# Patient Record
Sex: FEMALE | Race: WHITE | NOT HISPANIC OR LATINO | Employment: OTHER | URBAN - METROPOLITAN AREA
[De-identification: names, ages, dates, MRNs, and addresses within clinical notes are randomized per-mention and may not be internally consistent; named-entity substitution may affect disease eponyms.]

---

## 2019-09-23 ENCOUNTER — APPOINTMENT (OUTPATIENT)
Dept: RADIOLOGY | Facility: IMAGING CENTER | Age: 69
End: 2019-09-23
Attending: FAMILY MEDICINE
Payer: MEDICARE

## 2019-09-23 ENCOUNTER — HOSPITAL ENCOUNTER (OUTPATIENT)
Dept: LAB | Facility: MEDICAL CENTER | Age: 69
End: 2019-09-23
Attending: FAMILY MEDICINE
Payer: MEDICARE

## 2019-09-23 ENCOUNTER — OFFICE VISIT (OUTPATIENT)
Dept: MEDICAL GROUP | Facility: PHYSICIAN GROUP | Age: 69
End: 2019-09-23
Payer: MEDICARE

## 2019-09-23 VITALS
HEIGHT: 66 IN | RESPIRATION RATE: 18 BRPM | DIASTOLIC BLOOD PRESSURE: 82 MMHG | BODY MASS INDEX: 22.5 KG/M2 | TEMPERATURE: 98.7 F | HEART RATE: 110 BPM | WEIGHT: 140 LBS | OXYGEN SATURATION: 97 % | SYSTOLIC BLOOD PRESSURE: 122 MMHG

## 2019-09-23 DIAGNOSIS — M54.42 CHRONIC BILATERAL LOW BACK PAIN WITH LEFT-SIDED SCIATICA: ICD-10-CM

## 2019-09-23 DIAGNOSIS — F41.9 ANXIETY: ICD-10-CM

## 2019-09-23 DIAGNOSIS — I15.2 HYPERTENSION ASSOCIATED WITH DIABETES (HCC): ICD-10-CM

## 2019-09-23 DIAGNOSIS — E11.59 HYPERTENSION ASSOCIATED WITH DIABETES (HCC): ICD-10-CM

## 2019-09-23 DIAGNOSIS — F31.9 BIPOLAR 1 DISORDER (HCC): ICD-10-CM

## 2019-09-23 DIAGNOSIS — E78.5 DYSLIPIDEMIA: ICD-10-CM

## 2019-09-23 DIAGNOSIS — Z85.3 HISTORY OF BREAST CANCER: ICD-10-CM

## 2019-09-23 DIAGNOSIS — E10.9 TYPE 1 DIABETES MELLITUS WITHOUT COMPLICATION (HCC): ICD-10-CM

## 2019-09-23 DIAGNOSIS — Z23 NEED FOR VACCINATION: ICD-10-CM

## 2019-09-23 DIAGNOSIS — G89.29 CHRONIC BILATERAL LOW BACK PAIN WITH LEFT-SIDED SCIATICA: ICD-10-CM

## 2019-09-23 LAB
ALBUMIN SERPL BCP-MCNC: 4.3 G/DL (ref 3.2–4.9)
ALBUMIN/GLOB SERPL: 1.7 G/DL
ALP SERPL-CCNC: 123 U/L (ref 30–99)
ALT SERPL-CCNC: 12 U/L (ref 2–50)
ANION GAP SERPL CALC-SCNC: 9 MMOL/L (ref 0–11.9)
AST SERPL-CCNC: 20 U/L (ref 12–45)
BASOPHILS # BLD AUTO: 0.5 % (ref 0–1.8)
BASOPHILS # BLD: 0.04 K/UL (ref 0–0.12)
BILIRUB SERPL-MCNC: 0.4 MG/DL (ref 0.1–1.5)
BUN SERPL-MCNC: 14 MG/DL (ref 8–22)
CALCIUM SERPL-MCNC: 10.8 MG/DL (ref 8.5–10.5)
CHLORIDE SERPL-SCNC: 103 MMOL/L (ref 96–112)
CHOLEST SERPL-MCNC: 139 MG/DL (ref 100–199)
CO2 SERPL-SCNC: 27 MMOL/L (ref 20–33)
CREAT SERPL-MCNC: 1 MG/DL (ref 0.5–1.4)
EOSINOPHIL # BLD AUTO: 0.35 K/UL (ref 0–0.51)
EOSINOPHIL NFR BLD: 4.8 % (ref 0–6.9)
ERYTHROCYTE [DISTWIDTH] IN BLOOD BY AUTOMATED COUNT: 44 FL (ref 35.9–50)
EST. AVERAGE GLUCOSE BLD GHB EST-MCNC: 157 MG/DL
FASTING STATUS PATIENT QL REPORTED: NORMAL
GLOBULIN SER CALC-MCNC: 2.5 G/DL (ref 1.9–3.5)
GLUCOSE SERPL-MCNC: 126 MG/DL (ref 65–99)
HBA1C MFR BLD: 7.1 % (ref 0–5.6)
HCT VFR BLD AUTO: 44 % (ref 37–47)
HDLC SERPL-MCNC: 61 MG/DL
HGB BLD-MCNC: 13.6 G/DL (ref 12–16)
IMM GRANULOCYTES # BLD AUTO: 0.02 K/UL (ref 0–0.11)
IMM GRANULOCYTES NFR BLD AUTO: 0.3 % (ref 0–0.9)
LDLC SERPL CALC-MCNC: 51 MG/DL
LYMPHOCYTES # BLD AUTO: 1.79 K/UL (ref 1–4.8)
LYMPHOCYTES NFR BLD: 24.6 % (ref 22–41)
MCH RBC QN AUTO: 28.3 PG (ref 27–33)
MCHC RBC AUTO-ENTMCNC: 30.9 G/DL (ref 33.6–35)
MCV RBC AUTO: 91.5 FL (ref 81.4–97.8)
MONOCYTES # BLD AUTO: 0.62 K/UL (ref 0–0.85)
MONOCYTES NFR BLD AUTO: 8.5 % (ref 0–13.4)
NEUTROPHILS # BLD AUTO: 4.47 K/UL (ref 2–7.15)
NEUTROPHILS NFR BLD: 61.3 % (ref 44–72)
NRBC # BLD AUTO: 0 K/UL
NRBC BLD-RTO: 0 /100 WBC
PLATELET # BLD AUTO: 335 K/UL (ref 164–446)
PMV BLD AUTO: 10.4 FL (ref 9–12.9)
POTASSIUM SERPL-SCNC: 3.7 MMOL/L (ref 3.6–5.5)
PROT SERPL-MCNC: 6.8 G/DL (ref 6–8.2)
RBC # BLD AUTO: 4.81 M/UL (ref 4.2–5.4)
SODIUM SERPL-SCNC: 139 MMOL/L (ref 135–145)
T4 FREE SERPL-MCNC: 1.09 NG/DL (ref 0.53–1.43)
TRIGL SERPL-MCNC: 133 MG/DL (ref 0–149)
TSH SERPL DL<=0.005 MIU/L-ACNC: 4.82 UIU/ML (ref 0.38–5.33)
WBC # BLD AUTO: 7.3 K/UL (ref 4.8–10.8)

## 2019-09-23 PROCEDURE — 73521 X-RAY EXAM HIPS BI 2 VIEWS: CPT | Mod: TC | Performed by: FAMILY MEDICINE

## 2019-09-23 PROCEDURE — 80053 COMPREHEN METABOLIC PANEL: CPT

## 2019-09-23 PROCEDURE — 84443 ASSAY THYROID STIM HORMONE: CPT

## 2019-09-23 PROCEDURE — 83036 HEMOGLOBIN GLYCOSYLATED A1C: CPT | Mod: GA

## 2019-09-23 PROCEDURE — 90662 IIV NO PRSV INCREASED AG IM: CPT | Performed by: FAMILY MEDICINE

## 2019-09-23 PROCEDURE — 84439 ASSAY OF FREE THYROXINE: CPT

## 2019-09-23 PROCEDURE — 36415 COLL VENOUS BLD VENIPUNCTURE: CPT

## 2019-09-23 PROCEDURE — G0008 ADMIN INFLUENZA VIRUS VAC: HCPCS | Performed by: FAMILY MEDICINE

## 2019-09-23 PROCEDURE — 72100 X-RAY EXAM L-S SPINE 2/3 VWS: CPT | Mod: TC | Performed by: FAMILY MEDICINE

## 2019-09-23 PROCEDURE — 85025 COMPLETE CBC W/AUTO DIFF WBC: CPT

## 2019-09-23 PROCEDURE — 80061 LIPID PANEL: CPT

## 2019-09-23 PROCEDURE — 99203 OFFICE O/P NEW LOW 30 MIN: CPT | Mod: 25 | Performed by: FAMILY MEDICINE

## 2019-09-23 RX ORDER — LOSARTAN POTASSIUM 50 MG/1
TABLET ORAL
Qty: 135 TAB | Refills: 3 | Status: SHIPPED | OUTPATIENT
Start: 2019-09-23 | End: 2020-08-26 | Stop reason: SDUPTHER

## 2019-09-23 RX ORDER — GABAPENTIN 100 MG/1
100 CAPSULE ORAL 3 TIMES DAILY
Qty: 90 CAP | Refills: 5 | Status: SHIPPED | OUTPATIENT
Start: 2019-09-23 | End: 2020-02-27

## 2019-09-23 RX ORDER — LITHIUM CARBONATE 300 MG
300 TABLET ORAL 2 TIMES DAILY
Qty: 180 TAB | Refills: 0 | Status: SHIPPED | OUTPATIENT
Start: 2019-09-23 | End: 2020-01-27

## 2019-09-23 RX ORDER — ALPRAZOLAM 0.5 MG/1
TABLET ORAL
Qty: 60 TAB | Refills: 2 | Status: SHIPPED | OUTPATIENT
Start: 2019-09-23 | End: 2020-02-27 | Stop reason: SDUPTHER

## 2019-09-23 RX ORDER — LAMOTRIGINE 100 MG/1
100 TABLET ORAL DAILY
COMMUNITY
End: 2019-09-23 | Stop reason: SDUPTHER

## 2019-09-23 RX ORDER — OLANZAPINE 5 MG/1
5 TABLET ORAL EVERY EVENING
Qty: 90 TAB | Refills: 0 | Status: SHIPPED | OUTPATIENT
Start: 2019-09-23 | End: 2019-11-26 | Stop reason: SDUPTHER

## 2019-09-23 RX ORDER — AMLODIPINE BESYLATE 5 MG/1
5 TABLET ORAL DAILY
COMMUNITY
End: 2020-08-26 | Stop reason: SDUPTHER

## 2019-09-23 RX ORDER — OLANZAPINE 5 MG/1
5 TABLET ORAL NIGHTLY
COMMUNITY
End: 2019-09-23 | Stop reason: SDUPTHER

## 2019-09-23 RX ORDER — INSULIN ASPART 100 [IU]/ML
12-15 INJECTION, SOLUTION INTRAVENOUS; SUBCUTANEOUS
Qty: 15 PEN | Refills: 3 | Status: SHIPPED | OUTPATIENT
Start: 2019-09-23 | End: 2020-06-02

## 2019-09-23 RX ORDER — LITHIUM CARBONATE 300 MG
300 TABLET ORAL 3 TIMES DAILY
COMMUNITY
End: 2019-09-23 | Stop reason: SDUPTHER

## 2019-09-23 RX ORDER — ATORVASTATIN CALCIUM 80 MG/1
80 TABLET, FILM COATED ORAL NIGHTLY
COMMUNITY
End: 2020-03-02

## 2019-09-23 RX ORDER — INSULIN ASPART 100 [IU]/ML
INJECTION, SOLUTION INTRAVENOUS; SUBCUTANEOUS
Refills: 3 | COMMUNITY
Start: 2019-07-03 | End: 2019-09-23 | Stop reason: SDUPTHER

## 2019-09-23 RX ORDER — INSULIN GLARGINE 100 [IU]/ML
30 INJECTION, SOLUTION SUBCUTANEOUS EVERY EVENING
Qty: 15 PEN | Refills: 3 | Status: SHIPPED | OUTPATIENT
Start: 2019-09-23 | End: 2020-02-21

## 2019-09-23 RX ORDER — BUPROPION HYDROCHLORIDE 150 MG/1
150 TABLET, EXTENDED RELEASE ORAL 2 TIMES DAILY
Qty: 180 TAB | Refills: 0 | Status: SHIPPED | OUTPATIENT
Start: 2019-09-23 | End: 2020-04-24 | Stop reason: SDUPTHER

## 2019-09-23 RX ORDER — LOSARTAN POTASSIUM 50 MG/1
TABLET ORAL
Refills: 1 | COMMUNITY
Start: 2019-07-08 | End: 2019-09-23 | Stop reason: SDUPTHER

## 2019-09-23 RX ORDER — LAMOTRIGINE 100 MG/1
100 TABLET ORAL 2 TIMES DAILY
Qty: 180 TAB | Refills: 0 | Status: SHIPPED | OUTPATIENT
Start: 2019-09-23 | End: 2019-11-26 | Stop reason: SDUPTHER

## 2019-09-23 RX ORDER — ALPRAZOLAM 0.5 MG/1
TABLET ORAL
Refills: 2 | COMMUNITY
Start: 2019-08-14 | End: 2019-09-23 | Stop reason: SDUPTHER

## 2019-09-23 RX ORDER — PRIMIDONE 50 MG/1
100 TABLET ORAL DAILY
Qty: 180 TAB | Refills: 1 | Status: SHIPPED | OUTPATIENT
Start: 2019-09-23 | End: 2020-08-26 | Stop reason: SDUPTHER

## 2019-09-23 RX ORDER — ANASTROZOLE 1 MG/1
1 TABLET ORAL DAILY
COMMUNITY
End: 2020-08-26 | Stop reason: SDUPTHER

## 2019-09-23 RX ORDER — PRIMIDONE 50 MG/1
50 TABLET ORAL 4 TIMES DAILY
COMMUNITY
End: 2019-09-23 | Stop reason: SDUPTHER

## 2019-09-23 RX ORDER — BUPROPION HYDROCHLORIDE 150 MG/1
TABLET, EXTENDED RELEASE ORAL
COMMUNITY
Start: 2019-09-23 | End: 2019-09-23 | Stop reason: SDUPTHER

## 2019-09-23 RX ORDER — INSULIN GLARGINE 100 [IU]/ML
INJECTION, SOLUTION SUBCUTANEOUS
COMMUNITY
Start: 2019-09-21 | End: 2019-09-23 | Stop reason: SDUPTHER

## 2019-09-23 SDOH — HEALTH STABILITY: MENTAL HEALTH: HOW OFTEN DO YOU HAVE A DRINK CONTAINING ALCOHOL?: NEVER

## 2019-09-23 ASSESSMENT — PATIENT HEALTH QUESTIONNAIRE - PHQ9
CLINICAL INTERPRETATION OF PHQ2 SCORE: 2
SUM OF ALL RESPONSES TO PHQ QUESTIONS 1-9: 8
5. POOR APPETITE OR OVEREATING: 1 - SEVERAL DAYS

## 2019-09-23 NOTE — LETTER
[facility]  Ema Miller M.D.  [address]  [facility] [address]  Fax: [phone]   Authorization for Release/Disclosure of   Protected Health Information   Name: MOLLY STARK : 1950 SSN: [id number]   Address: [address] [place] [id number] Phone:    [phone] (home)    I authorize the entity listed below to release/disclose the PHI below to:   formerly [facility]/Ema Miller M.D. and Ema Miller M.D.   Provider or Entity Name:     Address   City, State, [facility]   Phone:      Fax:     Reason for request: continuity of care   Information to be released:    [  ] LAST COLONOSCOPY,  including any PATH REPORT and follow-up  [  ] LAST FIT/COLOGUARD RESULT [  ] LAST DEXA  [  ] LAST MAMMOGRAM  [  ] LAST PAP  [  ] LAST LABS [  ] RETINA EXAM REPORT  [  ] IMMUNIZATION RECORDS  [  ] Release all info      [  ] Check here and initial the line next to each item to release ALL health information INCLUDING  _____ Care and treatment for drug and / or alcohol abuse  _____ HIV testing, infection status, or AIDS  _____ Genetic Testing    DATES OF SERVICE OR TIME PERIOD TO BE DISCLOSED: _____________  I understand and acknowledge that:  * This Authorization may be revoked at any time by you in writing, except if your health information has already been used or disclosed.  * Your health information that will be used or disclosed as a result of you signing this authorization could be re-disclosed by the recipient. If this occurs, your re-disclosed health information may no longer be protected by State or Federal laws.  * You may refuse to sign this Authorization. Your refusal will not affect your ability to obtain treatment.  * This Authorization becomes effective upon signing and will  on (date) __________.      If no date is indicated, this Authorization will  one (1) year from the signature date.    Name: Molly Stark    Signature:   Date:     2019       PLEASE FAX REQUESTED RECORDS BACK TO: [phone]  [phone]

## 2019-09-23 NOTE — PROGRESS NOTES
cc: Diabetes      Subjective:     Molly Loera is a 69 y.o. female presenting for the following:     Type 1 diabetes: Patient was diagnosed with type 1 diabetes as an older adult.  She has been controlled on insulin and per her daughter's report has been well controlled.  She has not had any hypoglycemia episodes recently.Patient denies any polyuria/polydipsia, rashes, recurrent infections, changes in vision, changes in sensation.    Bipolar disorder: Patient has had this for many years and has been difficult to control her mood.  She was seeing psychiatry regularly before.  She is currently doing well on her medications.  No suicidal or homicidal ideation.    Review of systems:  All others reviewed and are negative.       Current Outpatient Medications:   •  atorvastatin (LIPITOR) 80 MG tablet, Take 80 mg by mouth every evening., Disp: , Rfl:   •  aspirin EC (ECOTRIN) 81 MG Tablet Delayed Response, Take 81 mg by mouth every day., Disp: , Rfl:   •  anastrozole (ARIMIDEX) 1 MG Tab, Take 1 mg by mouth every day., Disp: , Rfl:   •  amLODIPine (NORVASC) 5 MG Tab, Take 5 mg by mouth every day., Disp: , Rfl:   •  gabapentin (NEURONTIN) 100 MG Cap, Take 1 Cap by mouth 3 times a day., Disp: 90 Cap, Rfl: 5  •  ALPRAZolam (XANAX) 0.5 MG Tab, TAKE 1 TABLET BY MOUTH TWICE A DAY AS NEEDED FOR ANXIETY. F41.1, Disp: 60 Tab, Rfl: 2  •  lamoTRIgine (LAMICTAL) 100 MG Tab, Take 1 Tab by mouth 2 times a day., Disp: 180 Tab, Rfl: 0  •  buPROPion SR (WELLBUTRIN-SR) 150 MG TABLET SR 12 HR sustained-release tablet, Take 1 Tab by mouth 2 times a day., Disp: 180 Tab, Rfl: 0  •  Cariprazine HCl (VRAYLAR) 3 MG Cap, Take 1 Cap by mouth every day., Disp: 30 Cap, Rfl: 2  •  lithium (ESKALITH) 300 MG Tab, Take 1 Tab by mouth 2 Times a Day., Disp: 180 Tab, Rfl: 0  •  LANTUS SOLOSTAR 100 UNIT/ML Solution Pen-injector injection, Inject 30 Units as instructed every evening., Disp: 15 PEN, Rfl: 3  •  losartan (COZAAR) 50 MG Tab, TAKE 1.5 TABLETS (75 MG  "TOTAL) BY MOUTH DAILY., Disp: 135 Tab, Rfl: 3  •  NOVOLOG FLEXPEN 100 UNIT/ML solution for injection, Inject 12-15 Units as instructed 4 Times a Day,Before Meals and at Bedtime., Disp: 15 PEN, Rfl: 3  •  OLANZapine (ZYPREXA) 5 MG Tab, Take 1 Tab by mouth every evening., Disp: 90 Tab, Rfl: 0  •  primidone (MYSOLINE) 50 MG Tab, Take 2 Tabs by mouth every day., Disp: 180 Tab, Rfl: 1    Allergies, past medical history, past surgical history, family history, social history reviewed and updated    Objective:     Vitals: /82 (BP Location: Left arm, Patient Position: Sitting, BP Cuff Size: Adult)   Pulse (!) 110   Temp 37.1 °C (98.7 °F) (Temporal)   Resp 18   Ht 1.676 m (5' 6\")   Wt 63.5 kg (140 lb)   SpO2 97%   BMI 22.60 kg/m²   General: Alert, pleasant, NAD, slightly hard of hearing  HEENT: Normocephalic.   EOMI, no icterus or pallor.  Conjunctivae and lids normal. External ears normal. Oropharynx non-erythematous, mucous membranes moist.    Neck supple.  No thyromegaly or masses palpated. No cervical or supraclavicular lymphadenopathy.  Heart: Regular rate and rhythm.  S1 and S2 normal.  No murmurs appreciated.  Respiratory: Normal respiratory effort.  Clear to auscultation bilaterally.  Abdomen: Non-distended, soft  Skin: Warm, dry, no rashes.  Musculoskeletal: Gait is normal.  Moves all extremities well.  Extremities: No leg edema.    Neurological:  CN2-12 grossly intact  Psych:  Affect is slightly blunted but I am not sure if that is due to hearing difficulty, judgement is good, memory is intact, grooming is appropriate.    Assessment/Plan:     Molly was seen today for establish care.    Diagnoses and all orders for this visit:    Bipolar 1 disorder (HCC): Patient is on many medications to control her mood.  She was seeing psychiatry monthly in the past so does need to reestablish with a psychiatrist in [place].  She is currently stable and feels well on her medications.  -     INFLUENZA VACCINE, HIGH DOSE " (65+ ONLY)  -     REFERRAL TO PSYCHIATRY  -     CBC WITH DIFFERENTIAL; Future  -     TSH; Future  -     FREE THYROXINE; Future  -     lamoTRIgine (LAMICTAL) 100 MG Tab; Take 1 Tab by mouth 2 times a day.  -     buPROPion SR (WELLBUTRIN-SR) 150 MG TABLET SR 12 HR sustained-release tablet; Take 1 Tab by mouth 2 times a day.  -     Cariprazine HCl (VRAYLAR) 3 MG Cap; Take 1 Cap by mouth every day.  -     lithium (ESKALITH) 300 MG Tab; Take 1 Tab by mouth 2 Times a Day.  -     OLANZapine (ZYPREXA) 5 MG Tab; Take 1 Tab by mouth every evening.    History of breast cancer: Patient with breast cancer diagnosis diagnosed in 2017 and treated with mastectomy, chemo, and radiation.  She was seeing her last oncologist every 6 months.  Will refer to oncology.  -     REFERRAL TO HEMATOLOGY ONCOLOGY Referral to? [facility]    Type 1 diabetes mellitus without complication (HCC): Well-controlled in the past without hypoglycemia episodes recently.  Will refill medications and check labs.  -     HEMOGLOBIN A1C; Future  -     Comp Metabolic Panel; Future  -     LANTUS SOLOSTAR 100 UNIT/ML Solution Pen-injector injection; Inject 30 Units as instructed every evening.  -     NOVOLOG FLEXPEN 100 UNIT/ML solution for injection; Inject 12-15 Units as instructed 4 Times a Day,Before Meals and at Bedtime.    Chronic bilateral low back pain with left-sided sciatica: Chronic problem that is limiting patient's function.  Will obtain x-rays and refer to pain clinic.  -     DX-LUMBAR SPINE-2 OR 3 VIEWS; Future  -     DX-HIP-BILATERAL-WITH PELVIS-2 VIEWS; Future  -     REFERRAL TO PAIN CLINIC  -     gabapentin (NEURONTIN) 100 MG Cap; Take 1 Cap by mouth 3 times a day.    Anxiety: Explained to patient that Xanax is generally not recommended regularly in general and much less in someone who is 69 years old.  However, patient does have difficult to control bipolar and I would want any change in medication or withdrawal to be done with the  supervision of a psychiatrist so I will refill this now with the hope of psychiatry managing this in the future.  -     CBC WITH DIFFERENTIAL; Future  -     TSH; Future  -     FREE THYROXINE; Future  -     ALPRAZolam (XANAX) 0.5 MG Tab; TAKE 1 TABLET BY MOUTH TWICE A DAY AS NEEDED FOR ANXIETY. F41.1    Hypertension associated with diabetes (HCC): Well-controlled chronic problem.  -     losartan (COZAAR) 50 MG Tab; TAKE 1.5 TABLETS (75 MG TOTAL) BY MOUTH DAILY.    Dyslipidemia  -     Lipid Profile; Future    Need for vaccination: Daughter and patient think that she is up-to-date on her vaccinations but will check records.    Other orders  -     primidone (MYSOLINE) 50 MG Tab; Take 2 Tabs by mouth every day.    Return in about 6 months (around 3/23/2020), or if symptoms worsen or fail to improve.

## 2019-10-04 ENCOUNTER — TELEPHONE (OUTPATIENT)
Dept: MEDICAL GROUP | Facility: PHYSICIAN GROUP | Age: 69
End: 2019-10-04

## 2019-10-04 DIAGNOSIS — M54.42 CHRONIC BILATERAL LOW BACK PAIN WITH LEFT-SIDED SCIATICA: ICD-10-CM

## 2019-10-04 DIAGNOSIS — G89.29 CHRONIC BILATERAL LOW BACK PAIN WITH LEFT-SIDED SCIATICA: ICD-10-CM

## 2019-10-04 NOTE — TELEPHONE ENCOUNTER
Patient has decided that she would like to see [facility] for her chronic pain. Referral again placed.

## 2019-10-04 NOTE — TELEPHONE ENCOUNTER
Pt decided to stay with [facility].   2. SPECIFIC Action To Be Taken: Referral pending, please sign.    3. Diagnosis/Clinical Reason for Request: Pain Clinic    4. Specialty & Provider Name/Lab/Imaging Location: [facility]  Pain [facility]    5. Is appointment scheduled for requested order/referral: no    Patient was informed they will receive a return phone call from the office ONLY if there are any questions before processing their request. Advised to call back if they haven't received a call from the referral department in 5 days.

## 2019-11-04 ENCOUNTER — TELEPHONE (OUTPATIENT)
Dept: MEDICAL GROUP | Facility: PHYSICIAN GROUP | Age: 69
End: 2019-11-04

## 2019-11-04 NOTE — TELEPHONE ENCOUNTER
ALPRAZolam (XANAX) 0.5 MG Tab [[id number]]     Order Details   Dose, Route, Frequency: As Directed    Dispense Quantity: 60 Tab Refills: 2 Fills remaining: --           Sig: TAKE 1 TABLET BY MOUTH TWICE A DAY AS NEEDED FOR ANXIETY. F41.1        Pharmacy called and needs to clarify days supply? (Pt barely turned in script on Sunday)

## 2019-11-26 DIAGNOSIS — F31.9 BIPOLAR 1 DISORDER (HCC): ICD-10-CM

## 2019-11-26 RX ORDER — LAMOTRIGINE 100 MG/1
TABLET ORAL
Qty: 180 TAB | Refills: 0 | Status: SHIPPED | OUTPATIENT
Start: 2019-11-26 | End: 2020-04-24 | Stop reason: SDUPTHER

## 2019-11-26 NOTE — TELEPHONE ENCOUNTER
Will send 3 months to pharmacy as pt will be due for labs at that time. Will send 3 months to pharmacy.

## 2019-11-27 RX ORDER — OLANZAPINE 5 MG/1
TABLET ORAL
Qty: 90 TAB | Refills: 1 | Status: SHIPPED | OUTPATIENT
Start: 2019-11-27 | End: 2020-04-24

## 2019-11-27 NOTE — TELEPHONE ENCOUNTER
Was the patient seen in the last year in this department? Yes    Does patient have an active prescription for medications requested? No     Received Request Via: Pharmacy    Pt met protocol?: Yes     Last OV 09/23/19

## 2019-12-24 RX ORDER — ALPRAZOLAM 0.5 MG/1
TABLET ORAL
Qty: 60 TAB | Refills: 0 | OUTPATIENT
Start: 2019-12-24

## 2019-12-24 NOTE — TELEPHONE ENCOUNTER
Patient was referred to psychiatry to manage this medication.  We will have MA call patient to have her make appointment with psychiatry for further refills.  This referral has already been processed.    [facility]  [address] [place] [address]  Phone: [phone]    -Once patient has appointment I am willing to refill this medication up until that appointment date only.

## 2020-01-25 DIAGNOSIS — F31.9 BIPOLAR 1 DISORDER (HCC): ICD-10-CM

## 2020-01-27 RX ORDER — LITHIUM CARBONATE 300 MG
TABLET ORAL
Qty: 180 TAB | Refills: 0 | Status: SHIPPED | OUTPATIENT
Start: 2020-01-27 | End: 2020-04-09

## 2020-01-27 NOTE — TELEPHONE ENCOUNTER
Was the patient seen in the last year in this department? Yes    Does patient have an active prescription for medications requested? No     Received Request Via: Pharmacy      Pt met protocol?: Yes    LAST OV 09/23/2019

## 2020-02-18 DIAGNOSIS — E10.9 TYPE 1 DIABETES MELLITUS WITHOUT COMPLICATION (HCC): ICD-10-CM

## 2020-02-18 RX ORDER — INSULIN GLARGINE 100 [IU]/ML
30 INJECTION, SOLUTION SUBCUTANEOUS EVERY EVENING
Qty: 15 PEN | Refills: 2 | Status: SHIPPED | OUTPATIENT
Start: 2020-02-18 | End: 2020-02-21 | Stop reason: SDUPTHER

## 2020-02-18 RX ORDER — INSULIN GLARGINE 100 [IU]/ML
30 INJECTION, SOLUTION SUBCUTANEOUS EVERY EVENING
Qty: 15 PEN | Refills: 3 | OUTPATIENT
Start: 2020-02-18

## 2020-02-18 NOTE — TELEPHONE ENCOUNTER
*PHARMACY REQUESTING BASAGLAR*  Was the patient seen in the last year in this department? Yes    Does patient have an active prescription for medications requested? No     Received Request Via: Pharmacy      Pt met protocol?: Yes    LAST OV 09/23/2019    Lab Results   Component Value Date/Time    HBA1C 7.1 (H) 09/23/2019 0951     Lab Results   Component Value Date/Time    AVGLUC 157 09/23/2019 0951     Lab Results   Component Value Date/Time    CHOLSTRLTOT 139 09/23/2019 0951     Lab Results   Component Value Date/Time    TRIGLYCERIDE 133 09/23/2019 0951     No components found for: HLD  Lab Results   Component Value Date/Time    LDL 51 09/23/2019 0951

## 2020-02-21 RX ORDER — INSULIN GLARGINE 100 [IU]/ML
30 INJECTION, SOLUTION SUBCUTANEOUS EVERY EVENING
Qty: 15 PEN | Refills: 2 | Status: SHIPPED | OUTPATIENT
Start: 2020-02-21 | End: 2020-06-30 | Stop reason: SDUPTHER

## 2020-02-27 ENCOUNTER — OFFICE VISIT (OUTPATIENT)
Dept: MEDICAL GROUP | Facility: PHYSICIAN GROUP | Age: 70
End: 2020-02-27
Payer: MEDICARE

## 2020-02-27 ENCOUNTER — HOSPITAL ENCOUNTER (OUTPATIENT)
Dept: LAB | Facility: MEDICAL CENTER | Age: 70
End: 2020-02-27
Attending: FAMILY MEDICINE
Payer: MEDICARE

## 2020-02-27 VITALS
BODY MASS INDEX: 23.32 KG/M2 | HEIGHT: 65 IN | HEART RATE: 124 BPM | DIASTOLIC BLOOD PRESSURE: 90 MMHG | TEMPERATURE: 99.2 F | WEIGHT: 140 LBS | SYSTOLIC BLOOD PRESSURE: 172 MMHG | OXYGEN SATURATION: 92 %

## 2020-02-27 DIAGNOSIS — C50.912 MALIGNANT NEOPLASM OF LEFT FEMALE BREAST, UNSPECIFIED ESTROGEN RECEPTOR STATUS, UNSPECIFIED SITE OF BREAST (HCC): ICD-10-CM

## 2020-02-27 DIAGNOSIS — E83.52 HYPERCALCEMIA: ICD-10-CM

## 2020-02-27 DIAGNOSIS — E10.9 TYPE 1 DIABETES MELLITUS WITHOUT COMPLICATION (HCC): ICD-10-CM

## 2020-02-27 DIAGNOSIS — F31.9 BIPOLAR 1 DISORDER (HCC): ICD-10-CM

## 2020-02-27 DIAGNOSIS — Z11.59 NEED FOR HEPATITIS C SCREENING TEST: ICD-10-CM

## 2020-02-27 DIAGNOSIS — N18.30 STAGE 3 CHRONIC KIDNEY DISEASE: ICD-10-CM

## 2020-02-27 DIAGNOSIS — F41.9 ANXIETY: ICD-10-CM

## 2020-02-27 LAB
ALBUMIN SERPL BCP-MCNC: 4.6 G/DL (ref 3.2–4.9)
ALBUMIN/GLOB SERPL: 1.5 G/DL
ALP SERPL-CCNC: 128 U/L (ref 30–99)
ALT SERPL-CCNC: 18 U/L (ref 2–50)
ANION GAP SERPL CALC-SCNC: 12 MMOL/L (ref 0–11.9)
AST SERPL-CCNC: 24 U/L (ref 12–45)
BILIRUB SERPL-MCNC: 0.4 MG/DL (ref 0.1–1.5)
BUN SERPL-MCNC: 19 MG/DL (ref 8–22)
CALCIUM SERPL-MCNC: 11.2 MG/DL (ref 8.5–10.5)
CHLORIDE SERPL-SCNC: 100 MMOL/L (ref 96–112)
CO2 SERPL-SCNC: 22 MMOL/L (ref 20–33)
CREAT SERPL-MCNC: 0.92 MG/DL (ref 0.5–1.4)
FASTING STATUS PATIENT QL REPORTED: NORMAL
GLOBULIN SER CALC-MCNC: 3.1 G/DL (ref 1.9–3.5)
GLUCOSE SERPL-MCNC: 164 MG/DL (ref 65–99)
HCV AB S/CO SERPL IA: NEGATIVE
POTASSIUM SERPL-SCNC: 3.9 MMOL/L (ref 3.6–5.5)
PROT SERPL-MCNC: 7.7 G/DL (ref 6–8.2)
PTH-INTACT SERPL-MCNC: 65.3 PG/ML (ref 14–72)
SODIUM SERPL-SCNC: 134 MMOL/L (ref 135–145)

## 2020-02-27 PROCEDURE — 80053 COMPREHEN METABOLIC PANEL: CPT

## 2020-02-27 PROCEDURE — G0472 HEP C SCREEN HIGH RISK/OTHER: HCPCS

## 2020-02-27 PROCEDURE — 36415 COLL VENOUS BLD VENIPUNCTURE: CPT

## 2020-02-27 PROCEDURE — 83036 HEMOGLOBIN GLYCOSYLATED A1C: CPT | Mod: GA

## 2020-02-27 PROCEDURE — 99214 OFFICE O/P EST MOD 30 MIN: CPT | Performed by: FAMILY MEDICINE

## 2020-02-27 PROCEDURE — 83970 ASSAY OF PARATHORMONE: CPT

## 2020-02-27 RX ORDER — ALPRAZOLAM 0.5 MG/1
TABLET ORAL
Qty: 60 TAB | Refills: 2 | Status: SHIPPED | OUTPATIENT
Start: 2020-02-27 | End: 2020-04-24 | Stop reason: SDUPTHER

## 2020-02-27 NOTE — PROGRESS NOTES
cc: Breast cancer      Subjective:     Molyl Loera is a 69 y.o. female presenting for the following:     Breast cancer: Patient diagnosed with breast cancer in about 2017. Treated with masectomy, chemo, and radiation. Was previously seeing oncology regularly every 6 months prior to moving to [place] and has not yet established in [place]. Did not go to prior referral because she was too nervous.   Patient does feel well overall.  She is feeling some soft spots on her right breast and chest.  She feels as though it could be her anxiety making her more nervous about these areas.  She otherwise is not having any weight loss, new bone or joint pain, night sweats.    Anxiety-patient does have severe anxiety and a history of bipolar disorder.    she does feel that these problems are at their baseline.  She does have daily worry and tends to overreact to things but she always has.  She denies any suicidal or homicidal ideation.    Type 1 diabetes-patient is using NovoLog as needed after meals and daily glargine.  She has had 1 hypoglycemia to the 70s in the last few weeks.  She otherwise feels well. Patient denies any polyuria/polydipsia, rashes, recurrent infections, changes in vision, changes in sensation.      Review of systems:  All others reviewed and are negative.       Current Outpatient Medications:   •  ALPRAZolam (XANAX) 0.5 MG Tab, TAKE 1 TABLET BY MOUTH TWICE A DAY AS NEEDED FOR ANXIETY. F41.1, Disp: 60 Tab, Rfl: 2  •  insulin glargine (INSULIN GLARGINE) 100 UNIT/ML Solution Pen-injector injection, Inject 30 Units as instructed every evening., Disp: 15 PEN, Rfl: 2  •  lithium (ESKALITH) 300 MG Tab, TAKE 1 TABLET BY MOUTH TWICE A DAY, Disp: 180 Tab, Rfl: 0  •  OLANZapine (ZYPREXA) 5 MG Tab, TAKE 1 TABLET BY MOUTH EVERY DAY IN THE EVENING, Disp: 90 Tab, Rfl: 1  •  lamoTRIgine (LAMICTAL) 100 MG Tab, TAKE 1 TABLET BY MOUTH TWICE A DAY, Disp: 180 Tab, Rfl: 0  •  aspirin EC (ECOTRIN) 81 MG Tablet Delayed Response,  "Take 81 mg by mouth every day., Disp: , Rfl:   •  anastrozole (ARIMIDEX) 1 MG Tab, Take 1 mg by mouth every day., Disp: , Rfl:   •  amLODIPine (NORVASC) 5 MG Tab, Take 5 mg by mouth every day., Disp: , Rfl:   •  buPROPion SR (WELLBUTRIN-SR) 150 MG TABLET SR 12 HR sustained-release tablet, Take 1 Tab by mouth 2 times a day., Disp: 180 Tab, Rfl: 0  •  Cariprazine HCl (VRAYLAR) 3 MG Cap, Take 1 Cap by mouth every day., Disp: 30 Cap, Rfl: 2  •  losartan (COZAAR) 50 MG Tab, TAKE 1.5 TABLETS (75 MG TOTAL) BY MOUTH DAILY., Disp: 135 Tab, Rfl: 3  •  NOVOLOG FLEXPEN 100 UNIT/ML solution for injection, Inject 12-15 Units as instructed 4 Times a Day,Before Meals and at Bedtime., Disp: 15 PEN, Rfl: 3  •  primidone (MYSOLINE) 50 MG Tab, Take 2 Tabs by mouth every day., Disp: 180 Tab, Rfl: 1  •  atorvastatin (LIPITOR) 80 MG tablet, TAKE 1 TABLET BY MOUTH EVERY DAY, Disp: 90 Tab, Rfl: 3    Allergies, past medical history, past surgical history, family history, social history reviewed and updated    Objective:     Vitals: BP (!) 172/90 (BP Location: Left arm, Patient Position: Sitting, BP Cuff Size: Adult)   Pulse (!) 124   Temp 37.3 °C (99.2 °F) (Temporal)   Ht 1.651 m (5' 5\")   Wt 63.5 kg (140 lb)   SpO2 92%   BMI 23.30 kg/m²   General: Alert, pleasant, NAD  HEENT: Normocephalic.   EOMI, no icterus or pallor.  Conjunctivae and lids normal. External ears and nose normal. Oropharynx non-erythematous, mucous membranes moist.    Neck supple.  No thyromegaly or masses palpated. No cervical or supraclavicular lymphadenopathy.  Heart: Regular rate and rhythm.  S1 and S2 normal.  No murmurs appreciated.  Respiratory: Normal respiratory effort.  Clear to auscultation bilaterally.  Chest: Area of concern for patient on right anterior chest wall with normal exam.  No masses felt.  No axillary lymphadenopathy appreciated.  Skin: Warm, dry, no rashes in exposed areas.  Extremities: No leg edema.    Neurological:  CN2-12 grossly " intact  Psych:  Affect is normal, judgement is good, grooming is appropriate.    Assessment/Plan:     Molly was seen today for follow-up.    Diagnoses and all orders for this visit:    Malignant neoplasm of left female breast, unspecified estrogen receptor status, unspecified site of breast (HCC): Patient diagnosed with breast cancer in about 2017. Treated with masectomy, chemo, and radiation. Was previously seeing oncology regularly every 6 months prior to moving to [place] and has not yet established in [place]. Did not go to prior referral because she was too nervous.   -Patient does agree to obtain imaging and be seen by oncology.  -     REFERRAL TO HEMATOLOGY ONCOLOGY Referral to? [facility]  -     MA-DIAGNOSTIC MAMMO BILAT W/TOMOSYNTHESIS W/CAD; Future    Type 1 diabetes mellitus without complication (HCC): Well-controlled chronic problem but explained to patient that she should decrease her insulin at mealtimes as she has had a hypoglycemic episode.  Patient declines any referral to endocrinology currently.  She did verbalize understanding that the danger of hypoglycemia is much more immediate and being slightly elevated.  -     HEMOGLOBIN A1C; Future  -     Comp Metabolic Panel; Future    Stage 3 chronic kidney disease (HCC): We will monitor to ensure stability.  -     Comp Metabolic Panel; Future    Bipolar 1 disorder (HCC): Patient has had difficulty with mood since she was young and she does have good insight.  She does understand the importance of establishing with psychiatry but she had difficulty before.  With the last referral she had requested somewhere near her home but that office does not do medication management and she does need medication management.  So I will replace the referral to any office who does medication management and patient is okay with this.  -     REFERRAL TO PSYCHIATRY  Anxiety: Patient has been on Xanax for many years.  She does not take more than is prescribed.  She does  understand to not take this with alcohol.  She does not drive.  -     ALPRAZolam (XANAX) 0.5 MG Tab; TAKE 1 TABLET BY MOUTH TWICE A DAY AS NEEDED FOR ANXIETY. F41.1    Hypercalcemia: Patient has a long history of hypercalcemia.  Her daughter who presents with her does remember her getting scans on her parathyroid.  -     Comp Metabolic Panel; Future  -     PTH INTACT (PTH ONLY); Future    Need for hepatitis C screening test  -     HCV Scrn ( [id number] [employer]); Future    -pt declines colonoscopy currently.     Return in about 3 months (around 2020), or if symptoms worsen or fail to improve.

## 2020-02-28 LAB
EST. AVERAGE GLUCOSE BLD GHB EST-MCNC: 157 MG/DL
HBA1C MFR BLD: 7.1 % (ref 0–5.6)

## 2020-03-03 ENCOUNTER — TELEPHONE (OUTPATIENT)
Dept: SCHEDULING | Facility: IMAGING CENTER | Age: 70
End: 2020-03-03

## 2020-03-03 NOTE — TELEPHONE ENCOUNTER
Good afternoon Dr. Miller,    This pt called in and wanted to schedule for a CT and PET scan that you had talked about during your apt with her on 2/27 but I do not see an order for it.  She is worried she has cancer.     Thank you.   Jazzy Gauthier

## 2020-03-04 ENCOUNTER — HOSPITAL ENCOUNTER (OUTPATIENT)
Dept: RADIOLOGY | Facility: MEDICAL CENTER | Age: 70
End: 2020-03-04
Payer: MEDICARE

## 2020-03-04 NOTE — TELEPHONE ENCOUNTER
During that appointment I did explain to the patient that if oncology would like a PET scan, they will order it.  She does need to establish with oncology for this.

## 2020-03-06 ENCOUNTER — HOSPITAL ENCOUNTER (OUTPATIENT)
Dept: RADIOLOGY | Facility: MEDICAL CENTER | Age: 70
End: 2020-03-06
Attending: FAMILY MEDICINE
Payer: MEDICARE

## 2020-03-06 DIAGNOSIS — C50.912 MALIGNANT NEOPLASM OF LEFT FEMALE BREAST, UNSPECIFIED ESTROGEN RECEPTOR STATUS, UNSPECIFIED SITE OF BREAST (HCC): ICD-10-CM

## 2020-03-06 DIAGNOSIS — R92.8 ABNORMAL MAMMOGRAM: ICD-10-CM

## 2020-03-06 PROCEDURE — 76642 ULTRASOUND BREAST LIMITED: CPT | Mod: RT

## 2020-03-06 PROCEDURE — G0279 TOMOSYNTHESIS, MAMMO: HCPCS | Mod: RT

## 2020-03-16 ENCOUNTER — APPOINTMENT (OUTPATIENT)
Dept: BEHAVIORAL HEALTH | Facility: CLINIC | Age: 70
End: 2020-03-16
Payer: MEDICARE

## 2020-03-16 NOTE — PROGRESS NOTES
[facility] INITIAL PSYCHIATRIC EVALUATION    This provider informed the patient their medical records are totally confidential except for the use by other providers involved in their care, or if the patient signs a release, or to report instances of child or elder abuse, or if it is determined they are an immediate risk to harm themselves or others.    TOTAL FACE-TO-FACE TIME 60 minutes    CHIEF COMPLAINT       Mood swings and generalized anxiety over breast cancer diagnosis and treatment in 2017.    IDENTIFYING INFORMATION       ***    HISTORY OF PRESENT ILLNESS       ***    PSYCHIATRIC REVIEW OF SYSTEMS  {Psych ROS:72872}    MEDICAL REVIEW OF SYSTEMS:   Constitutional {POSITIVE/NEGATIVE:14}   Eyes {POSITIVE/NEGATIVE:14}   Ears/Nose/Mouth/Throat {POSITIVE/NEGATIVE:14}   Cardiovascular {POSITIVE/NEGATIVE:14}   Respiratory {POSITIVE/NEGATIVE:14}   Gastrointestinal {POSITIVE/NEGATIVE:14}   Genitourinary {POSITIVE/NEGATIVE:14}   Muscular {POSITIVE/NEGATIVE:14}   Integumentary {POSITIVE/NEGATIVE:14}   Neurological {POSITIVE/NEGATIVE:14}   Endocrine {POSITIVE/NEGATIVE:14}   Hematologic/Lymphatic {POSITIVE/NEGATIVE:14}       PAST PSYCHIATRIC HISTORY       ***  PAST PSYCHIATRIC MEDICATIONS       ***  SOCIAL HISTORY       ***  DRUGS ***  ALCOHOL ***  TOBACCO ***    MEDICAL HISTORY       ***  CURRENT MEDICATIONS       ***  ALLERGIES       ***  MENTAL STATUS EXAMINATION    There were no vitals taken for this visit.  Participation: {[facility] PARTICIPATION MEASURES:26120367}  Grooming:{[facility]}  Orientation: {[facility] ORIENTATION:62700590}  Eye contact: {[facility] EYE CONTACT:71174196}  Behavior:{[facility] BEHAVIOR:81779316}   Mood: {[facility] MOOD:87139276}  Affect: {[facility] AFFECT:43614501}  Thought process: {[facility] THOUGHT PROCESS:78943478}  Thought content:  {[facility] THOUGHT CONTENT:01840168}  Speech: {[facility] SPEECH:62012520}  Perception:  {[facility] PERCEPTION:08930944}  Memory:  {[facility]  MEMORY:00146426}  Insight: {GOOD/ADEQUATE/LIMITED/POOR:80176490}  Judgment: {GOOD/ADEQUATE/LIMITED/POOR:75312177}  Family/couple interaction observations:   Other:    CURRENT RISK       Suicidal:{[facility] RATINGS:71348886}       Homicidal:{[facility] RATINGS:05874204}       Self-Harm:{[facility] RATINGS:71932248}       Relapse:{[facility] RATINGS:16008860}       Crisis Safety Plan Reviewed {YES/NO/NOT INDICATED:87392}    ASSESSMENT       ***  DIFFERENTIAL DIAGNOSES       ***  PLAN       ***    Patient was seen for *** minutes face to face of which > 50% of appointment time was spent on counseling and coordination of care regarding the above.

## 2020-04-08 DIAGNOSIS — F31.9 BIPOLAR 1 DISORDER (HCC): ICD-10-CM

## 2020-04-09 RX ORDER — LITHIUM CARBONATE 300 MG
TABLET ORAL
Qty: 180 TAB | Refills: 0 | Status: SHIPPED | OUTPATIENT
Start: 2020-04-09 | End: 2020-04-24 | Stop reason: SDUPTHER

## 2020-04-09 NOTE — TELEPHONE ENCOUNTER
Was the patient seen in the last year in this department? Yes    Does patient have an active prescription for medications requested? No     Received Request Via: Pharmacy    Pt met protocol?: Yes     Last OV 02/27/2020

## 2020-04-23 PROBLEM — F41.1 GENERALIZED ANXIETY DISORDER: Status: ACTIVE | Noted: 2019-09-23

## 2020-04-24 ENCOUNTER — TELEMEDICINE (OUTPATIENT)
Dept: BEHAVIORAL HEALTH | Facility: CLINIC | Age: 70
End: 2020-04-24
Payer: MEDICARE

## 2020-04-24 VITALS — BODY MASS INDEX: 23.32 KG/M2 | HEIGHT: 65 IN | WEIGHT: 140 LBS

## 2020-04-24 DIAGNOSIS — F41.1 GENERALIZED ANXIETY DISORDER: ICD-10-CM

## 2020-04-24 DIAGNOSIS — F41.9 ANXIETY: ICD-10-CM

## 2020-04-24 DIAGNOSIS — F31.9 BIPOLAR 1 DISORDER (HCC): ICD-10-CM

## 2020-04-24 PROCEDURE — 90792 PSYCH DIAG EVAL W/MED SRVCS: CPT | Performed by: PSYCHIATRY & NEUROLOGY

## 2020-04-24 RX ORDER — LITHIUM CARBONATE 300 MG
TABLET ORAL
Qty: 60 TAB | Refills: 2 | Status: SHIPPED | OUTPATIENT
Start: 2020-04-24 | End: 2020-06-26 | Stop reason: SDUPTHER

## 2020-04-24 RX ORDER — BUPROPION HYDROCHLORIDE 150 MG/1
150 TABLET, EXTENDED RELEASE ORAL 2 TIMES DAILY
Qty: 60 TAB | Refills: 2 | Status: SHIPPED | OUTPATIENT
Start: 2020-04-24 | End: 2020-08-10

## 2020-04-24 RX ORDER — OLANZAPINE 10 MG/1
10 TABLET ORAL
Qty: 30 TAB | Refills: 2 | Status: SHIPPED | OUTPATIENT
Start: 2020-04-24 | End: 2020-07-30

## 2020-04-24 RX ORDER — ALPRAZOLAM 0.5 MG/1
TABLET ORAL
Qty: 60 TAB | Refills: 2 | Status: SHIPPED | OUTPATIENT
Start: 2020-04-24 | End: 2020-06-26

## 2020-04-24 RX ORDER — LAMOTRIGINE 100 MG/1
TABLET ORAL
Qty: 60 TAB | Refills: 2 | Status: SHIPPED | OUTPATIENT
Start: 2020-04-24 | End: 2020-06-26 | Stop reason: SDUPTHER

## 2020-04-24 ASSESSMENT — FIBROSIS 4 INDEX: FIB4 SCORE: 1.2

## 2020-04-24 NOTE — PROGRESS NOTES
" [facility] INITIAL PSYCHIATRIC EVALUATION    This provider informed the patient their medical records are totally confidential except for the use by other providers involved in their care, or if the patient signs a release, or to report instances of child or elder abuse, or if it is determined they are an immediate risk to harm themselves or others.  To avoid spread of covid-19 this appointment is being conducted by telehealth.  The patient gave consent to have this evaluation by telephone.    Time at beginning of call:  09:30 AM    TOTAL FACE-TO-FACE TIME 60 minutes    CHIEF COMPLAINT       Having mood swings and generalized anxiety.    IDENTIFYING INFORMATION       The patient is a 68yo W disabled female who lives with her 41yo daughter in [place], [place].    HISTORY OF PRESENT ILLNESS       The patient has a history of Bipolar 1 Disorder and has had manic episodes lasting one week or more and characterized by intense increased energy, racing thoughts, loss of need for sleep, pressure of speech, starting a lot of projects and not finishing them, extreme distractibility, euphoria, bad judgment, increased impulsivity such as spending sprees or binge eating, intense anger, spending sprees on too much clothing, and disruption of her interpersonal relations.  She also has had episodes of atypical Major Depression characterized by anhedonia, hypersomnia (10 hours/day), decreased appetite, and she would stay in bed all day., markedly decreased energy, concentration, interest, and motivation, feeling hopeless and helpless, and having passive suicidal ideation \"that life isn't worth it\".  She had a suicide attempt by trying to cut her wrist with a knife in 2000 and she had to be stopped by her daughters, she also took an overdose of insulin.  She had  admissions 4 to 5 times in [place], 2 times in [place].  She had 38 ECT treatments at [facility].  Four weeks ago she " had a 2 week hospitalization at [facility] for depression.  She denies having overt suicidal plan, intent, or impulse.  She has also become irrational and will have visual hallucinations and she will have ideas of reference from the TV.  At one time she thought her her daughter ann had had her arms and legs.  She had a father who had Bipolar 1 Disorder.       She also has had Generalized Anxiety Disorder characterized by excessive worry and concern that she can't get oput of her mind, feelikng tense and on edge, being unable to control her thoughts in order to go to sleep, irritability, and muscle tension in her neck, shoulders, and upper back.    PSYCHIATRIC REVIEW OF SYSTEMS  denies psychotic symptoms including AH / VH, denies OCD symptoms, denies restrictive eating or purging, denies trauma related symptoms, see HPI for depressive symptoms, see HPI for anxeity symptoms and see HPI for manic symptoms    MEDICAL REVIEW OF SYSTEMS:   Constitutional negative   Eyes negative   Ears/Nose/Mouth/Throat negative   Cardiovascular positive - hypertension   Respiratory positive - (L) breast cancer 2017, mastectomy and chemoradiation   Gastrointestinal negative   Genitourinary positive - chronic kidney disease stage 3   Muscular negative   Integumentary positive - chronic back pain   Neurological negative   Endocrine positive - dyslipidemia, Type 1 diabetes melitus   Hematologic/Lymphatic negative       PAST PSYCHIATRIC HISTORY       Bipolar 1 Disorder and Generalized Anxiety Disorder.  She denies ever having panic attacks or OCD.  PAST PSYCHIATRIC MEDICATIONS       Alprazolam, olanzapine, depakote, lithium, lamotrigine, bupropion SR, gabapentin, fluoxetine, sertraline, paroxetine, and venlafaxine.  SOCIAL HISTORY       The patient was born and raised in [place], [place] and had a brother and sister.  She graduated high school and went to [employer].  She  and had 2 children.  Her    in .  DRUGS none  ALCOHOL none  TOBACCO none    MEDICAL HISTORY       Hypertension, (L) breast cancer with mastectomy, chemo, and radiation, DM Type 1, CKD Stage 3, chroinic back pain, dyslipidemia  CURRENT MEDICATIONS       Lithium 300mg po BID.       Lamotrigine 100mg po BID.       Bupropion SR 150mg po BID.       Alprazolam 0.5mg po BID as needed.       Olanzapine 10mg po QHS.       Vraylar 4.5mg po daily.       For other current medications see the Rooming section of this medical record.         ALLERGIES      depakote  MENTAL STATUS EXAMINATION    There were no vitals taken for this visit.  Participation: Limited verbal participation  Grooming:Could not be observed  Orientation: Fully Oriented  Eye contact: Could not be observed.  Behavior:Could not be observed   Mood: Depressed  Affect: Constricted  Thought process: Logical  Thought content:  Within normal limits  Speech: Rate within normal limits and Volume within normal limits  Perception:  Within normal limits  Memory:  No gross evidence of memory deficits  Insight: Poor  Judgment: Poor  Family/couple interaction observations:   Other:    CURRENT RISK       Suicidal:Low       Homicidal:Not applicable       Self-Harm:Low       Relapse:Moderate       Crisis Safety Plan Reviewed Not Indicated    ASSESSMENT       The patient has a stable mood on lithium 300mg po BID augmented by olanzapine 10mg po QHS.  She has alleviation of depression by lamotrigine 100mg po BID combined with bupropion SR 150mg po BID and cariprazine 4.5mg po daily.  She has alleviation of anxiety by alprazolam 0.5mg po BID as needed.    DIFFERENTIAL DIAGNOSES       (1) Bipoplar 1 Disorder, Most Recent Episode Depressed (F31.32)       (2) Generalized Anxiety Disorder (F41.1)  PLAN       Continue lithium 300mg po BID.       Continue olanzapine 5mg po QHS.       Continue lamotrigine 100mg po BID.       Continue bupropion SR 150mg po BID.       Continue cariprazine 4.5mg po daily        Continue alprazolam 0.5mg po BID as needed.       Gabapentin 100mg po TID.    Time at end of call:  10:30 AM           Patient was seen for 60 minutes face to face of which > 50% of appointment time was spent on counseling and coordination of care regarding the above.

## 2020-05-30 DIAGNOSIS — E10.9 TYPE 1 DIABETES MELLITUS WITHOUT COMPLICATION (HCC): ICD-10-CM

## 2020-06-02 ENCOUNTER — OFFICE VISIT (OUTPATIENT)
Dept: MEDICAL GROUP | Facility: PHYSICIAN GROUP | Age: 70
End: 2020-06-02
Payer: MEDICARE

## 2020-06-02 ENCOUNTER — PATIENT MESSAGE (OUTPATIENT)
Dept: MEDICAL GROUP | Facility: PHYSICIAN GROUP | Age: 70
End: 2020-06-02

## 2020-06-02 VITALS
DIASTOLIC BLOOD PRESSURE: 80 MMHG | HEIGHT: 65 IN | SYSTOLIC BLOOD PRESSURE: 130 MMHG | TEMPERATURE: 97.8 F | BODY MASS INDEX: 23.82 KG/M2 | WEIGHT: 143 LBS | OXYGEN SATURATION: 96 % | HEART RATE: 103 BPM

## 2020-06-02 DIAGNOSIS — Z12.11 SCREENING FOR COLORECTAL CANCER: ICD-10-CM

## 2020-06-02 DIAGNOSIS — G89.29 CHRONIC BILATERAL LOW BACK PAIN WITH LEFT-SIDED SCIATICA: ICD-10-CM

## 2020-06-02 DIAGNOSIS — M54.2 CHRONIC NECK PAIN: ICD-10-CM

## 2020-06-02 DIAGNOSIS — G89.29 CHRONIC NECK PAIN: ICD-10-CM

## 2020-06-02 DIAGNOSIS — Z12.12 SCREENING FOR COLORECTAL CANCER: ICD-10-CM

## 2020-06-02 DIAGNOSIS — M54.42 CHRONIC BILATERAL LOW BACK PAIN WITH LEFT-SIDED SCIATICA: ICD-10-CM

## 2020-06-02 DIAGNOSIS — E10.9 TYPE 1 DIABETES MELLITUS WITHOUT COMPLICATION (HCC): ICD-10-CM

## 2020-06-02 DIAGNOSIS — Z79.899 LITHIUM USE: ICD-10-CM

## 2020-06-02 DIAGNOSIS — R22.1 LOCALIZED SWELLING, MASS OR LUMP OF NECK: ICD-10-CM

## 2020-06-02 DIAGNOSIS — F31.9 BIPOLAR 1 DISORDER (HCC): ICD-10-CM

## 2020-06-02 DIAGNOSIS — E83.52 HYPERCALCEMIA: ICD-10-CM

## 2020-06-02 PROCEDURE — 99214 OFFICE O/P EST MOD 30 MIN: CPT | Performed by: FAMILY MEDICINE

## 2020-06-02 RX ORDER — INSULIN ASPART 100 [IU]/ML
INJECTION, SOLUTION INTRAVENOUS; SUBCUTANEOUS
Qty: 5 PEN | Refills: 2 | Status: SHIPPED | OUTPATIENT
Start: 2020-06-02 | End: 2020-08-26 | Stop reason: SDUPTHER

## 2020-06-02 ASSESSMENT — FIBROSIS 4 INDEX: FIB4 SCORE: 1.22

## 2020-06-02 NOTE — PATIENT INSTRUCTIONS
[facility]  [address]  [place] [address]  Phone: [phone]

## 2020-06-02 NOTE — PROGRESS NOTES
cc: Chronic pain      Subjective:     Molly Loera is a 70 y.o. female presenting for the following:     Patient does have difficulty with pain in many joints.  She does have chronic low back pain and also some neck pain.  She also does have bilateral knee arthritis, right worse than left.  This is been flaring recently.  She does end up walking with a limp after just a short amount of walking.    Patient did notice a mild swelling in her right neck some months ago.  She does admit that this is very similar to the other side but she does not remember feeling this before.  It has not gotten larger or smaller.  No overlying skin changes.  Nontender.    Type 1 diabetes: Patient has been stable on her current insulin regimen for many years.  Her daughter who also does have type 1 diabetes has the Dexcom meter and she would be interested in getting this.  She does check her blood sugars a few times a day and is getting calluses on her fingers.  Most of her fasting blood sugars are near 100.  She denies any severe hypoglycemia but her daughter reports that she is getting down to the 60s.     Review of systems:  All others reviewed and are negative.       Current Outpatient Medications:   •  Blood Glucose Meter Kit, Test blood sugar as recommended by provider. 1 blood glucose monitoring kit-- DEXCOM, Disp: 1 Kit, Rfl: 0  •  Blood Glucose Test Strips, Use one testing unit every 10 days to test blood sugar. DEXCOM meter, Disp: 3 Units, Rfl: 5  •  lithium (ESKALITH) 300 MG Tab, TAKE 1 TABLET BY MOUTH TWICE A DAY, Disp: 60 Tab, Rfl: 2  •  lamoTRIgine (LAMICTAL) 100 MG Tab, TAKE 1 TABLET BY MOUTH TWICE A DAY, Disp: 60 Tab, Rfl: 2  •  ALPRAZolam (XANAX) 0.5 MG Tab, TAKE 1 TABLET BY MOUTH TWICE A DAY AS NEEDED FOR ANXIETY. F41.1, Disp: 60 Tab, Rfl: 2  •  atorvastatin (LIPITOR) 80 MG tablet, TAKE 1 TABLET BY MOUTH EVERY DAY, Disp: 90 Tab, Rfl: 3  •  insulin glargine (INSULIN GLARGINE) 100 UNIT/ML Solution Pen-injector  "injection, Inject 30 Units as instructed every evening., Disp: 15 PEN, Rfl: 2  •  aspirin EC (ECOTRIN) 81 MG Tablet Delayed Response, Take 81 mg by mouth every day., Disp: , Rfl:   •  anastrozole (ARIMIDEX) 1 MG Tab, Take 1 mg by mouth every day., Disp: , Rfl:   •  amLODIPine (NORVASC) 5 MG Tab, Take 5 mg by mouth every day., Disp: , Rfl:   •  losartan (COZAAR) 50 MG Tab, TAKE 1.5 TABLETS (75 MG TOTAL) BY MOUTH DAILY., Disp: 135 Tab, Rfl: 3  •  primidone (MYSOLINE) 50 MG Tab, Take 2 Tabs by mouth every day., Disp: 180 Tab, Rfl: 1  •  NOVOLOG FLEXPEN 100 UNIT/ML solution for injection, INJECT 12 UNITS TOTAL UNDER THE SKIN THREE (3) TIMES DAILY BEFORE MEALS., Disp: 5 PEN, Rfl: 2    Allergies, past medical history, past surgical history, family history, social history reviewed and updated    Objective:     Vitals: /80 (BP Location: Left arm, Patient Position: Sitting, BP Cuff Size: Adult)   Pulse (!) 103   Temp 36.6 °C (97.8 °F) (Temporal)   Ht 1.651 m (5' 5\")   Wt 64.9 kg (143 lb)   SpO2 96%   BMI 23.80 kg/m²   General: Alert, pleasant, NAD  HEENT: Normocephalic.   EOMI, no icterus or pallor.  Conjunctivae and lids normal. External ears and nose normal. Oropharynx non-erythematous, mucous membranes moist.    Neck supple.  No thyromegaly or masses palpated. No cervical or supraclavicular lymphadenopathy.  Heart: Regular rate and rhythm.  S1 and S2 normal.  No murmurs appreciated.  Respiratory: Normal respiratory effort.  Clear to auscultation bilaterally.  Psych:  Affect is normal, judgement is good, grooming is appropriate.    Assessment/Plan:     Molly was seen today for follow-up.    Diagnoses and all orders for this visit:    Chronic neck pain/Chronic bilateral low back pain with left-sided sciatica: Patient is established with [facility].  I do recommend that she follow-up with them, and explained that they are generally able to do knee injections as well.    Hypercalcemia: patient with history " of breast cancer diagnosed in 2017 but currently told that she is likely in remission. Normal PTH. Mostly likely caused by lithium. Will screen for MM and refer for colonoscopy, as patient believes she is due for this regardless, to ensure no signs of carcinoma.  -     Comp Metabolic Panel; Future  -     SPEP W/REFLEX TO [facility] BADILLO G [facility]; Future    Type 1 diabetes mellitus without complication (HCC): Explained to patient that I would want her to have a goal fasting blood sugar of 120-140.  Explained that her hemoglobin A1c can be between 7 and 8 at this point and that she should focus on avoiding any hypoglycemia.  Patient declines referral to endocrinology currently.  -     HEMOGLOBIN A1C; Future  -     Comp Metabolic Panel; Future  -     MICROALBUMIN CREAT RATIO URINE (LAB COLLECT); Future  -     Blood Glucose Meter Kit; Test blood sugar as recommended by provider. 1 blood glucose monitoring kit-- DEXCOM  -     Blood Glucose Test Strips; Use one testing unit every 10 days to test blood sugar. DEXCOM meter    Localized swelling, mass or lump of neck: Normal exam today but patient does feel the difference from the past.  So will obtain ultrasound to ensure no lymphadenopathy.  -     US-SOFT TISSUES OF HEAD - NECK; Future    Bipolar I/Lithium use: Patient currently denies any severe worsening of her mood.  She did have a hospitalization earlier in the year and her medications were adjusted.  She currently denies any manic or depressive symptoms and is doing well.  -     LITHIUM; Future    Screening for colorectal cancer Patient agrees to screening for colon cancer.  Currently without symptoms- no abdominal pain, changes in bowel habit, weight loss, blood in stool, or melena.   -     REFERRAL TO GI FOR COLONOSCOPY      Return in about 3 months (around 9/2/2020), or if symptoms worsen or fail to improve.

## 2020-06-05 ENCOUNTER — HOSPITAL ENCOUNTER (OUTPATIENT)
Dept: RADIOLOGY | Facility: MEDICAL CENTER | Age: 70
End: 2020-06-05
Attending: FAMILY MEDICINE
Payer: MEDICARE

## 2020-06-05 DIAGNOSIS — R22.1 LOCALIZED SWELLING, MASS OR LUMP OF NECK: ICD-10-CM

## 2020-06-05 PROCEDURE — 76536 US EXAM OF HEAD AND NECK: CPT

## 2020-06-05 RX ORDER — INSULIN ASPART 100 [IU]/ML
12-15 INJECTION, SOLUTION INTRAVENOUS; SUBCUTANEOUS
Qty: 15 PEN | Refills: 3
Start: 2020-06-05

## 2020-06-16 ENCOUNTER — PATIENT OUTREACH (OUTPATIENT)
Dept: SCHEDULING | Facility: IMAGING CENTER | Age: 70
End: 2020-06-16

## 2020-06-26 ENCOUNTER — TELEMEDICINE (OUTPATIENT)
Dept: BEHAVIORAL HEALTH | Facility: CLINIC | Age: 70
End: 2020-06-26
Payer: MEDICARE

## 2020-06-26 VITALS — HEIGHT: 65 IN | BODY MASS INDEX: 23.32 KG/M2 | WEIGHT: 140 LBS

## 2020-06-26 DIAGNOSIS — F41.1 GENERALIZED ANXIETY DISORDER: ICD-10-CM

## 2020-06-26 DIAGNOSIS — F31.9 BIPOLAR 1 DISORDER (HCC): ICD-10-CM

## 2020-06-26 PROCEDURE — 99213 OFFICE O/P EST LOW 20 MIN: CPT | Mod: 95,CR | Performed by: PSYCHIATRY & NEUROLOGY

## 2020-06-26 RX ORDER — LORAZEPAM 0.5 MG/1
0.5 TABLET ORAL 2 TIMES DAILY PRN
Qty: 60 TAB | Refills: 2 | Status: SHIPPED | OUTPATIENT
Start: 2020-06-26 | End: 2020-09-24

## 2020-06-26 RX ORDER — OLANZAPINE 5 MG/1
5 TABLET ORAL
Qty: 30 TAB | Refills: 2 | Status: SHIPPED | OUTPATIENT
Start: 2020-06-26 | End: 2020-07-26

## 2020-06-26 RX ORDER — LITHIUM CARBONATE 300 MG
TABLET ORAL
Qty: 60 TAB | Refills: 2 | Status: SHIPPED | OUTPATIENT
Start: 2020-06-26 | End: 2020-06-30

## 2020-06-26 RX ORDER — BUPROPION HYDROCHLORIDE 150 MG/1
150 TABLET, EXTENDED RELEASE ORAL 2 TIMES DAILY
Qty: 60 TAB | Refills: 2 | Status: SHIPPED | OUTPATIENT
Start: 2020-06-26 | End: 2020-07-26

## 2020-06-26 RX ORDER — LAMOTRIGINE 100 MG/1
TABLET ORAL
Qty: 60 TAB | Refills: 2 | Status: SHIPPED
Start: 2020-06-26 | End: 2020-07-28 | Stop reason: SDUPTHER

## 2020-06-26 RX ORDER — LAMOTRIGINE 100 MG/1
TABLET ORAL
Qty: 60 TAB | Refills: 2 | Status: SHIPPED | OUTPATIENT
Start: 2020-06-26 | End: 2020-06-30

## 2020-06-26 RX ORDER — LITHIUM CARBONATE 300 MG
TABLET ORAL
Qty: 60 TAB | Refills: 2 | Status: SHIPPED | OUTPATIENT
Start: 2020-06-26 | End: 2020-09-22 | Stop reason: SINTOL

## 2020-06-26 ASSESSMENT — FIBROSIS 4 INDEX: FIB4 SCORE: 1.22

## 2020-06-26 NOTE — PROGRESS NOTES
[facility] PSYCHIATRIC FOLLOW-UP NOTE    This provider informed the patient their medical records are totally confidential except for the use by other providers involved in their care, or if the patient signs a release, or to report instances of child or elder abuse, or if it is determined they are an immediate risk to harm themselves or others.  To avoid spresd of covid-19 virus this appointment was conducted by telehealth.  The patient gave consent to have this follow up session by video telehealth.    Time at beginning of session:  09:30 AM    TOTAL FACE-TO-FACE TIME  30 minutes    CHIEF COMPLAINT       Having mood swings and generalized anxiety.  HISTORY OF PRESENT ILLNESS       The patient is a 68yo disabled female who is followed by this provider for Bipolar 1 Disorder and Generalized Anxiety Disorder.  She has had manic episodes lasting one week or more characterized by intense increased energy, racing thoughts, loss of need for sleep, hyper-talkativeness, distractibility, impulsive spending and intense anger.  During manic episodes she will become irrational and will have visual hallucinations.  She also has had recurrent episodes of atypical major depression characterized by anhedonia, hypersomnia and decreased appetite.  She has had 2 suicide attempts by cutting her wrist with a knife and by overdosing on insulin.  She has had 4-5 MH admissions in [place] and 2 MH admissions in [place] where she had 38 ECT treatments.  She had a recent  admission to [facility] for depression for 2 weeks in late Mar 2020.  She depressed and suicidal because she had loaned her son some money and he didn't pay her back.       She also has Generalized Anxiety Disorder with excessive worry and concern and difficulty controlling her thoughts.  She has remained stable on her current mood stabilization with olanzapine 5mg po QHS, lithium 300mg po BID, and  lamotrigine 100mg po BID.  She has good alleviation of depression on bupro[pion SR 150mg po BID, She has better anger modulation on cariprazine 4.5mg p[o daily, and she takes alprazolam 0,5mg po BID and gabapentin 100 po TID for anxiety.        She has had decreased recent memory because of having many ECT treatments and probable damage to her hippocampus by having many manic manic episodes.    PSYCHOSOCIAL CHANGES SINCE PREVIOUS CONTACT       The patient is living with her daughter and very worried about the corona virus pandemic.  RESPONSE TO TREATMENT       Having a stable mood on lithium 300mg po BID and lamotrigine 100mg po BID.  PAST PSYCHIATRIC MEDICATIONS       Alprazolam, olanzapine, depakote, lithium, lamotrigine, bupropion SR, gabapentin, fluoxetine, sertraline, paroxetine, and venlafaxine  MEDICATION SIDE EFFECTS       none    MEDICAL REVIEW OF SYSTEMS:   Constitutional negative   Eyes negative   Ears/Nose/Mouth/Throat negative   Cardiovascular positive - hypertension   Respiratory positive - (L) breast cancer 2017, mastectomy and chemoradiation   Gastrointestinal negative   Genitourinary positive - chronic kidney disease stage 3   Muscular negative   Integumentary positive - chronic back pain   Neurological negative   Endocrine positive - dyslipidemia, Type 1 diabetes melitus   Hematologic/Lymphatic negative        MENTAL STATUS EVALUATION  There were no vitals taken for this visit.  Participation: Limited verbal participation  Grooming:Casual  Orientation: Fully Oriented  Eye contact: Poor  Behavior:Tense   Mood: Euthymic  Affect: Blunted  Thought process: Logical  Thought content:  Within normal limits  Speech: Rate within normal limits and Volume within normal limits  Perception:  Within normal limits  Memory:  Poor memory for chronology of events  Insight: Limited  Judgment: Poor  Family/couple interaction observations:   Other:    Current risk:    Suicide: Low   Homicide: Not  applicable   Self-harm: Low  Relapse: Moderate  Other:   Crisis Safety Plan reviewed?No  If evidence of imminent risk is present, intervention/plan:    Medical Records/Labs/Diagnostic Tests Reviewed: yes    Medical Records/Labs/Diagnostic Tests Ordered: no    DIAGNOSTIC IMPRESSIONS       (1) Bipolar 1 Disorder, Depressed (F31.32)       (2) Generalized Anxiety Disorder (F41.1)    ASSESSMENT AND PLAN       Having mood stabilization on a combination of lithium, lamotrigine, and olanzapine.  Having alleviation of depression by bupropion SR 150mg po BID combined with cariprazine 4.5mg po daily.  Using alprazolam 0.5mg po BID for symptomatic anxiety.       Continue lithium 300mg po BID.       Continue olanzapine 5mg po QHS,         Continue lamotrigine 100mg po BID.       Continue bupropion SR 150mg po BID.       Continue cariprazine 4.5mg po daily.       Discontinue alprazolam 0.5mg po BID as needed.       Start lorazepam 0.5mg po BID as needed for anxiety.       RTC to  Clinic in 2 months for 30 min follow up with this provider.           Time at end of session:  10:00 AM    30 minutes spent in psychotherapy  Topics addressed in psychotherapy include:  How to recognize the prodromal symptoms of a manic episode.  How un-mood-stabilized manic episodes can result in memory loss.  The importance of not getting sleep deprived to not precipitate marybeth.

## 2020-06-30 RX ORDER — INSULIN GLARGINE 100 [IU]/ML
30 INJECTION, SOLUTION SUBCUTANEOUS EVERY EVENING
Qty: 27 ML | Refills: 0 | Status: SHIPPED | OUTPATIENT
Start: 2020-06-30 | End: 2020-08-26 | Stop reason: SDUPTHER

## 2020-06-30 RX ORDER — INSULIN DETEMIR 100 [IU]/ML
INJECTION, SOLUTION SUBCUTANEOUS
COMMUNITY
Start: 2020-04-08 | End: 2020-06-30

## 2020-07-28 DIAGNOSIS — F31.9 BIPOLAR 1 DISORDER (HCC): ICD-10-CM

## 2020-07-29 RX ORDER — LAMOTRIGINE 100 MG/1
TABLET ORAL
Qty: 60 TAB | Refills: 2 | Status: SHIPPED | OUTPATIENT
Start: 2020-07-29 | End: 2020-09-22 | Stop reason: SDUPTHER

## 2020-07-30 RX ORDER — OLANZAPINE 10 MG/1
10 TABLET ORAL
Qty: 90 TAB | Refills: 0 | Status: SHIPPED | OUTPATIENT
Start: 2020-07-30 | End: 2020-08-29

## 2020-08-10 DIAGNOSIS — F31.9 BIPOLAR 1 DISORDER (HCC): ICD-10-CM

## 2020-08-10 RX ORDER — BUPROPION HYDROCHLORIDE 150 MG/1
150 TABLET, EXTENDED RELEASE ORAL 2 TIMES DAILY
Qty: 180 TAB | Refills: 1 | Status: SHIPPED | OUTPATIENT
Start: 2020-08-10 | End: 2020-09-09

## 2020-08-26 ENCOUNTER — OFFICE VISIT (OUTPATIENT)
Dept: MEDICAL GROUP | Facility: PHYSICIAN GROUP | Age: 70
End: 2020-08-26
Payer: MEDICARE

## 2020-08-26 ENCOUNTER — PATIENT MESSAGE (OUTPATIENT)
Dept: MEDICAL GROUP | Facility: PHYSICIAN GROUP | Age: 70
End: 2020-08-26

## 2020-08-26 VITALS
TEMPERATURE: 98.1 F | HEIGHT: 65 IN | WEIGHT: 148 LBS | RESPIRATION RATE: 16 BRPM | DIASTOLIC BLOOD PRESSURE: 82 MMHG | OXYGEN SATURATION: 97 % | HEART RATE: 106 BPM | BODY MASS INDEX: 24.66 KG/M2 | SYSTOLIC BLOOD PRESSURE: 140 MMHG

## 2020-08-26 DIAGNOSIS — I15.2 HYPERTENSION ASSOCIATED WITH DIABETES (HCC): ICD-10-CM

## 2020-08-26 DIAGNOSIS — F31.9 BIPOLAR 1 DISORDER (HCC): ICD-10-CM

## 2020-08-26 DIAGNOSIS — R25.1 SHAKING: ICD-10-CM

## 2020-08-26 DIAGNOSIS — Z76.0 ENCOUNTER FOR MEDICATION REFILL: ICD-10-CM

## 2020-08-26 DIAGNOSIS — C50.912 MALIGNANT NEOPLASM OF LEFT FEMALE BREAST, UNSPECIFIED ESTROGEN RECEPTOR STATUS, UNSPECIFIED SITE OF BREAST (HCC): ICD-10-CM

## 2020-08-26 DIAGNOSIS — E10.9 TYPE 1 DIABETES MELLITUS WITHOUT COMPLICATION (HCC): ICD-10-CM

## 2020-08-26 DIAGNOSIS — E11.59 HYPERTENSION ASSOCIATED WITH DIABETES (HCC): ICD-10-CM

## 2020-08-26 DIAGNOSIS — E78.5 DYSLIPIDEMIA: ICD-10-CM

## 2020-08-26 PROCEDURE — 99214 OFFICE O/P EST MOD 30 MIN: CPT | Performed by: NURSE PRACTITIONER

## 2020-08-26 RX ORDER — ANASTROZOLE 1 MG/1
1 TABLET ORAL DAILY
Qty: 90 TAB | Refills: 1 | Status: SHIPPED | OUTPATIENT
Start: 2020-08-26 | End: 2021-04-21

## 2020-08-26 RX ORDER — LOSARTAN POTASSIUM 50 MG/1
50 TABLET ORAL DAILY
Qty: 90 TAB | Refills: 3 | Status: SHIPPED | OUTPATIENT
Start: 2020-08-26 | End: 2020-11-02 | Stop reason: SDUPTHER

## 2020-08-26 RX ORDER — AMLODIPINE BESYLATE 5 MG/1
5 TABLET ORAL DAILY
Qty: 90 TAB | Refills: 0 | Status: SHIPPED | OUTPATIENT
Start: 2020-08-26 | End: 2020-12-17

## 2020-08-26 RX ORDER — ATORVASTATIN CALCIUM 80 MG/1
80 TABLET, FILM COATED ORAL
Qty: 90 TAB | Refills: 0 | Status: SHIPPED | OUTPATIENT
Start: 2020-08-26 | End: 2021-10-21

## 2020-08-26 RX ORDER — PRIMIDONE 50 MG/1
100 TABLET ORAL DAILY
Qty: 180 TAB | Refills: 1 | Status: SHIPPED | OUTPATIENT
Start: 2020-08-26 | End: 2021-02-01

## 2020-08-26 RX ORDER — INSULIN ASPART 100 [IU]/ML
INJECTION, SOLUTION INTRAVENOUS; SUBCUTANEOUS
Qty: 45 ML | Refills: 0 | Status: SHIPPED | OUTPATIENT
Start: 2020-08-26 | End: 2020-08-26 | Stop reason: SDUPTHER

## 2020-08-26 RX ORDER — INSULIN ASPART 100 [IU]/ML
INJECTION, SOLUTION INTRAVENOUS; SUBCUTANEOUS
Qty: 45 ML | Refills: 0 | Status: SHIPPED
Start: 2020-08-26 | End: 2020-11-02 | Stop reason: SDUPTHER

## 2020-08-26 RX ORDER — INSULIN GLARGINE 100 [IU]/ML
30 INJECTION, SOLUTION SUBCUTANEOUS EVERY EVENING
Qty: 27 ML | Refills: 0 | Status: SHIPPED | OUTPATIENT
Start: 2020-08-26 | End: 2020-09-24

## 2020-08-26 ASSESSMENT — FIBROSIS 4 INDEX: FIB4 SCORE: 1.22

## 2020-08-26 NOTE — ASSESSMENT & PLAN NOTE
Chronic medical problem.  She is taking anastrozole 1 mg daily.  She is in process of finding a new oncologist.  Her last oncologist was in [place].

## 2020-08-26 NOTE — ASSESSMENT & PLAN NOTE
Chronic medical problem.  She is taking atorvastatin 80 mg every day.  She is interested in decreasing her dose.  She is due for repeat labs.  Last lab results:  Results for JAMIA STARK (MRN [id number]) as of 8/26/2020 09:46   Ref. Range 9/23/2019 09:51   Cholesterol,Tot Latest Ref Range: 100 - 199 mg/dL 139   Triglycerides Latest Ref Range: 0 - 149 mg/dL 133   HDL Latest Ref Range: >=40 mg/dL 61   LDL Latest Ref Range: <100 mg/dL 51

## 2020-08-26 NOTE — PROGRESS NOTES
Subjective:     CC: medication refill    HPI:   Molly presents today with her daughter for the following.  Her prior PCP is Ema Miller.  She has establishing appointment with me on 11/2/2020.    Type 1 diabetes mellitus without complication (HCC)  Chronic medical problem. She is taking basaglar 10 units in AM and 14 units in evening, novolog 12 units with breakfast, 12 with lunch and 14 units with evening meal. She is checking her blood sugars 4-5 times. She is interested in starting the Dexcom sensor. She reports that the insurance will be sending paperwork.     Hypertension associated with diabetes (HCC)  Chronic medical problem.  She is taking amlodipine 5 mg and losartan 50 mg daily.  Her initial BP today is 148/88.  She has taken her medications this morning.  She reports that she does get anxious coming to doctor's office and does have frequently elevated blood pressure while in office.     Bipolar 1 disorder (HCC)  Chronic medical problem.  She is followed by psychiatrist Dr. Don who manages her medications. She is taking lamotrigine, lithium, and olanzapine.     Shaking  Chronic medical problem.  She is taking primidone 100 mg daily.  She is tolerating medication.    Malignant neoplasm of left female breast (HCC)  Chronic medical problem.  She is taking anastrozole 1 mg daily.  She is in process of finding a new oncologist.  Her last oncologist was in [place].    Dyslipidemia  Chronic medical problem.  She is taking atorvastatin 80 mg every day.  She is interested in decreasing her dose.  She is due for repeat labs.  Last lab results:  Results for MOLLY STARK (MRN [id number]) as of 8/26/2020 09:46   Ref. Range 9/23/2019 09:51   Cholesterol,Tot Latest Ref Range: 100 - 199 mg/dL 139   Triglycerides Latest Ref Range: 0 - 149 mg/dL 133   HDL Latest Ref Range: >=40 mg/dL 61   LDL Latest Ref Range: <100 mg/dL 51       Past Medical History:   Diagnosis Date   • Diabetes (HCC)        Social History      Tobacco Use   • Smoking status: Never Smoker   • Smokeless tobacco: Never Used   Substance Use Topics   • Alcohol use: Never     Frequency: Never   • Drug use: Never       Current Outpatient Medications Ordered in Epic   Medication Sig Dispense Refill   • insulin glargine (INSULIN GLARGINE) 100 UNIT/ML Solution Pen-injector injection Inject 30 Units as instructed every evening. 27 mL 0   • NOVOLOG FLEXPEN 100 UNIT/ML solution for injection INJECT 12 UNITS TOTAL UNDER THE SKIN THREE (3) TIMES DAILY BEFORE MEALS. 45 mL 0   • amLODIPine (NORVASC) 5 MG Tab Take 1 Tab by mouth every day. 90 Tab 0   • losartan (COZAAR) 50 MG Tab Take 1 Tab by mouth every day. 90 Tab 3   • atorvastatin (LIPITOR) 80 MG tablet Take 1 Tab by mouth every day. 90 Tab 0   • primidone (MYSOLINE) 50 MG Tab Take 2 Tabs by mouth every day. 180 Tab 1   • anastrozole (ARIMIDEX) 1 MG Tab Take 1 Tab by mouth every day. 90 Tab 1   • aspirin EC (ECOTRIN) 81 MG Tablet Delayed Response Take 1 Tab by mouth every day. 90 Tab 1   • buPROPion SR (WELLBUTRIN-SR) 150 MG TABLET SR 12 HR sustained-release tablet TAKE 1 TAB BY MOUTH 2 TIMES A DAY FOR 30 DAYS. 180 Tab 1   • olanzapine (ZYPREXA) 10 MG tablet TAKE 1 TAB BY MOUTH EVERY BEDTIME FOR 30 DAYS. 90 Tab 0   • lamoTRIgine (LAMICTAL) 100 MG Tab TAKE 1 TABLET BY MOUTH TWICE A DAY 60 Tab 2   • lithium (ESKALITH) 300 MG Tab TAKE 1 TABLET BY MOUTH TWICE A DAY 60 Tab 2   • Blood Glucose Meter Kit Test blood sugar as recommended by provider. 1 blood glucose monitoring kit-- DEXCOM 1 Kit 0   • Blood Glucose Test Strips Use one testing unit every 10 days to test blood sugar. DEXCOM meter 3 Units 5     No current Epic-ordered facility-administered medications on file.        Allergies:  Depakote [valproic acid]    Health Maintenance: Due for AWV, monofilament exam, retinal screen, hep B vaccine, tdap, pap, zoster vaccine, bone density and pneumovax. She declines pneumovax today.     ROS:  Gen: no fevers/chills, no  "changes in weight  Eyes: no changes in vision  ENT: no sore throat  Pulm: no sob, no cough  CV: no chest pain, no palpitations  GI: no nausea/vomiting, no diarrhea  : no dysuria  MSk: no myalgias, no falls   Skin: no rash  Neuro: no headaches, no dizziness    Objective:     Vital signs reviewed   Exam:  /82   Pulse (!) 106   Temp 36.7 °C (98.1 °F) (Temporal)   Resp 16   Ht 1.651 m (5' 5\")   Wt 67.1 kg (148 lb)   SpO2 97%   BMI 24.63 kg/m²  Body mass index is 24.63 kg/m².    Gen: Alert and oriented, No apparent distress. Daughter present in exam room.   Neck: Neck is supple without lymphadenopathy.  Lungs: Normal effort, CTA bilaterally, no wheezes, rhonchi, or rales  CV: Regular rate and rhythm. No murmurs, rubs, or gallops. Bilateral radial pulses 2+.   Ext: No clubbing, cyanosis, edema.      Assessment & Plan:     70 y.o. female with the following -     1. Encounter for medication refill  New problem to examiner.  Patient is here for medication refill.  She has establishing appointment on 11/2/2020.    2. Type 1 diabetes mellitus without complication (HCC)  New problem to examiner.  Continue insulin, medication refill.  Referral placed endocrinology.  We will await request from her pharmacy to start Dex com monitoring.  Monitor and follow.  - insulin glargine (INSULIN GLARGINE) 100 UNIT/ML Solution Pen-injector injection; Inject 30 Units as instructed every evening.  Dispense: 27 mL; Refill: 0  - NOVOLOG FLEXPEN 100 UNIT/ML solution for injection; INJECT 12 UNITS TOTAL UNDER THE SKIN THREE (3) TIMES DAILY BEFORE MEALS.  Dispense: 45 mL; Refill: 0  - aspirin EC (ECOTRIN) 81 MG Tablet Delayed Response; Take 1 Tab by mouth every day.  Dispense: 90 Tab; Refill: 1  - REFERRAL TO ENDOCRINOLOGY    3. Hypertension associated with diabetes (HCC)  New problem to examiner.  Continue amlodipine and losartan.  On repeat her BP by me was 140/82.  Red flags discussed.  Monitor and follow.  - amLODIPine (NORVASC) 5 " MG Tab; Take 1 Tab by mouth every day.  Dispense: 90 Tab; Refill: 0  - losartan (COZAAR) 50 MG Tab; Take 1 Tab by mouth every day.  Dispense: 90 Tab; Refill: 3    4. Dyslipidemia  New problem to examiner.  Continue atorvastatin.  Due for labs.  Orders placed.  We did discuss that we will recheck her labs and if her cholesterol is stable at her upcoming establishing appointment we will discuss decreasing her atorvastatin dose and then repeating labs.  She reports understanding.  Monitor and follow-up via [facility].  - Lipid Profile; Future  - atorvastatin (LIPITOR) 80 MG tablet; Take 1 Tab by mouth every day.  Dispense: 90 Tab; Refill: 0    5. Bipolar 1 disorder (HCC)  New problem to examiner.  Continue lamotrigine, lithium, and olanzapine per psychiatrist.  Monitor.    6. Shaking  New problem to examiner.  Continue primidone, medication refill.  No acute symptoms today.  Monitor and follow.  - primidone (MYSOLINE) 50 MG Tab; Take 2 Tabs by mouth every day.  Dispense: 180 Tab; Refill: 1    7. Malignant neoplasm of left female breast, unspecified estrogen receptor status, unspecified site of breast (HCC)  New problem to examiner.  Continue anastrozole.  Referral ordered so that she may establish with oncology.  Monitor and follow.  - anastrozole (ARIMIDEX) 1 MG Tab; Take 1 Tab by mouth every day.  Dispense: 90 Tab; Refill: 1  - REFERRAL TO HEMATOLOGY ONCOLOGY Referral to? Other      Return for Keep 11/2/2020 establishing appointment.    Please note that this dictation was created using voice recognition software. I have made every reasonable attempt to correct obvious errors, but I expect that there are errors of grammar and possibly content that I did not discover before finalizing the note.

## 2020-08-26 NOTE — ASSESSMENT & PLAN NOTE
Chronic medical problem.  She is taking amlodipine 5 mg and losartan 50 mg daily.  Her initial BP today is 148/88.  She has taken her medications this morning.  She reports that she does get anxious coming to doctor's office and does have frequently elevated blood pressure while in office.

## 2020-08-26 NOTE — PROGRESS NOTES
Kerwin request from patient to resend Novolog refill to [facility] and fax new prescription. [phone].    Requested Prescriptions     Signed Prescriptions Disp Refills   • insulin aspart (NOVOLOG FLEXPEN) 100 UNIT/ML injection PEN 45 mL 0     Sig: INJECT 12 UNITS TOTAL UNDER THE SKIN THREE (3) TIMES DAILY BEFORE MEALS.     Authorizing Provider: RJ LAM A.P.R.N.

## 2020-08-26 NOTE — ASSESSMENT & PLAN NOTE
Chronic medical problem. She is taking basaglar 10 units in AM and 14 units in evening, novolog 12 units with breakfast, 12 with lunch and 14 units with evening meal. She is checking her blood sugars 4-5 times. She is interested in starting the Dexcom sensor. She reports that the insurance will be sending paperwork.

## 2020-08-26 NOTE — ASSESSMENT & PLAN NOTE
Chronic medical problem.  She is followed by psychiatrist Dr. Don who manages her medications. She is taking lamotrigine, lithium, and olanzapine.

## 2020-09-03 ENCOUNTER — TELEPHONE (OUTPATIENT)
Dept: MEDICAL GROUP | Facility: PHYSICIAN GROUP | Age: 70
End: 2020-09-03

## 2020-09-03 NOTE — TELEPHONE ENCOUNTER
VOICEMAIL  1. Caller Name: Adamaris with cancer care specialist                      Call Back Number: [phone]    2. Message: Pt is not sure why she is getting referred to 's office. Pt states her Mammo has been clear and that she hasn't had cancer in a while.    3. Patient approves office to leave a detailed voicemail/MyChart message: yes

## 2020-09-04 NOTE — TELEPHONE ENCOUNTER
Phone Number Called: [phone]    Call outcome: Left detailed message for patient. Informed to call back with any additional questions.    Message: left a detailed vm to Adamaris regarding the note.

## 2020-09-10 ENCOUNTER — TELEPHONE (OUTPATIENT)
Dept: MEDICAL GROUP | Facility: PHYSICIAN GROUP | Age: 70
End: 2020-09-10

## 2020-09-10 NOTE — TELEPHONE ENCOUNTER
8/26/2020 progress note signed and dated per [facility] request.    STEVEN Paz.

## 2020-09-10 NOTE — TELEPHONE ENCOUNTER
1. Caller Name: / [facility]                        Call Back Number: [phone] Fax:[phone]      How would the patient prefer to be contacted with a response: Phone call OK to leave a detailed message    [facility] is requesting that we fax over chart notes that is from the 8/26/2020 visit. They state that you could sign and date anywhere on the note as long as it is visible.

## 2020-09-22 ENCOUNTER — TELEMEDICINE (OUTPATIENT)
Dept: BEHAVIORAL HEALTH | Facility: CLINIC | Age: 70
End: 2020-09-22
Payer: MEDICARE

## 2020-09-22 VITALS — HEIGHT: 65 IN | WEIGHT: 145 LBS | BODY MASS INDEX: 24.16 KG/M2

## 2020-09-22 DIAGNOSIS — F41.1 GENERALIZED ANXIETY DISORDER: ICD-10-CM

## 2020-09-22 DIAGNOSIS — F31.9 BIPOLAR 1 DISORDER (HCC): ICD-10-CM

## 2020-09-22 PROCEDURE — 99213 OFFICE O/P EST LOW 20 MIN: CPT | Mod: 95,CR | Performed by: PSYCHIATRY & NEUROLOGY

## 2020-09-22 PROCEDURE — 90833 PSYTX W PT W E/M 30 MIN: CPT | Mod: 95,CR | Performed by: PSYCHIATRY & NEUROLOGY

## 2020-09-22 RX ORDER — OXCARBAZEPINE 600 MG/1
TABLET, FILM COATED ORAL
Qty: 60 TAB | Refills: 2 | Status: SHIPPED | OUTPATIENT
Start: 2020-09-22 | End: 2020-10-19

## 2020-09-22 RX ORDER — LAMOTRIGINE 100 MG/1
100 TABLET ORAL 2 TIMES DAILY
Qty: 180 TAB | Refills: 1 | Status: SHIPPED | OUTPATIENT
Start: 2020-09-22 | End: 2020-10-28 | Stop reason: SDUPTHER

## 2020-09-22 RX ORDER — BUPROPION HYDROCHLORIDE 150 MG/1
150 TABLET, EXTENDED RELEASE ORAL 2 TIMES DAILY
Qty: 90 TAB | Refills: 1 | Status: SHIPPED | OUTPATIENT
Start: 2020-09-22 | End: 2020-12-21

## 2020-09-22 RX ORDER — OLANZAPINE 10 MG/1
10 TABLET ORAL
Qty: 90 TAB | Refills: 1 | Status: SHIPPED | OUTPATIENT
Start: 2020-09-22 | End: 2020-12-21

## 2020-09-22 RX ORDER — ALPRAZOLAM 0.5 MG/1
0.5 TABLET ORAL 2 TIMES DAILY PRN
Qty: 60 TAB | Refills: 2 | Status: SHIPPED | OUTPATIENT
Start: 2020-09-22 | End: 2020-09-24

## 2020-09-22 ASSESSMENT — FIBROSIS 4 INDEX: FIB4 SCORE: 1.22

## 2020-09-22 NOTE — PROGRESS NOTES
[facility] PSY[facility]RIC FOLLOW-UP NOTE    This provider informed the patient their medical records are totally confidential except for the use by other providers involved in their care, or if the patient signs a release, or to report instances of child or elder abuse, or if it is determined they are an immediate risk to harm themselves or others.  To avoid spread of covid-19 virus this appointment was conducted by telehealth.  The patient gave consent to have this follow up session by video telehealth.    Time at beginning of call:  08:30 AM    TOTAL FACE-TO-FACE TIME  30 minutes    CHIEF COMPLAINT       Having mood swings and generalized anxiety.    HISTORY OF PRESENT ILLNESS       The patient is a disabled 69yo female who lives with her daughter and is followed by this provider for Bipolar 1 Disorder and Generalized Anxiety Disorder.  She has recurrent manic episodes lasting one week or more with intense increased energy, racing thoughts, loss of need for sleep, impulsive spending, extreme distractibility, starting a lot of projects, and euphoria.  She also has recurrent episodes of atypical major depression with anhedonia, hypersomnia, decrease appetite, staying in bed all day, feeling hopeless and helpless, and having passive suicidal ideation.  She had 2 suicide attempts by trying to cut her wrist with a knife in 2000, and an insulin overdose.  She had  admissions 4 to 5 times in [place], 2 times in [place].  She had 38 ECT treatments at [facility].  Four weeks ago she had a 2 week hospitalization at [facility] for depression.  She denies having a current suicidal plan, intent, or impulse.  She has also become irrational and will have visual hallucinations and she will have ideas of reference from the TV.  At one time she thought her her daughter ann had had her arms and legs.  She had a father who had Bipolar 1 Disorder.        She also has had Generalized Anxiety Disorder characterized by excessive worry and concern, restlessness, irritability, difficulty relaxing, muscle tension in her neck, shoulders, and upper back, and difficulty turning off her worries to get to sleep.       This provider discussed with the patient how she has had kidney damage, probably from her type 1 diabetes and she has CKD stage 3.  Since lithium can damage the kidneys in a patient over the age of 64yo, it will be tapered and discontinued and she will be started on oxcabazepine 600 mg po BID.  She cannot take depakote because it caused a rash in the past.  PSYCHOSOCIAL CHANGES SINCE PREVIOUS CONTACT       The patient has been cooking and reading  RESPONSE TO TREATMENT       Having a stable mood but lithium can be toxic to her kidneys and will be discontinued.  PAST PSYCHIATRIC MEDICATIONS       Alprazolam, olanzapine, depakote, lithium, lamotrigine, bupropion SR, gabapentin, fluoxetine, sertraloine, paroxetine, and venlafaxine.  MEDICATION SIDE EFFECTS       Having kidney damage (CKD stage 3)    MEDICAL REVIEW OF SYSTEMS:   Constitutional negative   Eyes negative   Ears/Nose/Mouth/Throat negative   Cardiovascular positive - hypertension   Respiratory positive - (L) breast cancer 2017, mastectomy and chemoradiation   Gastrointestinal negative   Genitourinary positive - chronic kidney disease stage 3   Muscular negative   Integumentary positive - chronic back pain   Neurological negative   Endocrine positive - dyslipidemia, Type 1 diabetes melitus   Hematologic/Lymphatic negative       MENTAL STATUS EVALUATION  There were no vitals taken for this visit.  Participation: Active verbal participation  Grooming:Neat  Orientation: Fully Oriented  Eye contact: Good  Behavior:Calm   Mood: Euthymic  Affect: Full range  Thought process: Logical  Thought content:  Within normal limits  Speech: Rate within normal limits and Volume within normal limits  Perception:  Within  normal limits  Memory:  No gross evidence of memory deficits  Insight: Adequate  Judgment: Adequate  Family/couple interaction observations:   Other:  Depression Screen (PHQ-2/PHQ-9) 9/23/2019 11/2/2020   PHQ-2 Total Score 2 5   PHQ-9 Total Score 8 12       Interpretation of PHQ-9 Total Score   Score Severity   1-4 No Depression   5-9 Mild Depression   10-14 Moderate Depression   15-19 Moderately Severe Depression   20-27 Severe Depression  Current risk:    Suicide: Low   Homicide: Not applicable   Self-harm: Low  Relapse: Moderate  Other:   Crisis Safety Plan reviewed?No  If evidence of imminent risk is present, intervention/plan:    Medical Records/Labs/Diagnostic Tests Reviewed: yes    Medical Records/Labs/Diagnostic Tests Ordered: no    DIAGNOSTIC IMPRESSIONS       (1) Bipolar 1 Disorder, Depressed (F31.31)       (2) Generalized Anxiety Disorder     ASSESSMENT AND PLAN       Stable on current medications and dosages.       Taper lithium to 300mg po QHS X 1 week, then discontinue.       Start oxcarbazepine 600mg po QAM X 1 week, then increase to 600mg po BID thereafter.       Continue olanzapine 5mg po QHS.       Continue lamotrigine 100mg po BID.       Continue bupropion SR 150mg po BID.       Continue cariprazine 4.5mg po daily.       Continue alprazolam 0.5mg po BID as needed.       RTC to  Clinic in 3 months for 30 min follow up with this provider.    Time at end of call:  09:00 AM    30 minutes spent in psychotherapy  Topics addressed in psychotherapy include:  Discussed how generalized anxiety and bipolar disorder frequently are comorbid.  Explored ways the patient could add more meaning to her life.  Recommended using therapeutic breathing to reduce anxiety.

## 2020-09-24 ENCOUNTER — PATIENT MESSAGE (OUTPATIENT)
Dept: BEHAVIORAL HEALTH | Facility: CLINIC | Age: 70
End: 2020-09-24

## 2020-09-24 DIAGNOSIS — E10.9 TYPE 1 DIABETES MELLITUS WITHOUT COMPLICATION (HCC): ICD-10-CM

## 2020-09-24 DIAGNOSIS — F41.1 GENERALIZED ANXIETY DISORDER: ICD-10-CM

## 2020-09-24 RX ORDER — LORAZEPAM 0.5 MG/1
0.5 TABLET ORAL 2 TIMES DAILY PRN
Qty: 60 TAB | Refills: 2 | Status: SHIPPED | OUTPATIENT
Start: 2020-09-24 | End: 2020-09-24 | Stop reason: SDUPTHER

## 2020-09-24 RX ORDER — LORAZEPAM 0.5 MG/1
0.5 TABLET ORAL 2 TIMES DAILY PRN
Qty: 60 TAB | Refills: 2 | Status: SHIPPED | OUTPATIENT
Start: 2020-09-24 | End: 2020-10-24

## 2020-09-25 RX ORDER — INSULIN GLARGINE 100 [IU]/ML
30 INJECTION, SOLUTION SUBCUTANEOUS EVERY EVENING
Qty: 45 EACH | Refills: 1 | Status: SHIPPED | OUTPATIENT
Start: 2020-09-25 | End: 2020-11-02 | Stop reason: SDUPTHER

## 2020-09-25 NOTE — TELEPHONE ENCOUNTER
Received request via: Pharmacy    Was the patient seen in the last year in this department? Yes lov 8/26/2020    Does the patient have an active prescription (recently filled or refills available) for medication(s) requested? No

## 2020-09-28 ENCOUNTER — PATIENT MESSAGE (OUTPATIENT)
Dept: BEHAVIORAL HEALTH | Facility: CLINIC | Age: 70
End: 2020-09-28

## 2020-10-19 RX ORDER — OXCARBAZEPINE 600 MG/1
TABLET, FILM COATED ORAL
Qty: 60 TAB | Refills: 0 | Status: SHIPPED | OUTPATIENT
Start: 2020-10-19 | End: 2020-12-01

## 2020-10-28 ENCOUNTER — TELEPHONE (OUTPATIENT)
Dept: BEHAVIORAL HEALTH | Facility: CLINIC | Age: 70
End: 2020-10-28

## 2020-10-28 DIAGNOSIS — F31.9 BIPOLAR 1 DISORDER (HCC): ICD-10-CM

## 2020-10-28 RX ORDER — LAMOTRIGINE 100 MG/1
TABLET ORAL
Qty: 90 TAB | Refills: 2 | Status: SHIPPED | OUTPATIENT
Start: 2020-10-28 | End: 2020-12-14

## 2020-10-28 NOTE — TELEPHONE ENCOUNTER
1. Caller Name: Molly Loera                          Call Back Number: [phone]        How would the patient prefer to be contacted with a response: Phone call OK to leave a detailed message    Patient called to make a follow up appointment becasue she is having issues with her medication and would like to discuss them. She was able to make an appointment for Dec. 18, 2020. i added her to the cancelation list but she would like to recieve a call back if possible before her appointment. Please advise.

## 2020-10-29 ENCOUNTER — PATIENT MESSAGE (OUTPATIENT)
Dept: BEHAVIORAL HEALTH | Facility: CLINIC | Age: 70
End: 2020-10-29

## 2020-10-29 ENCOUNTER — HOSPITAL ENCOUNTER (OUTPATIENT)
Dept: LAB | Facility: MEDICAL CENTER | Age: 70
End: 2020-10-29
Attending: NURSE PRACTITIONER
Payer: MEDICARE

## 2020-10-29 DIAGNOSIS — E78.5 DYSLIPIDEMIA: ICD-10-CM

## 2020-10-29 LAB
CHOLEST SERPL-MCNC: 154 MG/DL (ref 100–199)
FASTING STATUS PATIENT QL REPORTED: NORMAL
HDLC SERPL-MCNC: 69 MG/DL
LDLC SERPL CALC-MCNC: 59 MG/DL
TRIGL SERPL-MCNC: 129 MG/DL (ref 0–149)

## 2020-10-29 PROCEDURE — 36415 COLL VENOUS BLD VENIPUNCTURE: CPT

## 2020-10-29 PROCEDURE — 80061 LIPID PANEL: CPT

## 2020-11-02 ENCOUNTER — OFFICE VISIT (OUTPATIENT)
Dept: MEDICAL GROUP | Facility: PHYSICIAN GROUP | Age: 70
End: 2020-11-02
Payer: MEDICARE

## 2020-11-02 VITALS
BODY MASS INDEX: 23.66 KG/M2 | SYSTOLIC BLOOD PRESSURE: 116 MMHG | TEMPERATURE: 97.6 F | HEIGHT: 65 IN | WEIGHT: 142 LBS | HEART RATE: 124 BPM | OXYGEN SATURATION: 96 % | DIASTOLIC BLOOD PRESSURE: 72 MMHG

## 2020-11-02 DIAGNOSIS — H53.8 BLURRY VISION, BILATERAL: ICD-10-CM

## 2020-11-02 DIAGNOSIS — R25.1 SHAKING: ICD-10-CM

## 2020-11-02 DIAGNOSIS — F41.1 GENERALIZED ANXIETY DISORDER: ICD-10-CM

## 2020-11-02 DIAGNOSIS — Z76.89 ENCOUNTER TO ESTABLISH CARE: ICD-10-CM

## 2020-11-02 DIAGNOSIS — E11.59 HYPERTENSION ASSOCIATED WITH DIABETES (HCC): ICD-10-CM

## 2020-11-02 DIAGNOSIS — I15.2 HYPERTENSION ASSOCIATED WITH DIABETES (HCC): ICD-10-CM

## 2020-11-02 DIAGNOSIS — E78.5 DYSLIPIDEMIA: ICD-10-CM

## 2020-11-02 DIAGNOSIS — F31.9 BIPOLAR 1 DISORDER (HCC): ICD-10-CM

## 2020-11-02 DIAGNOSIS — C50.912 MALIGNANT NEOPLASM OF LEFT FEMALE BREAST, UNSPECIFIED ESTROGEN RECEPTOR STATUS, UNSPECIFIED SITE OF BREAST (HCC): ICD-10-CM

## 2020-11-02 DIAGNOSIS — N18.31 STAGE 3A CHRONIC KIDNEY DISEASE: ICD-10-CM

## 2020-11-02 DIAGNOSIS — E10.9 TYPE 1 DIABETES MELLITUS WITHOUT COMPLICATION (HCC): ICD-10-CM

## 2020-11-02 PROCEDURE — 99214 OFFICE O/P EST MOD 30 MIN: CPT | Performed by: NURSE PRACTITIONER

## 2020-11-02 RX ORDER — INSULIN ASPART 100 [IU]/ML
INJECTION, SOLUTION INTRAVENOUS; SUBCUTANEOUS
Qty: 45 ML | Refills: 1 | Status: SHIPPED | OUTPATIENT
Start: 2020-11-02 | End: 2021-09-28

## 2020-11-02 RX ORDER — INSULIN GLARGINE 100 [IU]/ML
30 INJECTION, SOLUTION SUBCUTANEOUS EVERY EVENING
Qty: 45 EACH | Refills: 1 | Status: SHIPPED | OUTPATIENT
Start: 2020-11-02 | End: 2021-10-18

## 2020-11-02 RX ORDER — LOSARTAN POTASSIUM 50 MG/1
50 TABLET ORAL DAILY
Qty: 90 TAB | Refills: 3 | Status: SHIPPED | OUTPATIENT
Start: 2020-11-02 | End: 2021-10-18

## 2020-11-02 ASSESSMENT — ANXIETY QUESTIONNAIRES
1. FEELING NERVOUS, ANXIOUS, OR ON EDGE: NEARLY EVERY DAY
5. BEING SO RESTLESS THAT IT IS HARD TO SIT STILL: NEARLY EVERY DAY
GAD7 TOTAL SCORE: 19
7. FEELING AFRAID AS IF SOMETHING AWFUL MIGHT HAPPEN: NEARLY EVERY DAY
3. WORRYING TOO MUCH ABOUT DIFFERENT THINGS: NEARLY EVERY DAY
2. NOT BEING ABLE TO STOP OR CONTROL WORRYING: NEARLY EVERY DAY
6. BECOMING EASILY ANNOYED OR IRRITABLE: SEVERAL DAYS
4. TROUBLE RELAXING: NEARLY EVERY DAY

## 2020-11-02 ASSESSMENT — FIBROSIS 4 INDEX: FIB4 SCORE: 1.22

## 2020-11-02 ASSESSMENT — PATIENT HEALTH QUESTIONNAIRE - PHQ9
CLINICAL INTERPRETATION OF PHQ2 SCORE: 5
SUM OF ALL RESPONSES TO PHQ QUESTIONS 1-9: 12
5. POOR APPETITE OR OVEREATING: 0 - NOT AT ALL

## 2020-11-02 NOTE — ASSESSMENT & PLAN NOTE
Chronic medical problem.  She is currently taking atorvastatin 80 mg daily.  She is tolerating the medication.  Denies myalgias. Last lab results:  Results for JAMIA STARK (MRN [id number]) as of 11/2/2020 08:17   Ref. Range 10/29/2020 07:30   Cholesterol,Tot Latest Ref Range: 100 - 199 mg/dL 154   Triglycerides Latest Ref Range: 0 - 149 mg/dL 129   HDL Latest Ref Range: >=40 mg/dL 69   LDL Latest Ref Range: <100 mg/dL 59

## 2020-11-02 NOTE — ASSESSMENT & PLAN NOTE
Chronic medical problem.  She is followed by her psychiatrist Dr. Don.  She had recent telephone appointment last week.  Her psychiatrist discontinued her lithium due to her CKD 1 month ago.  She has upcoming appointment on 12/18/2020.  She is also taking olanzapine, lamotrigine and cariprazine.

## 2020-11-02 NOTE — ASSESSMENT & PLAN NOTE
Chronic medical problem.  She is followed by her psychiatrist.  She has upcoming appointment on 12/18/2020.  Her flower score today is 19.  She denies any self-harm or SI today.  She is taking Wellbutrin  mg twice a day.

## 2020-11-02 NOTE — PROGRESS NOTES
"Subjective:     CC:  Diagnoses of Encounter to establish care, Type 1 diabetes mellitus without complication (HCC), Bipolar 1 disorder (HCC), Generalized anxiety disorder, Hypertension associated with diabetes (HCC), Blurry vision, bilateral, Dyslipidemia, Malignant neoplasm of left female breast, unspecified estrogen receptor status, unspecified site of breast (HCC), Stage 3a chronic kidney disease, and Shaking were pertinent to this visit.    HISTORY OF THE PRESENT ILLNESS: Patient is a 70 y.o. female. This pleasant patient is here today to establish care and discuss the following. Her prior PCP was Ema Miller.    Bipolar 1 disorder (HCC)  Chronic medical problem.  She is followed by her psychiatrist Dr. Don.  She had recent telephone appointment last week.  Her psychiatrist discontinued her lithium due to her CKD 1 month ago.  She has upcoming appointment on 12/18/2020.  She is also taking olanzapine, lamotrigine and cariprazine.     Type 1 diabetes mellitus without complication (HCC)  Chronic medical problem.  She is taking basaglar 10 units in a.m. and 40 units in the evening, NovoLog 12 units with breakfast, 12 units with lunch, and 14 units with evening meal.  She has Dexcom sensor in place.  She has not followed up with endocrinology as she and her daughter states they have not received phone call from endocrinology office.  She would like a medication refill on her insulin today.    Blurry vision, bilateral  New problem to examiner. The bilateral blurry vision and spots started 3 weeks ago. She see's \"spider webs\". Her last eye exam was 2-3 years ago. She is a poor historian and requires frequent prompting for history. History of right eye retinal detachment 7 years ago and developed cataract in right eye post retinal detachment. She has history of dry eye and does not take drops. Denies fever, chills, itchy eyes, ear pain or sore throat.    Generalized anxiety disorder  Chronic medical problem.  She " "is followed by her psychiatrist.  She has upcoming appointment on 12/18/2020.  Her flower score today is 19.  She denies any self-harm or SI today.  She is taking Wellbutrin  mg twice a day.    Hypertension associated with diabetes (HCC)  Chronic medical problem.  She is taking losartan 50 mg daily and amlodipine 5 mg daily.  Her BP is at goal today.  She would like a medication refill today.  She denies any chest pain, shortness of breath, dizziness, palpitations, or headaches.    Dyslipidemia  Chronic medical problem.  She is currently taking atorvastatin 80 mg daily.  She is tolerating the medication.  Denies myalgias. Last lab results:  Results for JAMIA STARK (MRN [id number]) as of 11/2/2020 08:17   Ref. Range 10/29/2020 07:30   Cholesterol,Tot Latest Ref Range: 100 - 199 mg/dL 154   Triglycerides Latest Ref Range: 0 - 149 mg/dL 129   HDL Latest Ref Range: >=40 mg/dL 69   LDL Latest Ref Range: <100 mg/dL 59       Malignant neoplasm of left female breast (HCC)  Chronic medical problem.  She is currently taking anastrozole 1 mg daily.  She has referral placed for new oncologist although has not followed up as she states she is \"scared\".  Her daughter states that the patient has anxiety with establishing with new providers.    Stage 3 chronic kidney disease (HCC)  Chronic medical problem.  She is not taking any NSAIDs.  She is followed closely by her psychiatrist who was recently discontinued her lithium.    Shaking  Chronic medical problem. She is taking primidone 100 mg daily. This is controlling her symptoms.       Allergies: Depakote [valproic acid]    Current Outpatient Medications Ordered in Epic   Medication Sig Dispense Refill   • losartan (COZAAR) 50 MG Tab Take 1 Tab by mouth every day. 90 Tab 3   • insulin aspart (NOVOLOG FLEXPEN) 100 UNIT/ML injection PEN INJECT 12 UNITS TOTAL UNDER THE SKIN THREE (3) TIMES DAILY BEFORE MEALS. 45 mL 1   • insulin glargine (BASAGLAR KWIKPEN) 100 UNIT/ML " injection PEN Inject 30 Units as instructed every evening. 45 Each 1   • lamoTRIgine (LAMICTAL) 100 MG Tab Take 2 tabs by mouth each morning and 1 tab by mouth each evening. 90 Tab 2   • Cariprazine HCl 4.5 MG Cap Take 4.5 mg by mouth every day for 90 days. 90 Cap 1   • oxcarbazepine (TRILEPTAL) 600 MG tablet TAKE 1 TAB BY MOUTH EACH MORNING X 1 WEEK, THEN TAKE 1 TAB BY MOUTH TWICE A DAY THEREAFTER. 60 Tab 0   • buPROPion SR (WELLBUTRIN-SR) 150 MG TABLET SR 12 HR sustained-release tablet Take 1 Tab by mouth 2 times a day for 90 days. 90 Tab 1   • olanzapine (ZYPREXA) 10 MG tablet Take 1 Tab by mouth every bedtime for 90 days. 90 Tab 1   • amLODIPine (NORVASC) 5 MG Tab Take 1 Tab by mouth every day. 90 Tab 0   • atorvastatin (LIPITOR) 80 MG tablet Take 1 Tab by mouth every day. 90 Tab 0   • primidone (MYSOLINE) 50 MG Tab Take 2 Tabs by mouth every day. 180 Tab 1   • anastrozole (ARIMIDEX) 1 MG Tab Take 1 Tab by mouth every day. 90 Tab 1   • aspirin EC (ECOTRIN) 81 MG Tablet Delayed Response Take 1 Tab by mouth every day. 90 Tab 1   • Blood Glucose Meter Kit Test blood sugar as recommended by provider. 1 blood glucose monitoring kit-- DEXCOM 1 Kit 0   • Blood Glucose Test Strips Use one testing unit every 10 days to test blood sugar. DEXCOM meter 3 Units 5     No current Epic-ordered facility-administered medications on file.        Past Medical History:   Diagnosis Date   • Anxiety    • Bipolar 1 disorder (HCC)    • Cancer (HCC)     left breast   • Depression    • Diabetes (HCC)    • Kidney disease        History reviewed. No pertinent surgical history.    Social History     Tobacco Use   • Smoking status: Never Smoker   • Smokeless tobacco: Never Used   Substance Use Topics   • Alcohol use: Never     Frequency: Never   • Drug use: Never       Social History     Social History Narrative   • Not on file       Family History   Problem Relation Age of Onset   • Diabetes Daughter        Health Maintenance: Due for AWV,  "urine microalbumin, monofilament, retinal screening, hep B vaccine, Tdap, Pap smear, colonoscopy, zoster vaccine, bone density, and pneumonia vaccine.  She declines her vaccines today.  Her daughter states that she has had bone density in the last 5 years and will look for her records.    ROS:   Gen: no fevers/chills, no changes in weight  Eyes: No itchy eyes, + Blurry vision, + spots in her vision  ENT: no sore throat, no ear pain  Pulm: no sob, no cough  CV: no chest pain, no palpitations  GI: no nausea/vomiting, no diarrhea  : no dysuria  MSk: no myalgias  Skin: no rash  Neuro: no headaches, no dizziness, + chronic anxiety, + chronic depression (she is followed closely by her psychiatrist)     Objective:     Vital signs reviewed  Exam: /72 (BP Location: Left arm, Patient Position: Sitting, BP Cuff Size: Adult)   Pulse (!) 124   Temp 36.4 °C (97.6 °F) (Temporal)   Ht 1.651 m (5' 5\")   Wt 64.4 kg (142 lb)   SpO2 96%  Body mass index is 23.63 kg/m².    General: Normal appearing. No distress.  Daughter present in exam room.  HENT: Normocephalic. Ears normal shape and contour, canals are clear bilaterally, tympanic membranes are benign.  Eyes: Eyes conjunctiva clear lids without ptosis, pupils equal and reactive to light accommodation, lids normal.  Neck: Supple without JVD. Thyroid is not enlarged.  Pulmonary: Clear to ausculation.  Normal effort. No rales, ronchi, or wheezing.  Cardiovascular: Regular rate and rhythm without murmur. Radial pulses are intact and equal bilaterally.  Abdomen: Soft, nontender, nondistended.   Neurologic: Grossly nonfocal  Lymph: No cervical or supraclavicular lymph nodes are palpable  Skin: Warm and dry.  No obvious lesions.  Musculoskeletal: Normal gait. No extremity cyanosis, clubbing, or edema.  Psych: Flat affect.  Anxious.  Needs frequent prompting to answer questions.  Alert and oriented x3. Judgment and insight is normal.      Assessment & Plan:   70 y.o. female with " the following -    1. Encounter to establish care  New problem to examiner.  Care established.    2. Type 1 diabetes mellitus without complication (HCC)  Chronic unstable medical problem.  Continue NovoLog and Basaglar, medications refilled.  Discussed with patient that she should complete referral with endocrinology to help manage her type 1 diabetes especially since she has Dexcom meter now.  She verbalized understanding.  Contact information for endocrinology provided to patient on her AVS.  Referral placed to ophthalmology.  - insulin aspart (NOVOLOG FLEXPEN) 100 UNIT/ML injection PEN; INJECT 12 UNITS TOTAL UNDER THE SKIN THREE (3) TIMES DAILY BEFORE MEALS.  Dispense: 45 mL; Refill: 1  - insulin glargine (BASAGLAR KWIKPEN) 100 UNIT/ML injection PEN; Inject 30 Units as instructed every evening.  Dispense: 45 Each; Refill: 1  - AMB REFERRAL TO NEURO OPHTHALMOLOGY    3. Bipolar 1 disorder (HCC)  Chronic unstable medical problem.  Continue follow-up with psychiatrist.  Continue olanzapine, lamotrigine, and cariprazine. MICHELLE and PHQ-9 score reviewed today.  She denies any self-harm or SI today.  - AMB REFERRAL TO NEURO OPHTHALMOLOGY    4. Generalized anxiety disorder  New problem to examiner.  Continue follow-up with psychiatry.  Continue Wellbutrin.  Michelle score reviewed today.  She denies any self-harm or SI today.  Red flags discussed.    5. Hypertension associated with diabetes (HCC)  Chronic stable medical problem.  Continue losartan and amlodipine.  BP is at goal today.  Losartan refilled today.  - losartan (COZAAR) 50 MG Tab; Take 1 Tab by mouth every day.  Dispense: 90 Tab; Refill: 3  - AMB REFERRAL TO NEURO OPHTHALMOLOGY    6. Blurry vision, bilateral  New problem to examiner.  Urgent referral placed to ophthalmology.  Discussed with her diabetes health maintenance she should have annual retinal exam.  She verbalized understanding.   - AMB REFERRAL TO NEURO OPHTHALMOLOGY    7. Dyslipidemia  Chronic stable  medical problem.  Continue atorvastatin.  She is up-to-date on labs.    8. Malignant neoplasm of left female breast, unspecified estrogen receptor status, unspecified site of breast (HCC)  Chronic stable medical problem.  Continue anastrozole.  Discussed she should follow-up with oncology and make appointment.  She verbalized understanding.    9. Stage 3a chronic kidney disease  New problem to examiner.  Continue to avoid NSAIDs.  Recent labs reviewed and discussed with patient.  Reassurance provided on CKD.    10. Shaking  Chronic stable medical problem.  Continue primidone.  No acute issues today.    Return in about 3 months (around 2/2/2021) for Diabetes, HTN.    Please note that this dictation was created using voice recognition software. I have made every reasonable attempt to correct obvious errors, but I expect that there are errors of grammar and possibly content that I did not discover before finalizing the note.

## 2020-11-02 NOTE — ASSESSMENT & PLAN NOTE
"New problem to examiner. The bilateral blurry vision and spots started 3 weeks ago. She see's \"spider webs\". Her last eye exam was 2-3 years ago. She is a poor historian and requires frequent prompting for history. History of right eye retinal detachment 7 years ago and developed cataract in right eye post retinal detachment. She has history of dry eye and does not take drops. Denies fever, chills, itchy eyes, ear pain or sore throat.  "

## 2020-11-02 NOTE — ASSESSMENT & PLAN NOTE
Chronic medical problem.  She is not taking any NSAIDs.  She is followed closely by her psychiatrist who was recently discontinued her lithium.

## 2020-11-02 NOTE — PATIENT INSTRUCTIONS
[facility]   [address], [address]  [place] [address]  Phone: [phone]

## 2020-11-02 NOTE — ASSESSMENT & PLAN NOTE
Chronic medical problem.  She is taking losartan 50 mg daily and amlodipine 5 mg daily.  Her BP is at goal today.  She would like a medication refill today.  She denies any chest pain, shortness of breath, dizziness, palpitations, or headaches.

## 2020-11-02 NOTE — ASSESSMENT & PLAN NOTE
Chronic medical problem.  She is taking basaglar 10 units in a.m. and 40 units in the evening, NovoLog 12 units with breakfast, 12 units with lunch, and 14 units with evening meal.  She has Dexcom sensor in place.  She has not followed up with endocrinology as she and her daughter states they have not received phone call from endocrinology office.  She would like a medication refill on her insulin today.

## 2020-11-02 NOTE — ASSESSMENT & PLAN NOTE
"Chronic medical problem.  She is currently taking anastrozole 1 mg daily.  She has referral placed for new oncologist although has not followed up as she states she is \"scared\".  Her daughter states that the patient has anxiety with establishing with new providers.  "

## 2020-11-06 ENCOUNTER — PATIENT MESSAGE (OUTPATIENT)
Dept: MEDICAL GROUP | Facility: PHYSICIAN GROUP | Age: 70
End: 2020-11-06

## 2020-11-06 DIAGNOSIS — E10.9 TYPE 1 DIABETES MELLITUS WITHOUT COMPLICATION (HCC): ICD-10-CM

## 2020-11-06 DIAGNOSIS — N18.31 STAGE 3A CHRONIC KIDNEY DISEASE: ICD-10-CM

## 2020-11-07 ENCOUNTER — HOSPITAL ENCOUNTER (OUTPATIENT)
Dept: LAB | Facility: MEDICAL CENTER | Age: 70
DRG: 638 | End: 2020-11-07
Attending: NURSE PRACTITIONER
Payer: MEDICARE

## 2020-11-07 DIAGNOSIS — N18.31 STAGE 3A CHRONIC KIDNEY DISEASE: ICD-10-CM

## 2020-11-07 DIAGNOSIS — E10.9 TYPE 1 DIABETES MELLITUS WITHOUT COMPLICATION (HCC): ICD-10-CM

## 2020-11-07 LAB
ANION GAP SERPL CALC-SCNC: 20 MMOL/L (ref 7–16)
APPEARANCE UR: ABNORMAL
BACTERIA #/AREA URNS HPF: NEGATIVE /HPF
BILIRUB UR QL STRIP.AUTO: NEGATIVE
BUN SERPL-MCNC: 14 MG/DL (ref 8–22)
CALCIUM SERPL-MCNC: 10 MG/DL (ref 8.5–10.5)
CAOX CRY #/AREA URNS HPF: ABNORMAL /HPF
CHLORIDE SERPL-SCNC: 94 MMOL/L (ref 96–112)
CO2 SERPL-SCNC: 20 MMOL/L (ref 20–33)
COLOR UR: YELLOW
CREAT SERPL-MCNC: 0.89 MG/DL (ref 0.5–1.4)
CREAT UR-MCNC: 169.85 MG/DL
EPI CELLS #/AREA URNS HPF: NEGATIVE /HPF
GLUCOSE SERPL-MCNC: 368 MG/DL (ref 65–99)
GLUCOSE UR STRIP.AUTO-MCNC: NEGATIVE MG/DL
HYALINE CASTS #/AREA URNS LPF: ABNORMAL /LPF
KETONES UR STRIP.AUTO-MCNC: NEGATIVE MG/DL
LEUKOCYTE ESTERASE UR QL STRIP.AUTO: NEGATIVE
MICRO URNS: ABNORMAL
MICROALBUMIN UR-MCNC: 2.9 MG/DL
MICROALBUMIN/CREAT UR: 17 MG/G (ref 0–30)
NITRITE UR QL STRIP.AUTO: NEGATIVE
PH UR STRIP.AUTO: 6 [PH] (ref 5–8)
POTASSIUM SERPL-SCNC: 3.8 MMOL/L (ref 3.6–5.5)
PROT UR QL STRIP: NEGATIVE MG/DL
RBC # URNS HPF: ABNORMAL /HPF
RBC UR QL AUTO: ABNORMAL
SODIUM SERPL-SCNC: 134 MMOL/L (ref 135–145)
SP GR UR STRIP.AUTO: 1.02
UROBILINOGEN UR STRIP.AUTO-MCNC: 0.2 MG/DL
WBC #/AREA URNS HPF: ABNORMAL /HPF

## 2020-11-07 PROCEDURE — 83036 HEMOGLOBIN GLYCOSYLATED A1C: CPT | Mod: GA

## 2020-11-07 PROCEDURE — 81001 URINALYSIS AUTO W/SCOPE: CPT

## 2020-11-07 PROCEDURE — 36415 COLL VENOUS BLD VENIPUNCTURE: CPT | Mod: GA

## 2020-11-07 PROCEDURE — 80048 BASIC METABOLIC PNL TOTAL CA: CPT

## 2020-11-07 PROCEDURE — 82043 UR ALBUMIN QUANTITATIVE: CPT

## 2020-11-07 PROCEDURE — 82570 ASSAY OF URINE CREATININE: CPT

## 2020-11-08 ENCOUNTER — HOSPITAL ENCOUNTER (INPATIENT)
Facility: MEDICAL CENTER | Age: 70
LOS: 1 days | DRG: 638 | End: 2020-11-09
Attending: EMERGENCY MEDICINE | Admitting: INTERNAL MEDICINE
Payer: MEDICARE

## 2020-11-08 ENCOUNTER — APPOINTMENT (OUTPATIENT)
Dept: CARDIOLOGY | Facility: MEDICAL CENTER | Age: 70
DRG: 638 | End: 2020-11-08
Attending: INTERNAL MEDICINE
Payer: MEDICARE

## 2020-11-08 ENCOUNTER — APPOINTMENT (OUTPATIENT)
Dept: RADIOLOGY | Facility: MEDICAL CENTER | Age: 70
DRG: 638 | End: 2020-11-08
Attending: EMERGENCY MEDICINE
Payer: MEDICARE

## 2020-11-08 DIAGNOSIS — R73.9 HYPERGLYCEMIA: ICD-10-CM

## 2020-11-08 DIAGNOSIS — R53.1 GENERALIZED WEAKNESS: ICD-10-CM

## 2020-11-08 PROBLEM — N18.30 ACUTE RENAL FAILURE SUPERIMPOSED ON STAGE 3 CHRONIC KIDNEY DISEASE (HCC): Status: ACTIVE | Noted: 2020-11-08

## 2020-11-08 PROBLEM — N17.9 AKI (ACUTE KIDNEY INJURY) (HCC): Status: ACTIVE | Noted: 2020-11-08

## 2020-11-08 PROBLEM — R19.7 DIARRHEA: Status: ACTIVE | Noted: 2020-11-08

## 2020-11-08 PROBLEM — R79.89 TROPONIN LEVEL ELEVATED: Status: ACTIVE | Noted: 2020-11-08

## 2020-11-08 PROBLEM — I10 UNCONTROLLED HYPERTENSION: Status: ACTIVE | Noted: 2019-09-23

## 2020-11-08 LAB
ALBUMIN SERPL BCP-MCNC: 4 G/DL (ref 3.2–4.9)
ALBUMIN/GLOB SERPL: 1.7 G/DL
ALP SERPL-CCNC: 195 U/L (ref 30–99)
ALT SERPL-CCNC: 24 U/L (ref 2–50)
ANION GAP SERPL CALC-SCNC: 17 MMOL/L (ref 7–16)
ANION GAP SERPL CALC-SCNC: 7 MMOL/L (ref 7–16)
AST SERPL-CCNC: 31 U/L (ref 12–45)
B-OH-BUTYR SERPL-MCNC: 0.24 MMOL/L (ref 0.02–0.27)
BASE EXCESS BLDV CALC-SCNC: -8 MMOL/L
BASOPHILS # BLD AUTO: 0.5 % (ref 0–1.8)
BASOPHILS # BLD: 0.05 K/UL (ref 0–0.12)
BILIRUB SERPL-MCNC: 0.2 MG/DL (ref 0.1–1.5)
BODY TEMPERATURE: ABNORMAL CENTIGRADE
BUN SERPL-MCNC: 13 MG/DL (ref 8–22)
BUN SERPL-MCNC: 16 MG/DL (ref 8–22)
CALCIUM SERPL-MCNC: 10.2 MG/DL (ref 8.5–10.5)
CALCIUM SERPL-MCNC: 9.4 MG/DL (ref 8.5–10.5)
CHLORIDE SERPL-SCNC: 118 MMOL/L (ref 96–112)
CHLORIDE SERPL-SCNC: 97 MMOL/L (ref 96–112)
CO2 SERPL-SCNC: 19 MMOL/L (ref 20–33)
CO2 SERPL-SCNC: 26 MMOL/L (ref 20–33)
COMMENT 1642: NORMAL
COVID ORDER STATUS COVID19: NORMAL
CREAT SERPL-MCNC: 0.81 MG/DL (ref 0.5–1.4)
CREAT SERPL-MCNC: 1.27 MG/DL (ref 0.5–1.4)
EKG IMPRESSION: NORMAL
EOSINOPHIL # BLD AUTO: 0.02 K/UL (ref 0–0.51)
EOSINOPHIL NFR BLD: 0.2 % (ref 0–6.9)
ERYTHROCYTE [DISTWIDTH] IN BLOOD BY AUTOMATED COUNT: 44.8 FL (ref 35.9–50)
EST. AVERAGE GLUCOSE BLD GHB EST-MCNC: 151 MG/DL
EST. AVERAGE GLUCOSE BLD GHB EST-MCNC: 154 MG/DL
GLOBULIN SER CALC-MCNC: 2.3 G/DL (ref 1.9–3.5)
GLUCOSE BLD-MCNC: 575 MG/DL (ref 65–99)
GLUCOSE BLD-MCNC: >600 MG/DL (ref 65–99)
GLUCOSE SERPL-MCNC: 1084 MG/DL (ref 65–99)
GLUCOSE SERPL-MCNC: 279 MG/DL (ref 65–99)
GLUCOSE SERPL-MCNC: 801 MG/DL (ref 65–99)
HBA1C MFR BLD: 6.9 % (ref 0–5.6)
HBA1C MFR BLD: 7 % (ref 0–5.6)
HCO3 BLDV-SCNC: 17 MMOL/L (ref 24–28)
HCT VFR BLD AUTO: 43.5 % (ref 37–47)
HGB BLD-MCNC: 12.9 G/DL (ref 12–16)
IMM GRANULOCYTES # BLD AUTO: 0.04 K/UL (ref 0–0.11)
IMM GRANULOCYTES NFR BLD AUTO: 0.4 % (ref 0–0.9)
LIPASE SERPL-CCNC: 21 U/L (ref 11–82)
LV EJECT FRACT MOD 2C 99903: 66.52
LV EJECT FRACT MOD 4C 99902: 78.32
LV EJECT FRACT MOD BP 99901: 74.25
LYMPHOCYTES # BLD AUTO: 0.59 K/UL (ref 1–4.8)
LYMPHOCYTES NFR BLD: 5.4 % (ref 22–41)
MAGNESIUM SERPL-MCNC: 2 MG/DL (ref 1.5–2.5)
MAGNESIUM SERPL-MCNC: 2.3 MG/DL (ref 1.5–2.5)
MCH RBC QN AUTO: 27.5 PG (ref 27–33)
MCHC RBC AUTO-ENTMCNC: 29.7 G/DL (ref 33.6–35)
MCV RBC AUTO: 92.8 FL (ref 81.4–97.8)
MONOCYTES # BLD AUTO: 0.43 K/UL (ref 0–0.85)
MONOCYTES NFR BLD AUTO: 3.9 % (ref 0–13.4)
MORPHOLOGY BLD-IMP: NORMAL
NEUTROPHILS # BLD AUTO: 9.77 K/UL (ref 2–7.15)
NEUTROPHILS NFR BLD: 89.6 % (ref 44–72)
NRBC # BLD AUTO: 0 K/UL
NRBC BLD-RTO: 0 /100 WBC
PCO2 BLDV: 33.6 MMHG (ref 41–51)
PH BLDV: 7.33 [PH] (ref 7.31–7.45)
PLATELET # BLD AUTO: 282 K/UL (ref 164–446)
PLATELET BLD QL SMEAR: NORMAL
PMV BLD AUTO: 11.6 FL (ref 9–12.9)
PO2 BLDV: 58.5 MMHG (ref 25–40)
POTASSIUM SERPL-SCNC: 3.5 MMOL/L (ref 3.6–5.5)
POTASSIUM SERPL-SCNC: 5.6 MMOL/L (ref 3.6–5.5)
PROT SERPL-MCNC: 6.3 G/DL (ref 6–8.2)
RBC # BLD AUTO: 4.69 M/UL (ref 4.2–5.4)
RBC BLD AUTO: NORMAL
SAO2 % BLDV: ABNORMAL %
SARS-COV-2 RNA RESP QL NAA+PROBE: NOTDETECTED
SODIUM SERPL-SCNC: 133 MMOL/L (ref 135–145)
SODIUM SERPL-SCNC: 151 MMOL/L (ref 135–145)
SPECIMEN SOURCE: NORMAL
TROPONIN T SERPL-MCNC: 30 NG/L (ref 6–19)
TROPONIN T SERPL-MCNC: 34 NG/L (ref 6–19)
WBC # BLD AUTO: 10.9 K/UL (ref 4.8–10.8)

## 2020-11-08 PROCEDURE — 82803 BLOOD GASES ANY COMBINATION: CPT

## 2020-11-08 PROCEDURE — A9270 NON-COVERED ITEM OR SERVICE: HCPCS | Performed by: INTERNAL MEDICINE

## 2020-11-08 PROCEDURE — 71045 X-RAY EXAM CHEST 1 VIEW: CPT

## 2020-11-08 PROCEDURE — 84484 ASSAY OF TROPONIN QUANT: CPT

## 2020-11-08 PROCEDURE — 700105 HCHG RX REV CODE 258: Performed by: INTERNAL MEDICINE

## 2020-11-08 PROCEDURE — 96366 THER/PROPH/DIAG IV INF ADDON: CPT

## 2020-11-08 PROCEDURE — 83036 HEMOGLOBIN GLYCOSYLATED A1C: CPT

## 2020-11-08 PROCEDURE — 700105 HCHG RX REV CODE 258: Performed by: EMERGENCY MEDICINE

## 2020-11-08 PROCEDURE — A9270 NON-COVERED ITEM OR SERVICE: HCPCS | Performed by: EMERGENCY MEDICINE

## 2020-11-08 PROCEDURE — 96375 TX/PRO/DX INJ NEW DRUG ADDON: CPT

## 2020-11-08 PROCEDURE — 99291 CRITICAL CARE FIRST HOUR: CPT | Performed by: INTERNAL MEDICINE

## 2020-11-08 PROCEDURE — 82947 ASSAY GLUCOSE BLOOD QUANT: CPT

## 2020-11-08 PROCEDURE — 83690 ASSAY OF LIPASE: CPT

## 2020-11-08 PROCEDURE — U0003 INFECTIOUS AGENT DETECTION BY NUCLEIC ACID (DNA OR RNA); SEVERE ACUTE RESPIRATORY SYNDROME CORONAVIRUS 2 (SARS-COV-2) (CORONAVIRUS DISEASE [COVID-19]), AMPLIFIED PROBE TECHNIQUE, MAKING USE OF HIGH THROUGHPUT TECHNOLOGIES AS DESCRIBED BY CMS-2020-01-R: HCPCS

## 2020-11-08 PROCEDURE — 99223 1ST HOSP IP/OBS HIGH 75: CPT | Mod: AI | Performed by: INTERNAL MEDICINE

## 2020-11-08 PROCEDURE — 93306 TTE W/DOPPLER COMPLETE: CPT | Mod: 26 | Performed by: STUDENT IN AN ORGANIZED HEALTH CARE EDUCATION/TRAINING PROGRAM

## 2020-11-08 PROCEDURE — 93005 ELECTROCARDIOGRAM TRACING: CPT | Performed by: INTERNAL MEDICINE

## 2020-11-08 PROCEDURE — 82010 KETONE BODYS QUAN: CPT

## 2020-11-08 PROCEDURE — 770022 HCHG ROOM/CARE - ICU (200)

## 2020-11-08 PROCEDURE — 93010 ELECTROCARDIOGRAM REPORT: CPT | Performed by: INTERNAL MEDICINE

## 2020-11-08 PROCEDURE — 96376 TX/PRO/DX INJ SAME DRUG ADON: CPT

## 2020-11-08 PROCEDURE — 51798 US URINE CAPACITY MEASURE: CPT

## 2020-11-08 PROCEDURE — C9803 HOPD COVID-19 SPEC COLLECT: HCPCS | Performed by: INTERNAL MEDICINE

## 2020-11-08 PROCEDURE — 82962 GLUCOSE BLOOD TEST: CPT | Mod: 91

## 2020-11-08 PROCEDURE — 36415 COLL VENOUS BLD VENIPUNCTURE: CPT

## 2020-11-08 PROCEDURE — 700102 HCHG RX REV CODE 250 W/ 637 OVERRIDE(OP): Performed by: EMERGENCY MEDICINE

## 2020-11-08 PROCEDURE — 96365 THER/PROPH/DIAG IV INF INIT: CPT

## 2020-11-08 PROCEDURE — 99291 CRITICAL CARE FIRST HOUR: CPT

## 2020-11-08 PROCEDURE — 94760 N-INVAS EAR/PLS OXIMETRY 1: CPT

## 2020-11-08 PROCEDURE — 700102 HCHG RX REV CODE 250 W/ 637 OVERRIDE(OP): Performed by: INTERNAL MEDICINE

## 2020-11-08 PROCEDURE — 700111 HCHG RX REV CODE 636 W/ 250 OVERRIDE (IP): Performed by: INTERNAL MEDICINE

## 2020-11-08 PROCEDURE — 93306 TTE W/DOPPLER COMPLETE: CPT

## 2020-11-08 PROCEDURE — 85025 COMPLETE CBC W/AUTO DIFF WBC: CPT

## 2020-11-08 PROCEDURE — 80048 BASIC METABOLIC PNL TOTAL CA: CPT

## 2020-11-08 PROCEDURE — 83735 ASSAY OF MAGNESIUM: CPT

## 2020-11-08 PROCEDURE — 80053 COMPREHEN METABOLIC PANEL: CPT

## 2020-11-08 RX ORDER — INSULIN GLARGINE 100 [IU]/ML
20 INJECTION, SOLUTION SUBCUTANEOUS EVERY EVENING
Status: DISCONTINUED | OUTPATIENT
Start: 2020-11-08 | End: 2020-11-08

## 2020-11-08 RX ORDER — ACETAMINOPHEN 325 MG/1
650 TABLET ORAL EVERY 6 HOURS PRN
Status: DISCONTINUED | OUTPATIENT
Start: 2020-11-08 | End: 2020-11-09 | Stop reason: HOSPADM

## 2020-11-08 RX ORDER — ATORVASTATIN CALCIUM 80 MG/1
80 TABLET, FILM COATED ORAL DAILY
Status: DISCONTINUED | OUTPATIENT
Start: 2020-11-08 | End: 2020-11-09 | Stop reason: HOSPADM

## 2020-11-08 RX ORDER — ONDANSETRON 4 MG/1
4 TABLET, ORALLY DISINTEGRATING ORAL EVERY 4 HOURS PRN
Status: DISCONTINUED | OUTPATIENT
Start: 2020-11-08 | End: 2020-11-09 | Stop reason: HOSPADM

## 2020-11-08 RX ORDER — SODIUM CHLORIDE 9 MG/ML
1000 INJECTION, SOLUTION INTRAVENOUS ONCE
Status: COMPLETED | OUTPATIENT
Start: 2020-11-08 | End: 2020-11-08

## 2020-11-08 RX ORDER — OLANZAPINE 5 MG/1
10 TABLET ORAL
Status: DISCONTINUED | OUTPATIENT
Start: 2020-11-08 | End: 2020-11-09 | Stop reason: HOSPADM

## 2020-11-08 RX ORDER — INSULIN GLARGINE 100 [IU]/ML
36 INJECTION, SOLUTION SUBCUTANEOUS EVERY EVENING
Status: DISCONTINUED | OUTPATIENT
Start: 2020-11-08 | End: 2020-11-08

## 2020-11-08 RX ORDER — PRIMIDONE 50 MG/1
100 TABLET ORAL DAILY
Status: DISCONTINUED | OUTPATIENT
Start: 2020-11-08 | End: 2020-11-09 | Stop reason: HOSPADM

## 2020-11-08 RX ORDER — POLYETHYLENE GLYCOL 3350 17 G/17G
1 POWDER, FOR SOLUTION ORAL
Status: DISCONTINUED | OUTPATIENT
Start: 2020-11-08 | End: 2020-11-09 | Stop reason: HOSPADM

## 2020-11-08 RX ORDER — BISACODYL 10 MG
10 SUPPOSITORY, RECTAL RECTAL
Status: DISCONTINUED | OUTPATIENT
Start: 2020-11-08 | End: 2020-11-09 | Stop reason: HOSPADM

## 2020-11-08 RX ORDER — DEXTROSE MONOHYDRATE 25 G/50ML
50 INJECTION, SOLUTION INTRAVENOUS
Status: DISCONTINUED | OUTPATIENT
Start: 2020-11-08 | End: 2020-11-08

## 2020-11-08 RX ORDER — LAMOTRIGINE 100 MG/1
100-200 TABLET ORAL 2 TIMES DAILY
Status: DISCONTINUED | OUTPATIENT
Start: 2020-11-08 | End: 2020-11-09

## 2020-11-08 RX ORDER — SODIUM CHLORIDE, SODIUM LACTATE, POTASSIUM CHLORIDE, CALCIUM CHLORIDE 600; 310; 30; 20 MG/100ML; MG/100ML; MG/100ML; MG/100ML
INJECTION, SOLUTION INTRAVENOUS CONTINUOUS
Status: DISCONTINUED | OUTPATIENT
Start: 2020-11-08 | End: 2020-11-09

## 2020-11-08 RX ORDER — BUPROPION HYDROCHLORIDE 150 MG/1
150 TABLET, EXTENDED RELEASE ORAL 2 TIMES DAILY
Status: DISCONTINUED | OUTPATIENT
Start: 2020-11-08 | End: 2020-11-09 | Stop reason: HOSPADM

## 2020-11-08 RX ORDER — AMOXICILLIN 250 MG
2 CAPSULE ORAL 2 TIMES DAILY
Status: DISCONTINUED | OUTPATIENT
Start: 2020-11-09 | End: 2020-11-09 | Stop reason: HOSPADM

## 2020-11-08 RX ORDER — ONDANSETRON 2 MG/ML
4 INJECTION INTRAMUSCULAR; INTRAVENOUS EVERY 4 HOURS PRN
Status: DISCONTINUED | OUTPATIENT
Start: 2020-11-08 | End: 2020-11-09 | Stop reason: HOSPADM

## 2020-11-08 RX ORDER — AMLODIPINE BESYLATE 10 MG/1
5 TABLET ORAL DAILY
Status: DISCONTINUED | OUTPATIENT
Start: 2020-11-08 | End: 2020-11-09 | Stop reason: HOSPADM

## 2020-11-08 RX ORDER — LORAZEPAM 0.5 MG/1
1 TABLET ORAL EVERY EVENING
Status: ON HOLD | COMMUNITY
End: 2020-11-09

## 2020-11-08 RX ORDER — DEXTROSE MONOHYDRATE 25 G/50ML
25-50 INJECTION, SOLUTION INTRAVENOUS PRN
Status: DISCONTINUED | OUTPATIENT
Start: 2020-11-08 | End: 2020-11-09

## 2020-11-08 RX ORDER — INSULIN GLARGINE 100 [IU]/ML
15 INJECTION, SOLUTION SUBCUTANEOUS
Status: DISCONTINUED | OUTPATIENT
Start: 2020-11-08 | End: 2020-11-08

## 2020-11-08 RX ORDER — AMLODIPINE BESYLATE 5 MG/1
5 TABLET ORAL ONCE
Status: COMPLETED | OUTPATIENT
Start: 2020-11-08 | End: 2020-11-08

## 2020-11-08 RX ORDER — LABETALOL HYDROCHLORIDE 5 MG/ML
10 INJECTION, SOLUTION INTRAVENOUS EVERY 4 HOURS PRN
Status: DISCONTINUED | OUTPATIENT
Start: 2020-11-08 | End: 2020-11-09

## 2020-11-08 RX ORDER — LOSARTAN POTASSIUM 50 MG/1
50 TABLET ORAL DAILY
Status: DISCONTINUED | OUTPATIENT
Start: 2020-11-08 | End: 2020-11-09 | Stop reason: HOSPADM

## 2020-11-08 RX ORDER — OXCARBAZEPINE 300 MG/1
600 TABLET, FILM COATED ORAL 2 TIMES DAILY
Status: DISCONTINUED | OUTPATIENT
Start: 2020-11-08 | End: 2020-11-09 | Stop reason: HOSPADM

## 2020-11-08 RX ORDER — LOSARTAN POTASSIUM 50 MG/1
50 TABLET ORAL ONCE
Status: COMPLETED | OUTPATIENT
Start: 2020-11-08 | End: 2020-11-08

## 2020-11-08 RX ADMIN — AMLODIPINE BESYLATE 5 MG: 5 TABLET ORAL at 05:40

## 2020-11-08 RX ADMIN — LAMOTRIGINE 200 MG: 100 TABLET ORAL at 09:55

## 2020-11-08 RX ADMIN — ATORVASTATIN CALCIUM 80 MG: 80 TABLET, FILM COATED ORAL at 09:55

## 2020-11-08 RX ADMIN — SODIUM CHLORIDE, POTASSIUM CHLORIDE, SODIUM LACTATE AND CALCIUM CHLORIDE: 600; 310; 30; 20 INJECTION, SOLUTION INTRAVENOUS at 11:30

## 2020-11-08 RX ADMIN — LOSARTAN POTASSIUM 50 MG: 50 TABLET, FILM COATED ORAL at 05:40

## 2020-11-08 RX ADMIN — OXCARBAZEPINE 600 MG: 300 TABLET, FILM COATED ORAL at 17:31

## 2020-11-08 RX ADMIN — SODIUM CHLORIDE 1000 ML: 9 INJECTION, SOLUTION INTRAVENOUS at 07:26

## 2020-11-08 RX ADMIN — SODIUM CHLORIDE, POTASSIUM CHLORIDE, SODIUM LACTATE AND CALCIUM CHLORIDE: 600; 310; 30; 20 INJECTION, SOLUTION INTRAVENOUS at 22:20

## 2020-11-08 RX ADMIN — SODIUM CHLORIDE 8 UNITS/HR: 9 INJECTION, SOLUTION INTRAVENOUS at 13:01

## 2020-11-08 RX ADMIN — INSULIN GLARGINE 15 UNITS: 100 INJECTION, SOLUTION SUBCUTANEOUS at 10:02

## 2020-11-08 RX ADMIN — ENOXAPARIN SODIUM 40 MG: 40 INJECTION SUBCUTANEOUS at 09:56

## 2020-11-08 RX ADMIN — BUPROPION HYDROCHLORIDE 150 MG: 150 TABLET, EXTENDED RELEASE ORAL at 17:31

## 2020-11-08 RX ADMIN — OLANZAPINE 10 MG: 5 TABLET, FILM COATED ORAL at 20:41

## 2020-11-08 RX ADMIN — LAMOTRIGINE 100 MG: 100 TABLET ORAL at 18:00

## 2020-11-08 RX ADMIN — ASPIRIN 81 MG: 81 TABLET, COATED ORAL at 09:55

## 2020-11-08 RX ADMIN — SODIUM CHLORIDE 1000 ML: 9 INJECTION, SOLUTION INTRAVENOUS at 05:39

## 2020-11-08 ASSESSMENT — FIBROSIS 4 INDEX
FIB4 SCORE: 1.62
FIB4 SCORE: 1.22

## 2020-11-08 ASSESSMENT — ENCOUNTER SYMPTOMS
VOMITING: 0
BACK PAIN: 0
NERVOUS/ANXIOUS: 0
PALPITATIONS: 0
BLOOD IN STOOL: 0
COUGH: 0
POLYDIPSIA: 1
FLANK PAIN: 0
SPUTUM PRODUCTION: 0
DIARRHEA: 1
HEADACHES: 0
CHILLS: 0
SHORTNESS OF BREATH: 0
SORE THROAT: 0
NAUSEA: 1
FEVER: 0
DIZZINESS: 0
ABDOMINAL PAIN: 0
BLURRED VISION: 0
DEPRESSION: 0

## 2020-11-08 NOTE — ED NOTES
Alejandro from Lab called with critical result of Glucose 1084 at 0627. Critical lab result read back to Alejandro.   Dr. De La Cruz notified of critical lab result at 0628.  Critical lab result read back by Dr. De La Cruz.

## 2020-11-08 NOTE — ED NOTES
Lab called with critical result of glucose 801 at 1148. Critical lab result read back.   Dr. Little notified of critical lab result .  Critical lab result read back by

## 2020-11-08 NOTE — ED NOTES
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    2  DDD (degenerative disc disease), lumbar M51 36        Start Time: 1500  Stop Time: 1600  Total time in clinic (min): 60 minutes    Subjective: Patient reports increased stiffness and pain today when she woke up but is a little better now  Objective: See treatment diary below      Assessment: Tolerated treatment well, continues to have tightness bl le's shara hamstring and piriformis R>L  Completed program without additional pain  mat exercises are challenging for patient hsara bridges and abd slr  Patient exhibited good technique with therapeutic exercises and would benefit from continued PT      Plan: Continue per plan of care        Precautions: MS, Bilateral carpal tunnel syndrome, Multiple Myeloma      Manual  6/23 6/29 7/01 7/6 7/8 7/13 7/15 7/22 7/23    LB/LE's 10' 10' 10' 10' 10' 10 10 10 10     leg pulls 5' 5' 5' 5' 5' 5 5 5 5                                               Exercise Diary  6/23 6/29 7/01 7/6 7/8 7/13 7/15 7/22 7/23                 B heel slides 30x 30x 30x 30x 30x 30x 30x 30x 30x    Hip add-ball 30x 30x 30x 30x 30x 30x 30x 30x 30x    Hip abd-band 30x Blue 30x 30x 30x 30x 30x 30x 30x 30x    Bridge w/ abd 20x 20x 20x 20x 25x 25x 25x 30x 30x    clamshell  20x 20x 30x 30x 30x 30x 30x 30x    SLR     20x 20x 20x 30x 30x 30x    SLR abduction    20x 20x 20x 20x 30x 30x    Core ball press 5" 10x 5''x10 5"x10 5''x20 10"x10 10"x10 10"x10 10x 10x    Core TB stand Red 5" 5x 5''x10 5"x10 5''x10 5"x10 5"x10 5"x10 10x 10x                                                        Slant board 30" 4x L3 x4 x4 x4 4x 4x 4x 4x 4x    nustep 7' L3 8' 8' 8' 10' 10' 10' 10 10'    Calf press 30# 20x 30# 30x 35/30 40# 30x 40/30 50/30 50/30 50/30 50/30    Row deltoid  20# Pt assisted to the bedside commode, pt very unsteady, unable to bear weight full. Pt has tremors. Upper extremities and lower. Pt states usually able to ambulate okay at home, but today unable to. BM, very loose.    30x 20/30 25# 30x 25/30 30/20 30/25 30/30 30/30                 HEP  *5'                                       HEP 6/29/2020: B heelslides on ball, hip add, hip abd, bridge with abd, clamshell, core ball press, core tband press, row with tband

## 2020-11-08 NOTE — ED NOTES
Pt having another episode of loose water diarrhea, with urine, pt unsteady to bedside commode, unable to bear full weight. Very unsteady and has tremors.

## 2020-11-08 NOTE — ED NOTES
Spoke to patient's daughter, Rut, she states pt had blood work yesterday and a UA, stating pt has been having diarrhea since drinking a galloon of juice this morning. Pt's daughter states patient has been having tremors and is on medications for it. Daughter states her mother's psychiatrist recently changed her medications and patient has been acting different since.     Updated daughter on plan of care. Daughter verbalized understanding.

## 2020-11-08 NOTE — ED NOTES
Assumed pt care. Pt placing call to family to update poc. 2nd liter NS started. Pt continues to have diarrhea and urgency. Assisted to bsc.

## 2020-11-08 NOTE — ASSESSMENT & PLAN NOTE
-She is not in DKA.  Beta hydroxybutyrate is normal.  She is not encephalopathic.  I am unclear if she is fully compliant with her insulin regimen and diet.  -Discussed with intensivist/ED, will do trial of IV and subcutaneous insulin for now. Will give 15 units of IV regular insulin, and start her on high dose sliding scale insulin.  Increase Levemir to 20 units at night, and 15 units in the morning. Increase her prandial NovoLog insulin to 15 units TID. Hydrate with IV LR at 125 cc/h. Accu-Cheks before meals and at bedtime. Goal to keep BG between 140-180 per 2019 ADA guidelines.    -Diabetic diet.  -I will obtain a diabetes educator for further evaluation of her compliance and regimen.

## 2020-11-08 NOTE — ED NOTES
Pt reports she got nervious and accidentally pulled out iv. Left ac iv removed by pt.  Assisted pt to bsc. Continues to have loose stools. fsbs continues to read HI physician notified.

## 2020-11-08 NOTE — ED NOTES
Med Rec complete per phone interview with pt and pt's daughter  Allergies Reviewed  No ABX in the last 14 days    Pt takes ASPRIN

## 2020-11-08 NOTE — ED NOTES
Pt's blood venous collected and sent, NS bolus administered per MAR. Medications for /86 administered per MAR

## 2020-11-08 NOTE — H&P
Hospital Medicine History & Physical Note    Date of Service  11/8/2020    Primary Care Physician  STEVEN Paz.    Consultants  none    Code Status  Full Code    Chief Complaint  Chief Complaint   Patient presents with   • High Blood Sugar     hx: Diabetes, non-compliant with checking blood sugar       History of Presenting Illness  70 y.o. female with type 1 diabetes mellitus, insulin-dependent, along with bipolar 1 disorder, and CKD, who presented 11/8/2020 with 2 weeks of diminished appetite, malaise, and weakness, along with increased shaking, and difficulty walking.  She states her symptoms have been progressive in the past 2 weeks, but she denies any other complaint such as nausea, vomiting, abdominal pain, confusion or mental status changes, fevers or chills, chest pain or shortness of breath.  She did have onset of multiple episodes of nonbloody diarrhea (4 times) last night.  She has not been recently on antibiotics.  Notably, she states she is compliant with her insulin regimen, but in the last several weeks has been noticing her blood sugars to be running high in the 300-500s despite her playing around with her insulin.  Today, as she felt very weak, she thought that her blood sugar was low but did not check it although she gave herself juice, and a lot of candies.  She states she has not followed up with her outpatient physicians in months.    ED course:  The patient was initially evaluated.  Vital signs were stable albeit blood pressure being significantly elevated.  Initial blood work-up showed no leukocytosis, with sodium of 133, potassium 5.6, creatinine of 1.27, CO2 of 90 9/9/2017, with blood glucose of 1084.  Troponin was 34.  Beta hydroxybutyrate was normal at 0.24.  Venous pH was 7.33.  Chest x-ray (personally reviewed) showed no acute consolidation.  Patient was given her Norvasc and Cozaar, along with 2 L of IV fluid bolus and 12 units of IV regular insulin.  She was  subsequently admitted to the hospitalist service.    Review of Systems  ROS     Pertinent positives/negatives as mentioned above.     A complete review of systems was personally done by me. All other systems were negative.       Past Medical History   has a past medical history of Anxiety, Bipolar 1 disorder (HCC), Cancer (HCC), Depression, Diabetes (HCC), and Kidney disease.    Surgical History  Reviewed.  No past surgical history.    Family History  family history includes Diabetes in her daughter.     Social History   reports that she has never smoked. She has never used smokeless tobacco. She reports that she does not drink alcohol or use drugs.    Allergies  Allergies   Allergen Reactions   • Depakote [Valproic Acid]        Medications  Prior to Admission Medications   Prescriptions Last Dose Informant Patient Reported? Taking?   Blood Glucose Meter Kit   No No   Sig: Test blood sugar as recommended by provider. 1 blood glucose monitoring kit-- [facility]   Blood Glucose Test Strips   No No   Sig: Use one testing unit every 10 days to test blood sugar. [employer] meter   Cariprazine HCl 4.5 MG Cap   No No   Sig: Take 4.5 mg by mouth every day for 90 days.   amLODIPine (NORVASC) 5 MG Tab 11/8/2020 at AM  No No   Sig: Take 1 Tab by mouth every day.   anastrozole (ARIMIDEX) 1 MG Tab   No No   Sig: Take 1 Tab by mouth every day.   aspirin EC (ECOTRIN) 81 MG Tablet Delayed Response   No No   Sig: Take 1 Tab by mouth every day.   atorvastatin (LIPITOR) 80 MG tablet   No No   Sig: Take 1 Tab by mouth every day.   buPROPion SR (WELLBUTRIN-SR) 150 MG TABLET SR 12 HR sustained-release tablet   No No   Sig: Take 1 Tab by mouth 2 times a day for 90 days.   insulin aspart (NOVOLOG FLEXPEN) 100 UNIT/ML injection PEN   No No   Sig: INJECT 12 UNITS TOTAL UNDER THE SKIN THREE (3) TIMES DAILY BEFORE MEALS.   insulin glargine (BASAGLAR KWIKPEN) 100 UNIT/ML injection PEN   No No   Sig: Inject 30 Units as instructed every evening.    lamoTRIgine (LAMICTAL) 100 MG Tab   No No   Sig: Take 2 tabs by mouth each morning and 1 tab by mouth each evening.   losartan (COZAAR) 50 MG Tab   No No   Sig: Take 1 Tab by mouth every day.   olanzapine (ZYPREXA) 10 MG tablet   No No   Sig: Take 1 Tab by mouth every bedtime for 90 days.   oxcarbazepine (TRILEPTAL) 600 MG tablet   No No   Sig: TAKE 1 TAB BY MOUTH EACH MORNING X 1 WEEK, THEN TAKE 1 TAB BY MOUTH TWICE A DAY THEREAFTER.   primidone (MYSOLINE) 50 MG Tab   No No   Sig: Take 2 Tabs by mouth every day.      Facility-Administered Medications: None       Physical Exam  Temp:  [36.4 °C (97.5 °F)] 36.4 °C (97.5 °F)  Pulse:  [] 120  Resp:  [16-22] 20  BP: (168-241)/() 168/72  SpO2:  [90 %-94 %] 92 %    Physical Exam  Vitals signs reviewed.   Constitutional:       General: She is not in acute distress.     Appearance: Normal appearance. She is normal weight. She is not ill-appearing or diaphoretic.   HENT:      Head: Normocephalic and atraumatic.      Mouth/Throat:      Mouth: Mucous membranes are dry.      Pharynx: No oropharyngeal exudate or posterior oropharyngeal erythema.      Comments: Significantly dry lips and oral mucosa  Eyes:      General: No scleral icterus.     Extraocular Movements: Extraocular movements intact.      Conjunctiva/sclera: Conjunctivae normal.      Pupils: Pupils are equal, round, and reactive to light.   Neck:      Musculoskeletal: Normal range of motion and neck supple. No neck rigidity or muscular tenderness.   Cardiovascular:      Rate and Rhythm: Normal rate and regular rhythm.      Heart sounds: Normal heart sounds. No murmur.   Pulmonary:      Effort: Pulmonary effort is normal. No respiratory distress.      Breath sounds: Normal breath sounds. No stridor. No wheezing, rhonchi or rales.   Chest:      Chest wall: No tenderness.   Abdominal:      General: Bowel sounds are normal. There is no distension.      Palpations: Abdomen is soft. There is no mass.       Tenderness: There is no abdominal tenderness. There is no guarding or rebound.   Musculoskeletal: Normal range of motion.         General: No swelling.   Lymphadenopathy:      Cervical: No cervical adenopathy.   Skin:     General: Skin is warm and dry.      Coloration: Skin is not jaundiced.      Findings: No rash.      Comments: Poor skin turgor   Neurological:      General: No focal deficit present.      Mental Status: She is alert and oriented to person, place, and time. Mental status is at baseline.      Cranial Nerves: No cranial nerve deficit.      Comments: Unsteady.  Shaky.   Psychiatric:         Mood and Affect: Mood normal.         Behavior: Behavior normal.         Thought Content: Thought content normal.         Judgment: Judgment normal.         Laboratory:  Recent Labs     11/08/20  0434   WBC 10.9*   RBC 4.69   HEMOGLOBIN 12.9   HEMATOCRIT 43.5   MCV 92.8   MCH 27.5   MCHC 29.7*   RDW 44.8   PLATELETCT 282   MPV 11.6     Recent Labs     11/07/20  0801 11/08/20  0434   SODIUM 134* 133*   POTASSIUM 3.8 5.6*   CHLORIDE 94* 97   CO2 20 19*   GLUCOSE 368* 1084*   BUN 14 16   CREATININE 0.89 1.27   CALCIUM 10.0 9.4     Recent Labs     11/07/20  0801 11/08/20  0434   ALTSGPT  --  24   ASTSGOT  --  31   ALKPHOSPHAT  --  195*   TBILIRUBIN  --  0.2   LIPASE  --  21   GLUCOSE 368* 1084*         No results for input(s): NTPROBNP in the last 72 hours.      Recent Labs     11/08/20  0434   TROPONINT 34*       Imaging:  DX-CHEST-PORTABLE (1 VIEW)   Final Result      1.  Opacity projecting over the left breast region which may represent opacity from left breast implant. Correlation is recommended.      2.  No focal pulmonary consolidation.      EC-ECHOCARDIOGRAM COMPLETE W/O CONT    (Results Pending)         Assessment/Plan:  I anticipate this patient will require at least two midnights for appropriate medical management, necessitating inpatient admission.    * Hyperglycemia- (present on admission)  Assessment &  Plan  -She is not in DKA.  Beta hydroxybutyrate is normal.  She is not encephalopathic.  I am unclear if she is fully compliant with her insulin regimen and diet.  -Discussed with intensivist/ED, will do trial of IV and subcutaneous insulin for now. Will give 15 units of IV regular insulin, and start her on high dose sliding scale insulin.  Increase Levemir to 20 units at night, and 15 units in the morning. Increase her prandial NovoLog insulin to 15 units TID. Hydrate with IV LR at 125 cc/h. Accu-Cheks before meals and at bedtime. Goal to keep BG between 140-180 per 2019 ADA guidelines.    -Diabetic diet.  -I will obtain a diabetes educator for further evaluation of her compliance and regimen.    Troponin level elevated- (present on admission)  Assessment & Plan  -Troponin is flat at 34.  No ACS.  No complaints of chest pain.  -Check EKG.  Trend troponins.  I will obtain an echocardiogram.    Diarrhea- (present on admission)  Assessment & Plan  -Probably viral.  Send for stool studies including C. difficile PCR, ova and parasites, stool cultures, lactoferrin, [place].  -Hold off on antidiarrheals until C. difficile ruled out.    Acute renal failure superimposed on stage 3 chronic kidney disease (HCC)- (present on admission)  Assessment & Plan  -Likely due to dehydration from severe hyperglycemia.  She is not far off from her baseline.  -Hydrate with IV LR at 125 cc/h.  Monitor for improvement in renal function.  - avoid nephrotoxins, and continue to renally dose all medications.    Uncontrolled hypertension- (present on admission)  Assessment & Plan  -Resume home Norvasc, and Cozaar.  Monitor blood pressure trend closely with as needed IV labetalol for significant hypertension.  May need further optimization of antihypertensive regimen.    Dyslipidemia- (present on admission)  Assessment & Plan  -Resume home Lipitor.    Type 1 diabetes mellitus without complication (HCC)- (present on admission)  Assessment &  Plan  -Management as above.    Bipolar 1 disorder (HCC)- (present on admission)  Assessment & Plan  -Resume home psych medications.      DVT prophylaxis: Lovenox SQ

## 2020-11-08 NOTE — ED TRIAGE NOTES
"Chief Complaint   Patient presents with   • High Blood Sugar     hx: Diabetes, non-compliant with checking blood sugar       BIB EMS to [facility], pt on monitor and in gown, labs drawn and sent. Pt consists of: High blood sugar. Per EMS pt checked blood sugar this morning and was 374, pt thought she was going to drop, so patient drank a lot of sugary juice. Per pt's daughter pt has been not good at managing her sugar, pt is non-compliant with medications. History of diabetes. Pt denies pain. Per EMS when checked blood sugar, FSBS HIGH. Pt denies Pain. A/O x 4 and GCS 15 upon arrival.    FSBS upon arrival: HIGH >600    Medications given en route: 400 cc bolus of NS     BP (!) 171/77   Pulse (!) 101   Temp 36.4 °C (97.5 °F) (Temporal)   Resp 20   Ht 1.651 m (5' 5\")   Wt 65.8 kg (145 lb)   SpO2 90%   BMI 24.13 kg/m²   "

## 2020-11-08 NOTE — ED NOTES
Pt incontinent of urine. Pt used call light after to let me know. Pt continues to have some confusion. A&O x 2 pt has been calling her daughter ant

## 2020-11-08 NOTE — ASSESSMENT & PLAN NOTE
Hyperosmolar nonketotic coma -improving  Discontinue insulin drip and resume long and short acting insulin  Close glucose monitoring  S/p fluid resuscitation --> additional fluid bolus today, continue maintenance IV fluids given ongoing signs of dehydration  Begin diabetic diet, oral hydration  Monitor electrolytes closely  Diabetes education

## 2020-11-08 NOTE — ED PROVIDER NOTES
ED Provider Note    CHIEF COMPLAINT  Chief Complaint   Patient presents with   • High Blood Sugar     hx: Diabetes, non-compliant with checking blood sugar       HPI  Molly Loera is a 70 y.o. female who presents to the emergency department by ambulance from home for high blood sugar.  Patient is insulin-dependent diabetic, compliant with Levemir twice daily, NovoLog when she adjusts for blood glucose with meals, states although her normal blood sugar is around 120, has been elevated lately.  Greater than 350 this evening which prompted this evaluation.  Patient describes similarly elevated blood sugars over the last 2 weeks.  Progressive malaise and weakness.  Decreased appetite.  Denies dysuria, hematuria or urgency but has some frequency.  Denies vomiting or diarrhea.  Denies cough or congestion.  Denies fever or chills.    REVIEW OF SYSTEMS  See HPI for further details. All other systems are negative.     PAST MEDICAL HISTORY   has a past medical history of Anxiety, Bipolar 1 disorder (HCC), Cancer (HCC), Depression, Diabetes (HCC), and Kidney disease.    SOCIAL HISTORY  Social History     Tobacco Use   • Smoking status: Never Smoker   • Smokeless tobacco: Never Used   Substance and Sexual Activity   • Alcohol use: Never     Frequency: Never   • Drug use: Never   • Sexual activity: Not Currently       SURGICAL HISTORY  patient denies any surgical history    CURRENT MEDICATIONS  Home Medications     Reviewed by Manuel Tidwell (Pharmacy Tech) on 11/08/20 at 2929  Med List Status: Complete   Medication Last Dose Status   amLODIPine (NORVASC) 5 MG Tab 11/7/2020 Active   anastrozole (ARIMIDEX) 1 MG Tab 11/7/2020 Active   aspirin EC (ECOTRIN) 81 MG Tablet Delayed Response 11/7/2020 Active   atorvastatin (LIPITOR) 80 MG tablet 11/7/2020 Active   Blood Glucose Meter Kit  Active   Blood Glucose Test Strips  Active   buPROPion SR (WELLBUTRIN-SR) 150 MG TABLET SR 12 HR sustained-release tablet 11/7/2020  "Active   Cariprazine HCl 4.5 MG Cap 11/7/2020 Active   insulin aspart (NOVOLOG FLEXPEN) 100 UNIT/ML injection PEN 11/7/2020 Active   insulin glargine (BASAGLAR KWIKPEN) 100 UNIT/ML injection PEN 11/7/2020 Active   lamoTRIgine (LAMICTAL) 100 MG Tab 11/7/2020 Active   LORazepam (ATIVAN) 0.5 MG Tab 11/7/2020 Active   losartan (COZAAR) 50 MG Tab 11/7/2020 Active   olanzapine (ZYPREXA) 10 MG tablet 11/7/2020 Active   oxcarbazepine (TRILEPTAL) 600 MG tablet 11/7/2020 Active   primidone (MYSOLINE) 50 MG Tab 11/7/2020 Active                ALLERGIES  Allergies   Allergen Reactions   • Depakote [Valproic Acid]        PHYSICAL EXAM  VITAL SIGNS: BP (!) 183/86   Pulse 100   Temp 36.4 °C (97.5 °F) (Temporal)   Resp (!) 26   Ht 1.651 m (5' 5\")   Wt 65.8 kg (145 lb)   SpO2 92%   BMI 24.13 kg/m²   Pulse ox interpretation: I interpret this pulse ox as normal.  Constitutional: Alert in no apparent distress.  HENT: Normocephalic, atraumatic. Bilateral external ears normal, Nose normal. Moist mucous membranes.    Eyes: Pupils are equal and reactive, Conjunctiva normal.   Neck: Normal range of motion, Supple  Lymphatic: No lymphadenopathy noted.   Cardiovascular: Regular rate and rhythm, pansystolic murmur. Distal pulses intact.  No peripheral edema.  Thorax & Lungs: Normal breath sounds.  No wheezing/rales/ronchi. No increased work of breathing, clipped speech or retractions.   Abdomen: Soft, non-distended, non-tender to palpation.  No peritoneal signs. No CVA tenderness.  Skin: Warm, Dry, No erythema, No rash.   Musculoskeletal: Good range of motion in all major joints.   Neurologic: Alert and orient x4.  Speech is clear.  Moves 4 extremities spontaneously.  Psychiatric: Affect normal, Judgment normal, Mood normal.       DIAGNOSTIC STUDIES / PROCEDURES  LABS  Results for orders placed or performed during the hospital encounter of 11/08/20   CBC w/ Differential   Result Value Ref Range    WBC 10.9 (H) 4.8 - 10.8 K/uL    RBC " 4.69 4.20 - 5.40 M/uL    Hemoglobin 12.9 12.0 - 16.0 g/dL    Hematocrit 43.5 37.0 - 47.0 %    MCV 92.8 81.4 - 97.8 fL    MCH 27.5 27.0 - 33.0 pg    MCHC 29.7 (L) 33.6 - 35.0 g/dL    RDW 44.8 35.9 - 50.0 fL    Platelet Count 282 164 - 446 K/uL    MPV 11.6 9.0 - 12.9 fL    Neutrophils-Polys 89.60 (H) 44.00 - 72.00 %    Lymphocytes 5.40 (L) 22.00 - 41.00 %    Monocytes 3.90 0.00 - 13.40 %    Eosinophils 0.20 0.00 - 6.90 %    Basophils 0.50 0.00 - 1.80 %    Immature Granulocytes 0.40 0.00 - 0.90 %    Nucleated RBC 0.00 /100 WBC    Neutrophils (Absolute) 9.77 (H) 2.00 - 7.15 K/uL    Lymphs (Absolute) 0.59 (L) 1.00 - 4.80 K/uL    Monos (Absolute) 0.43 0.00 - 0.85 K/uL    Eos (Absolute) 0.02 0.00 - 0.51 K/uL    Baso (Absolute) 0.05 0.00 - 0.12 K/uL    Immature Granulocytes (abs) 0.04 0.00 - 0.11 K/uL    NRBC (Absolute) 0.00 K/uL   Complete Metabolic Panel (CMP)   Result Value Ref Range    Sodium 133 (L) 135 - 145 mmol/L    Potassium 5.6 (H) 3.6 - 5.5 mmol/L    Chloride 97 96 - 112 mmol/L    Co2 19 (L) 20 - 33 mmol/L    Anion Gap 17.0 (H) 7.0 - 16.0    Glucose 1084 (HH) 65 - 99 mg/dL    Bun 16 8 - 22 mg/dL    Creatinine 1.27 0.50 - 1.40 mg/dL    Calcium 9.4 8.5 - 10.5 mg/dL    AST(SGOT) 31 12 - 45 U/L    ALT(SGPT) 24 2 - 50 U/L    Alkaline Phosphatase 195 (H) 30 - 99 U/L    Total Bilirubin 0.2 0.1 - 1.5 mg/dL    Albumin 4.0 3.2 - 4.9 g/dL    Total Protein 6.3 6.0 - 8.2 g/dL    Globulin 2.3 1.9 - 3.5 g/dL    A-G Ratio 1.7 g/dL   Troponin   Result Value Ref Range    Troponin T 34 (H) 6 - 19 ng/L   MAGNESIUM   Result Value Ref Range    Magnesium 2.0 1.5 - 2.5 mg/dL   BETA-HYDROXYBUTYRIC ACID   Result Value Ref Range    beta-Hydroxybutyric Acid 0.24 0.02 - 0.27 mmol/L   LIPASE   Result Value Ref Range    Lipase 21 11 - 82 U/L   VENOUS BLOOD GAS   Result Value Ref Range    Venous Bg Ph 7.33 7.31 - 7.45    Venous Bg Pco2 33.6 (L) 41.0 - 51.0 mmHg    Venous Bg Po2 58.5 (H) 25.0 - 40.0 mmHg    Venous Bg O2 Saturation SEE COMMENT %     Venous Bg Hco3 17 (L) 24 - 28 mmol/L    Venous Bg Base Excess -8 mmol/L    Body Temp see below Centigrade   PERIPHERAL SMEAR REVIEW   Result Value Ref Range    Peripheral Smear Review see below    PLATELET ESTIMATE   Result Value Ref Range    Plt Estimation Normal    MORPHOLOGY   Result Value Ref Range    RBC Morphology Normal    DIFFERENTIAL COMMENT   Result Value Ref Range    Comments-Diff see below    ESTIMATED GFR   Result Value Ref Range    GFR If African American 50 (A) >60 mL/min/1.73 m 2    GFR If Non  41 (A) >60 mL/min/1.73 m 2   Routine (COVID/SARS COV-2 In-House PCR up to 24 hours)    Specimen: Nasopharyngeal; Respirate   Result Value Ref Range    COVID Order Status Received    SARS-CoV-2, PCR (In-House)   Result Value Ref Range    SARS-CoV-2 Source NP Swab    ACCU-CHEK GLUCOSE   Result Value Ref Range    Glucose - Accu-Ck >600 (HH) 65 - 99 mg/dL   ACCU-CHEK GLUCOSE   Result Value Ref Range    Glucose - Accu-Ck >600 (HH) 65 - 99 mg/dL     RADIOLOGY  DX-CHEST-PORTABLE (1 VIEW)   Final Result      1.  Opacity projecting over the left breast region which may represent opacity from left breast implant. Correlation is recommended.      2.  No focal pulmonary consolidation.      EC-ECHOCARDIOGRAM COMPLETE W/O CONT    (Results Pending)       COURSE & MEDICAL DECISION MAKING  Nursing notes and vital signs were reviewed. (See chart for details)  The patients records were reviewed, history was obtained from the patient;     ED evaluation does demonstrate hyperglycemia, although no evidence for DKA.  Mild hyperkalemia without other electrolyte derangement.  This will likely be shifted with insulin.  Renal function is preserved.  Patient has an indeterminate troponin, without chest pain or syncope.  This can be trended.  No source or trigger of his hyperglycemia, patient states she is fairly well controlled otherwise.  Urinalysis was unremarkable yesterday.  No clinical or radiographic evidence for  pneumonia.  Jase exam is benign.  She is neurologically intact and nonfocal, although fatigued.  Patient received 2 L of IV fluid in the emergency department.    0630 -Dr. Hernandez is aware the patient agreeable consultation.  Agrees with IV fluid, insulin drip and plan ICU admission.  Intensivist paged.    0730 -Dr. Gonda is aware of the patient, however does not feel patient requires ICU monitoring.  Recommends IV bolus insulin, subcu insulin and close monitoring on telemetry.  Dr. Hernandez is aware of this and agreeable to this plan at this time.    12:01 PM Dr. Hernandez has been managing this patient in the emergency department in order to prepare her for telemetry.  Telemetry resisted due to the elevated blood glucose.  She has received 10 units, then 15 units of regular IV insulin as well as subcu Lantus.  Her blood glucose remains 800.  Dr. Hernandez is asked me to contact intensivist for ICU placement as she will be placed on insulin drip.  Dr. Gonda is aware of the ED attempts to place this patient on telemetry, however given persistent significantly elevated blood glucose she will go to the unit on an insulin drip.  He is agreeable to consultation at this time.    The total critical care time on this patient is 40 minutes, immediate and continuous hemodynamic monitoring and multiple bedside evaluations, resuscitating patient and assessing response to treatment, deciphering test results, speaking with admitting and consulting physician, and arranging for hospital admission. This 40 minutes is exclusive of separately billable procedures.      FINAL IMPRESSION  (R73.9) Hyperglycemia  (R53.1) Generalized weakness      Electronically signed by: Lashonda De La Cruz D.O., 11/8/2020 5:22 AM      This dictation was created using voice recognition software. The accuracy of the dictation is limited to the abilities of the software. I expect there may be some errors of grammar and possibly content. The nursing notes were reviewed and  certain aspects of this information were incorporated into this note.

## 2020-11-08 NOTE — ASSESSMENT & PLAN NOTE
-Resume home Norvasc, and Cozaar.  Monitor blood pressure trend closely with as needed IV labetalol for significant hypertension.  May need further optimization of antihypertensive regimen.

## 2020-11-08 NOTE — ASSESSMENT & PLAN NOTE
Likely noninfectious, C. difficile PCR pending  Monitor  S/p fluid resuscitation  Isolation precautions

## 2020-11-08 NOTE — ASSESSMENT & PLAN NOTE
-Likely due to dehydration from severe hyperglycemia.  She is not far off from her baseline.  -Hydrate with IV LR at 125 cc/h.  Monitor for improvement in renal function.  - avoid nephrotoxins, and continue to renally dose all medications.

## 2020-11-08 NOTE — ASSESSMENT & PLAN NOTE
Secondary to dehydration -improving  S/p fluid resuscitation  Monitor creatinine, urine output, electrolytes closely  Avoid nephrotoxins

## 2020-11-08 NOTE — PROGRESS NOTES
- patient has received a total of 27 units of IV regular insulin, in addition to dose of lantus 15 units. Serum BG better but remains at 800. Telemetry unable to take BG>400. Pt will need insulin drip.   - Discussed with ERP (Dr. De La Cruz). She will contact the intensivist as pt need to go to the ICU for insulin drip.   - discussed with pharmacy. Start insulin drip orders.

## 2020-11-08 NOTE — ASSESSMENT & PLAN NOTE
-Probably viral.  Send for stool studies including C. difficile PCR, ova and parasites, stool cultures, lactoferrin, [place].  -Hold off on antidiarrheals until C. difficile ruled out.

## 2020-11-08 NOTE — ASSESSMENT & PLAN NOTE
-Troponin is flat at 34.  No ACS.  No complaints of chest pain.  -Check EKG.  Trend troponins.  I will obtain an echocardiogram.

## 2020-11-08 NOTE — ED NOTES
Assisted pt to the bedside commode, pt almost fell, but RN caught patient and was able to place pt back into bed. Placed US PIV on R AC 20g.

## 2020-11-08 NOTE — CONSULTS
"Critical Care Consultation    Date of consult: 11/8/2020    Referring Physician  Lashonda De La Cruz D.O.    Reason for Consultation  HONK    History of Presenting Illness  70 y.o. female who presented 11/8/2020 with a past medical history significant for insulin-dependent diabetes who has had poor compliance in the past with medications and follow-up who lives with her daughter.  She was brought in by EMS this morning for elevated blood sugar.  She states she has \"been eating a lot of candy and other things that she is not supposed to\" and not taking her medications well for the last week.  She states her blood sugars have been 300-400 but today it was higher than that prompting the visit to the ED.  She reports polyuria, polydipsia, nausea, and some diarrhea.  She denies any fevers, chills, cough, shortness of breath, chest pain, headache, syncope.  In emergency department patient was found to have a significantly elevated glucose and despite multiple attempts at both IV and subcu insulin her blood sugars remained 800.  She is being admitted to intensive care unit for an insulin infusion and I was consulted for her critical care management.    Code Status  Full Code    Review of Systems  Review of Systems   Constitutional: Positive for malaise/fatigue. Negative for chills and fever.   HENT: Negative for sore throat.    Eyes: Negative for blurred vision.   Respiratory: Negative for cough, sputum production and shortness of breath.    Cardiovascular: Negative for chest pain, palpitations and leg swelling.   Gastrointestinal: Positive for diarrhea and nausea. Negative for abdominal pain, blood in stool and vomiting.   Genitourinary: Positive for frequency. Negative for dysuria and flank pain.   Musculoskeletal: Negative for back pain.   Skin: Negative for rash.   Neurological: Negative for dizziness and headaches.   Endo/Heme/Allergies: Positive for polydipsia.   Psychiatric/Behavioral: Negative for depression. The " patient is not nervous/anxious.    All other systems reviewed and are negative.      Past Medical History   has a past medical history of Anxiety, Bipolar 1 disorder (HCC), Cancer (HCC), Depression, Diabetes (HCC), and Kidney disease.    Surgical History   has no past surgical history on file. -Back surgery, retinal detachment repair, breast surgery    Family History  family history includes Diabetes in her daughter.    Social History   reports that she has never smoked. She has never used smokeless tobacco. She reports that she does not drink alcohol or use drugs. -Patient lives with daughter    Medications  Home Medications     Reviewed by Manuel Tidwell (Pharmacy Tech) on 11/08/20 at 0759  Med List Status: Complete   Medication Last Dose Status   amLODIPine (NORVASC) 5 MG Tab 11/7/2020 Active   anastrozole (ARIMIDEX) 1 MG Tab 11/7/2020 Active   aspirin EC (ECOTRIN) 81 MG Tablet Delayed Response 11/7/2020 Active   atorvastatin (LIPITOR) 80 MG tablet 11/7/2020 Active   Blood Glucose Meter Kit  Active   Blood Glucose Test Strips  Active   buPROPion SR (WELLBUTRIN-SR) 150 MG TABLET SR 12 HR sustained-release tablet 11/7/2020 Active   Cariprazine HCl 4.5 MG Cap 11/7/2020 Active   insulin aspart (NOVOLOG FLEXPEN) 100 UNIT/ML injection PEN 11/7/2020 Active   insulin glargine (BASAGLAR KWIKPEN) 100 UNIT/ML injection PEN 11/7/2020 Active   lamoTRIgine (LAMICTAL) 100 MG Tab 11/7/2020 Active   LORazepam (ATIVAN) 0.5 MG Tab 11/7/2020 Active   losartan (COZAAR) 50 MG Tab 11/7/2020 Active   olanzapine (ZYPREXA) 10 MG tablet 11/7/2020 Active   oxcarbazepine (TRILEPTAL) 600 MG tablet 11/7/2020 Active   primidone (MYSOLINE) 50 MG Tab 11/7/2020 Active              Current Facility-Administered Medications   Medication Dose Route Frequency Provider Last Rate Last Admin   • amLODIPine (NORVASC) tablet 5 mg  5 mg Oral DAILY Sam Hernandez M.D.   Stopped at 11/08/20 0800   • aspirin EC (ECOTRIN) tablet 81 mg  81 mg Oral DAILY  Sam Hernandez M.D.   81 mg at 11/08/20 0955   • atorvastatin (LIPITOR) tablet 80 mg  80 mg Oral DAILY Sam Hernandez M.D.   80 mg at 11/08/20 0955   • buPROPion SR (WELLBUTRIN-SR) tablet 150 mg  150 mg Oral BID Sam Hernandez M.D.       • lamoTRIgine (LAMICTAL) tablet 100-200 mg  100-200 mg Oral BID Sam Hernandez M.D.   200 mg at 11/08/20 0955   • losartan (COZAAR) tablet 50 mg  50 mg Oral DAILY Sam Hernandez M.D.   Stopped at 11/08/20 0915   • OLANZapine (ZYPREXA) tablet 10 mg  10 mg Oral QHS Sam Hernandez M.D.       • OXcarbazepine (TRILEPTAL) tablet 600 mg  600 mg Oral BID Sam Hernandez M.D.       • primidone (MYSOLINE) tablet 100 mg  100 mg Oral DAILY Sam Hernandez M.D.       • Respiratory Therapy Consult   Nebulization Continuous RT Sam Hernandez M.D.       • acetaminophen (Tylenol) tablet 650 mg  650 mg Oral Q6HRS PRN Sam Hernandez M.D.       • ondansetron (ZOFRAN) syringe/vial injection 4 mg  4 mg Intravenous Q4HRS PRN Sam Hernandez M.D.       • ondansetron (ZOFRAN ODT) dispertab 4 mg  4 mg Oral Q4HRS PRN Sam Hernandez M.D.       • [START ON 11/9/2020] senna-docusate (PERICOLACE or SENOKOT S) 8.6-50 MG per tablet 2 Tab  2 Tab Oral BID Sam Hernandez M.D.        And   • polyethylene glycol/lytes (MIRALAX) PACKET 1 Packet  1 Packet Oral QDAY PRN Sam Hernandez M.D.        And   • magnesium hydroxide (MILK OF MAGNESIA) suspension 30 mL  30 mL Oral QDAY PRN Sam Hernandez M.D.        And   • bisacodyl (DULCOLAX) suppository 10 mg  10 mg Rectal QDAY PRN Sam Hernandez M.D.       • lactated ringers infusion   Intravenous Continuous Sam Hernandez M.D. 125 mL/hr at 11/08/20 1130 New Bag at 11/08/20 1130   • enoxaparin (LOVENOX) inj 40 mg  40 mg Subcutaneous DAILY Sam Hernandez M.D.   40 mg at 11/08/20 0956   • labetalol (NORMODYNE/TRANDATE) injection 10 mg  10 mg Intravenous Q4HRS PRN Sam Hernandez M.D.       • insulin regular (HumuLIN R,NovoLIN R) injection  0-14 Units Intravenous  Once Sam Hernandez M.D.       • insulin regular human (HUMULIN/NOVOLIN R) 62.5 Units in  mL infusion per protocol  0-29 Units/hr Intravenous Continuous Sam Hernandez M.D.       • dextrose 50% (D50W) injection 25-50 mL  25-50 mL Intravenous PRN Sam Hernandez M.D.         Current Outpatient Medications   Medication Sig Dispense Refill   • LORazepam (ATIVAN) 0.5 MG Tab Take 1 mg by mouth every evening. 2 tablets = 1 mg     • losartan (COZAAR) 50 MG Tab Take 1 Tab by mouth every day. (Patient taking differently: Take 50 mg by mouth every morning.) 90 Tab 3   • insulin aspart (NOVOLOG FLEXPEN) 100 UNIT/ML injection PEN INJECT 12 UNITS TOTAL UNDER THE SKIN THREE (3) TIMES DAILY BEFORE MEALS. (Patient taking differently: Inject 12 Units as instructed 3 times a day before meals.) 45 mL 1   • insulin glargine (BASAGLAR KWIKPEN) 100 UNIT/ML injection PEN Inject 30 Units as instructed every evening. (Patient taking differently: Inject 10-14 Units as instructed every evening. 10 units every morning   14 units every night) 45 Each 1   • lamoTRIgine (LAMICTAL) 100 MG Tab Take 2 tabs by mouth each morning and 1 tab by mouth each evening. (Patient taking differently: Take 100-200 mg by mouth 2 times a day. 200 mg every morning   100 mg every night) 90 Tab 2   • Cariprazine HCl 4.5 MG Cap Take 4.5 mg by mouth every day for 90 days. 90 Cap 1   • oxcarbazepine (TRILEPTAL) 600 MG tablet TAKE 1 TAB BY MOUTH EACH MORNING X 1 WEEK, THEN TAKE 1 TAB BY MOUTH TWICE A DAY THEREAFTER. (Patient taking differently: Take 600 mg by mouth 2 times a day.) 60 Tab 0   • buPROPion SR (WELLBUTRIN-SR) 150 MG TABLET SR 12 HR sustained-release tablet Take 1 Tab by mouth 2 times a day for 90 days. 90 Tab 1   • olanzapine (ZYPREXA) 10 MG tablet Take 1 Tab by mouth every bedtime for 90 days. 90 Tab 1   • amLODIPine (NORVASC) 5 MG Tab Take 1 Tab by mouth every day. (Patient taking differently: Take 5 mg by mouth every evening.) 90 Tab 0   •  atorvastatin (LIPITOR) 80 MG tablet Take 1 Tab by mouth every day. (Patient taking differently: Take 80 mg by mouth every morning.) 90 Tab 0   • primidone (MYSOLINE) 50 MG Tab Take 2 Tabs by mouth every day. 180 Tab 1   • anastrozole (ARIMIDEX) 1 MG Tab Take 1 Tab by mouth every day. (Patient taking differently: Take 1 mg by mouth every evening.) 90 Tab 1   • aspirin EC (ECOTRIN) 81 MG Tablet Delayed Response Take 1 Tab by mouth every day. (Patient taking differently: Take 81 mg by mouth every morning.) 90 Tab 1   • Blood Glucose Meter Kit Test blood sugar as recommended by provider. 1 blood glucose monitoring kit-- DEXCOM 1 Kit 0   • Blood Glucose Test Strips Use one testing unit every 10 days to test blood sugar. DEXCOM meter 3 Units 5       Allergies  Allergies   Allergen Reactions   • Depakote [Valproic Acid]        Vital Signs last 24 hours  Temp:  [36.4 °C (97.5 °F)] 36.4 °C (97.5 °F)  Pulse:  [] 125  Resp:  [16-35] 23  BP: (138-241)/() 164/76  SpO2:  [90 %-94 %] 92 %    Physical Exam  Physical Exam  Vitals signs and nursing note reviewed.   Constitutional:       Appearance: She is normal weight. She is ill-appearing. She is not toxic-appearing.   HENT:      Head: Normocephalic and atraumatic.      Right Ear: External ear normal.      Left Ear: External ear normal.      Nose: Nose normal. No congestion.      Mouth/Throat:      Mouth: Mucous membranes are dry.      Pharynx: Oropharynx is clear.   Eyes:      Conjunctiva/sclera: Conjunctivae normal.      Pupils: Pupils are equal, round, and reactive to light.   Neck:      Musculoskeletal: Neck supple. No neck rigidity.   Cardiovascular:      Rate and Rhythm: Regular rhythm. Tachycardia present.      Pulses: Normal pulses.      Heart sounds: Normal heart sounds. No murmur.   Pulmonary:      Effort: Pulmonary effort is normal. No respiratory distress.      Breath sounds: Normal breath sounds. No rhonchi.   Abdominal:      General: Bowel sounds are  normal. There is no distension.      Palpations: Abdomen is soft.      Tenderness: There is no abdominal tenderness. There is no guarding.   Musculoskeletal:         General: No tenderness.      Right lower leg: No edema.      Left lower leg: No edema.   Skin:     General: Skin is warm and dry.      Capillary Refill: Capillary refill takes less than 2 seconds.      Findings: No rash.      Comments: Poor skin turgor   Neurological:      General: No focal deficit present.      Mental Status: She is alert and oriented to person, place, and time.      Cranial Nerves: No cranial nerve deficit.   Psychiatric:         Mood and Affect: Mood normal.         Behavior: Behavior normal.         Fluids    Intake/Output Summary (Last 24 hours) at 11/8/2020 1202  Last data filed at 11/8/2020 0789  Gross per 24 hour   Intake 1000 ml   Output --   Net 1000 ml       Laboratory  Recent Results (from the past 48 hour(s))   HEMOGLOBIN A1C    Collection Time: 11/07/20  8:01 AM   Result Value Ref Range    Glycohemoglobin 6.9 (H) 0.0 - 5.6 %    Est Avg Glucose 151 mg/dL   Basic Metabolic Panel    Collection Time: 11/07/20  8:01 AM   Result Value Ref Range    Sodium 134 (L) 135 - 145 mmol/L    Potassium 3.8 3.6 - 5.5 mmol/L    Chloride 94 (L) 96 - 112 mmol/L    Co2 20 20 - 33 mmol/L    Glucose 368 (H) 65 - 99 mg/dL    Bun 14 8 - 22 mg/dL    Creatinine 0.89 0.50 - 1.40 mg/dL    Calcium 10.0 8.5 - 10.5 mg/dL    Anion Gap 20.0 (H) 7.0 - 16.0   ESTIMATED GFR    Collection Time: 11/07/20  8:01 AM   Result Value Ref Range    GFR If African American >60 >60 mL/min/1.73 m 2    GFR If Non African American >60 >60 mL/min/1.73 m 2   MICROALBUMIN CREAT RATIO URINE    Collection Time: 11/07/20  9:20 AM   Result Value Ref Range    Creatinine, Urine 169.85 mg/dL    Microalbumin, Urine Random 2.9 mg/dL    Micro Alb Creat Ratio 17 0 - 30 mg/g   URINALYSIS,CULTURE IF INDICATED    Collection Time: 11/07/20  9:20 AM   Result Value Ref Range    Color Yellow      Character Cloudy (A)     Specific Gravity 1.021 <1.035    Ph 6.0 5.0 - 8.0    Glucose Negative Negative mg/dL    Ketones Negative Negative mg/dL    Protein Negative Negative mg/dL    Bilirubin Negative Negative    Urobilinogen, Urine 0.2 Negative    Nitrite Negative Negative    Leukocyte Esterase Negative Negative    Occult Blood Trace (A) Negative    Micro Urine Req Microscopic    URINE MICROSCOPIC (W/UA)    Collection Time: 11/07/20  9:20 AM   Result Value Ref Range    WBC 2-5 /hpf    RBC 5-10 (A) /hpf    Bacteria Negative None /hpf    Epithelial Cells Negative /hpf    Ca Oxalate Crystal Moderate /hpf    Hyaline Cast 3-5 (A) /lpf   ACCU-CHEK GLUCOSE    Collection Time: 11/08/20  4:33 AM   Result Value Ref Range    Glucose - Accu-Ck >600 (HH) 65 - 99 mg/dL   CBC w/ Differential    Collection Time: 11/08/20  4:34 AM   Result Value Ref Range    WBC 10.9 (H) 4.8 - 10.8 K/uL    RBC 4.69 4.20 - 5.40 M/uL    Hemoglobin 12.9 12.0 - 16.0 g/dL    Hematocrit 43.5 37.0 - 47.0 %    MCV 92.8 81.4 - 97.8 fL    MCH 27.5 27.0 - 33.0 pg    MCHC 29.7 (L) 33.6 - 35.0 g/dL    RDW 44.8 35.9 - 50.0 fL    Platelet Count 282 164 - 446 K/uL    MPV 11.6 9.0 - 12.9 fL    Neutrophils-Polys 89.60 (H) 44.00 - 72.00 %    Lymphocytes 5.40 (L) 22.00 - 41.00 %    Monocytes 3.90 0.00 - 13.40 %    Eosinophils 0.20 0.00 - 6.90 %    Basophils 0.50 0.00 - 1.80 %    Immature Granulocytes 0.40 0.00 - 0.90 %    Nucleated RBC 0.00 /100 WBC    Neutrophils (Absolute) 9.77 (H) 2.00 - 7.15 K/uL    Lymphs (Absolute) 0.59 (L) 1.00 - 4.80 K/uL    Monos (Absolute) 0.43 0.00 - 0.85 K/uL    Eos (Absolute) 0.02 0.00 - 0.51 K/uL    Baso (Absolute) 0.05 0.00 - 0.12 K/uL    Immature Granulocytes (abs) 0.04 0.00 - 0.11 K/uL    NRBC (Absolute) 0.00 K/uL   Complete Metabolic Panel (CMP)    Collection Time: 11/08/20  4:34 AM   Result Value Ref Range    Sodium 133 (L) 135 - 145 mmol/L    Potassium 5.6 (H) 3.6 - 5.5 mmol/L    Chloride 97 96 - 112 mmol/L    Co2 19 (L) 20 - 33  mmol/L    Anion Gap 17.0 (H) 7.0 - 16.0    Glucose 1084 (HH) 65 - 99 mg/dL    Bun 16 8 - 22 mg/dL    Creatinine 1.27 0.50 - 1.40 mg/dL    Calcium 9.4 8.5 - 10.5 mg/dL    AST(SGOT) 31 12 - 45 U/L    ALT(SGPT) 24 2 - 50 U/L    Alkaline Phosphatase 195 (H) 30 - 99 U/L    Total Bilirubin 0.2 0.1 - 1.5 mg/dL    Albumin 4.0 3.2 - 4.9 g/dL    Total Protein 6.3 6.0 - 8.2 g/dL    Globulin 2.3 1.9 - 3.5 g/dL    A-G Ratio 1.7 g/dL   Troponin    Collection Time: 11/08/20  4:34 AM   Result Value Ref Range    Troponin T 34 (H) 6 - 19 ng/L   MAGNESIUM    Collection Time: 11/08/20  4:34 AM   Result Value Ref Range    Magnesium 2.0 1.5 - 2.5 mg/dL   BETA-HYDROXYBUTYRIC ACID    Collection Time: 11/08/20  4:34 AM   Result Value Ref Range    beta-Hydroxybutyric Acid 0.24 0.02 - 0.27 mmol/L   LIPASE    Collection Time: 11/08/20  4:34 AM   Result Value Ref Range    Lipase 21 11 - 82 U/L   PERIPHERAL SMEAR REVIEW    Collection Time: 11/08/20  4:34 AM   Result Value Ref Range    Peripheral Smear Review see below    PLATELET ESTIMATE    Collection Time: 11/08/20  4:34 AM   Result Value Ref Range    Plt Estimation Normal    MORPHOLOGY    Collection Time: 11/08/20  4:34 AM   Result Value Ref Range    RBC Morphology Normal    DIFFERENTIAL COMMENT    Collection Time: 11/08/20  4:34 AM   Result Value Ref Range    Comments-Diff see below    ESTIMATED GFR    Collection Time: 11/08/20  4:34 AM   Result Value Ref Range    GFR If African American 50 (A) >60 mL/min/1.73 m 2    GFR If Non  41 (A) >60 mL/min/1.73 m 2   VENOUS BLOOD GAS    Collection Time: 11/08/20  5:35 AM   Result Value Ref Range    Venous Bg Ph 7.33 7.31 - 7.45    Venous Bg Pco2 33.6 (L) 41.0 - 51.0 mmHg    Venous Bg Po2 58.5 (H) 25.0 - 40.0 mmHg    Venous Bg O2 Saturation SEE COMMENT %    Venous Bg Hco3 17 (L) 24 - 28 mmol/L    Venous Bg Base Excess -8 mmol/L    Body Temp see below Centigrade   ACCU-CHEK GLUCOSE    Collection Time: 11/08/20  7:50 AM   Result Value Ref  Range    Glucose - Accu-Ck >600 (HH) 65 - 99 mg/dL   Routine (COVID/SARS COV-2 In-House PCR up to 24 hours)    Collection Time: 11/08/20  8:05 AM    Specimen: Nasopharyngeal; Respirate   Result Value Ref Range    COVID Order Status Received    SARS-CoV-2, PCR (In-House)    Collection Time: 11/08/20  8:05 AM   Result Value Ref Range    SARS-CoV-2 Source NP Swab    ACCU-CHEK GLUCOSE    Collection Time: 11/08/20  8:58 AM   Result Value Ref Range    Glucose - Accu-Ck >600 (HH) 65 - 99 mg/dL   ACCU-CHEK GLUCOSE    Collection Time: 11/08/20 10:39 AM   Result Value Ref Range    Glucose - Accu-Ck >600 (HH) 65 - 99 mg/dL   HEMOGLOBIN A1C    Collection Time: 11/08/20 10:48 AM   Result Value Ref Range    Glycohemoglobin 7.0 (H) 0.0 - 5.6 %    Est Avg Glucose 154 mg/dL   TROPONIN    Collection Time: 11/08/20 10:48 AM   Result Value Ref Range    Troponin T 30 (H) 6 - 19 ng/L   Blood Glucose    Collection Time: 11/08/20 10:48 AM   Result Value Ref Range    Glucose 801 (HH) 65 - 99 mg/dL   MAGNESIUM    Collection Time: 11/08/20 10:48 AM   Result Value Ref Range    Magnesium 2.3 1.5 - 2.5 mg/dL       Imaging  EC-ECHOCARDIOGRAM COMPLETE W/O CONT         DX-CHEST-PORTABLE (1 VIEW)   Final Result      1.  Opacity projecting over the left breast region which may represent opacity from left breast implant. Correlation is recommended.      2.  No focal pulmonary consolidation.       *Personally reviewed chest x-ray showing no acute cardiopulmonary process    Assessment/Plan  * Hyperglycemia- (present on admission)  Assessment & Plan  Hyperosmolar nonketotic coma  Begin insulin infusion titrating to goal glucose between 120 and 180  Fluid resuscitation  Keep n.p.o. for now  Monitor electrolytes closely  Diabetes education with eventual diabetic diet    Type 1 diabetes mellitus without complication (HCC)- (present on admission)  Assessment & Plan  Insulin management as described above  Diabetes education and diet    Troponin level  elevated- (present on admission)  Assessment & Plan  Secondary to demand ischemia  Trend    Diarrhea- (present on admission)  Assessment & Plan  Likely noninfectious  Monitor  Fluid resuscitation    Acute renal failure superimposed on stage 3 chronic kidney disease (HCC)- (present on admission)  Assessment & Plan  Secondary to dehydration  Aggressive fluid resuscitation  Monitor creatinine, urine output, electrolytes closely  Avoid nephrotoxins    Dyslipidemia- (present on admission)  Assessment & Plan  Atorvastatin    Uncontrolled hypertension- (present on admission)  Assessment & Plan  Resume amlodipine  As needed labetalol for goal SBP less than 160    Bipolar 1 disorder (HCC)- (present on admission)  Assessment & Plan  Continue Lamictal      Discussed patient condition and risk of morbidity and/or mortality with Hospitalist, RN, Charge nurse / hot rounds, Patient and Emergency physician.    The patient remains critically ill.  Critical care time = 32 minutes in directly providing and coordinating critical care and extensive data review.  No time overlap and excludes procedures.

## 2020-11-09 VITALS
HEART RATE: 92 BPM | WEIGHT: 141.09 LBS | RESPIRATION RATE: 22 BRPM | DIASTOLIC BLOOD PRESSURE: 75 MMHG | TEMPERATURE: 98.7 F | BODY MASS INDEX: 23.51 KG/M2 | OXYGEN SATURATION: 92 % | SYSTOLIC BLOOD PRESSURE: 160 MMHG | HEIGHT: 65 IN

## 2020-11-09 PROBLEM — E87.8 ELECTROLYTE ABNORMALITY: Status: ACTIVE | Noted: 2020-11-09

## 2020-11-09 LAB
ALBUMIN SERPL BCP-MCNC: 3.6 G/DL (ref 3.2–4.9)
ANION GAP SERPL CALC-SCNC: 10 MMOL/L (ref 7–16)
ANION GAP SERPL CALC-SCNC: 11 MMOL/L (ref 7–16)
ANION GAP SERPL CALC-SCNC: 5 MMOL/L (ref 7–16)
BASOPHILS # BLD AUTO: 0.2 % (ref 0–1.8)
BASOPHILS # BLD: 0.03 K/UL (ref 0–0.12)
BUN SERPL-MCNC: 12 MG/DL (ref 8–22)
BUN SERPL-MCNC: 13 MG/DL (ref 8–22)
BUN SERPL-MCNC: 13 MG/DL (ref 8–22)
CALCIUM SERPL-MCNC: 9.6 MG/DL (ref 8.5–10.5)
CALCIUM SERPL-MCNC: 9.7 MG/DL (ref 8.5–10.5)
CALCIUM SERPL-MCNC: 9.9 MG/DL (ref 8.5–10.5)
CHLORIDE SERPL-SCNC: 108 MMOL/L (ref 96–112)
CHLORIDE SERPL-SCNC: 117 MMOL/L (ref 96–112)
CHLORIDE SERPL-SCNC: 118 MMOL/L (ref 96–112)
CO2 SERPL-SCNC: 27 MMOL/L (ref 20–33)
CO2 SERPL-SCNC: 28 MMOL/L (ref 20–33)
CO2 SERPL-SCNC: 28 MMOL/L (ref 20–33)
CREAT SERPL-MCNC: 0.69 MG/DL (ref 0.5–1.4)
CREAT SERPL-MCNC: 0.7 MG/DL (ref 0.5–1.4)
CREAT SERPL-MCNC: 0.75 MG/DL (ref 0.5–1.4)
EOSINOPHIL # BLD AUTO: 0 K/UL (ref 0–0.51)
EOSINOPHIL NFR BLD: 0 % (ref 0–6.9)
ERYTHROCYTE [DISTWIDTH] IN BLOOD BY AUTOMATED COUNT: 43.2 FL (ref 35.9–50)
GLUCOSE BLD-MCNC: 103 MG/DL (ref 65–99)
GLUCOSE BLD-MCNC: 115 MG/DL (ref 65–99)
GLUCOSE BLD-MCNC: 116 MG/DL (ref 65–99)
GLUCOSE BLD-MCNC: 124 MG/DL (ref 65–99)
GLUCOSE BLD-MCNC: 143 MG/DL (ref 65–99)
GLUCOSE BLD-MCNC: 169 MG/DL (ref 65–99)
GLUCOSE BLD-MCNC: 172 MG/DL (ref 65–99)
GLUCOSE BLD-MCNC: 174 MG/DL (ref 65–99)
GLUCOSE BLD-MCNC: 188 MG/DL (ref 65–99)
GLUCOSE BLD-MCNC: 220 MG/DL (ref 65–99)
GLUCOSE BLD-MCNC: 319 MG/DL (ref 65–99)
GLUCOSE BLD-MCNC: 409 MG/DL (ref 65–99)
GLUCOSE BLD-MCNC: 435 MG/DL (ref 65–99)
GLUCOSE SERPL-MCNC: 102 MG/DL (ref 65–99)
GLUCOSE SERPL-MCNC: 192 MG/DL (ref 65–99)
GLUCOSE SERPL-MCNC: 377 MG/DL (ref 65–99)
HCT VFR BLD AUTO: 40.2 % (ref 37–47)
HGB BLD-MCNC: 12.2 G/DL (ref 12–16)
IMM GRANULOCYTES # BLD AUTO: 0.04 K/UL (ref 0–0.11)
IMM GRANULOCYTES NFR BLD AUTO: 0.3 % (ref 0–0.9)
LYMPHOCYTES # BLD AUTO: 2.41 K/UL (ref 1–4.8)
LYMPHOCYTES NFR BLD: 20 % (ref 22–41)
MAGNESIUM SERPL-MCNC: 1.8 MG/DL (ref 1.5–2.5)
MCH RBC QN AUTO: 27.1 PG (ref 27–33)
MCHC RBC AUTO-ENTMCNC: 30.3 G/DL (ref 33.6–35)
MCV RBC AUTO: 89.3 FL (ref 81.4–97.8)
MONOCYTES # BLD AUTO: 1.23 K/UL (ref 0–0.85)
MONOCYTES NFR BLD AUTO: 10.2 % (ref 0–13.4)
NEUTROPHILS # BLD AUTO: 8.32 K/UL (ref 2–7.15)
NEUTROPHILS NFR BLD: 69.3 % (ref 44–72)
NRBC # BLD AUTO: 0 K/UL
NRBC BLD-RTO: 0 /100 WBC
OSMOLALITY SERPL: 320 MOSM/KG H2O (ref 278–298)
PHOSPHATE SERPL-MCNC: 2 MG/DL (ref 2.5–4.5)
PLATELET # BLD AUTO: 251 K/UL (ref 164–446)
PMV BLD AUTO: 11.1 FL (ref 9–12.9)
POTASSIUM SERPL-SCNC: 3.3 MMOL/L (ref 3.6–5.5)
POTASSIUM SERPL-SCNC: 3.5 MMOL/L (ref 3.6–5.5)
POTASSIUM SERPL-SCNC: 3.9 MMOL/L (ref 3.6–5.5)
RBC # BLD AUTO: 4.5 M/UL (ref 4.2–5.4)
SODIUM SERPL-SCNC: 146 MMOL/L (ref 135–145)
SODIUM SERPL-SCNC: 151 MMOL/L (ref 135–145)
SODIUM SERPL-SCNC: 155 MMOL/L (ref 135–145)
WBC # BLD AUTO: 12 K/UL (ref 4.8–10.8)

## 2020-11-09 PROCEDURE — A9270 NON-COVERED ITEM OR SERVICE: HCPCS | Performed by: INTERNAL MEDICINE

## 2020-11-09 PROCEDURE — 99233 SBSQ HOSP IP/OBS HIGH 50: CPT | Performed by: INTERNAL MEDICINE

## 2020-11-09 PROCEDURE — 700102 HCHG RX REV CODE 250 W/ 637 OVERRIDE(OP): Performed by: INTERNAL MEDICINE

## 2020-11-09 PROCEDURE — 83735 ASSAY OF MAGNESIUM: CPT

## 2020-11-09 PROCEDURE — 80048 BASIC METABOLIC PNL TOTAL CA: CPT

## 2020-11-09 PROCEDURE — 82962 GLUCOSE BLOOD TEST: CPT | Mod: 91

## 2020-11-09 PROCEDURE — 83930 ASSAY OF BLOOD OSMOLALITY: CPT

## 2020-11-09 PROCEDURE — 85025 COMPLETE CBC W/AUTO DIFF WBC: CPT

## 2020-11-09 PROCEDURE — 700111 HCHG RX REV CODE 636 W/ 250 OVERRIDE (IP): Performed by: INTERNAL MEDICINE

## 2020-11-09 PROCEDURE — 700105 HCHG RX REV CODE 258: Performed by: INTERNAL MEDICINE

## 2020-11-09 PROCEDURE — 700102 HCHG RX REV CODE 250 W/ 637 OVERRIDE(OP): Performed by: STUDENT IN AN ORGANIZED HEALTH CARE EDUCATION/TRAINING PROGRAM

## 2020-11-09 PROCEDURE — 99239 HOSP IP/OBS DSCHRG MGMT >30: CPT | Performed by: STUDENT IN AN ORGANIZED HEALTH CARE EDUCATION/TRAINING PROGRAM

## 2020-11-09 PROCEDURE — 80069 RENAL FUNCTION PANEL: CPT

## 2020-11-09 PROCEDURE — A9270 NON-COVERED ITEM OR SERVICE: HCPCS | Performed by: STUDENT IN AN ORGANIZED HEALTH CARE EDUCATION/TRAINING PROGRAM

## 2020-11-09 PROCEDURE — 700101 HCHG RX REV CODE 250: Performed by: INTERNAL MEDICINE

## 2020-11-09 RX ORDER — SODIUM CHLORIDE, SODIUM LACTATE, POTASSIUM CHLORIDE, CALCIUM CHLORIDE 600; 310; 30; 20 MG/100ML; MG/100ML; MG/100ML; MG/100ML
INJECTION, SOLUTION INTRAVENOUS CONTINUOUS
Status: DISCONTINUED | OUTPATIENT
Start: 2020-11-09 | End: 2020-11-09 | Stop reason: HOSPADM

## 2020-11-09 RX ORDER — LAMOTRIGINE 100 MG/1
100 TABLET ORAL
Status: DISCONTINUED | OUTPATIENT
Start: 2020-11-09 | End: 2020-11-09 | Stop reason: HOSPADM

## 2020-11-09 RX ORDER — LABETALOL HYDROCHLORIDE 5 MG/ML
10-20 INJECTION, SOLUTION INTRAVENOUS EVERY 4 HOURS PRN
Status: DISCONTINUED | OUTPATIENT
Start: 2020-11-09 | End: 2020-11-09 | Stop reason: HOSPADM

## 2020-11-09 RX ORDER — INSULIN GLARGINE 100 [IU]/ML
30 INJECTION, SOLUTION SUBCUTANEOUS EVERY EVENING
Status: DISCONTINUED | OUTPATIENT
Start: 2020-11-09 | End: 2020-11-09

## 2020-11-09 RX ORDER — LABETALOL HYDROCHLORIDE 5 MG/ML
10-20 INJECTION, SOLUTION INTRAVENOUS EVERY 4 HOURS
Status: DISCONTINUED | OUTPATIENT
Start: 2020-11-09 | End: 2020-11-09

## 2020-11-09 RX ORDER — DEXTROSE MONOHYDRATE 25 G/50ML
50 INJECTION, SOLUTION INTRAVENOUS
Status: DISCONTINUED | OUTPATIENT
Start: 2020-11-09 | End: 2020-11-09 | Stop reason: HOSPADM

## 2020-11-09 RX ORDER — SODIUM CHLORIDE, SODIUM LACTATE, POTASSIUM CHLORIDE, AND CALCIUM CHLORIDE .6; .31; .03; .02 G/100ML; G/100ML; G/100ML; G/100ML
1000 INJECTION, SOLUTION INTRAVENOUS ONCE
Status: COMPLETED | OUTPATIENT
Start: 2020-11-09 | End: 2020-11-09

## 2020-11-09 RX ORDER — INSULIN GLARGINE 100 [IU]/ML
30 INJECTION, SOLUTION SUBCUTANEOUS ONCE
Status: COMPLETED | OUTPATIENT
Start: 2020-11-09 | End: 2020-11-09

## 2020-11-09 RX ORDER — LAMOTRIGINE 100 MG/1
200 TABLET ORAL DAILY
Status: DISCONTINUED | OUTPATIENT
Start: 2020-11-10 | End: 2020-11-09 | Stop reason: HOSPADM

## 2020-11-09 RX ORDER — POTASSIUM CHLORIDE 20 MEQ/1
40 TABLET, EXTENDED RELEASE ORAL ONCE
Status: COMPLETED | OUTPATIENT
Start: 2020-11-09 | End: 2020-11-09

## 2020-11-09 RX ORDER — INSULIN GLARGINE 100 [IU]/ML
15 INJECTION, SOLUTION SUBCUTANEOUS ONCE
Status: DISCONTINUED | OUTPATIENT
Start: 2020-11-09 | End: 2020-11-09

## 2020-11-09 RX ADMIN — OXCARBAZEPINE 600 MG: 300 TABLET, FILM COATED ORAL at 05:33

## 2020-11-09 RX ADMIN — ENOXAPARIN SODIUM 40 MG: 40 INJECTION SUBCUTANEOUS at 05:37

## 2020-11-09 RX ADMIN — BUPROPION HYDROCHLORIDE 150 MG: 150 TABLET, EXTENDED RELEASE ORAL at 05:33

## 2020-11-09 RX ADMIN — SODIUM CHLORIDE, POTASSIUM CHLORIDE, SODIUM LACTATE AND CALCIUM CHLORIDE 1000 ML: 600; 310; 30; 20 INJECTION, SOLUTION INTRAVENOUS at 09:52

## 2020-11-09 RX ADMIN — POTASSIUM CHLORIDE 40 MEQ: 1500 TABLET, EXTENDED RELEASE ORAL at 12:33

## 2020-11-09 RX ADMIN — LABETALOL HYDROCHLORIDE 10 MG: 5 INJECTION, SOLUTION INTRAVENOUS at 08:12

## 2020-11-09 RX ADMIN — LABETALOL HYDROCHLORIDE 10 MG: 5 INJECTION, SOLUTION INTRAVENOUS at 12:28

## 2020-11-09 RX ADMIN — ATORVASTATIN CALCIUM 80 MG: 80 TABLET, FILM COATED ORAL at 05:35

## 2020-11-09 RX ADMIN — ASPIRIN 81 MG: 81 TABLET, COATED ORAL at 05:35

## 2020-11-09 RX ADMIN — SODIUM CHLORIDE, POTASSIUM CHLORIDE, SODIUM LACTATE AND CALCIUM CHLORIDE: 600; 310; 30; 20 INJECTION, SOLUTION INTRAVENOUS at 09:45

## 2020-11-09 RX ADMIN — POTASSIUM BICARBONATE 50 MEQ: 978 TABLET, EFFERVESCENT ORAL at 02:39

## 2020-11-09 RX ADMIN — Medication 800 MG: at 12:33

## 2020-11-09 RX ADMIN — OXCARBAZEPINE 600 MG: 300 TABLET, FILM COATED ORAL at 17:37

## 2020-11-09 RX ADMIN — LOSARTAN POTASSIUM 50 MG: 50 TABLET, FILM COATED ORAL at 05:35

## 2020-11-09 RX ADMIN — INSULIN GLARGINE 30 UNITS: 100 INJECTION, SOLUTION SUBCUTANEOUS at 11:27

## 2020-11-09 RX ADMIN — AMLODIPINE BESYLATE 5 MG: 10 TABLET ORAL at 05:35

## 2020-11-09 RX ADMIN — BUPROPION HYDROCHLORIDE 150 MG: 150 TABLET, EXTENDED RELEASE ORAL at 17:37

## 2020-11-09 RX ADMIN — LAMOTRIGINE 200 MG: 100 TABLET ORAL at 05:35

## 2020-11-09 RX ADMIN — PRIMIDONE 100 MG: 50 TABLET ORAL at 05:34

## 2020-11-09 ASSESSMENT — ENCOUNTER SYMPTOMS
BLURRED VISION: 0
NAUSEA: 0
POLYDIPSIA: 1
BRUISES/BLEEDS EASILY: 0
DEPRESSION: 0
DIARRHEA: 1
FEVER: 0
BACK PAIN: 0
COUGH: 0
FLANK PAIN: 0
HEADACHES: 0
VOMITING: 0
SHORTNESS OF BREATH: 0
CHILLS: 0
DIZZINESS: 0
ABDOMINAL PAIN: 0

## 2020-11-09 NOTE — DISCHARGE INSTRUCTIONS
Discharge Instructions    Discharged to home by car with relative. Discharged via wheelchair, hospital escort: Yes.  Special equipment needed: Not Applicable    Be sure to schedule a follow-up appointment with your primary care doctor or any specialists as instructed.     Discharge Plan:   Diet Plan: Discussed  Activity Level: Discussed  Confirmed Follow up Appointment: Patient to Call and Schedule Appointment  Confirmed Symptoms Management: Discussed  Medication Reconciliation Updated: Yes  Influenza Vaccine Indication: Not indicated: Previously immunized this influenza season and > 8 years of age    I understand that a diet low in cholesterol, fat, and sodium is recommended for good health. Unless I have been given specific instructions below for another diet, I accept this instruction as my diet prescription.   Other diet: Diabetic     Special Instructions: None    · Is patient discharged on Warfarin / Coumadin?   No     Depression / Suicide Risk    As you are discharged from this [facility] facility, it is important to learn how to keep safe from harming yourself.    Recognize the warning signs:  · Abrupt changes in personality, positive or negative- including increase in energy   · Giving away possessions  · Change in eating patterns- significant weight changes-  positive or negative  · Change in sleeping patterns- unable to sleep or sleeping all the time   · Unwillingness or inability to communicate  · Depression  · Unusual sadness, discouragement and loneliness  · Talk of wanting to die  · Neglect of personal appearance   · Rebelliousness- reckless behavior  · Withdrawal from people/activities they love  · Confusion- inability to concentrate     If you or a loved one observes any of these behaviors or has concerns about self-harm, here's what you can do:  · Talk about it- your feelings and reasons for harming yourself  · Remove any means that you might use to hurt yourself (examples: pills, rope, extension  cords, firearm)  · Get professional help from the community (Mental Health, Substance Abuse, psychological counseling)  · Do not be alone:Call your Safe Contact- someone whom you trust who will be there for you.  · Call your local CRISIS HOTLINE 213-9715 or 959-221-5435  · Call your local Children's Mobile Crisis Response Team [place] (887) 999-5929 or www.Evento  · Call the toll free National Suicide Prevention Hotlines   · National Suicide Prevention Lifeline 412-618-NTXW (7132)  · [employer] Hope Line Network 800-SUICIDE (248-2713)      Diabetic Ketoacidosis  Diabetic ketoacidosis is a serious complication of diabetes. This condition develops when there is not enough insulin in the body. Insulin is an hormone that regulates blood sugar levels in the body. Normally, insulin allows glucose to enter the cells in the body. The cells break down glucose for energy. Without enough insulin, the body cannot break down glucose, so it breaks down fats instead. This leads to high blood glucose levels in the body and the production of acids that are called ketones. Ketones are poisonous at high levels.  If diabetic ketoacidosis is not treated, it can cause severe dehydration and can lead to a coma or death.  What are the causes?  This condition develops when a lack of insulin causes the body to break down fats instead of glucose. This may be triggered by:  · Stress on the body. This stress is brought on by an illness.  · Infection.  · Medicines that raise blood glucose levels.  · Not taking diabetes medicine.  · New onset of type 1 diabetes mellitus.  What are the signs or symptoms?  Symptoms of this condition include:  · Fatigue.  · Weight loss.  · Excessive thirst.  · Light-headedness.  · Fruity or sweet-smelling breath.  · Excessive urination.  · Vision changes.  · Confusion or irritability.  · Nausea.  · Vomiting.  · Rapid breathing.  · Abdominal pain.  · Feeling flushed.  How is this diagnosed?  This  condition is diagnosed based on your medical history, a physical exam, and blood tests. You may also have a urine test to check for ketones.  How is this treated?  This condition may be treated with:  · Fluid replacement. This may be done to correct dehydration.  · Insulin injections. These may be given through the skin or through an IV tube.  · Electrolyte replacement. Electrolytes are minerals in your blood. Electrolytes such as potassium and sodium may be given in pill form or through an IV tube.  · Antibiotic medicines. These may be prescribed if your condition was caused by an infection.  Diabetic ketoacidosis is a serious medical condition. You may need emergency treatment in the hospital to monitor your condition.  Follow these instructions at home:  Eating and drinking  · Drink enough fluids to keep your urine clear or pale yellow.  · If you are not able to eat, drink clear fluids in small amounts as you are able. Clear fluids include water, ice chips, fruit juice with water added (diluted), and low-calorie sports drinks. You may also have sugar-free jello or popsicles.  · If you are able to eat, follow your usual diet and drink sugar-free liquids, such as water.  Medicines  · Take over-the-counter and prescription medicines only as told by your health care provider.  · Continue to take insulin and other diabetes medicines as told by your health care provider.  · If you were prescribed an antibiotic, take it as told by your health care provider. Do not stop taking the antibiotic even if you start to feel better.  General instructions    · Check your urine for ketones when you are ill and as told by your health care provider.  ? If your blood glucose is 240 mg/dL (13.3 mmol/L) or higher, check your urine ketones every 4-6 hours.  · Check your blood glucose every day, as often as told by your health care provider.  ? If your blood glucose is high, drink plenty of fluids. This helps to flush out ketones.  ? If  your blood glucose is above your target for 2 tests in a row, contact your health care provider.  · Carry a medical alert card or wear medical alert jewelry that says that you have diabetes.  · Rest and exercise only as told by your health care provider. Do not exercise when your blood glucose is high and you have ketones in your urine.  · If you get sick, call your health care provider and begin treatment quickly. Your body often needs extra insulin to fight an illness. Check your blood glucose every 4-6 hours when you are sick.  · Keep all follow-up visits as told by your health care provider. This is important.  Contact a health care provider if:  · Your blood glucose level is higher than 240 mg/dL (13.3 mmol/L) for 2 days in a row.  · You have moderate or large ketones in your urine.  · You have a fever.  · You cannot eat or drink without vomiting.  · You have been vomiting for more than 2 hours.  · You continue to have symptoms of diabetic ketoacidosis.  · You develop new symptoms.  Get help right away if:  · Your blood glucose monitor reads “high” even when you are taking insulin.  · You faint.  · You have chest pain.  · You have trouble breathing.  · You have sudden trouble speaking or swallowing.  · You have vomiting or diarrhea that gets worse after 3 hours.  · You are unable to stay awake.  · You have trouble thinking.  · You are severely dehydrated. Symptoms of severe dehydration include:  ? Extreme thirst.  ? Dry mouth.  ? Rapid breathing.  These symptoms may represent a serious problem that is an emergency. Do not wait to see if the symptoms will go away. Get medical help right away. Call your local emergency services (911 in the [place] Do not drive yourself to the hospital.  Summary  · Diabetic ketoacidosis is a serious complication of diabetes. This condition develops when there is not enough insulin in the body.  · This condition is diagnosed based on your medical history, a physical exam, and blood  tests. You may also have a urine test to check for ketones.  · Diabetic ketoacidosis is a serious medical condition. You may need emergency treatment in the hospital to monitor your condition.  · Contact your health care provider if your blood glucose is higher than 240 mg/dl for 2 days in a row or if you have moderate or large ketones in your urine.  This information is not intended to replace advice given to you by your health care provider. Make sure you discuss any questions you have with your health care provider.  Document Released: 12/15/2001 Document Revised: 02/02/2018 Document Reviewed: 01/22/2018  [employer] Patient Education © 2020 [employer]        Diabetes Mellitus and Nutrition, Adult  When you have diabetes (diabetes mellitus), it is very important to have healthy eating habits because your blood sugar (glucose) levels are greatly affected by what you eat and drink. Eating healthy foods in the appropriate amounts, at about the same times every day, can help you:  · Control your blood glucose.  · Lower your risk of heart disease.  · Improve your blood pressure.  · Reach or maintain a healthy weight.  Every person with diabetes is different, and each person has different needs for a meal plan. Your health care provider may recommend that you work with a diet and nutrition specialist (dietitian) to make a meal plan that is best for you. Your meal plan may vary depending on factors such as:  · The calories you need.  · The medicines you take.  · Your weight.  · Your blood glucose, blood pressure, and cholesterol levels.  · Your activity level.  · Other health conditions you have, such as heart or kidney disease.  How do carbohydrates affect me?  Carbohydrates, also called carbs, affect your blood glucose level more than any other type of food. Eating carbs naturally raises the amount of glucose in your blood. Carb counting is a method for keeping track of how many carbs you eat. Counting carbs is  "important to keep your blood glucose at a healthy level, especially if you use insulin or take certain oral diabetes medicines.  It is important to know how many carbs you can safely have in each meal. This is different for every person. Your dietitian can help you calculate how many carbs you should have at each meal and for each snack.  Foods that contain carbs include:  · Bread, cereal, rice, pasta, and crackers.  · Potatoes and corn.  · Peas, beans, and lentils.  · Milk and yogurt.  · Fruit and juice.  · Desserts, such as cakes, cookies, ice cream, and candy.  How does alcohol affect me?  Alcohol can cause a sudden decrease in blood glucose (hypoglycemia), especially if you use insulin or take certain oral diabetes medicines. Hypoglycemia can be a life-threatening condition. Symptoms of hypoglycemia (sleepiness, dizziness, and confusion) are similar to symptoms of having too much alcohol.  If your health care provider says that alcohol is safe for you, follow these guidelines:  · Limit alcohol intake to no more than 1 drink per day for nonpregnant women and 2 drinks per day for men. One drink equals 12 oz of beer, 5 oz of wine, or 1½ oz of hard liquor.  · Do not drink on an empty stomach.  · Keep yourself hydrated with water, diet soda, or unsweetened iced tea.  · Keep in mind that regular soda, juice, and other mixers may contain a lot of sugar and must be counted as carbs.  What are tips for following this plan?    Reading food labels  · Start by checking the serving size on the \"Nutrition Facts\" label of packaged foods and drinks. The amount of calories, carbs, fats, and other nutrients listed on the label is based on one serving of the item. Many items contain more than one serving per package.  · Check the total grams (g) of carbs in one serving. You can calculate the number of servings of carbs in one serving by dividing the total carbs by 15. For example, if a food has 30 g of total carbs, it would be " "equal to 2 servings of carbs.  · Check the number of grams (g) of saturated and trans fats in one serving. Choose foods that have low or no amount of these fats.  · Check the number of milligrams (mg) of salt (sodium) in one serving. Most people should limit total sodium intake to less than 2,300 mg per day.  · Always check the nutrition information of foods labeled as \"low-fat\" or \"nonfat\". These foods may be higher in added sugar or refined carbs and should be avoided.  · Talk to your dietitian to identify your daily goals for nutrients listed on the label.  Shopping  · Avoid buying canned, premade, or processed foods. These foods tend to be high in fat, sodium, and added sugar.  · Shop around the outside edge of the grocery store. This includes fresh fruits and vegetables, bulk grains, fresh meats, and fresh dairy.  Cooking  · Use low-heat cooking methods, such as baking, instead of high-heat cooking methods like deep frying.  · Cook using healthy oils, such as olive, canola, or sunflower oil.  · Avoid cooking with butter, cream, or high-fat meats.  Meal planning  · Eat meals and snacks regularly, preferably at the same times every day. Avoid going long periods of time without eating.  · Eat foods high in fiber, such as fresh fruits, vegetables, beans, and whole grains. Talk to your dietitian about how many servings of carbs you can eat at each meal.  · Eat 4-6 ounces (oz) of lean protein each day, such as lean meat, chicken, fish, eggs, or tofu. One oz of lean protein is equal to:  ? 1 oz of meat, chicken, or fish.  ? 1 egg.  ? ¼ cup of tofu.  · Eat some foods each day that contain healthy fats, such as avocado, nuts, seeds, and fish.  Lifestyle  · Check your blood glucose regularly.  · Exercise regularly as told by your health care provider. This may include:  ? 150 minutes of moderate-intensity or vigorous-intensity exercise each week. This could be brisk walking, biking, or water aerobics.  ? Stretching and " doing strength exercises, such as yoga or weightlifting, at least 2 times a week.  · Take medicines as told by your health care provider.  · Do not use any products that contain nicotine or tobacco, such as cigarettes and e-cigarettes. If you need help quitting, ask your health care provider.  · Work with a counselor or diabetes educator to identify strategies to manage stress and any emotional and social challenges.  Questions to ask a health care provider  · Do I need to meet with a diabetes educator?  · Do I need to meet with a dietitian?  · What number can I call if I have questions?  · When are the best times to check my blood glucose?  Where to find more information:  · American Diabetes Association: diabetes.org  · Academy of Nutrition and Dietetics: www.eatright.org  · National [place] of Diabetes and Digestive and Kidney Diseases (NIH): www.niddk.nih.gov  Summary  · A healthy meal plan will help you control your blood glucose and maintain a healthy lifestyle.  · Working with a diet and nutrition specialist (dietitian) can help you make a meal plan that is best for you.  · Keep in mind that carbohydrates (carbs) and alcohol have immediate effects on your blood glucose levels. It is important to count carbs and to use alcohol carefully.  This information is not intended to replace advice given to you by your health care provider. Make sure you discuss any questions you have with your health care provider.  Document Released: 09/14/2006 Document Revised: 11/30/2018 Document Reviewed: 01/22/2018  [employer] Patient Education © 2020 [employer]

## 2020-11-09 NOTE — DISCHARGE SUMMARY
"Discharge Summary    CHIEF COMPLAINT ON ADMISSION  Chief Complaint   Patient presents with   • High Blood Sugar     hx: Diabetes, non-compliant with checking blood sugar       Reason for Admission  High blood sugar, ate too much candy, diarrhea  DKA    Admission Date  11/8/2020    CODE STATUS  Full Code    HPI & HOSPITAL COURSE  Ms. Loera is a 70 y.o. female who presented 11/8/2020 with a past medical history significant for insulin-dependent diabetes who has had poor compliance in the past with medications and follow-up who lives with her daughter.  She was brought in by EMS this morning for elevated blood sugar.  She states she has \"been eating a lot of candy and other things that she is not supposed to\" and not taking her medications well for the last week. Her home BG reading reported to have been 300-400, prompting visit to ED. Additionally, pt was reported to have 3-4 episodes of diarrhea, likely due to increased carb intake. No recent hospitalization, fever, chills, no recent antibiotic use.    In ED, pt was found to have DKA with glucose of 1084 with anion gap of 17 at admission. Her beta-hydroxybutrate was normal at 0.24 and HbA1c 7.0.  She was admitted to ICU for IVF resuscitation, insulin drip, replace electrolyte as appropriate. On day 2 of admission, she was seen tolerating PO diet, off insulin drip and home insulin regimen resumed with closure of anion gap. Patient's daughter was updated of patient's hospital course. Patient was clinically doing well and discharged.    Therefore, she is discharged in good and stable condition to home with close outpatient follow-up.    The patient recovered much more quickly than anticipated on admission.    Discharge Date  11/9/2020    FOLLOW UP ITEMS POST DISCHARGE  DKA, resolved - f/u with your PCP and endocrinology as already referred  Insulin Dependent Type 1 Diabetes - continue taking Insulin sliding scale of Novolog and Levemir at evening as instructed    HARSHAD on " CKD III - due to diarrhea and dehydration in setting of DKA - HARSHAD resolved with fluid resuscitation  Diarrhea, resolved  Dehydration  Hypernatremia - due to hyperglycemia and dehydration  Hypertension  Hyperlipidemia  Bipolar disorder  Minimally elevated troponin, demand ischemia and uncontrolled HTN - normal EKGs and Echo    DISCHARGE DIAGNOSES  Principal Problem:    Hyperglycemia POA: Yes  Active Problems:    Type 1 diabetes mellitus without complication (HCC) POA: Yes    Uncontrolled hypertension POA: Yes    Dyslipidemia POA: Yes    Acute renal failure superimposed on stage 3 chronic kidney disease (HCC) POA: Yes    Diarrhea POA: Yes    Troponin level elevated POA: Yes    Bipolar 1 disorder (HCC) POA: Yes  Resolved Problems:    * No resolved hospital problems. *      FOLLOW UP  Future Appointments   Date Time Provider Department Center   12/18/2020  8:30 AM Miller Montano M.D. [facility] [address] [facility]   2/1/2021  7:30 AM JASMIN Paz.P.R.N. [facility] JASMIN Mccullough.P.R.N.  [address]  [place] [address]  [phone]          Josefa Penn A.P.R.N.  [address]  [place] [address]  [phone]          Ema Miller M.D.  [address]  [address]  [facility] [address]  [phone]    In 1 week      [facility]  [address] [place] [address]. [address]  [facility] [address]  [phone]  In 1 week        MEDICATIONS ON DISCHARGE     Medication List      CHANGE how you take these medications      Instructions   amLODIPine 5 MG Tabs  What changed: when to take this  Commonly known as: NORVASC   Take 1 Tab by mouth every day.  Dose: 5 mg     anastrozole 1 MG Tabs  What changed: when to take this  Commonly known as: ARIMIDEX   Take 1 Tab by mouth every day.  Dose: 1 mg     aspirin EC 81 MG Tbec  What changed: when to take this  Commonly known as: ECOTRIN   Take 1 Tab by mouth every day.  Dose: 81 mg     atorvastatin 80 MG tablet  What changed: when to take  this  Commonly known as: LIPITOR   Take 1 Tab by mouth every day.  Dose: 80 mg     insulin glargine 100 UNIT/ML injection PEN  What changed:   · how much to take  · additional instructions   Inject 30 Units as instructed every evening.  Dose: 30 Units     lamoTRIgine 100 MG Tabs  What changed:   · how much to take  · how to take this  · when to take this  · additional instructions  Commonly known as: LAMICTAL   Take 2 tabs by mouth each morning and 1 tab by mouth each evening.     losartan 50 MG Tabs  What changed: when to take this  Commonly known as: COZAAR   Take 1 Tab by mouth every day.  Dose: 50 mg     NovoLOG FlexPen 100 UNIT/ML injection PEN  What changed:   · how much to take  · how to take this  · when to take this  · additional instructions  Generic drug: insulin aspart   Doctor's comments: 15 pens  INJECT 12 UNITS TOTAL UNDER THE SKIN THREE (3) TIMES DAILY BEFORE MEALS.     oxcarbazepine 600 MG tablet  What changed:   · how much to take  · how to take this  · when to take this  · additional instructions  Commonly known as: TRILEPTAL   TAKE 1 TAB BY MOUTH EACH MORNING X 1 WEEK, THEN TAKE 1 TAB BY MOUTH TWICE A DAY THEREAFTER.        CONTINUE taking these medications      Instructions   * Blood Glucose Meter Kit   Doctor's comments: ICD-10 code: E10.9 - Controlled type 1 Diabetes Mellitus  Test blood sugar as recommended by provider. 1 blood glucose monitoring kit-- DEXCOM     * Blood Glucose Test Strips   Doctor's comments: ICD-10 code: E10.9 - Controlled type 1 Diabetes Mellitus  Use one testing unit every 10 days to test blood sugar. DEXCOM meter     buPROPion  MG Tb12 sustained-release tablet  Commonly known as: WELLBUTRIN-SR   Take 1 Tab by mouth 2 times a day for 90 days.  Dose: 150 mg     olanzapine 10 MG tablet  Commonly known as: ZYPREXA   Take 1 Tab by mouth every bedtime for 90 days.  Dose: 10 mg     primidone 50 MG Tabs  Commonly known as: MYSOLINE   Take 2 Tabs by mouth every day.  Dose:  100 mg         * This list has 2 medication(s) that are the same as other medications prescribed for you. Read the directions carefully, and ask your doctor or other care provider to review them with you.            STOP taking these medications    Ativan 0.5 MG Tabs  Generic drug: LORazepam     Cariprazine HCl 4.5 MG Caps            Allergies  Allergies   Allergen Reactions   • Depakote [Valproic Acid]        DIET  Orders Placed This Encounter   Procedures   • Diet Order Diet: Consistent CHO (Diabetic)     Standing Status:   Standing     Number of Occurrences:   1     Order Specific Question:   Diet:     Answer:   Consistent CHO (Diabetic) [4]       ACTIVITY  As tolerated.  Weight bearing as tolerated    CONSULTATIONS  ICU    PROCEDURES  EC-ECHOCARDIOGRAM COMPLETE W/O CONT  Order: [id number]  Status:  Final result   Visible to patient:  No (not released) Next appt:  12/18/2020 at 08:30 AM in Behavioral Health (Miller Montano M.D.)  Details    Reading Physician Reading Date Result Priority   No Reading Provider Prelim 11/8/2020    Juan Pak M.D.  [phone] 11/8/2020       Narrative & Impression     Transthoracic  Echo Report        Echocardiography Laboratory     CONCLUSIONS  Technically difficult study.  Hyperdynamic left ventricular systolic function. Left ventricular   ejection fraction is visually estimated to be greater than 75%.   Normal right ventricular size and systolic function.  No significant valvular abnormalities.  No prior study is available for comparison.               LABORATORY  Lab Results   Component Value Date    SODIUM 155 (H) 11/09/2020    POTASSIUM 3.9 11/09/2020    CHLORIDE 117 (H) 11/09/2020    CO2 27 11/09/2020    GLUCOSE 192 (H) 11/09/2020    BUN 13 11/09/2020    CREATININE 0.69 11/09/2020        Lab Results   Component Value Date    WBC 12.0 (H) 11/09/2020    HEMOGLOBIN 12.2 11/09/2020    HEMATOCRIT 40.2 11/09/2020    PLATELETCT 251 11/09/2020      EC-ECHOCARDIOGRAM COMPLETE  W/O CONT   Final Result      DX-CHEST-PORTABLE (1 VIEW)   Final Result      1.  Opacity projecting over the left breast region which may represent opacity from left breast implant. Correlation is recommended.      2.  No focal pulmonary consolidation.            Total time of the discharge process exceeds 33 minutes.

## 2020-11-09 NOTE — CARE PLAN
Problem: Fluid Volume:  Goal: Will maintain balanced intake and output  Outcome: PROGRESSING AS EXPECTED  Note: 1L LR bolus administered. Insulin discontinued. UO measured.      Problem: Knowledge Deficit  Goal: Knowledge of disease process/condition, treatment plan, diagnostic tests, and medications will improve  Outcome: PROGRESSING AS EXPECTED  Note: Patient educated on hypo and hyperglycemia management. Pt educated on appropriate foods to help/prevent hypo or hyperglycemia. Verbalized understanding.

## 2020-11-09 NOTE — HOSPITAL COURSE
"70 y.o. female who presented 11/8/2020 with a past medical history significant for insulin-dependent diabetes who has had poor compliance in the past with medications and follow-up who lives with her daughter.  She was brought in by EMS this morning for elevated blood sugar.  She states she has \"been eating a lot of candy and other things that she is not supposed to\" and not taking her medications well for the last week.  She states her blood sugars have been 300-400 but today it was higher than that prompting the visit to the ED.  She reports polyuria, polydipsia, nausea, and some diarrhea.  She denies any fevers, chills, cough, shortness of breath, chest pain, headache, syncope.  In emergency department patient was found to have a significantly elevated glucose and despite multiple attempts at both IV and subcu insulin her blood sugars remained 800.  She is being admitted to intensive care unit for an insulin infusion and I was consulted for her critical care management.  "

## 2020-11-09 NOTE — PROGRESS NOTES
"Critical Care Progress Note    Date of admission  11/8/2020    Chief Complaint  70 y.o. female admitted 11/8/2020 with [facility] Course  70 y.o. female who presented 11/8/2020 with a past medical history significant for insulin-dependent diabetes who has had poor compliance in the past with medications and follow-up who lives with her daughter.  She was brought in by EMS this morning for elevated blood sugar.  She states she has \"been eating a lot of candy and other things that she is not supposed to\" and not taking her medications well for the last week.  She states her blood sugars have been 300-400 but today it was higher than that prompting the visit to the ED.  She reports polyuria, polydipsia, nausea, and some diarrhea.  She denies any fevers, chills, cough, shortness of breath, chest pain, headache, syncope.  In emergency department patient was found to have a significantly elevated glucose and despite multiple attempts at both IV and subcu insulin her blood sugars remained 800.  She is being admitted to intensive care unit for an insulin infusion and I was consulted for her critical care management.      Interval Problem Update  Reviewed last 24 hour events:   - remains on minimal insulin gtt   - N.p.o. this morning, started on diet and oral hydration   -Afebrile, increased white count   -Alert and oriented x4   -Normal sinus rhythm, SBP elevated from 130-190   -Sodium went up to 155 --> fluid bolus given and came down to 146   -Low potassium, phosphorus, magnesium    Review of Systems  Review of Systems   Constitutional: Positive for malaise/fatigue. Negative for chills and fever.   HENT: Negative for congestion.    Eyes: Negative for blurred vision.   Respiratory: Negative for cough and shortness of breath.    Cardiovascular: Negative for chest pain and leg swelling.   Gastrointestinal: Positive for diarrhea. Negative for abdominal pain, nausea and vomiting.   Genitourinary: Positive for frequency. " Negative for flank pain.   Musculoskeletal: Negative for back pain.   Skin: Negative for rash.   Neurological: Negative for dizziness and headaches.   Endo/Heme/Allergies: Positive for polydipsia. Does not bruise/bleed easily.   Psychiatric/Behavioral: Negative for depression.   All other systems reviewed and are negative.       Vital Signs for last 24 hours   Temp:  [36 °C (96.8 °F)-37.1 °C (98.8 °F)] 37.1 °C (98.8 °F)  Pulse:  [] 93  Resp:  [18-41] 19  BP: (113-185)/(62-93) 161/93  SpO2:  [91 %-96 %] 95 %    Respiratory Information for the last 24 hours   Room air    Physical Exam   Physical Exam  Vitals signs and nursing note reviewed.   Constitutional:       Appearance: She is normal weight. She is not ill-appearing.   HENT:      Head: Normocephalic and atraumatic.      Right Ear: External ear normal.      Left Ear: External ear normal.      Nose: Nose normal. No congestion.      Mouth/Throat:      Mouth: Mucous membranes are dry.      Pharynx: Oropharynx is clear.   Eyes:      Conjunctiva/sclera: Conjunctivae normal.      Pupils: Pupils are equal, round, and reactive to light.   Neck:      Musculoskeletal: Neck supple.   Cardiovascular:      Rate and Rhythm: Normal rate and regular rhythm.      Pulses: Normal pulses.      Heart sounds: Normal heart sounds. No murmur.   Pulmonary:      Effort: Pulmonary effort is normal. No respiratory distress.      Breath sounds: Normal breath sounds. No wheezing.   Abdominal:      General: Bowel sounds are normal. There is no distension.      Palpations: Abdomen is soft.      Tenderness: There is no abdominal tenderness. There is no guarding.   Musculoskeletal:         General: No tenderness.      Right lower leg: No edema.      Left lower leg: No edema.   Skin:     General: Skin is warm and dry.      Capillary Refill: Capillary refill takes less than 2 seconds.      Findings: No rash.   Neurological:      General: No focal deficit present.      Mental Status: She is  alert and oriented to person, place, and time.      Cranial Nerves: No cranial nerve deficit.   Psychiatric:         Mood and Affect: Mood normal.         Medications  Current Facility-Administered Medications   Medication Dose Route Frequency Provider Last Rate Last Admin   • amLODIPine (NORVASC) tablet 5 mg  5 mg Oral DAILY HUSSEIN DelgadoDLarissa   5 mg at 11/09/20 0535   • aspirin EC (ECOTRIN) tablet 81 mg  81 mg Oral DAILY HUSSEIN DelgadoDaLrissa   81 mg at 11/09/20 0535   • atorvastatin (LIPITOR) tablet 80 mg  80 mg Oral DAILY Sam Hernandez M.DLarissa   80 mg at 11/09/20 0535   • buPROPion SR (WELLBUTRIN-SR) tablet 150 mg  150 mg Oral BID HUSSEIN DelgadoDLarissa   150 mg at 11/09/20 0533   • lamoTRIgine (LAMICTAL) tablet 100-200 mg  100-200 mg Oral BID HUSSEIN DelgadoDLarissa   200 mg at 11/09/20 0535   • losartan (COZAAR) tablet 50 mg  50 mg Oral DAILY HUSSEIN DelgadoDLarissa   50 mg at 11/09/20 0535   • OLANZapine (ZYPREXA) tablet 10 mg  10 mg Oral QHS HUSSEIN DelgadoDLarissa   10 mg at 11/08/20 2041   • OXcarbazepine (TRILEPTAL) tablet 600 mg  600 mg Oral BID HUSSEIN DelgadoDLarissa   600 mg at 11/09/20 0533   • primidone (MYSOLINE) tablet 100 mg  100 mg Oral DAILY HUSSEIN DelgadoDLarissa   100 mg at 11/09/20 0534   • Respiratory Therapy Consult   Nebulization Continuous RT Sam Hernandez M.D.       • acetaminophen (Tylenol) tablet 650 mg  650 mg Oral Q6HRS PRN Sam Hernandez M.D.       • ondansetron (ZOFRAN) syringe/vial injection 4 mg  4 mg Intravenous Q4HRS PRN Sam Hernandez M.D.       • ondansetron (ZOFRAN ODT) dispertab 4 mg  4 mg Oral Q4HRS PRN Sam Hernandez M.D.       • senna-docusate (PERICOLACE or SENOKOT S) 8.6-50 MG per tablet 2 Tab  2 Tab Oral BID Sam Hernandez M.D.   Stopped at 11/09/20 0600    And   • polyethylene glycol/lytes (MIRALAX) PACKET 1 Packet  1 Packet Oral QDAY PRN Sam Hernandez M.D.        And   • magnesium hydroxide (MILK OF MAGNESIA) suspension 30 mL  30 mL Oral QDAY PRN Sam Hernandez M.D.         And   • bisacodyl (DULCOLAX) suppository 10 mg  10 mg Rectal QDAY PRN Sam Hernandez M.D.       • lactated ringers infusion   Intravenous Continuous Sam Hernandez M.D. 125 mL/hr at 11/08/20 2220 New Bag at 11/08/20 2220   • enoxaparin (LOVENOX) inj 40 mg  40 mg Subcutaneous DAILY Sam Hernandez M.D.   40 mg at 11/09/20 0537   • labetalol (NORMODYNE/TRANDATE) injection 10 mg  10 mg Intravenous Q4HRS PRN Sam Hernandez M.D.       • insulin regular human (HUMULIN/NOVOLIN R) 62.5 Units in  mL infusion per protocol  0-29 Units/hr Intravenous Continuous Sam Hernandez M.D. 4 mL/hr at 11/09/20 0630 1 Units/hr at 11/09/20 0630   • dextrose 50% (D50W) injection 25-50 mL  25-50 mL Intravenous PRN Sam Hernandez M.D.           Fluids    Intake/Output Summary (Last 24 hours) at 11/9/2020 0717  Last data filed at 11/9/2020 0600  Gross per 24 hour   Intake 3977.3 ml   Output --   Net 3977.3 ml       Laboratory          Recent Labs     11/08/20 0434 11/08/20  1048 11/08/20 1742 11/09/20  0006 11/09/20  0520   SODIUM 133*  --  151* 151* 155*   POTASSIUM 5.6*  --  3.5* 3.3* 3.9   CHLORIDE 97  --  118* 118* 117*   CO2 19*  --  26 28 27   BUN 16  --  13 13 13   CREATININE 1.27  --  0.81 0.70 0.69   MAGNESIUM 2.0 2.3  --   --   --    CALCIUM 9.4  --  10.2 9.9 9.7     Recent Labs     11/08/20  0434 11/08/20  0434 11/08/20  1742 11/09/20  0006 11/09/20  0520   ALTSGPT 24  --   --   --   --    ASTSGOT 31  --   --   --   --    ALKPHOSPHAT 195*  --   --   --   --    TBILIRUBIN 0.2  --   --   --   --    LIPASE 21  --   --   --   --    GLUCOSE 1084*   < > 279* 102* 192*    < > = values in this interval not displayed.     Recent Labs     11/08/20 0434 11/09/20  0006   WBC 10.9* 12.0*   NEUTSPOLYS 89.60* 69.30   LYMPHOCYTES 5.40* 20.00*   MONOCYTES 3.90 10.20   EOSINOPHILS 0.20 0.00   BASOPHILS 0.50 0.20   ASTSGOT 31  --    ALTSGPT 24  --    ALKPHOSPHAT 195*  --    TBILIRUBIN 0.2  --      Recent Labs     11/08/20 0434  11/09/20  0006   RBC 4.69 4.50   HEMOGLOBIN 12.9 12.2   HEMATOCRIT 43.5 40.2   PLATELETCT 282 251       Imaging  X-Ray:  No film today    Assessment/Plan  * Hyperglycemia- (present on admission)  Assessment & Plan  Hyperosmolar nonketotic coma -improving  Discontinue insulin drip and resume long and short acting insulin  Close glucose monitoring  S/p fluid resuscitation --> additional fluid bolus today, continue maintenance IV fluids given ongoing signs of dehydration  Begin diabetic diet, oral hydration  Monitor electrolytes closely  Diabetes education    Type 1 diabetes mellitus without complication (HCC)- (present on admission)  Assessment & Plan  Insulin management as described above  Diabetes education and diet    Troponin level elevated- (present on admission)  Assessment & Plan  Secondary to demand ischemia    Diarrhea- (present on admission)  Assessment & Plan  Likely noninfectious, C. difficile PCR pending  Monitor  S/p fluid resuscitation  Isolation precautions    Acute renal failure superimposed on stage 3 chronic kidney disease (HCC)- (present on admission)  Assessment & Plan  Secondary to dehydration -improving  S/p fluid resuscitation  Monitor creatinine, urine output, electrolytes closely  Avoid nephrotoxins    Dyslipidemia- (present on admission)  Assessment & Plan  Atorvastatin    Uncontrolled hypertension- (present on admission)  Assessment & Plan  Continue scheduled amlodipine  As needed labetalol for goal SBP less than 160    Electrolyte abnormality  Assessment & Plan  Replete potassium, phosphorus, magnesium and monitor    Bipolar 1 disorder (HCC)- (present on admission)  Assessment & Plan  Continue Lamictal       VTE:  Lovenox  Ulcer: Not Indicated  Lines: None    I have performed a physical exam and reviewed and updated ROS and Plan today (11/9/2020). In review of yesterday's note (11/8/2020), there are no changes except as documented above.     Discussed patient condition and risk of  morbidity and/or mortality with Hospitalist, RN, RT, Pharmacy, Charge nurse / hot rounds and Patient.  [facility] critical care will sign off.  Please call with questions.

## 2020-11-10 LAB
GLUCOSE BLD-MCNC: 121 MG/DL (ref 65–99)
GLUCOSE BLD-MCNC: 185 MG/DL (ref 65–99)
GLUCOSE BLD-MCNC: 186 MG/DL (ref 65–99)
GLUCOSE BLD-MCNC: 194 MG/DL (ref 65–99)
GLUCOSE BLD-MCNC: 337 MG/DL (ref 65–99)

## 2020-11-13 ENCOUNTER — TELEMEDICINE (OUTPATIENT)
Dept: MEDICAL GROUP | Facility: PHYSICIAN GROUP | Age: 70
End: 2020-11-13
Payer: MEDICARE

## 2020-11-13 VITALS — TEMPERATURE: 97.6 F | WEIGHT: 145 LBS | BODY MASS INDEX: 24.16 KG/M2 | HEIGHT: 65 IN

## 2020-11-13 DIAGNOSIS — Z09 HOSPITAL DISCHARGE FOLLOW-UP: ICD-10-CM

## 2020-11-13 DIAGNOSIS — F31.9 BIPOLAR 1 DISORDER (HCC): ICD-10-CM

## 2020-11-13 DIAGNOSIS — H53.8 BLURRY VISION, BILATERAL: ICD-10-CM

## 2020-11-13 DIAGNOSIS — E10.9 TYPE 1 DIABETES MELLITUS WITHOUT COMPLICATION (HCC): ICD-10-CM

## 2020-11-13 PROCEDURE — 99214 OFFICE O/P EST MOD 30 MIN: CPT | Mod: 95,CR | Performed by: NURSE PRACTITIONER

## 2020-11-13 RX ORDER — LORAZEPAM 0.5 MG/1
TABLET ORAL
COMMUNITY
End: 2021-02-01

## 2020-11-13 ASSESSMENT — FIBROSIS 4 INDEX: FIB4 SCORE: 1.82

## 2020-11-13 NOTE — ASSESSMENT & PLAN NOTE
New problem to examiner.  Patient was brought in by EMS on 11/8/2020 and was found to be in DKA.  From her lab work in ER her glucose is 1084 with an anion gap of 17.  Patient was admitted to hospital from 11/8/2020-11/9/2020 for DKA treatment with IV fluids and IV insulin.  Her daughter states that patient has not been feeling well mentally.  She has upcoming appoint with her psychiatrist on 12/18/2020.  Daughter is requesting assistance to see if we can get a sooner appointment.  Daughter believes that patient has been fixating on her sugar using her Dexcom meter and watching sugars and taking extra insulin and then chasing with snacks.  Daughter states she tried to call endocrinology to schedule appointment and has not received a return call.  Daughter is worried about when she starts her new job and having to leave her mother alone what her mother will do with her insulin.  Daughter would like to have patient see psychiatry sooner than 12/18/2020.

## 2020-11-13 NOTE — PROGRESS NOTES
Virtual Visit: Established Patient   This visit was conducted via Zoom using secure and encrypted videoconferencing technology. The patient was in a private location in the state of [place].    The patient's identity was confirmed and verbal consent was obtained for this virtual visit.    Subjective:   CC:   Chief Complaint   Patient presents with   • Hospital Follow-up     Hyperglycemia, feels better, hard to conrol sugar levels        Molly Loera is a 70 y.o. female presenting with her daughter Rut for evaluation and management of:    Hospital discharge follow-up  New problem to examiner.  Patient was brought in by EMS on 11/8/2020 and was found to be in DKA.  From her lab work in ER her glucose is 1084 with an anion gap of 17.  Patient was admitted to hospital from 11/8/2020-11/9/2020 for DKA treatment with IV fluids and IV insulin.  Her daughter states that patient has not been feeling well mentally.  She has upcoming appoint with her psychiatrist on 12/18/2020.  Daughter is requesting assistance to see if we can get a sooner appointment.  Daughter believes that patient has been fixating on her sugar using her Dexcom meter and watching sugars and taking extra insulin and then chasing with snacks.  Daughter states she tried to call endocrinology to schedule appointment and has not received a return call.  Daughter is worried about when she starts her new job and having to leave her mother alone what her mother will do with her insulin.  Daughter would like to have patient see psychiatry sooner than 12/18/2020.    Bipolar 1 disorder (HCC)  Chronic medical problem.  She has upcoming appoint with her psychiatrist on 12/18/2020.  Patient's daughter Rut is asking for assistance in obtaining appointment sooner.  Patient was stopped on her lithium by her psychiatrist and patient's daughter does not believe this is helping her.  She is currently taking olanzapine, oxcarbazepine and lamotrigine.  Patient  "denies any self-harm or SI today.    Type 1 diabetes mellitus without complication (HCC)  Chronic medical problem.  Since discharge from the hospital patient has resumed her insulin regimen.  Patient daughter report that her insulin regimen is not changed.  She is currently taking NovoLog 12 units 3 times daily with meals and Basaglar 10 units in the a.m. and 14 units in the p.m.  Patient's blood sugar today after medication was 212.  Patient expresses concerns that her blood sugars been in the 400s.  Patient's daughter corrects patient and states that patient blood sugar has not been running that high.  Daughter attempted to schedule appoint with endocrinology but is not received a call back yet.    Blurry vision, bilateral  Chronic medical problem.  Patient had recent appointment with ophthalmology and was told her blurry vision was attributed to \"floaters\".  Patient states that she believes she has retinopathy and does not believe this is as a result of \"floaters\".  Patient has new consult to new ophthalmologist for second opinion scheduled.        ROS   Denies any recent fevers or chills. No nausea or vomiting. No chest pains or shortness of breath.     Allergies   Allergen Reactions   • Depakote [Valproic Acid]        Current medicines (including changes today)  Current Outpatient Medications   Medication Sig Dispense Refill   • losartan (COZAAR) 50 MG Tab Take 1 Tab by mouth every day. (Patient taking differently: Take 50 mg by mouth every morning.) 90 Tab 3   • insulin aspart (NOVOLOG FLEXPEN) 100 UNIT/ML injection PEN INJECT 12 UNITS TOTAL UNDER THE SKIN THREE (3) TIMES DAILY BEFORE MEALS. (Patient taking differently: Inject 12 Units as instructed 3 times a day before meals.) 45 mL 1   • insulin glargine (BASAGLAR KWIKPEN) 100 UNIT/ML injection PEN Inject 30 Units as instructed every evening. (Patient taking differently: Inject 10-14 Units as instructed every evening. 10 units every morning   14 units every " night) 45 Each 1   • lamoTRIgine (LAMICTAL) 100 MG Tab Take 2 tabs by mouth each morning and 1 tab by mouth each evening. (Patient taking differently: Take 100-200 mg by mouth 2 times a day. 200 mg every morning   100 mg every night) 90 Tab 2   • oxcarbazepine (TRILEPTAL) 600 MG tablet TAKE 1 TAB BY MOUTH EACH MORNING X 1 WEEK, THEN TAKE 1 TAB BY MOUTH TWICE A DAY THEREAFTER. (Patient taking differently: Take 600 mg by mouth 2 times a day.) 60 Tab 0   • buPROPion SR (WELLBUTRIN-SR) 150 MG TABLET SR 12 HR sustained-release tablet Take 1 Tab by mouth 2 times a day for 90 days. 90 Tab 1   • olanzapine (ZYPREXA) 10 MG tablet Take 1 Tab by mouth every bedtime for 90 days. 90 Tab 1   • amLODIPine (NORVASC) 5 MG Tab Take 1 Tab by mouth every day. 90 Tab 0   • atorvastatin (LIPITOR) 80 MG tablet Take 1 Tab by mouth every day. (Patient taking differently: Take 80 mg by mouth every morning.) 90 Tab 0   • primidone (MYSOLINE) 50 MG Tab Take 2 Tabs by mouth every day. 180 Tab 1   • anastrozole (ARIMIDEX) 1 MG Tab Take 1 Tab by mouth every day. (Patient taking differently: Take 1 mg by mouth every evening.) 90 Tab 1   • aspirin EC (ECOTRIN) 81 MG Tablet Delayed Response Take 1 Tab by mouth every day. (Patient taking differently: Take 81 mg by mouth every morning.) 90 Tab 1   • Blood Glucose Meter Kit Test blood sugar as recommended by provider. 1 blood glucose monitoring kit-- DEXCOM 1 Kit 0   • Blood Glucose Test Strips Use one testing unit every 10 days to test blood sugar. DEXCOM meter 3 Units 5   • LORazepam (ATIVAN) 0.5 MG Tab      • Cariprazine HCl 4.5 MG Cap        No current facility-administered medications for this visit.        Patient Active Problem List    Diagnosis Date Noted   • Hyperglycemia 11/08/2020     Priority: High   • Type 1 diabetes mellitus without complication (HCC) 09/23/2019     Priority: High   • Acute renal failure superimposed on stage 3 chronic kidney disease (HCC) 11/08/2020     Priority: Medium  "  • Diarrhea 11/08/2020     Priority: Medium   • Troponin level elevated 11/08/2020     Priority: Medium   • Uncontrolled hypertension 09/23/2019     Priority: Medium   • Dyslipidemia 09/23/2019     Priority: Medium   • Electrolyte abnormality 11/09/2020     Priority: Low   • Bipolar 1 disorder (HCC) 09/23/2019     Priority: Low   • Hospital discharge follow-up 11/13/2020   • Blurry vision, bilateral 11/02/2020   • Shaking 08/26/2020   • Chronic neck pain 06/02/2020   • Malignant neoplasm of left female breast (HCC) 02/27/2020   • Stage 3 chronic kidney disease 02/27/2020   • Hypercalcemia 02/27/2020   • Chronic bilateral low back pain with left-sided sciatica 09/23/2019   • Generalized anxiety disorder 09/23/2019       Family History   Problem Relation Age of Onset   • Diabetes Daughter        She  has a past medical history of Anxiety, Bipolar 1 disorder (HCC), Cancer (HCC), Depression, Diabetes (HCC), and Kidney disease.  She  has no past surgical history on file.       Objective:   Temp 36.4 °C (97.6 °F) (Axillary)   Ht 1.651 m (5' 5\")   Wt 65.8 kg (145 lb)   BMI 24.13 kg/m² Vital signs reviewed    Physical Exam:  Constitutional: Alert, no distress, well-groomed.  Daughter present during appointment.  Skin: No rashes in visible areas.  Eye: Round. Conjunctiva clear, lids normal. No icterus.   ENMT: Lips pink without lesions, good dentition, moist mucous membranes. Phonation normal.  Neck: No masses, no thyromegaly. Moves freely without pain.  Respiratory: Unlabored respiratory effort, no cough or audible wheeze  Psych: Alert and oriented x3, flat affect and mood.  Patient and daughter do argue amongst himself frequently throughout the virtual visit.  Daughter appeared visually upset with her mother.  Patient appeared visually upset with her daughter.      Assessment and Plan:   The following treatment plan was discussed:     1. Hospital discharge follow-up  New problem to examiner.  Hospital discharge summary " reviewed today.  Inpatient hospital labs reviewed.  Discussed with patient and daughter that I will send a staff message to her psychiatrist  to see if we can get a sooner appointment than 12/18/2020.  I did discuss with patient and daughter that her mental health is as important as her chronic health problems.  Patient was concerned that she did not want to complete an inpatient treatment for her bipolar again.  During the virtual visit daughter and the patient did become argumentative with each other with the patient insisting that she did not have any problem with her diabetes or her bipolar.  Patient was concerned with her blurry vision.  I did discuss with patient and patient's daughter that if patient becomes risk for self-harm that she should go back to the ER or call 911, they both verbalized understanding.  They will return in 1 week for follow-up.  Monitor and follow.    2. Type 1 diabetes mellitus without complication (HCC)  Chronic unstable medical problem.  Continue NovoLog and Basaglar.  Urgent referral placed to endocrine.  Discussed with patient and patient's daughter that patient should have follow-up with endocrinology for her type 1 diabetes.  They will follow up in 1 week with me.  Monitor and follow.  - REFERRAL TO ENDOCRINOLOGY    3. Bipolar 1 disorder (HCC)  Chronic unstable medical problem.  I will send staff message to her psychiatrist to see if we can get her in sooner.  She will continue with her lamotrigine, olanzapine and oxcarbazepine.  Red flags discussed with strict ER precautions.  Monitor.  - REFERRAL TO ENDOCRINOLOGY    4. Blurry vision, bilateral  Chronic unstable medical problem.  Discussed with patient that she should complete her second opinion.  During the visit patient did not want to discuss her hospital discharge, diabetes, or bipolar disorder.  She kept insisting multiple times that her blurry vision was causing her problems.  She did have to be redirected.   Monitor and follow.    Other orders  - LORazepam (ATIVAN) 0.5 MG Tab  - Cariprazine HCl 4.5 MG Cap        Follow-up: Return in about 1 week (around 11/20/2020).

## 2020-11-14 NOTE — ASSESSMENT & PLAN NOTE
"Chronic medical problem.  Patient had recent appointment with ophthalmology and was told her blurry vision was attributed to \"floaters\".  Patient states that she believes she has retinopathy and does not believe this is as a result of \"floaters\".  Patient has new consult to new ophthalmologist for second opinion scheduled.  "

## 2020-11-14 NOTE — ASSESSMENT & PLAN NOTE
Chronic medical problem.  Since discharge from the hospital patient has resumed her insulin regimen.  Patient daughter report that her insulin regimen is not changed.  She is currently taking NovoLog 12 units 3 times daily with meals and Basaglar 10 units in the a.m. and 14 units in the p.m.  Patient's blood sugar today after medication was 212.  Patient expresses concerns that her blood sugars been in the 400s.  Patient's daughter corrects patient and states that patient blood sugar has not been running that high.  Daughter attempted to schedule appoint with endocrinology but is not received a call back yet.

## 2020-11-14 NOTE — ASSESSMENT & PLAN NOTE
Chronic medical problem.  She has upcoming appoint with her psychiatrist on 12/18/2020.  Patient's daughter Rut is asking for assistance in obtaining appointment sooner.  Patient was stopped on her lithium by her psychiatrist and patient's daughter does not believe this is helping her.  She is currently taking olanzapine, oxcarbazepine and lamotrigine.  Patient denies any self-harm or SI today.

## 2020-12-01 RX ORDER — OXCARBAZEPINE 600 MG/1
600 TABLET, FILM COATED ORAL 2 TIMES DAILY
Qty: 60 TAB | Refills: 2 | Status: SHIPPED | OUTPATIENT
Start: 2020-12-01 | End: 2020-12-31

## 2020-12-13 DIAGNOSIS — F31.9 BIPOLAR 1 DISORDER (HCC): ICD-10-CM

## 2020-12-14 RX ORDER — LAMOTRIGINE 100 MG/1
TABLET ORAL
Qty: 90 TAB | Refills: 2 | Status: SHIPPED | OUTPATIENT
Start: 2020-12-14 | End: 2021-02-01

## 2020-12-16 DIAGNOSIS — E11.59 HYPERTENSION ASSOCIATED WITH DIABETES (HCC): ICD-10-CM

## 2020-12-16 DIAGNOSIS — I15.2 HYPERTENSION ASSOCIATED WITH DIABETES (HCC): ICD-10-CM

## 2020-12-17 RX ORDER — AMLODIPINE BESYLATE 5 MG/1
TABLET ORAL
Qty: 90 TAB | Refills: 1 | Status: SHIPPED | OUTPATIENT
Start: 2020-12-17 | End: 2021-07-19

## 2020-12-17 NOTE — TELEPHONE ENCOUNTER
Requested Prescriptions     Signed Prescriptions Disp Refills   • amLODIPine (NORVASC) 5 MG Tab 90 Tab 1     Sig: TAKE 1 TABLET BY MOUTH EVERY DAY     Authorizing Provider: RJ LAM A.P.R.N.

## 2020-12-17 NOTE — PROGRESS NOTES
The patient was admitted to [facility] a dew weeks ago to stabilize her psychotropic medications.  She was restarted on lithium carbonate (which caused kidney damage in the past) and her daughter Rut will schedule her for a follow up with this provider when she is discharged.

## 2020-12-18 ENCOUNTER — TELEMEDICINE (OUTPATIENT)
Dept: BEHAVIORAL HEALTH | Facility: CLINIC | Age: 70
End: 2020-12-18
Payer: MEDICARE

## 2020-12-18 DIAGNOSIS — F31.9 BIPOLAR 1 DISORDER (HCC): ICD-10-CM

## 2020-12-18 DIAGNOSIS — F41.1 GENERALIZED ANXIETY DISORDER: ICD-10-CM

## 2021-01-15 DIAGNOSIS — Z23 NEED FOR VACCINATION: ICD-10-CM

## 2021-02-01 ENCOUNTER — TELEMEDICINE (OUTPATIENT)
Dept: MEDICAL GROUP | Facility: PHYSICIAN GROUP | Age: 71
End: 2021-02-01
Payer: MEDICARE

## 2021-02-01 VITALS — BODY MASS INDEX: 24.75 KG/M2 | HEIGHT: 64 IN | WEIGHT: 145 LBS | TEMPERATURE: 98.9 F

## 2021-02-01 DIAGNOSIS — Z09 HOSPITAL DISCHARGE FOLLOW-UP: ICD-10-CM

## 2021-02-01 DIAGNOSIS — E10.9 TYPE 1 DIABETES MELLITUS WITHOUT COMPLICATION (HCC): ICD-10-CM

## 2021-02-01 DIAGNOSIS — F31.9 BIPOLAR 1 DISORDER (HCC): ICD-10-CM

## 2021-02-01 DIAGNOSIS — R25.1 SHAKING: ICD-10-CM

## 2021-02-01 PROCEDURE — 99214 OFFICE O/P EST MOD 30 MIN: CPT | Mod: 95,CR | Performed by: NURSE PRACTITIONER

## 2021-02-01 RX ORDER — QUETIAPINE FUMARATE 100 MG/1
200 TABLET, FILM COATED ORAL
COMMUNITY
Start: 2021-01-21 | End: 2021-02-08 | Stop reason: SDUPTHER

## 2021-02-01 RX ORDER — CARBAMAZEPINE 200 MG/1
TABLET, EXTENDED RELEASE ORAL
COMMUNITY
Start: 2021-01-21 | End: 2024-02-09

## 2021-02-01 RX ORDER — HYDROXYZINE PAMOATE 50 MG/1
CAPSULE ORAL
COMMUNITY
Start: 2021-01-22 | End: 2024-02-09

## 2021-02-01 RX ORDER — QUETIAPINE FUMARATE 100 MG/1
100 TABLET, FILM COATED ORAL 2 TIMES DAILY
Qty: 60 TAB | Refills: 3 | Status: SHIPPED | OUTPATIENT
Start: 2021-02-01 | End: 2021-02-08 | Stop reason: SDUPTHER

## 2021-02-01 RX ORDER — PRIMIDONE 50 MG/1
50 TABLET ORAL 2 TIMES DAILY
Qty: 60 TAB | Refills: 2
Start: 2021-02-01 | End: 2021-04-21

## 2021-02-01 RX ORDER — LAMOTRIGINE 100 MG/1
TABLET ORAL
Qty: 90 TAB | Refills: 2
Start: 2021-02-01 | End: 2021-04-21

## 2021-02-01 RX ORDER — LITHIUM CARBONATE 300 MG/1
300 CAPSULE ORAL
COMMUNITY
Start: 2021-01-21 | End: 2022-11-15

## 2021-02-01 ASSESSMENT — FIBROSIS 4 INDEX: FIB4 SCORE: 1.82

## 2021-02-01 NOTE — PROGRESS NOTES
Virtual Visit: Established Patient   This visit was conducted via Zoom using secure and encrypted videoconferencing technology. The patient was in a private location in the [facility].    The patient's identity was confirmed and verbal consent was obtained for this virtual visit.    Subjective:   CC:   Chief Complaint   Patient presents with   • Hospital Follow-up     mental health issues , had covid, issues with seroquil       Molly Loera is a 70 y.o. female presenting for evaluation and management of:    Hospital discharge follow-up  Chronic medical problem. She was admitted 1/14/2020 to [facility] and discharged home on 1/28/2021. She was admitted for delusions, memory impairment, and difficulty functioning at home. [facility] has updated her medications. She was previously followed by her psychiatrist Dr. Montano. Per patient and daughter today, patient is re-establishing with new psychiatrist Dr. Jazmin Chamorro in private practice on 2/12/2021. Patient is asking for refill on her seroquel dosing. She is taking seroquel 100 mg in AM, 100 mg in PM and 200 mg at bedtime for a total of 400 mg in 24 hours. She states that today she is feeling good. Denies any thoughts of harming self or others. Patient states that while at [facility], she was positive for COVID on 12/14/2020. She currently denies any chest pain, shortness of breath, cough, fever, chills, palpations, dizziness, headache or loss of taste.    Type 1 diabetes mellitus without complication (HCC)  Chronic medical problem.  She is taking NovoLog 1 unit for every 5 carbs ratio.  Daughter states that she does not eat enough at her meals to take full 12 units dosing.  Patient has been unable to schedule appointment with endocrinology.  She is also taking Basaglar 10 units in the a.m. and 10 units in the evening.    Bipolar 1 disorder (HCC)  Chronic medical problem.  She was recently discharged from [facility] inpatient encounter.   She had medication changes.  She is taking lamotrigine 100 mg twice daily, lithium 300 mg twice daily, Vraylar 6 mg daily, carbamazepine 200 mg BID, quetiapine 100 mg in a.m., 100 mg in p.m., and 200 mg at bedtime for a total of 400 mg of quetiapine in 24 hours.  Patient is requesting a new prescription for quetiapine twice daily dosing as [facility] did not send her with a cart prescription.  She is also asking a lithium level.  She does have follow-up with new psychiatrist Dr. Jazmin Chamorro scheduled for 2/12/2021.    Shaking  Chronic medical problem.  She is taking primidone 50 mg twice daily.  This is controlling her shaking.        ROS   As per HPI above  Denies any recent fevers or chills. No nausea or vomiting. No chest pains or shortness of breath. Denies any self harm or SI today.    Allergies   Allergen Reactions   • Depakote [Valproic Acid]        Current medicines (including changes today)  Current Outpatient Medications   Medication Sig Dispense Refill   • carbamazepine SR (TEGRETOL XR) 200 MG TABLET SR 12 HR extended-release tablet TAKE 1 TABLET BY MOUTH TWICE A DAY     • lithium carbonate 300 MG capsule Take 300 mg by mouth.     • QUEtiapine (SEROQUEL) 100 MG Tab Take 200 mg by mouth every bedtime. Takes with quetiapine 100 mg BID, 4 tabs total/day     • hydrOXYzine pamoate (VISTARIL) 50 MG Cap TAKE 1 CAPSULE BY MOUTH 3 TIMES A DAY AS NEEDED FOR 30 DAYS     • Cariprazine HCl 6 MG Cap Take 6 mg by mouth every evening.     • primidone (MYSOLINE) 50 MG Tab Take 1 Tab by mouth 2 Times a Day. 60 Tab 2   • lamoTRIgine (LAMICTAL) 100 MG Tab 1 tablet BID 90 Tab 2   • QUEtiapine (SEROQUEL) 100 MG Tab Take 1 Tab by mouth 2 times a day. Take in AM and PM. 60 Tab 3   • amLODIPine (NORVASC) 5 MG Tab TAKE 1 TABLET BY MOUTH EVERY DAY 90 Tab 1   • losartan (COZAAR) 50 MG Tab Take 1 Tab by mouth every day. (Patient taking differently: Take 50 mg by mouth every morning.) 90 Tab 3   • insulin aspart (NOVOLOG  FLEXPEN) 100 UNIT/ML injection PEN INJECT 12 UNITS TOTAL UNDER THE SKIN THREE (3) TIMES DAILY BEFORE MEALS. (Patient taking differently: Inject 12 Units as instructed 3 times a day before meals.) 45 mL 1   • atorvastatin (LIPITOR) 80 MG tablet Take 1 Tab by mouth every day. (Patient taking differently: Take 80 mg by mouth every morning.) 90 Tab 0   • anastrozole (ARIMIDEX) 1 MG Tab Take 1 Tab by mouth every day. (Patient taking differently: Take 1 mg by mouth every evening.) 90 Tab 1   • aspirin EC (ECOTRIN) 81 MG Tablet Delayed Response Take 1 Tab by mouth every day. (Patient taking differently: Take 81 mg by mouth every morning.) 90 Tab 1   • Blood Glucose Meter Kit Test blood sugar as recommended by provider. 1 blood glucose monitoring kit-- DEXCOM 1 Kit 0   • Blood Glucose Test Strips Use one testing unit every 10 days to test blood sugar. DEXCOM meter 3 Units 5   • insulin glargine (BASAGLAR KWIKPEN) 100 UNIT/ML injection PEN Inject 30 Units as instructed every evening. (Patient taking differently: Inject 10-14 Units as instructed every evening. 10 units every morning   14 units every night) 45 Each 1     No current facility-administered medications for this visit.        Patient Active Problem List    Diagnosis Date Noted   • Hyperglycemia 11/08/2020     Priority: High   • Type 1 diabetes mellitus without complication (HCC) 09/23/2019     Priority: High   • Acute renal failure superimposed on stage 3 chronic kidney disease (HCC) 11/08/2020     Priority: Medium   • Diarrhea 11/08/2020     Priority: Medium   • Troponin level elevated 11/08/2020     Priority: Medium   • Uncontrolled hypertension 09/23/2019     Priority: Medium   • Dyslipidemia 09/23/2019     Priority: Medium   • Electrolyte abnormality 11/09/2020     Priority: Low   • Bipolar 1 disorder (HCC) 09/23/2019     Priority: Low   • Hospital discharge follow-up 11/13/2020   • Blurry vision, bilateral 11/02/2020   • Shaking 08/26/2020   • Chronic neck  "pain 06/02/2020   • Malignant neoplasm of left female breast (HCC) 02/27/2020   • Stage 3 chronic kidney disease 02/27/2020   • Hypercalcemia 02/27/2020   • Chronic bilateral low back pain with left-sided sciatica 09/23/2019   • Generalized anxiety disorder 09/23/2019       Family History   Problem Relation Age of Onset   • Diabetes Daughter        She  has a past medical history of Anxiety, Bipolar 1 disorder (HCC), Cancer (HCC), Depression, Diabetes (HCC), and Kidney disease.  She  has no past surgical history on file.       Objective:   Temp 37.2 °C (98.9 °F) (Tympanic)   Ht 1.626 m (5' 4\")   Wt 65.8 kg (145 lb)   BMI 24.89 kg/m²   Vital signs reviewed.     Physical Exam:  Constitutional: Alert, no distress, well-groomed. Daughter present during appointment.   Skin: No rashes in visible areas.  Eye: Round. Conjunctiva clear, lids normal. No icterus. Good eye contact.  ENMT: Lips pink without lesions, good dentition, moist mucous membranes. Phonation normal.  Neck: No masses, no thyromegaly. Moves freely without pain.  Respiratory: Unlabored respiratory effort, no cough or audible wheeze  Psych: Alert and oriented x3, normal affect and mood.       Assessment and Plan:   The following treatment plan was discussed:     1. Hospital discharge follow-up  Chronic stable medical problem.  She will continue follow-up with psychiatry.  No acute complaints today.  Discharge summary from [facility] reviewed today.  Monitor and follow.    2. Bipolar 1 disorder (HCC)  Chronic stable medical problem.  Continue lamotrigine, lithium, Vraylar, temazepam and quetiapine.  Will check lithium level. Keep new establishing appointment with new psychiatrist Dr. Chamorro.  She denies any self-harm or SI today.  Red flags discussed.  Monitor and follow.  - LITHIUM; Future  - lamoTRIgine (LAMICTAL) 100 MG Tab; 1 tablet BID  Dispense: 90 Tab; Refill: 2  - QUEtiapine (SEROQUEL) 100 MG Tab; Take 1 Tab by mouth 2 times a day. Take in AM and " PM.  Dispense: 60 Tab; Refill: 3    3. Type 1 diabetes mellitus without complication (HCC)  Chronic stable medical problem.  Encourage follow-up with endocrinology, new referral placed today.  Continue NovoLog and Basaglar.  Continue diet lifestyle modifications.  Monitor and follow.  - Comp Metabolic Panel; Future  - HEMOGLOBIN A1C; Future  - REFERRAL TO ENDOCRINOLOGY    4. Shaking  Chronic stable medical problem.  Continue primidone.  She does not need a refill today.  Monitor and follow.  - primidone (MYSOLINE) 50 MG Tab; Take 1 Tab by mouth 2 Times a Day.  Dispense: 60 Tab; Refill: 2    Other orders  - carbamazepine SR (TEGRETOL XR) 200 MG TABLET SR 12 HR extended-release tablet; TAKE 1 TABLET BY MOUTH TWICE A DAY  - lithium carbonate 300 MG capsule; Take 300 mg by mouth.  - QUEtiapine (SEROQUEL) 100 MG Tab; Take 200 mg by mouth every bedtime. Takes with quetiapine 100 mg BID, 4 tabs total/day  - hydrOXYzine pamoate (VISTARIL) 50 MG Cap; TAKE 1 CAPSULE BY MOUTH 3 TIMES A DAY AS NEEDED FOR 30 DAYS  - Cariprazine HCl 6 MG Cap; Take 6 mg by mouth every evening.      Follow-up: Return in about 2 months (around 4/1/2021) for Labs, Diabetes.

## 2021-02-01 NOTE — ASSESSMENT & PLAN NOTE
Chronic medical problem.  She is taking NovoLog 1 unit for every 5 carbs ratio.  Daughter states that she does not eat enough at her meals to take full 12 units dosing.  Patient has been unable to schedule appointment with endocrinology.  She is also taking Basaglar 10 units in the a.m. and 10 units in the evening.

## 2021-02-01 NOTE — ASSESSMENT & PLAN NOTE
Chronic medical problem. She was admitted 1/14/2020 to [facility] and discharged home on 1/28/2021. She was admitted for delusions, memory impairment, and difficulty functioning at home. [facility] has updated her medications. She was previously followed by her psychiatrist Dr. Montano. Per patient and daughter today, patient is re-establishing with new psychiatrist Dr. Jazmin Chamorro in private practice on 2/12/2021. Patient is asking for refill on her seroquel dosing. She is taking seroquel 100 mg in AM, 100 mg in PM and 200 mg at bedtime for a total of 400 mg in 24 hours. She states that today she is feeling good. Denies any thoughts of harming self or others. Patient states that while at [facility], she was positive for COVID on 12/14/2020. She currently denies any chest pain, shortness of breath, cough, fever, chills, palpations, dizziness, headache or loss of taste.

## 2021-02-01 NOTE — ASSESSMENT & PLAN NOTE
Chronic medical problem.  She was recently discharged from [facility] inpatient encounter.  She had medication changes.  She is taking lamotrigine 100 mg twice daily, lithium 300 mg twice daily, Vraylar 6 mg daily, carbamazepine 200 mg BID, quetiapine 100 mg in a.m., 100 mg in p.m., and 200 mg at bedtime for a total of 400 mg of quetiapine in 24 hours.  Patient is requesting a new prescription for quetiapine twice daily dosing as [facility] did not send her with a cart prescription.  She is also asking a lithium level.  She does have follow-up with new psychiatrist Dr. Jazmin Chamorro scheduled for 2/12/2021.

## 2021-02-02 NOTE — ASSESSMENT & PLAN NOTE
Chronic medical problem.  She is taking primidone 50 mg twice daily.  This is controlling her shaking.

## 2021-02-07 ENCOUNTER — PATIENT MESSAGE (OUTPATIENT)
Dept: MEDICAL GROUP | Facility: PHYSICIAN GROUP | Age: 71
End: 2021-02-07

## 2021-02-07 DIAGNOSIS — F31.9 BIPOLAR 1 DISORDER (HCC): ICD-10-CM

## 2021-02-08 RX ORDER — QUETIAPINE FUMARATE 200 MG/1
200 TABLET, FILM COATED ORAL
Qty: 30 TAB | Refills: 1 | Status: SHIPPED | OUTPATIENT
Start: 2021-02-08 | End: 2021-03-29

## 2021-02-08 RX ORDER — QUETIAPINE FUMARATE 100 MG/1
100 TABLET, FILM COATED ORAL 2 TIMES DAILY
Qty: 60 TAB | Refills: 1 | Status: SHIPPED | OUTPATIENT
Start: 2021-02-08 | End: 2024-02-09

## 2021-02-08 NOTE — PROGRESS NOTES
Requested Prescriptions     Signed Prescriptions Disp Refills   • QUEtiapine (SEROQUEL) 100 MG Tab 60 Tab 1     Sig: Take 1 Tab by mouth 2 times a day. Takes with quetiapine 200 mg at bedtime     Authorizing Provider: RJ LAM   • QUEtiapine (SEROQUEL) 200 MG Tab 30 Tab 1     Sig: Take 1 Tab by mouth every bedtime. Takes with quetiapine 100 mg BID.     Authorizing Provider: RJ LAM A.P.R.N.

## 2021-02-08 NOTE — PATIENT COMMUNICATION
Received request via: Patient    Was the patient seen in the last year in this department? Yes LOV 02/01/2021    Does the patient have an active prescription (recently filled or refills available) for medication(s) requested? No

## 2021-03-28 DIAGNOSIS — F31.9 BIPOLAR 1 DISORDER (HCC): ICD-10-CM

## 2021-03-29 RX ORDER — QUETIAPINE FUMARATE 200 MG/1
TABLET, FILM COATED ORAL
Qty: 30 TABLET | Refills: 1 | Status: SHIPPED | OUTPATIENT
Start: 2021-03-29 | End: 2021-10-21

## 2021-03-29 NOTE — TELEPHONE ENCOUNTER
Requested Prescriptions     Signed Prescriptions Disp Refills   • QUEtiapine (SEROQUEL) 200 MG Tab 30 tablet 1     Sig: TAKE 1 TAB BY MOUTH EVERY BEDTIME. TAKES WITH QUETIAPINE 100 MG TWICE A DAY .     Authorizing Provider: RJ LAM A.P.R.N.

## 2021-03-29 NOTE — TELEPHONE ENCOUNTER
Received request via: Pharmacy    Was the patient seen in the last year in this department? Yes    LOV 02/01/2021    Does the patient have an active prescription (recently filled or refills available) for medication(s) requested? No

## 2021-04-20 DIAGNOSIS — F31.9 BIPOLAR 1 DISORDER (HCC): ICD-10-CM

## 2021-04-20 RX ORDER — QUETIAPINE FUMARATE 200 MG/1
TABLET, FILM COATED ORAL
Refills: 1 | OUTPATIENT
Start: 2021-04-20

## 2021-04-21 DIAGNOSIS — F31.9 BIPOLAR 1 DISORDER (HCC): ICD-10-CM

## 2021-04-21 DIAGNOSIS — C50.912 MALIGNANT NEOPLASM OF LEFT FEMALE BREAST, UNSPECIFIED ESTROGEN RECEPTOR STATUS, UNSPECIFIED SITE OF BREAST (HCC): ICD-10-CM

## 2021-04-21 DIAGNOSIS — R25.1 SHAKING: ICD-10-CM

## 2021-04-21 RX ORDER — LAMOTRIGINE 100 MG/1
100 TABLET ORAL 2 TIMES DAILY
Qty: 180 TABLET | Refills: 0 | Status: SHIPPED | OUTPATIENT
Start: 2021-04-21 | End: 2021-07-19

## 2021-04-21 RX ORDER — PRIMIDONE 50 MG/1
50 TABLET ORAL 2 TIMES DAILY
Qty: 180 TABLET | Refills: 1 | Status: SHIPPED | OUTPATIENT
Start: 2021-04-21 | End: 2022-04-05 | Stop reason: SDUPTHER

## 2021-04-21 RX ORDER — ANASTROZOLE 1 MG/1
1 TABLET ORAL EVERY EVENING
Qty: 90 TABLET | Refills: 1 | Status: SHIPPED | OUTPATIENT
Start: 2021-04-21 | End: 2021-10-08

## 2021-04-21 NOTE — TELEPHONE ENCOUNTER
Received request via: Pharmacy    Was the patient seen in the last year in this department? Yes    Does the patient have an active prescription (recently filled or refills available) for medication(s) requested? No  
Requested Prescriptions     Signed Prescriptions Disp Refills   • anastrozole (ARIMIDEX) 1 MG Tab 90 tablet 1     Sig: Take 1 tablet by mouth every evening.     Authorizing Provider: RJ LAM   • primidone (MYSOLINE) 50 MG Tab 180 tablet 1     Sig: Take 1 tablet by mouth 2 times a day.     Authorizing Provider: RJ LAM A.P.R.N.   
Statement Selected

## 2021-04-22 NOTE — TELEPHONE ENCOUNTER
Left a message with the  to verify if Jazmin is prescribing these medications. She is going to check and call me back.

## 2021-04-22 NOTE — TELEPHONE ENCOUNTER
Will have MA clarify if patient completed new psychiatry appointment with Dr. Jazmin Chamorro and if new psychiatrist will take over lamotrigine, lithium, Vraylar, carbamazepine and quetiapine prescriptions.

## 2021-05-28 ENCOUNTER — TELEPHONE (OUTPATIENT)
Dept: MEDICAL GROUP | Facility: PHYSICIAN GROUP | Age: 71
End: 2021-05-28

## 2021-05-28 DIAGNOSIS — F31.9 BIPOLAR 1 DISORDER (HCC): ICD-10-CM

## 2021-05-28 RX ORDER — QUETIAPINE FUMARATE 200 MG/1
TABLET, FILM COATED ORAL
Qty: 30 TABLET | Refills: 0 | OUTPATIENT
Start: 2021-05-28

## 2021-05-28 NOTE — TELEPHONE ENCOUNTER
MA was able to call patient psychiatrist Dr. Jazmin Chamorro for medication clarification.  Dr. Chamorro to take over prescriptions for bipolar.

## 2021-05-28 NOTE — TELEPHONE ENCOUNTER
Pts daughter called back and said that Jazmin will be managing all meds. She said they were waiting for the refills to run out so she can start prescribing the medication under her name. She has an appt with Jazmin in a couple weeks.

## 2021-05-28 NOTE — TELEPHONE ENCOUNTER
Requested Prescriptions     Refused Prescriptions Disp Refills   • QUEtiapine (SEROQUEL) 200 MG Tab [Pharmacy Med Name: QUETIAPINE FUMARATE 200 MG TAB] 30 tablet 0     Sig: TAKE 1 TAB BY MOUTH EVERY BEDTIME. TAKES WITH QUETIAPINE 100 MG TWICE A DAY .     Refused By: RJ LAM     Reason for Refusal: Refill not appropriate.     Need clarification of patient psychiatrist is refilling the medication. While the MA reach out to Dr. Jazmin Chamorro's office for clarification.

## 2021-05-28 NOTE — TELEPHONE ENCOUNTER
The office I called said that she is no longer in practice. I will do some research online to see if I can find out where her new office is. I will also reach out to the Pt.

## 2021-06-01 DIAGNOSIS — F31.9 BIPOLAR 1 DISORDER (HCC): ICD-10-CM

## 2021-06-02 RX ORDER — QUETIAPINE FUMARATE 100 MG/1
TABLET, FILM COATED ORAL
Qty: 180 TABLET | Refills: 0 | OUTPATIENT
Start: 2021-06-02

## 2021-06-02 NOTE — TELEPHONE ENCOUNTER
Requested Prescriptions     Refused Prescriptions Disp Refills   • QUEtiapine (SEROQUEL) 100 MG Tab [Pharmacy Med Name: QUETIAPINE FUMARATE 100 MG TAB] 180 tablet 0     Sig: TAKE 1 TAB BY MOUTH 2 TIMES A DAY. TAKE IN THE AM AND PM.     Refused By: RJ LAM     Reason for Refusal: Refill not appropriate.     Dr. Jazmin Chamorro, psychiatry to take over medication refills. See telephone encounter from 5/8/2021.    STEVEN Paz.

## 2021-06-21 DIAGNOSIS — E10.9 TYPE 1 DIABETES MELLITUS WITHOUT COMPLICATION (HCC): ICD-10-CM

## 2021-06-21 RX ORDER — ASPIRIN 81 MG/1
TABLET, COATED ORAL
Qty: 90 TABLET | Refills: 1 | Status: SHIPPED | OUTPATIENT
Start: 2021-06-21 | End: 2021-12-13

## 2021-06-21 NOTE — TELEPHONE ENCOUNTER
Requested Prescriptions     Signed Prescriptions Disp Refills   • ASPIRIN LOW DOSE 81 MG EC tablet 90 tablet 1     Sig: TAKE 1 TABLET BY MOUTH EVERY DAY     Authorizing Provider: RJ LAM A.P.R.N.

## 2021-06-28 RX ORDER — ATORVASTATIN CALCIUM 80 MG/1
80 TABLET, FILM COATED ORAL DAILY
Qty: 90 TABLET | Refills: 1 | Status: SHIPPED | OUTPATIENT
Start: 2021-06-28 | End: 2021-12-16

## 2021-06-28 NOTE — TELEPHONE ENCOUNTER
Requested Prescriptions     Signed Prescriptions Disp Refills   • atorvastatin (LIPITOR) 80 MG tablet 90 tablet 1     Sig: Take 1 tablet by mouth every day.     Authorizing Provider: RJ LAM A.P.R.N.

## 2021-07-17 DIAGNOSIS — F31.9 BIPOLAR 1 DISORDER (HCC): ICD-10-CM

## 2021-07-19 DIAGNOSIS — E11.59 HYPERTENSION ASSOCIATED WITH DIABETES (HCC): ICD-10-CM

## 2021-07-19 DIAGNOSIS — I15.2 HYPERTENSION ASSOCIATED WITH DIABETES (HCC): ICD-10-CM

## 2021-07-19 RX ORDER — AMLODIPINE BESYLATE 5 MG/1
TABLET ORAL
Qty: 90 TABLET | Refills: 1 | Status: SHIPPED | OUTPATIENT
Start: 2021-07-19 | End: 2022-01-17

## 2021-07-19 RX ORDER — LAMOTRIGINE 100 MG/1
100 TABLET ORAL 2 TIMES DAILY
Qty: 180 TABLET | Refills: 0 | Status: SHIPPED | OUTPATIENT
Start: 2021-07-19 | End: 2021-10-17

## 2021-07-19 NOTE — TELEPHONE ENCOUNTER
Requested Prescriptions     Signed Prescriptions Disp Refills   • amLODIPine (NORVASC) 5 MG Tab 90 tablet 1     Sig: TAKE 1 TABLET BY MOUTH EVERY DAY     Authorizing Provider: RJ LAM A.P.R.N.

## 2021-09-27 DIAGNOSIS — E10.9 TYPE 1 DIABETES MELLITUS WITHOUT COMPLICATION (HCC): ICD-10-CM

## 2021-09-29 RX ORDER — INSULIN ASPART 100 [IU]/ML
INJECTION, SOLUTION INTRAVENOUS; SUBCUTANEOUS
Qty: 45 ML | Refills: 1 | Status: SHIPPED | OUTPATIENT
Start: 2021-09-29 | End: 2022-04-08

## 2021-09-29 NOTE — TELEPHONE ENCOUNTER
Requested Prescriptions     Signed Prescriptions Disp Refills   • NOVOLOG FLEXPEN 100 UNIT/ML solution for injection 45 mL 1     Sig: INJECT 12 UNITS TOTAL UNDER THE SKIN THREE (3) TIMES DAILY BEFORE MEALS.     Authorizing Provider: RJ LAM A.P.R.N.

## 2021-10-08 DIAGNOSIS — C50.912 MALIGNANT NEOPLASM OF LEFT FEMALE BREAST, UNSPECIFIED ESTROGEN RECEPTOR STATUS, UNSPECIFIED SITE OF BREAST (HCC): ICD-10-CM

## 2021-10-08 RX ORDER — ANASTROZOLE 1 MG/1
TABLET ORAL
Qty: 90 TABLET | Refills: 0 | Status: SHIPPED | OUTPATIENT
Start: 2021-10-08 | End: 2022-01-05

## 2021-10-08 NOTE — TELEPHONE ENCOUNTER
Requested Prescriptions     Pending Prescriptions Disp Refills   • anastrozole (ARIMIDEX) 1 MG Tab [Pharmacy Med Name: ANASTROZOLE 1 MG TABLET] 90 Tablet 0     Sig: TAKE 1 TABLET BY MOUTH EVERY DAY IN THE EVENING       STEVEN Luo.

## 2021-10-16 DIAGNOSIS — R25.1 SHAKING: ICD-10-CM

## 2021-10-17 DIAGNOSIS — E10.9 TYPE 1 DIABETES MELLITUS WITHOUT COMPLICATION (HCC): ICD-10-CM

## 2021-10-18 ENCOUNTER — TELEPHONE (OUTPATIENT)
Dept: MEDICAL GROUP | Facility: PHYSICIAN GROUP | Age: 71
End: 2021-10-18

## 2021-10-18 DIAGNOSIS — I15.2 HYPERTENSION ASSOCIATED WITH DIABETES (HCC): ICD-10-CM

## 2021-10-18 DIAGNOSIS — E11.59 HYPERTENSION ASSOCIATED WITH DIABETES (HCC): ICD-10-CM

## 2021-10-18 RX ORDER — LOSARTAN POTASSIUM 50 MG/1
TABLET ORAL
Qty: 90 TABLET | Refills: 0 | Status: SHIPPED | OUTPATIENT
Start: 2021-10-18 | End: 2022-01-17

## 2021-10-18 RX ORDER — PRIMIDONE 50 MG/1
TABLET ORAL
Qty: 180 TABLET | Refills: 0 | OUTPATIENT
Start: 2021-10-18

## 2021-10-18 RX ORDER — INSULIN GLARGINE 100 [IU]/ML
INJECTION, SOLUTION SUBCUTANEOUS
Qty: 15 EACH | Refills: 0 | Status: SHIPPED | OUTPATIENT
Start: 2021-10-18 | End: 2022-03-02

## 2021-10-18 NOTE — TELEPHONE ENCOUNTER
Requested Prescriptions     Signed Prescriptions Disp Refills   • losartan (COZAAR) 50 MG Tab 90 Tablet 0     Sig: TAKE 1 TABLET BY MOUTH EVERY DAY     Authorizing Provider: RJ LAM A.P.R.N.

## 2021-10-18 NOTE — TELEPHONE ENCOUNTER
Requested Prescriptions     Signed Prescriptions Disp Refills   • insulin glargine (INSULIN GLARGINE) 100 UNIT/ML Solution Pen-injector injection 15 Each 0     Sig: Inject 10-14 Units as instructed every evening. 10 units every morning. 14 units every night. Due for appointment with PCP for further refills.     Authorizing Provider: RJ LAM A.P.R.N.

## 2021-10-18 NOTE — TELEPHONE ENCOUNTER
Requested Prescriptions     Refused Prescriptions Disp Refills   • primidone (MYSOLINE) 50 MG Tab [Pharmacy Med Name: PRIMIDONE 50 MG TABLET] 180 Tablet 0     Sig: TAKE 1 TABLET BY MOUTH TWICE A DAY     Refused By: RJ LAM     Reason for Refusal: Refill not appropriate.     Dr. Jazmin Chamorro is prescribing.     STEVEN Paz.

## 2021-10-18 NOTE — TELEPHONE ENCOUNTER
Phone Number Called: [phone] (home)     Call outcome: Spoke to patient regarding message below.    Message: Informed patient, she will call back later when she has her schedule in hand.

## 2021-10-21 ENCOUNTER — OFFICE VISIT (OUTPATIENT)
Dept: MEDICAL GROUP | Facility: PHYSICIAN GROUP | Age: 71
End: 2021-10-21
Payer: MEDICARE

## 2021-10-21 ENCOUNTER — HOSPITAL ENCOUNTER (OUTPATIENT)
Dept: LAB | Facility: MEDICAL CENTER | Age: 71
End: 2021-10-21
Attending: NURSE PRACTITIONER
Payer: MEDICARE

## 2021-10-21 VITALS
HEART RATE: 112 BPM | OXYGEN SATURATION: 98 % | WEIGHT: 149 LBS | BODY MASS INDEX: 24.83 KG/M2 | DIASTOLIC BLOOD PRESSURE: 62 MMHG | HEIGHT: 65 IN | SYSTOLIC BLOOD PRESSURE: 116 MMHG | TEMPERATURE: 97.7 F

## 2021-10-21 DIAGNOSIS — Z13.820 ENCOUNTER FOR OSTEOPOROSIS SCREENING IN ASYMPTOMATIC POSTMENOPAUSAL PATIENT: ICD-10-CM

## 2021-10-21 DIAGNOSIS — F31.9 BIPOLAR 1 DISORDER (HCC): ICD-10-CM

## 2021-10-21 DIAGNOSIS — E10.9 TYPE 1 DIABETES MELLITUS WITHOUT COMPLICATION (HCC): ICD-10-CM

## 2021-10-21 DIAGNOSIS — Z12.31 ENCOUNTER FOR SCREENING MAMMOGRAM FOR BREAST CANCER: ICD-10-CM

## 2021-10-21 DIAGNOSIS — Z78.0 ENCOUNTER FOR OSTEOPOROSIS SCREENING IN ASYMPTOMATIC POSTMENOPAUSAL PATIENT: ICD-10-CM

## 2021-10-21 DIAGNOSIS — I10 ESSENTIAL HYPERTENSION: ICD-10-CM

## 2021-10-21 DIAGNOSIS — N18.31 STAGE 3A CHRONIC KIDNEY DISEASE: ICD-10-CM

## 2021-10-21 DIAGNOSIS — E78.5 DYSLIPIDEMIA: ICD-10-CM

## 2021-10-21 PROBLEM — N18.30 ACUTE RENAL FAILURE SUPERIMPOSED ON STAGE 3 CHRONIC KIDNEY DISEASE (HCC): Status: RESOLVED | Noted: 2020-11-08 | Resolved: 2021-10-21

## 2021-10-21 PROBLEM — Z09 HOSPITAL DISCHARGE FOLLOW-UP: Status: RESOLVED | Noted: 2020-11-13 | Resolved: 2021-10-21

## 2021-10-21 PROBLEM — R25.1 SHAKING: Status: RESOLVED | Noted: 2020-08-26 | Resolved: 2021-10-21

## 2021-10-21 PROBLEM — R79.89 TROPONIN LEVEL ELEVATED: Status: RESOLVED | Noted: 2020-11-08 | Resolved: 2021-10-21

## 2021-10-21 PROBLEM — R73.9 HYPERGLYCEMIA: Status: RESOLVED | Noted: 2020-11-08 | Resolved: 2021-10-21

## 2021-10-21 PROBLEM — M54.2 CHRONIC NECK PAIN: Status: RESOLVED | Noted: 2020-06-02 | Resolved: 2021-10-21

## 2021-10-21 PROBLEM — E87.8 ELECTROLYTE ABNORMALITY: Status: RESOLVED | Noted: 2020-11-09 | Resolved: 2021-10-21

## 2021-10-21 PROBLEM — G89.29 CHRONIC NECK PAIN: Status: RESOLVED | Noted: 2020-06-02 | Resolved: 2021-10-21

## 2021-10-21 PROBLEM — M54.42 CHRONIC BILATERAL LOW BACK PAIN WITH LEFT-SIDED SCIATICA: Status: RESOLVED | Noted: 2019-09-23 | Resolved: 2021-10-21

## 2021-10-21 PROBLEM — G89.29 CHRONIC BILATERAL LOW BACK PAIN WITH LEFT-SIDED SCIATICA: Status: RESOLVED | Noted: 2019-09-23 | Resolved: 2021-10-21

## 2021-10-21 PROBLEM — E83.52 HYPERCALCEMIA: Status: RESOLVED | Noted: 2020-02-27 | Resolved: 2021-10-21

## 2021-10-21 PROBLEM — R19.7 DIARRHEA: Status: RESOLVED | Noted: 2020-11-08 | Resolved: 2021-10-21

## 2021-10-21 PROBLEM — H53.8 BLURRY VISION, BILATERAL: Status: RESOLVED | Noted: 2020-11-02 | Resolved: 2021-10-21

## 2021-10-21 PROBLEM — N17.9 ACUTE RENAL FAILURE SUPERIMPOSED ON STAGE 3 CHRONIC KIDNEY DISEASE (HCC): Status: RESOLVED | Noted: 2020-11-08 | Resolved: 2021-10-21

## 2021-10-21 LAB
ALBUMIN SERPL BCP-MCNC: 4.7 G/DL (ref 3.2–4.9)
ALBUMIN/GLOB SERPL: 1.9 G/DL
ALP SERPL-CCNC: 169 U/L (ref 30–99)
ALT SERPL-CCNC: 18 U/L (ref 2–50)
ANION GAP SERPL CALC-SCNC: 13 MMOL/L (ref 7–16)
AST SERPL-CCNC: 21 U/L (ref 12–45)
BILIRUB SERPL-MCNC: 0.4 MG/DL (ref 0.1–1.5)
BUN SERPL-MCNC: 17 MG/DL (ref 8–22)
CALCIUM SERPL-MCNC: 10.3 MG/DL (ref 8.5–10.5)
CHLORIDE SERPL-SCNC: 98 MMOL/L (ref 96–112)
CHOLEST SERPL-MCNC: 172 MG/DL (ref 100–199)
CO2 SERPL-SCNC: 24 MMOL/L (ref 20–33)
CREAT SERPL-MCNC: 1.11 MG/DL (ref 0.5–1.4)
EST. AVERAGE GLUCOSE BLD GHB EST-MCNC: 111 MG/DL
FASTING STATUS PATIENT QL REPORTED: NORMAL
GLOBULIN SER CALC-MCNC: 2.5 G/DL (ref 1.9–3.5)
GLUCOSE SERPL-MCNC: 306 MG/DL (ref 65–99)
HBA1C MFR BLD: 5.5 % (ref 4–5.6)
HDLC SERPL-MCNC: 85 MG/DL
LDLC SERPL CALC-MCNC: 66 MG/DL
LITHIUM SERPL-MCNC: 1.1 MMOL/L (ref 0.6–1.2)
POTASSIUM SERPL-SCNC: 4.2 MMOL/L (ref 3.6–5.5)
PROT SERPL-MCNC: 7.2 G/DL (ref 6–8.2)
SODIUM SERPL-SCNC: 135 MMOL/L (ref 135–145)
TRIGL SERPL-MCNC: 104 MG/DL (ref 0–149)

## 2021-10-21 PROCEDURE — 82043 UR ALBUMIN QUANTITATIVE: CPT

## 2021-10-21 PROCEDURE — 80061 LIPID PANEL: CPT

## 2021-10-21 PROCEDURE — 82570 ASSAY OF URINE CREATININE: CPT

## 2021-10-21 PROCEDURE — 80053 COMPREHEN METABOLIC PANEL: CPT

## 2021-10-21 PROCEDURE — 80178 ASSAY OF LITHIUM: CPT

## 2021-10-21 PROCEDURE — 99214 OFFICE O/P EST MOD 30 MIN: CPT | Performed by: NURSE PRACTITIONER

## 2021-10-21 PROCEDURE — 83036 HEMOGLOBIN GLYCOSYLATED A1C: CPT | Mod: GA

## 2021-10-21 PROCEDURE — 36415 COLL VENOUS BLD VENIPUNCTURE: CPT

## 2021-10-21 ASSESSMENT — FIBROSIS 4 INDEX: FIB4 SCORE: 1.84

## 2021-10-21 NOTE — PROGRESS NOTES
Subjective:     CC: medication management    HPI:   Molly presents today with her daughter for the following:    Bipolar 1 disorder (HCC)  Chronic medical problem. She is followed by her psychiatrist, Dr. Jazmin Chamorro, every 4-6 weeks. She is currently taking seroquel 100 mg TID, primidone 50 mg BID, lithium carbonate 300 mg BID, cariprazine 6 mg in evening and carbamazepine  BID. She takes Hydroxyzine 50 mg is as needed, she will take up to BID. She denies any recent hospitalizations.     Essential hypertension  Chronic medical problem.  Her blood pressure is at goal today.  She is checking her blood pressure intermittently at home. She gets readings 130-140/70-80's.  She states that she checks her blood pressure as soon as she sits down.  She states that she does get nervous when checking her blood pressure.  She is taking amlodipine 5 mg and losartan 50 mg daily.  Denies any chest pain, shortness of breath, dizziness, blurry vision, or headaches.    Type 1 diabetes mellitus without complication (HCC)  Chronic medical problem.  She is taking novolog 10 units TID and Basaglar 10 units in am and 14 units in evening.  She states that her blood sugars are ranging  and averaging 120. She is checking her blood sugars 6 times a day. She is currently using Dexcom meter/sensor and fingerstick as needed or with calibration.  She has not had any recent hospitalizations. Her daughter states they are cooking more at home.     Dyslipidemia  Chronic medical problem.  She is currently taking atorvastatin 80 mg daily.  She is due for updated labs today.    Stage 3 chronic kidney disease  Chronic medical problem.  Her blood pressure is at goal today.  She is taking losartan 50 mg daily.  She is due for updated labs.    Results for MOLLY STARK (MRN [id number]) as of 10/21/2021 09:00   Ref. Range 11/9/2020 11:13   GFR If  Latest Ref Range: >60 mL/min/1.73 m 2 >60   GFR If Non African American  Latest Ref Range: >60 mL/min/1.73 m 2 >60     [place] for dex com      Past Medical History:   Diagnosis Date   • Anxiety    • Bipolar 1 disorder (HCC)    • Cancer (HCC)     left breast   • Depression    • Diabetes (HCC)    • Kidney disease        Social History     Tobacco Use   • Smoking status: Never Smoker   • Smokeless tobacco: Never Used   Vaping Use   • Vaping Use: Never used   Substance Use Topics   • Alcohol use: Never   • Drug use: Never       Current Outpatient Medications Ordered in Epic   Medication Sig Dispense Refill   • insulin glargine (INSULIN GLARGINE) 100 UNIT/ML Solution Pen-injector injection Inject 10-14 Units as instructed every evening. 10 units every morning. 14 units every night. Due for appointment with PCP for further refills. 15 Each 0   • losartan (COZAAR) 50 MG Tab TAKE 1 TABLET BY MOUTH EVERY DAY 90 Tablet 0   • anastrozole (ARIMIDEX) 1 MG Tab TAKE 1 TABLET BY MOUTH EVERY DAY IN THE EVENING 90 Tablet 0   • NOVOLOG FLEXPEN 100 UNIT/ML solution for injection INJECT 12 UNITS TOTAL UNDER THE SKIN THREE (3) TIMES DAILY BEFORE MEALS. 45 mL 1   • amLODIPine (NORVASC) 5 MG Tab TAKE 1 TABLET BY MOUTH EVERY DAY 90 tablet 1   • atorvastatin (LIPITOR) 80 MG tablet Take 1 tablet by mouth every day. 90 tablet 1   • ASPIRIN LOW DOSE 81 MG EC tablet TAKE 1 TABLET BY MOUTH EVERY DAY 90 tablet 1   • primidone (MYSOLINE) 50 MG Tab Take 1 tablet by mouth 2 times a day. 180 tablet 1   • QUEtiapine (SEROQUEL) 100 MG Tab Take 1 Tab by mouth 2 times a day. Takes with quetiapine 200 mg at bedtime (Patient taking differently: Take 100 mg by mouth 3 times a day.) 60 Tab 1   • carbamazepine SR (TEGRETOL XR) 200 MG TABLET SR 12 HR extended-release tablet TAKE 1 TABLET BY MOUTH TWICE A DAY     • lithium carbonate 300 MG capsule Take 300 mg by mouth.     • hydrOXYzine pamoate (VISTARIL) 50 MG Cap TAKE 1 CAPSULE BY MOUTH 3 TIMES A DAY AS NEEDED FOR 30 DAYS     • Cariprazine HCl 6 MG Cap Take 6 mg by mouth every  "evening.     • Blood Glucose Meter Kit Test blood sugar as recommended by provider. 1 blood glucose monitoring kit-- DEXCOM 1 Kit 0   • Blood Glucose Test Strips Use one testing unit every 10 days to test blood sugar. DEXCOM meter 3 Units 5     No current Epic-ordered facility-administered medications on file.       Allergies:  Depakote [valproic acid]    Health Maintenance: reviewed. Her influenza vaccine was 10/14/2021.    ROS:  Gen: no fevers/chills, no changes in weight  Eyes: no changes in vision  Pulm: no redness of breath  CV: no chest pain, no palpitations  GI: no nausea/vomiting, no diarrhea  : no dysuria  MSk: no myalgias  Skin: no rash  Neuro: no headaches, no numbness/tingling      Objective:     Vital signs reviewed  Exam:  /62 (BP Location: Left arm, Patient Position: Sitting, BP Cuff Size: Adult)   Pulse (!) 112   Temp 36.5 °C (97.7 °F) (Temporal)   Ht 1.651 m (5' 5\")   Wt 67.6 kg (149 lb)   SpO2 98%   BMI 24.79 kg/m²  Body mass index is 24.79 kg/m².    Gen: Alert and oriented, No apparent distress. Daughter present in exam room.   Lungs: Normal effort, CTA bilaterally, no wheezes, rhonchi, or rales  CV: Regular rate and rhythm. No murmurs, rubs, or gallops.  Ext: No clubbing, cyanosis, edema.    Monofilament testing with a 10 gram force: sensation intact: intact bilaterally  Visual Inspection: Feet without maceration, ulcers, fissures. Dry feet bilaterally.   Pedal pulses: intact bilaterally      Assessment & Plan:     71 y.o. female with the following -     1. Type 1 diabetes mellitus without complication (HCC)  Chronic stable problem.  Patient will complete her A1c, lithium level, CMP today after appointment.  I have added on her lipid panel and urine microalbumin.  Monofilament exam completed today.  She will continue with her NovoLog and Basaglar.  She will continue with blood glucose monitoring.  She has not scheduled an appointment with endocrinology yet.  Daughter states that she " does not receive callbacks to schedule.  Daughter is requesting another referral hoping that he goes to [facility] as she herself was able to schedule with [facility] endocrinology.  - Lipid Profile; Future  - MICROALBUMIN CREAT RATIO URINE; Future  - REFERRAL TO ENDOCRINOLOGY  - Diabetic Monofilament Lower Extremity Exam    2. Bipolar 1 disorder (HCC)  Chronic stable problem.  Recommended that she continue follow-up with her psychiatrist.  Recommend that she continue with her Seroquel, primidone, lithium, cariprazine, carbamazepine and hydroxyzine.     3. Essential hypertension  Chronic stable problem.  Her blood pressure is at goal today.  She will continue with her amlodipine and losartan.  She is checking updated labs.    4. Dyslipidemia  Chronic stable problem.  She will continue with her atorvastatin.  She is completing updated labs today.    5. Stage 3a chronic kidney disease (HCC)  Chronic stable problem.  She will continue with her losartan.  Blood pressure is at goal today.  She is checking updated labs today.    6. Encounter for screening mammogram for breast cancer  Chronic stable problem.  Screening indicated.  Patient is in agreement.  Orders placed.  - MA-SCREENING MAMMO BILAT W/TOMOSYNTHESIS W/CAD; Future    7. Encounter for osteoporosis screening in asymptomatic postmenopausal patient  Chronic stable problem.  Screening indicated.  Patient is in agreement.  Orders placed.  - DS-BONE DENSITY STUDY (DEXA); Future          Return in about 3 months (around 1/21/2022) for Diabetes, Hypertension.    Please note that this dictation was created using voice recognition software. I have made every reasonable attempt to correct obvious errors, but I expect that there are errors of grammar and possibly content that I did not discover before finalizing the note.

## 2021-10-21 NOTE — ASSESSMENT & PLAN NOTE
Chronic medical problem.  She is currently taking atorvastatin 80 mg daily.  She is due for updated labs today.

## 2021-10-21 NOTE — ASSESSMENT & PLAN NOTE
Chronic medical problem.  She is taking novolog 10 units TID and Basaglar 10 units in am and 14 units in evening.  She states that her blood sugars are ranging  and averaging 120. She is checking her blood sugars 6 times a day. She is currently using Dexcom meter/sensor and fingerstick as needed or with calibration.  She has not had any recent hospitalizations. Her daughter states they are cooking more at home.

## 2021-10-21 NOTE — ASSESSMENT & PLAN NOTE
Chronic medical problem. She is followed by her psychiatrist, Dr. Jazmin Chamorro, every 4-6 weeks. She is currently taking seroquel 100 mg TID, primidone 50 mg BID, lithium carbonate 300 mg BID, cariprazine 6 mg in evening and carbamazepine  BID. She takes Hydroxyzine 50 mg is as needed, she will take up to BID. She denies any recent hospitalizations.

## 2021-10-21 NOTE — TELEPHONE ENCOUNTER
Requested Prescriptions     Signed Prescriptions Disp Refills   • Blood Glucose Meter Kit 1 Kit 0     Sig: Test blood sugar as recommended by provider. 1 blood glucose monitoring kit-- DEXCOM     Authorizing Provider: RJ LAM   • Blood Glucose Test Strips 3 Strip 3     Sig: Use one testing unit every 10 days to test blood sugar. DEXCOM meter     Authorizing Provider: RJ LAM A.P.R.N.

## 2021-10-21 NOTE — ASSESSMENT & PLAN NOTE
Chronic medical problem.  Her blood pressure is at goal today.  She is checking her blood pressure intermittently at home. She gets readings 130-140/70-80's.  She states that she checks her blood pressure as soon as she sits down.  She states that she does get nervous when checking her blood pressure.  She is taking amlodipine 5 mg and losartan 50 mg daily.  Denies any chest pain, shortness of breath, dizziness, blurry vision, or headaches.

## 2021-10-21 NOTE — ASSESSMENT & PLAN NOTE
Chronic medical problem.  Her blood pressure is at goal today.  She is taking losartan 50 mg daily.  She is due for updated labs.    Results for JAMIA STARK (MRN [id number]) as of 10/21/2021 09:00   Ref. Range 11/9/2020 11:13   GFR If  Latest Ref Range: >60 mL/min/1.73 m 2 >60   GFR If Non  Latest Ref Range: >60 mL/min/1.73 m 2 >60

## 2021-10-22 LAB
CREAT UR-MCNC: 231.85 MG/DL
MICROALBUMIN UR-MCNC: 18.7 MG/DL
MICROALBUMIN/CREAT UR: 81 MG/G (ref 0–30)

## 2021-12-01 ENCOUNTER — HOSPITAL ENCOUNTER (OUTPATIENT)
Dept: RADIOLOGY | Facility: MEDICAL CENTER | Age: 71
End: 2021-12-01
Attending: NURSE PRACTITIONER
Payer: MEDICARE

## 2021-12-01 DIAGNOSIS — Z78.0 ENCOUNTER FOR OSTEOPOROSIS SCREENING IN ASYMPTOMATIC POSTMENOPAUSAL PATIENT: ICD-10-CM

## 2021-12-01 DIAGNOSIS — Z13.820 ENCOUNTER FOR OSTEOPOROSIS SCREENING IN ASYMPTOMATIC POSTMENOPAUSAL PATIENT: ICD-10-CM

## 2021-12-01 PROCEDURE — 77080 DXA BONE DENSITY AXIAL: CPT

## 2021-12-12 DIAGNOSIS — E10.9 TYPE 1 DIABETES MELLITUS WITHOUT COMPLICATION (HCC): ICD-10-CM

## 2021-12-13 RX ORDER — ASPIRIN 81 MG/1
TABLET ORAL
Qty: 90 TABLET | Refills: 1 | Status: SHIPPED | OUTPATIENT
Start: 2021-12-13 | End: 2022-04-08

## 2021-12-13 NOTE — TELEPHONE ENCOUNTER
Requested Prescriptions     Signed Prescriptions Disp Refills   • aspirin 81 MG EC tablet 90 Tablet 1     Sig: TAKE 1 TABLET BY MOUTH EVERY DAY     Authorizing Provider: RJ LAM A.P.R.N.

## 2021-12-16 DIAGNOSIS — E78.5 DYSLIPIDEMIA: ICD-10-CM

## 2021-12-16 RX ORDER — ATORVASTATIN CALCIUM 80 MG/1
80 TABLET, FILM COATED ORAL DAILY
Qty: 90 TABLET | Refills: 2 | Status: SHIPPED | OUTPATIENT
Start: 2021-12-16 | End: 2022-07-06

## 2021-12-16 NOTE — TELEPHONE ENCOUNTER
Was the patient seen in the last year in this department? 10/21/21    Does patient have an active prescription for medications requested? Yes    Received Request Via: Pharmacy    Hospital Outpatient Visit on 10/21/2021   Component Date Value   • Creatinine, Urine 10/21/2021 231.85    • Microalbumin, Urine Rand* 10/21/2021 18.7    • Micro Alb Creat Ratio 10/21/2021 81*   • Cholesterol,Tot 10/21/2021 172    • Triglycerides 10/21/2021 104    • HDL 10/21/2021 85    • LDL 10/21/2021 66    • Lithium 10/21/2021 1.1    • Glycohemoglobin 10/21/2021 5.5    • Est Avg Glucose 10/21/2021 111    • Sodium 10/21/2021 135    • Potassium 10/21/2021 4.2    • Chloride 10/21/2021 98    • Co2 10/21/2021 24    • Anion Gap 10/21/2021 13.0    • Glucose 10/21/2021 306*   • Bun 10/21/2021 17    • Creatinine 10/21/2021 1.11    • Calcium 10/21/2021 10.3    • AST(SGOT) 10/21/2021 21    • ALT(SGPT) 10/21/2021 18    • Alkaline Phosphatase 10/21/2021 169*   • Total Bilirubin 10/21/2021 0.4    • Albumin 10/21/2021 4.7    • Total Protein 10/21/2021 7.2    • Globulin 10/21/2021 2.5    • A-G Ratio 10/21/2021 1.9    • Fasting Status 10/21/2021 Fasting    • GFR If  10/21/2021 58*   • GFR If Non  Ameri* 10/21/2021 48*

## 2021-12-16 NOTE — TELEPHONE ENCOUNTER
Requested Prescriptions     Signed Prescriptions Disp Refills   • atorvastatin (LIPITOR) 80 MG tablet 90 Tablet 2     Sig: TAKE 1 TABLET BY MOUTH EVERY DAY     Authorizing Provider: RJ LAM A.P.R.N.

## 2022-01-05 DIAGNOSIS — C50.912 MALIGNANT NEOPLASM OF LEFT FEMALE BREAST, UNSPECIFIED ESTROGEN RECEPTOR STATUS, UNSPECIFIED SITE OF BREAST (HCC): ICD-10-CM

## 2022-01-05 RX ORDER — ANASTROZOLE 1 MG/1
TABLET ORAL
Qty: 90 TABLET | Refills: 0 | Status: SHIPPED | OUTPATIENT
Start: 2022-01-05 | End: 2022-04-08

## 2022-01-16 DIAGNOSIS — E11.59 HYPERTENSION ASSOCIATED WITH DIABETES (HCC): ICD-10-CM

## 2022-01-16 DIAGNOSIS — I15.2 HYPERTENSION ASSOCIATED WITH DIABETES (HCC): ICD-10-CM

## 2022-01-17 RX ORDER — AMLODIPINE BESYLATE 5 MG/1
TABLET ORAL
Qty: 90 TABLET | Refills: 1 | Status: SHIPPED | OUTPATIENT
Start: 2022-01-17 | End: 2022-07-25

## 2022-01-17 RX ORDER — LOSARTAN POTASSIUM 50 MG/1
TABLET ORAL
Qty: 90 TABLET | Refills: 1 | Status: SHIPPED | OUTPATIENT
Start: 2022-01-17 | End: 2022-05-31

## 2022-01-17 NOTE — TELEPHONE ENCOUNTER
Requested Prescriptions     Signed Prescriptions Disp Refills   • losartan (COZAAR) 50 MG Tab 90 Tablet 1     Sig: TAKE 1 TABLET BY MOUTH EVERY DAY     Authorizing Provider: RJ LAM   • amLODIPine (NORVASC) 5 MG Tab 90 Tablet 1     Sig: TAKE 1 TABLET BY MOUTH EVERY DAY     Authorizing Provider: RJ LAM A.P.R.N.

## 2022-01-30 DIAGNOSIS — R25.1 SHAKING: ICD-10-CM

## 2022-01-31 RX ORDER — PRIMIDONE 50 MG/1
TABLET ORAL
Qty: 180 TABLET | Refills: 0 | OUTPATIENT
Start: 2022-01-31

## 2022-01-31 NOTE — TELEPHONE ENCOUNTER
Requested Prescriptions     Refused Prescriptions Disp Refills   • primidone (MYSOLINE) 50 MG Tab [Pharmacy Med Name: PRIMIDONE 50 MG TABLET] 180 Tablet 0     Sig: TAKE 1 TABLET BY MOUTH TWICE A DAY     Refused By: RJ LAM     Reason for Refusal: Refill not appropriate.     Bipolar medications managed by her psychiatrist Dr. Jazmin Chamorro. Please send request there.     STEVEN Paz.

## 2022-03-01 DIAGNOSIS — E10.9 TYPE 1 DIABETES MELLITUS WITHOUT COMPLICATION (HCC): ICD-10-CM

## 2022-03-02 RX ORDER — INSULIN GLARGINE 100 [IU]/ML
INJECTION, SOLUTION SUBCUTANEOUS
Qty: 9 ML | Refills: 1 | Status: SHIPPED | OUTPATIENT
Start: 2022-03-02 | End: 2022-04-08

## 2022-03-02 NOTE — TELEPHONE ENCOUNTER
Requested Prescriptions     Signed Prescriptions Disp Refills   • INSULIN GLARGINE 100 UNIT/ML injection PEN 9 mL 1     Sig: INJECT 10-14 UNITS AS INSTRUCTED EVERY EVENING. 10 UNITS EVERY MORNING. 14 UNITS EVERY NIGHT. DUE FOR APPOINTMENT WITH PCP FOR FURTHER REFILLS.     Authorizing Provider: RJ LAM A.P.R.N.

## 2022-04-04 ENCOUNTER — PATIENT MESSAGE (OUTPATIENT)
Dept: MEDICAL GROUP | Facility: PHYSICIAN GROUP | Age: 72
End: 2022-04-04
Payer: MEDICARE

## 2022-04-04 DIAGNOSIS — R25.1 SHAKING: ICD-10-CM

## 2022-04-05 RX ORDER — PRIMIDONE 50 MG/1
50 TABLET ORAL 2 TIMES DAILY
Qty: 180 TABLET | Refills: 1 | Status: SHIPPED | OUTPATIENT
Start: 2022-04-05 | End: 2022-07-25

## 2022-04-05 NOTE — PROGRESS NOTES
Requested Prescriptions     Signed Prescriptions Disp Refills   • primidone (MYSOLINE) 50 MG Tab 180 Tablet 1     Sig: Take 1 Tablet by mouth 2 times a day.     Authorizing Provider: RJ LAM for appointment.     STEVEN Paz.

## 2022-04-08 DIAGNOSIS — E10.9 TYPE 1 DIABETES MELLITUS WITHOUT COMPLICATION (HCC): ICD-10-CM

## 2022-04-08 DIAGNOSIS — C50.912 MALIGNANT NEOPLASM OF LEFT FEMALE BREAST, UNSPECIFIED ESTROGEN RECEPTOR STATUS, UNSPECIFIED SITE OF BREAST (HCC): ICD-10-CM

## 2022-04-08 RX ORDER — ANASTROZOLE 1 MG/1
1 TABLET ORAL EVERY EVENING
Qty: 90 TABLET | Refills: 0 | Status: SHIPPED | OUTPATIENT
Start: 2022-04-08 | End: 2022-07-25

## 2022-04-08 RX ORDER — INSULIN GLARGINE 100 [IU]/ML
INJECTION, SOLUTION SUBCUTANEOUS
Qty: 9 ML | Refills: 0 | Status: SHIPPED | OUTPATIENT
Start: 2022-04-08 | End: 2022-06-27

## 2022-04-08 RX ORDER — ASPIRIN 81 MG/1
81 TABLET ORAL
Qty: 90 TABLET | Refills: 0 | Status: SHIPPED | OUTPATIENT
Start: 2022-04-08 | End: 2022-09-01 | Stop reason: SDUPTHER

## 2022-04-08 RX ORDER — INSULIN ASPART 100 [IU]/ML
INJECTION, SOLUTION INTRAVENOUS; SUBCUTANEOUS
Qty: 15 ML | Refills: 0 | Status: SHIPPED | OUTPATIENT
Start: 2022-04-08 | End: 2022-07-25

## 2022-04-08 NOTE — TELEPHONE ENCOUNTER
Requested Prescriptions     Signed Prescriptions Disp Refills   • anastrozole (ARIMIDEX) 1 MG Tab 90 Tablet 0     Sig: Take 1 Tablet by mouth every evening. Due for appointment with PCP     Authorizing Provider: RJ LAM A.P.R.N.

## 2022-04-08 NOTE — TELEPHONE ENCOUNTER
Requested Prescriptions     Signed Prescriptions Disp Refills   • aspirin (ASPIRIN LOW DOSE) 81 MG EC tablet 90 Tablet 0     Sig: Take 1 Tablet by mouth every day. Due for appointment with PCP for further refills     Authorizing Provider: RJ LAM   • insulin glargine (INSULIN GLARGINE) 100 UNIT/ML Solution Pen-injector injection 9 mL 0     Sig: INJECT 10 UNITS EVERY MORNING AND 14 UNITS EVERY NIGHT. DUE FOR APPT WITH PCP FOR FURTHER REFILLS     Authorizing Provider: RJ LAM   • NOVOLOG FLEXPEN 100 UNIT/ML solution for injection 15 mL 0     Sig: INJECT 12 UNITS TOTAL UNDER THE SKIN THREE (3) TIMES DAILY BEFORE MEALS.     Authorizing Provider: RJ LAM A.P.R.N.

## 2022-04-08 NOTE — TELEPHONE ENCOUNTER
Was the patient seen in the last year in this department? 10/21/21    Does patient have an active prescription for medications requested? Yes    Received Request Via: Pharmacy    Hospital Outpatient Visit on 10/21/2021   Component Date Value   • Creatinine, Urine 10/21/2021 231.85    • Microalbumin, Urine Rand* 10/21/2021 18.7    • Micro Alb Creat Ratio 10/21/2021 81 (A)   • Cholesterol,Tot 10/21/2021 172    • Triglycerides 10/21/2021 104    • HDL 10/21/2021 85    • LDL 10/21/2021 66    • Lithium 10/21/2021 1.1    • Glycohemoglobin 10/21/2021 5.5    • Est Avg Glucose 10/21/2021 111    • Sodium 10/21/2021 135    • Potassium 10/21/2021 4.2    • Chloride 10/21/2021 98    • Co2 10/21/2021 24    • Anion Gap 10/21/2021 13.0    • Glucose 10/21/2021 306 (A)   • Bun 10/21/2021 17    • Creatinine 10/21/2021 1.11    • Calcium 10/21/2021 10.3    • AST(SGOT) 10/21/2021 21    • ALT(SGPT) 10/21/2021 18    • Alkaline Phosphatase 10/21/2021 169 (A)   • Total Bilirubin 10/21/2021 0.4    • Albumin 10/21/2021 4.7    • Total Protein 10/21/2021 7.2    • Globulin 10/21/2021 2.5    • A-G Ratio 10/21/2021 1.9    • Fasting Status 10/21/2021 Fasting    • GFR If  10/21/2021 58 (A)   • GFR If Non  Ameri* 10/21/2021 48 (A)

## 2022-05-24 ENCOUNTER — OFFICE VISIT (OUTPATIENT)
Dept: MEDICAL GROUP | Facility: PHYSICIAN GROUP | Age: 72
End: 2022-05-24
Payer: MEDICARE

## 2022-05-24 VITALS
OXYGEN SATURATION: 96 % | DIASTOLIC BLOOD PRESSURE: 80 MMHG | HEIGHT: 64 IN | WEIGHT: 154 LBS | TEMPERATURE: 97.7 F | SYSTOLIC BLOOD PRESSURE: 172 MMHG | HEART RATE: 118 BPM | BODY MASS INDEX: 26.29 KG/M2

## 2022-05-24 DIAGNOSIS — E10.9 TYPE 1 DIABETES MELLITUS WITHOUT COMPLICATION (HCC): ICD-10-CM

## 2022-05-24 DIAGNOSIS — I10 ESSENTIAL HYPERTENSION: ICD-10-CM

## 2022-05-24 LAB
HBA1C MFR BLD: 6.9 % (ref 0–5.6)
INT CON NEG: ABNORMAL
INT CON POS: ABNORMAL

## 2022-05-24 PROCEDURE — 99214 OFFICE O/P EST MOD 30 MIN: CPT | Performed by: NURSE PRACTITIONER

## 2022-05-24 PROCEDURE — 83036 HEMOGLOBIN GLYCOSYLATED A1C: CPT | Performed by: NURSE PRACTITIONER

## 2022-05-24 RX ORDER — TIMOLOL MALEATE 5 MG/ML
1 SOLUTION/ DROPS OPHTHALMIC
Status: ON HOLD | COMMUNITY
End: 2024-02-27

## 2022-05-24 RX ORDER — LAMOTRIGINE 100 MG/1
100 TABLET ORAL DAILY
Status: ON HOLD | COMMUNITY
End: 2024-02-27

## 2022-05-24 ASSESSMENT — FIBROSIS 4 INDEX: FIB4 SCORE: 1.46

## 2022-05-24 NOTE — ASSESSMENT & PLAN NOTE
Her initial blood pressure today is 170/80 and pulse rate 118.  She continues with her amlodipine 5 mg daily and losartan 50 mg daily.  She has had her blood pressure medication this morning between 7:30-8:00 a.m. No chest pain, shortness of breath, dizziness, blurry vision or headaches today. She does check her blood pressure at home weekly and reports that her home blood pressure is ranging 110-130/70-90's.

## 2022-05-24 NOTE — PROGRESS NOTES
Subjective:     CC: medication refill     HPI:   Molly presents today with her daughter for the following:    Essential hypertension  Her initial blood pressure today is 170/80 and pulse rate 118.  She continues with her amlodipine 5 mg daily and losartan 50 mg daily.  She has had her blood pressure medication this morning between 7:30-8:00 a.m. No chest pain, shortness of breath, dizziness, blurry vision or headaches today. She does check her blood pressure at home weekly and reports that her home blood pressure is ranging 110-130/70-90's.     Type 1 diabetes mellitus without complication (HCC)  She is due for A1c today.  She continues with NovoLog 10 units 3 times daily before meals and insulin glargine 10 units in the morning and 14 units in the evening.  She checks her blood sugars at home and her blood sugars have been ranging  or high around 150's. She will drink a small glass of juice with blood sugars of 80's. She is eating 3 meals a day with small snack in between. She is currently using Dexcom meter/sensor as needed or with calibration. She has not had any recent hospitalizations. She has not followed up with endocrine. She states that she is afraid of doctors and would like to have me manage her diabetes.        Past Medical History:   Diagnosis Date   • Anxiety    • Bipolar 1 disorder (HCC)    • Cancer (HCC)     left breast   • Depression    • Diabetes (HCC)    • Kidney disease        Social History     Tobacco Use   • Smoking status: Never Smoker   • Smokeless tobacco: Never Used   Vaping Use   • Vaping Use: Never used   Substance Use Topics   • Alcohol use: Never   • Drug use: Never       Current Outpatient Medications Ordered in Epic   Medication Sig Dispense Refill   • aspirin (ASPIRIN LOW DOSE) 81 MG EC tablet Take 1 Tablet by mouth every day. Due for appointment with PCP for further refills 90 Tablet 0   • insulin glargine (INSULIN GLARGINE) 100 UNIT/ML Solution Pen-injector injection INJECT  10 UNITS EVERY MORNING AND 14 UNITS EVERY NIGHT. DUE FOR APPT WITH PCP FOR FURTHER REFILLS 9 mL 0   • anastrozole (ARIMIDEX) 1 MG Tab Take 1 Tablet by mouth every evening. Due for appointment with PCP 90 Tablet 0   • NOVOLOG FLEXPEN 100 UNIT/ML solution for injection INJECT 12 UNITS TOTAL UNDER THE SKIN THREE (3) TIMES DAILY BEFORE MEALS. 15 mL 0   • primidone (MYSOLINE) 50 MG Tab Take 1 Tablet by mouth 2 times a day. 180 Tablet 1   • losartan (COZAAR) 50 MG Tab TAKE 1 TABLET BY MOUTH EVERY DAY 90 Tablet 1   • amLODIPine (NORVASC) 5 MG Tab TAKE 1 TABLET BY MOUTH EVERY DAY 90 Tablet 1   • atorvastatin (LIPITOR) 80 MG tablet TAKE 1 TABLET BY MOUTH EVERY DAY 90 Tablet 2   • Blood Glucose Meter Kit Test blood sugar as recommended by provider. 1 blood glucose monitoring kit-- DEXCOM 1 Kit 0   • Blood Glucose Test Strips Use one testing unit every 10 days to test blood sugar. DEXCOM meter 3 Strip 3   • QUEtiapine (SEROQUEL) 100 MG Tab Take 1 Tab by mouth 2 times a day. Takes with quetiapine 200 mg at bedtime (Patient taking differently: Take 100 mg by mouth 3 times a day.) 60 Tab 1   • carbamazepine SR (TEGRETOL XR) 200 MG TABLET SR 12 HR extended-release tablet TAKE 1 TABLET BY MOUTH TWICE A DAY     • lithium carbonate 300 MG capsule Take 300 mg by mouth.     • hydrOXYzine pamoate (VISTARIL) 50 MG Cap TAKE 1 CAPSULE BY MOUTH 3 TIMES A DAY AS NEEDED FOR 30 DAYS     • Cariprazine HCl 6 MG Cap Take 6 mg by mouth every evening.     • timolol (TIMOPTIC) 0.5 % Solution INSTILL 1 DROP IN BOTH EYES DAILY     • lamoTRIgine (LAMICTAL) 100 MG Tab TAKE 1 TABLET BY MOUTH TWICE A DAY AS DIRECTED       No current Epic-ordered facility-administered medications on file.       Allergies:  Depakote [valproic acid]    Health Maintenance: Due for vaccinations.  She is interested in her shingles vaccine we discussed that she can obtain this at the pharmacy.  Due for retinal screening, however daughter states that she has completed screening,  "we will request records.      Objective:     Vital signs reviewed  Exam:  BP (!) 172/80 (BP Location: Right arm, Patient Position: Sitting, BP Cuff Size: Adult)   Pulse (!) 118   Temp 36.5 °C (97.7 °F) (Temporal)   Ht 1.626 m (5' 4\")   Wt 69.9 kg (154 lb)   SpO2 96%   BMI 26.43 kg/m²  Body mass index is 26.43 kg/m².    Gen: Alert and oriented, No apparent distress.  Daughter present in exam room.  Neck: Neck is supple without lymphadenopathy.  Lungs: Normal effort, CTA bilaterally, no wheezes, rhonchi, or rales  CV: Regular rate and rhythm. No murmurs, rubs, or gallops.  Ext: No clubbing, cyanosis, edema.  Left hip area without any vesicles, rashes or papules present.  Per patient she has intermittent burning pain to the area.      Labs:     Latest Reference Range & Units 05/24/22 09:37   Glycohemoglobin 0.0 - 5.6 % 6.9 !   !: Data is abnormal    Assessment & Plan:     72 y.o. female with the following -     1. Type 1 diabetes mellitus without complication (HCC)  Chronic stable problem.  A1c is 6.9% today.  Reassurance provided to patient that her diabetes is controlled.  We also discussed that with type 1 diabetes she is best to be managed with endocrinology not primary care provider.  She was hesitant at first but after further discussion she was in agreement to establish with endocrinology.  New referral placed today.  She will continue with her Dexcom glucometer.  She will continue with her NovoLog and insulin glargine.  Again I highly recommended that she follow through with endocrinology as I placed multiple referrals in the past and she has not followed up.  Requesting her retinal screening records.  - POCT Hemoglobin A1C  - Referral to Endocrinology    2. Essential hypertension  Chronic exacerbated problem.  On repeat by me her blood pressure is 180/96.  She states that she is anxious about being at the appointment.  I will have the MA repeat her blood pressure again at the end of today's appointment.  " She will continue home blood pressure monitoring daily and return in 1 week with her blood pressure log for follow-up with me.  She will continue with amlodipine 5 mg daily and losartan 50 mg daily.  On repeat by the MA at today's appointment her blood pressure was 172/80.        Return in about 1 week (around 5/31/2022) for Hypertension.    Please note that this dictation was created using voice recognition software. I have made every reasonable attempt to correct obvious errors, but I expect that there are errors of grammar and possibly content that I did not discover before finalizing the note.

## 2022-05-24 NOTE — LETTER
[facility]  CRISTINE PazRQUENTIN  [address]  [place] [address]  Fax: [phone]   Authorization for Release/Disclosure of   Protected Health Information   Name: MOLLY STARK : 1950 SSN: [id number]   Address: [address] [place] [address] [place]  [place] [address] Phone:    [phone] (home)    I authorize the entity listed below to release/disclose the PHI below to:   [facility]/CRISTINE PazRQUENTIN and STACIE Paz   Provider or Entity Name:     Address   City, State, Zip   Phone:      Fax:     Reason for request: continuity of care   Information to be released:    [  ] LAST COLONOSCOPY,  including any PATH REPORT and follow-up  [  ] LAST FIT/COLOGUARD RESULT [  ] LAST DEXA  [  ] LAST MAMMOGRAM  [  ] LAST PAP  [  ] LAST LABS [  ] RETINA EXAM REPORT  [  ] IMMUNIZATION RECORDS  [  ] Release all info      [  ] Check here and initial the line next to each item to release ALL health information INCLUDING  _____ Care and treatment for drug and / or alcohol abuse  _____ HIV testing, infection status, or AIDS  _____ Genetic Testing    DATES OF SERVICE OR TIME PERIOD TO BE DISCLOSED: _____________  I understand and acknowledge that:  * This Authorization may be revoked at any time by you in writing, except if your health information has already been used or disclosed.  * Your health information that will be used or disclosed as a result of you signing this authorization could be re-disclosed by the recipient. If this occurs, your re-disclosed health information may no longer be protected by State or Federal laws.  * You may refuse to sign this Authorization. Your refusal will not affect your ability to obtain treatment.  * This Authorization becomes effective upon signing and will  on (date) __________.      If no date is indicated, this Authorization will  one (1) year from the signature date.    Name: Molly Stark    Signature:   Date:     2022        PLEASE FAX REQUESTED RECORDS BACK TO: [phone]

## 2022-05-31 ENCOUNTER — OFFICE VISIT (OUTPATIENT)
Dept: MEDICAL GROUP | Facility: PHYSICIAN GROUP | Age: 72
End: 2022-05-31
Payer: MEDICARE

## 2022-05-31 ENCOUNTER — TELEPHONE (OUTPATIENT)
Dept: VASCULAR LAB | Facility: MEDICAL CENTER | Age: 72
End: 2022-05-31

## 2022-05-31 VITALS
TEMPERATURE: 97.2 F | DIASTOLIC BLOOD PRESSURE: 76 MMHG | OXYGEN SATURATION: 97 % | BODY MASS INDEX: 26.43 KG/M2 | SYSTOLIC BLOOD PRESSURE: 136 MMHG | HEART RATE: 102 BPM | WEIGHT: 154 LBS

## 2022-05-31 DIAGNOSIS — E10.9 TYPE 1 DIABETES MELLITUS WITHOUT COMPLICATION (HCC): ICD-10-CM

## 2022-05-31 DIAGNOSIS — I10 ESSENTIAL HYPERTENSION: ICD-10-CM

## 2022-05-31 PROCEDURE — 99214 OFFICE O/P EST MOD 30 MIN: CPT | Performed by: NURSE PRACTITIONER

## 2022-05-31 RX ORDER — LOSARTAN POTASSIUM 100 MG/1
100 TABLET ORAL DAILY
Qty: 30 TABLET | Refills: 2 | Status: SHIPPED | OUTPATIENT
Start: 2022-05-31 | End: 2022-06-13 | Stop reason: SDUPTHER

## 2022-05-31 ASSESSMENT — FIBROSIS 4 INDEX: FIB4 SCORE: 1.46

## 2022-05-31 NOTE — TELEPHONE ENCOUNTER
Called and left VM for pt to call back to get scheduled for initial vascular med appt with Dr. Bloch. See details below for scheduling.     ----- Message from Michael J Bloch, M.D. sent at 5/31/2022  2:42 PM PDT -----  Regarding: Vascular medicine referral  Please schedule initial visit with block either tomorrow, June 7 at 2 or 2:40 PM, or next available

## 2022-05-31 NOTE — PATIENT INSTRUCTIONS
[facility]  [address] [facility], [address]  [place] [address]  Phone: [phone]    Please make an appointment to schedule.

## 2022-05-31 NOTE — ASSESSMENT & PLAN NOTE
Her referral to endocrinology has been approved.  She has not schedule an appointment.  She continues with her NovoLog and insulin glargine.  She reports that she has had 1 episode of hypoglycemia since her last visit.

## 2022-05-31 NOTE — ASSESSMENT & PLAN NOTE
Her initial blood pressure today is 154/80.  She continues with her amlodipine 5 mg daily and losartan 50 mg daily.  She has been checking her blood pressure at home and brings in her blood pressure log today.  Her home blood pressures have been ranging 151-198/. She reports anxiety with taking her blood pressure. She had her medications around 8:30 a.m. today. On 5/27/2022 her blood pressure was 211/113 and she went to ER at [facility].  Patient reports that in the ER her EKG was normal and she did not have labs completed.  She does not have any chest pain, shortness of breath, dizziness, blurry vision or headache with her elevated blood pressures.

## 2022-05-31 NOTE — PROGRESS NOTES
Subjective:     CC: Hypertension follow-up    HPI:   Molly presents today with her daughter Rut for the following:    Essential hypertension  Her initial blood pressure today is 154/80.  She continues with her amlodipine 5 mg daily and losartan 50 mg daily.  She has been checking her blood pressure at home and brings in her blood pressure log today.  Her home blood pressures have been ranging 151-198/. She reports anxiety with taking her blood pressure. She had her medications around 8:30 a.m. today. On 5/27/2022 her blood pressure was 211/113 and she went to ER at [facility].  Patient reports that in the ER her EKG was normal and she did not have labs completed.  She does not have any chest pain, shortness of breath, dizziness, blurry vision or headache with her elevated blood pressures.    Type 1 diabetes mellitus without complication (HCC)  Her referral to endocrinology has been approved.  She has not schedule an appointment.  She continues with her NovoLog and insulin glargine.  She reports that she has had 1 episode of hypoglycemia since her last visit.      Past Medical History:   Diagnosis Date   • Anxiety    • Bipolar 1 disorder (HCC)    • Cancer (HCC)     left breast   • Depression    • Diabetes (HCC)    • Kidney disease        Social History     Tobacco Use   • Smoking status: Never Smoker   • Smokeless tobacco: Never Used   Vaping Use   • Vaping Use: Never used   Substance Use Topics   • Alcohol use: Never   • Drug use: Never       Current Outpatient Medications Ordered in Epic   Medication Sig Dispense Refill   • losartan (COZAAR) 100 MG Tab Take 1 Tablet by mouth every day. 30 Tablet 2   • timolol (TIMOPTIC) 0.5 % Solution INSTILL 1 DROP IN BOTH EYES DAILY     • lamoTRIgine (LAMICTAL) 100 MG Tab TAKE 1 TABLET BY MOUTH TWICE A DAY AS DIRECTED     • aspirin (ASPIRIN LOW DOSE) 81 MG EC tablet Take 1 Tablet by mouth every day. Due for appointment with PCP for further refills 90 Tablet 0   •  insulin glargine (INSULIN GLARGINE) 100 UNIT/ML Solution Pen-injector injection INJECT 10 UNITS EVERY MORNING AND 14 UNITS EVERY NIGHT. DUE FOR APPT WITH PCP FOR FURTHER REFILLS 9 mL 0   • anastrozole (ARIMIDEX) 1 MG Tab Take 1 Tablet by mouth every evening. Due for appointment with PCP 90 Tablet 0   • NOVOLOG FLEXPEN 100 UNIT/ML solution for injection INJECT 12 UNITS TOTAL UNDER THE SKIN THREE (3) TIMES DAILY BEFORE MEALS. 15 mL 0   • primidone (MYSOLINE) 50 MG Tab Take 1 Tablet by mouth 2 times a day. 180 Tablet 1   • amLODIPine (NORVASC) 5 MG Tab TAKE 1 TABLET BY MOUTH EVERY DAY 90 Tablet 1   • atorvastatin (LIPITOR) 80 MG tablet TAKE 1 TABLET BY MOUTH EVERY DAY 90 Tablet 2   • Blood Glucose Meter Kit Test blood sugar as recommended by provider. 1 blood glucose monitoring kit-- DEXCOM 1 Kit 0   • Blood Glucose Test Strips Use one testing unit every 10 days to test blood sugar. DEXCOM meter 3 Strip 3   • QUEtiapine (SEROQUEL) 100 MG Tab Take 1 Tab by mouth 2 times a day. Takes with quetiapine 200 mg at bedtime (Patient taking differently: Take 100 mg by mouth 3 times a day.) 60 Tab 1   • carbamazepine SR (TEGRETOL XR) 200 MG TABLET SR 12 HR extended-release tablet TAKE 1 TABLET BY MOUTH TWICE A DAY     • lithium carbonate 300 MG capsule Take 300 mg by mouth.     • hydrOXYzine pamoate (VISTARIL) 50 MG Cap TAKE 1 CAPSULE BY MOUTH 3 TIMES A DAY AS NEEDED FOR 30 DAYS     • Cariprazine HCl 6 MG Cap Take 6 mg by mouth every evening.       No current Epic-ordered facility-administered medications on file.       Allergies:  Depakote [valproic acid]    Health Maintenance: She plans to get her shingles vaccines.  Declines colon cancer screening today.      Objective:     Vital signs reviewed  Exam:  /76 (BP Location: Right arm, Patient Position: Sitting)   Pulse (!) 102   Temp 36.2 °C (97.2 °F) (Temporal)   Wt 69.9 kg (154 lb)   SpO2 97%   BMI 26.43 kg/m²  Body mass index is 26.43 kg/m².    Gen: Alert and  oriented, No apparent distress.  Daughter present in exam room.  Lungs: Normal effort, CTA bilaterally, no wheezes, rhonchi, or rales  CV: Regular rate and rhythm. No murmurs, rubs, or gallops.  Ext: No clubbing, cyanosis, edema.        Assessment & Plan:     72 y.o. female with the following -     1. Essential hypertension  Chronic exacerbated problem.  Review of her home blood pressures show blood pressures have been consistently greater than 140/90.  On repeat by me today her blood pressure is 136/76.  I will plan to increase her losartan to 100 mg daily.  She will continue with her amlodipine 5 mg daily.  Daughter states that patient has plenty of 50 mg losartan tablets left.  We did discuss that patient may take 2 of the 50 mg tablets to equal new daily dosing to 100 mg.  She will continue daily blood pressure checks and return to see me in 2 weeks.  Referral placed to vascular medicine as she may benefit from 24-hour blood pressure monitoring in the future.  - Referral to Vascular Medicine  - losartan (COZAAR) 100 MG Tab; Take 1 Tablet by mouth every day.  Dispense: 30 Tablet; Refill: 2    2. Type 1 diabetes mellitus without complication (HCC)  Chronic stable problem.  I provided her with the endocrinology contact information and encouraged her to schedule her appointment at their earliest available appointment.  She verbalized understanding.  She will continue with her NovoLog and insulin glargine.      Return in about 2 weeks (around 6/14/2022) for Hypertension.    Please note that this dictation was created using voice recognition software. I have made every reasonable attempt to correct obvious errors, but I expect that there are errors of grammar and possibly content that I did not discover before finalizing the note.

## 2022-06-07 ENCOUNTER — TELEPHONE (OUTPATIENT)
Dept: VASCULAR LAB | Facility: MEDICAL CENTER | Age: 72
End: 2022-06-07
Payer: MEDICARE

## 2022-06-07 NOTE — TELEPHONE ENCOUNTER
Called and left VM for pt to call back to get scheduled for initial vascular med appt with Dr. Bloch, next available.     ----- Message from Michael J Bloch, M.D. sent at 5/31/2022  2:42 PM PDT -----  Regarding: Vascular medicine referral  Please schedule initial visit with block either tomorrow, June 7 at 2 or 2:40 PM, or next available

## 2022-06-13 ENCOUNTER — OFFICE VISIT (OUTPATIENT)
Dept: MEDICAL GROUP | Facility: PHYSICIAN GROUP | Age: 72
End: 2022-06-13
Payer: MEDICARE

## 2022-06-13 VITALS
OXYGEN SATURATION: 98 % | TEMPERATURE: 97 F | RESPIRATION RATE: 18 BRPM | DIASTOLIC BLOOD PRESSURE: 72 MMHG | BODY MASS INDEX: 26.29 KG/M2 | HEART RATE: 68 BPM | WEIGHT: 154 LBS | SYSTOLIC BLOOD PRESSURE: 144 MMHG | HEIGHT: 64 IN

## 2022-06-13 DIAGNOSIS — I10 ESSENTIAL HYPERTENSION: ICD-10-CM

## 2022-06-13 DIAGNOSIS — E10.9 TYPE 1 DIABETES MELLITUS WITHOUT COMPLICATION (HCC): ICD-10-CM

## 2022-06-13 PROCEDURE — 99214 OFFICE O/P EST MOD 30 MIN: CPT | Performed by: NURSE PRACTITIONER

## 2022-06-13 RX ORDER — LOSARTAN POTASSIUM 100 MG/1
100 TABLET ORAL DAILY
Qty: 90 TABLET | Refills: 2 | Status: SHIPPED | OUTPATIENT
Start: 2022-06-13 | End: 2023-01-09

## 2022-06-13 ASSESSMENT — FIBROSIS 4 INDEX: FIB4 SCORE: 1.46

## 2022-06-13 NOTE — ASSESSMENT & PLAN NOTE
Her referral to endocrinology is approved but she still has not scheduled an appointment.  She states that she has been having a busy week.  Her home blood sugars are ranging . Her lowest blood sugar in the last 2 weeks was 47. She states that she miscalculated her carbohydrate count with this low blood sugar. She responds to apple juice with her low blood sugars. Her last diabetic education was around 2017.

## 2022-06-13 NOTE — ASSESSMENT & PLAN NOTE
Her initial blood pressure today is 174/76.  She continues with amlodipine 5 mg daily in the evening and losartan 100 mg daily in the morning.  She has been checking her blood pressure at home and brings in her blood pressure log today.  Her home blood pressures have been ranging 108-149/70-90.  Majority of her blood pressures are less than 140/90.  Her referral to vascular medicine has been approved and she has an appointment scheduled for 9/27/2022. She is watching her salt intake. No new chest pain, shortness of breath, dizziness, blurry vision or headache.

## 2022-06-13 NOTE — PATIENT INSTRUCTIONS
[facility]  [address]  [place] [address]  Phone: [phone]

## 2022-06-13 NOTE — PROGRESS NOTES
Subjective:     CC: hypertension follow-up    HPI:   Molly presents today with her daughter Rut for the following:    Essential hypertension  Her initial blood pressure today is 174/76.  She continues with amlodipine 5 mg daily in the evening and losartan 100 mg daily in the morning.  She has been checking her blood pressure at home and brings in her blood pressure log today.  Her home blood pressures have been ranging 108-149/70-90.  Majority of her blood pressures are less than 140/90.  Her referral to vascular medicine has been approved and she has an appointment scheduled for 9/27/2022. She is watching her salt intake. No new chest pain, shortness of breath, dizziness, blurry vision or headache.     Type 1 diabetes mellitus without complication (HCC)  Her referral to endocrinology is approved but she still has not scheduled an appointment.  She states that she has been having a busy week.  Her home blood sugars are ranging . Her lowest blood sugar in the last 2 weeks was 47. She states that she miscalculated her carbohydrate count with this low blood sugar. She responds to apple juice with her low blood sugars. Her last diabetic education was around 2017.        Past Medical History:   Diagnosis Date   • Anxiety    • Bipolar 1 disorder (HCC)    • Cancer (HCC)     left breast   • Depression    • Diabetes (HCC)    • Kidney disease        Social History     Tobacco Use   • Smoking status: Never Smoker   • Smokeless tobacco: Never Used   Vaping Use   • Vaping Use: Never used   Substance Use Topics   • Alcohol use: Never   • Drug use: Never       Current Outpatient Medications Ordered in Epic   Medication Sig Dispense Refill   • losartan (COZAAR) 100 MG Tab Take 1 Tablet by mouth every day. 30 Tablet 2   • timolol (TIMOPTIC) 0.5 % Solution INSTILL 1 DROP IN BOTH EYES DAILY     • lamoTRIgine (LAMICTAL) 100 MG Tab TAKE 1 TABLET BY MOUTH TWICE A DAY AS DIRECTED     • aspirin (ASPIRIN LOW DOSE) 81 MG EC  tablet Take 1 Tablet by mouth every day. Due for appointment with PCP for further refills 90 Tablet 0   • insulin glargine (INSULIN GLARGINE) 100 UNIT/ML Solution Pen-injector injection INJECT 10 UNITS EVERY MORNING AND 14 UNITS EVERY NIGHT. DUE FOR APPT WITH PCP FOR FURTHER REFILLS 9 mL 0   • anastrozole (ARIMIDEX) 1 MG Tab Take 1 Tablet by mouth every evening. Due for appointment with PCP 90 Tablet 0   • NOVOLOG FLEXPEN 100 UNIT/ML solution for injection INJECT 12 UNITS TOTAL UNDER THE SKIN THREE (3) TIMES DAILY BEFORE MEALS. 15 mL 0   • primidone (MYSOLINE) 50 MG Tab Take 1 Tablet by mouth 2 times a day. 180 Tablet 1   • amLODIPine (NORVASC) 5 MG Tab TAKE 1 TABLET BY MOUTH EVERY DAY 90 Tablet 1   • atorvastatin (LIPITOR) 80 MG tablet TAKE 1 TABLET BY MOUTH EVERY DAY 90 Tablet 2   • Blood Glucose Meter Kit Test blood sugar as recommended by provider. 1 blood glucose monitoring kit-- DEXCOM 1 Kit 0   • Blood Glucose Test Strips Use one testing unit every 10 days to test blood sugar. DEXCOM meter 3 Strip 3   • QUEtiapine (SEROQUEL) 100 MG Tab Take 1 Tab by mouth 2 times a day. Takes with quetiapine 200 mg at bedtime (Patient taking differently: Take 100 mg by mouth 3 times a day.) 60 Tab 1   • carbamazepine SR (TEGRETOL XR) 200 MG TABLET SR 12 HR extended-release tablet TAKE 1 TABLET BY MOUTH TWICE A DAY     • lithium carbonate 300 MG capsule Take 300 mg by mouth.     • hydrOXYzine pamoate (VISTARIL) 50 MG Cap TAKE 1 CAPSULE BY MOUTH 3 TIMES A DAY AS NEEDED FOR 30 DAYS     • Cariprazine HCl 6 MG Cap Take 6 mg by mouth every evening.       No current Epic-ordered facility-administered medications on file.       Allergies:  Depakote [valproic acid]    Health Maintenance: She had recent shingles vaccination.      Objective:     Vital signs reviewed  Exam:  BP (!) 144/72 (BP Location: Right arm, Patient Position: Sitting, BP Cuff Size: Adult)   Pulse 68   Temp 36.1 °C (97 °F) (Temporal)   Resp 18   Ht 1.626 m (5'  "4\")   Wt 69.9 kg (154 lb)   SpO2 98%   BMI 26.43 kg/m²  Body mass index is 26.43 kg/m².    Gen: Alert and oriented, No apparent distress.  Daughter present in exam room.  Neck: Neck is supple without lymphadenopathy.  Lungs: Normal effort, CTA bilaterally, no wheezes, rhonchi, or rales  CV: Regular rate and rhythm. No murmurs, rubs, or gallops.  Ext: No clubbing, cyanosis, edema.      Assessment & Plan:     72 y.o. female with the following -     1. Essential hypertension  Chronic exacerbated problem.  Her repeat by me her blood pressure today is 144/72.  Blood pressure does remain slightly above goal for in office blood pressures.  Her home blood pressures have been at goal.  For now would like her to continue with her amlodipine 5 mg daily and losartan 100 mg daily.  Recommend low-salt diet and exercise as tolerated.  Keep upcoming vascular medicine appointment.  She will continue home blood pressure monitoring at least 1-2 times weekly.  I would like to see her back in 3 months or sooner if blood pressure becomes elevated.  - losartan (COZAAR) 100 MG Tab; Take 1 Tablet by mouth every day.  Dispense: 90 Tablet; Refill: 2    2. Type 1 diabetes mellitus without complication (HCC)  Chronic exacerbated problem.  Again I encouraged her to schedule her endocrinology appointment, contact number provided to patient today.  We did discuss referral back to diabetes education just to reinforce that she is counting carbohydrates correctly as she did have a low blood sugar 47 since her last office visit.  She verbalized understanding.  - Referral to Diabetic Education        Return in about 3 months (around 9/13/2022) for Diabetes, A1C, Hypertension.    Please note that this dictation was created using voice recognition software. I have made every reasonable attempt to correct obvious errors, but I expect that there are errors of grammar and possibly content that I did not discover before finalizing the note.      "

## 2022-06-24 DIAGNOSIS — E10.9 TYPE 1 DIABETES MELLITUS WITHOUT COMPLICATION (HCC): ICD-10-CM

## 2022-06-27 RX ORDER — INSULIN GLARGINE 100 [IU]/ML
INJECTION, SOLUTION SUBCUTANEOUS
Qty: 15 EACH | Refills: 1 | Status: SHIPPED | OUTPATIENT
Start: 2022-06-27 | End: 2022-09-12

## 2022-06-27 NOTE — TELEPHONE ENCOUNTER
Requested Prescriptions     Signed Prescriptions Disp Refills   • insulin glargine (INSULIN GLARGINE) 100 UNIT/ML Solution Pen-injector injection 15 Each 1     Sig: INJECT 10 UNITS EVERY MORNING AND 14 UNITS EVERY NIGHT. Please schedule appointment with Endocrinology.     Authorizing Provider: RJ LAM A.P.R.N.

## 2022-07-05 DIAGNOSIS — E78.5 DYSLIPIDEMIA: ICD-10-CM

## 2022-07-06 RX ORDER — ATORVASTATIN CALCIUM 80 MG/1
80 TABLET, FILM COATED ORAL DAILY
Qty: 90 TABLET | Refills: 2 | Status: SHIPPED | OUTPATIENT
Start: 2022-07-06 | End: 2023-05-24

## 2022-07-23 DIAGNOSIS — C50.912 MALIGNANT NEOPLASM OF LEFT FEMALE BREAST, UNSPECIFIED ESTROGEN RECEPTOR STATUS, UNSPECIFIED SITE OF BREAST (HCC): ICD-10-CM

## 2022-07-23 DIAGNOSIS — R25.1 SHAKING: ICD-10-CM

## 2022-07-23 DIAGNOSIS — E10.9 TYPE 1 DIABETES MELLITUS WITHOUT COMPLICATION (HCC): ICD-10-CM

## 2022-07-23 DIAGNOSIS — E11.59 HYPERTENSION ASSOCIATED WITH DIABETES (HCC): ICD-10-CM

## 2022-07-23 DIAGNOSIS — I15.2 HYPERTENSION ASSOCIATED WITH DIABETES (HCC): ICD-10-CM

## 2022-07-27 RX ORDER — INSULIN ASPART 100 [IU]/ML
INJECTION, SOLUTION INTRAVENOUS; SUBCUTANEOUS
Qty: 12 ML | Refills: 0 | Status: SHIPPED | OUTPATIENT
Start: 2022-07-27 | End: 2022-09-19

## 2022-07-27 RX ORDER — PRIMIDONE 50 MG/1
TABLET ORAL
Qty: 180 TABLET | Refills: 1 | Status: SHIPPED | OUTPATIENT
Start: 2022-07-27 | End: 2022-12-14

## 2022-07-27 RX ORDER — AMLODIPINE BESYLATE 5 MG/1
TABLET ORAL
Qty: 90 TABLET | Refills: 1 | Status: SHIPPED | OUTPATIENT
Start: 2022-07-27 | End: 2022-11-21

## 2022-07-27 RX ORDER — ANASTROZOLE 1 MG/1
1 TABLET ORAL EVERY EVENING
Qty: 90 TABLET | Refills: 1 | Status: SHIPPED | OUTPATIENT
Start: 2022-07-27 | End: 2022-11-21

## 2022-07-27 NOTE — TELEPHONE ENCOUNTER
Requested Prescriptions     Signed Prescriptions Disp Refills   • insulin aspart (NOVOLOG FLEXPEN) 100 UNIT/ML injection PEN 12 mL 0     Sig: INJECT 12 UNITS TOTAL UNDER THE SKIN THREE (3) TIMES DAILY BEFORE MEALS. Please establish with endocrinology.     Authorizing Provider: RJ LAM   • amLODIPine (NORVASC) 5 MG Tab 90 Tablet 1     Sig: TAKE 1 TABLET BY MOUTH EVERY DAY     Authorizing Provider: RJ LAM   • primidone (MYSOLINE) 50 MG Tab 180 Tablet 1     Sig: TAKE 1 TABLET BY MOUTH TWICE A DAY     Authorizing Provider: RJ LAM   • anastrozole (ARIMIDEX) 1 MG Tab 90 Tablet 1     Sig: Take 1 Tablet by mouth every evening. Needs to establish with cancer specialist.     Authorizing Provider: RJ LAM     Referral to oncology to take over anastrozole prescription. Patient needs to schedule with endocrinology for Type 1 diabetes.     STACIE Paz

## 2022-09-01 DIAGNOSIS — E10.9 TYPE 1 DIABETES MELLITUS WITHOUT COMPLICATION (HCC): ICD-10-CM

## 2022-09-06 RX ORDER — ASPIRIN 81 MG/1
81 TABLET ORAL
Qty: 90 TABLET | Refills: 1 | Status: SHIPPED | OUTPATIENT
Start: 2022-09-06 | End: 2023-10-30

## 2022-09-06 NOTE — TELEPHONE ENCOUNTER
Requested Prescriptions     Signed Prescriptions Disp Refills    aspirin (ASPIRIN LOW DOSE) 81 MG EC tablet 90 Tablet 1     Sig: Take 1 Tablet by mouth every day.     Authorizing Provider: RJ LAM A.P.R.N.

## 2022-09-10 DIAGNOSIS — E10.9 TYPE 1 DIABETES MELLITUS WITHOUT COMPLICATION (HCC): ICD-10-CM

## 2022-09-12 RX ORDER — INSULIN GLARGINE 100 [IU]/ML
INJECTION, SOLUTION SUBCUTANEOUS
Qty: 15 EACH | Refills: 1 | Status: SHIPPED | OUTPATIENT
Start: 2022-09-12 | End: 2022-11-21

## 2022-09-12 NOTE — TELEPHONE ENCOUNTER
Received request via: Pharmacy    Was the patient seen in the last year in this department? Yes    Does the patient have an active prescription (recently filled or refills available) for medication(s) requested? No  *has appt with endo in 2 months*

## 2022-09-12 NOTE — TELEPHONE ENCOUNTER
Requested Prescriptions     Signed Prescriptions Disp Refills    insulin glargine 100 UNIT/ML Solution Pen-injector injection 15 Each 1     Sig: INJECT 10 UNITS EVERY MORNING AND 14 UNITS EVERY NIGHT. PLEASE SCHEDULE APPOINTMENT WITH ENDOCRINOLOGY.     Authorizing Provider: RJ LAM A.P.R.N.

## 2022-09-17 DIAGNOSIS — E10.9 TYPE 1 DIABETES MELLITUS WITHOUT COMPLICATION (HCC): ICD-10-CM

## 2022-09-19 RX ORDER — INSULIN ASPART 100 [IU]/ML
INJECTION, SOLUTION INTRAVENOUS; SUBCUTANEOUS
Qty: 15 ML | Refills: 1 | Status: SHIPPED | OUTPATIENT
Start: 2022-09-19 | End: 2022-11-21

## 2022-09-19 NOTE — TELEPHONE ENCOUNTER
Requested Prescriptions     Signed Prescriptions Disp Refills    insulin aspart (NOVOLOG FLEXPEN) 100 UNIT/ML injection PEN 15 mL 1     Sig: INJECT 12 UNITS TOTAL UNDER THE SKIN THREE (3) TIMES DAILY BEFORE MEALS. Keep upcoming endocrinology appointment.     Authorizing Provider: RJ LAM A.P.R.N.

## 2022-09-20 ENCOUNTER — OFFICE VISIT (OUTPATIENT)
Dept: MEDICAL GROUP | Facility: PHYSICIAN GROUP | Age: 72
End: 2022-09-20
Payer: MEDICARE

## 2022-09-20 VITALS
DIASTOLIC BLOOD PRESSURE: 62 MMHG | HEIGHT: 64 IN | OXYGEN SATURATION: 99 % | BODY MASS INDEX: 26.46 KG/M2 | HEART RATE: 96 BPM | WEIGHT: 155 LBS | TEMPERATURE: 97.6 F | SYSTOLIC BLOOD PRESSURE: 130 MMHG

## 2022-09-20 DIAGNOSIS — F31.9 BIPOLAR 1 DISORDER (HCC): ICD-10-CM

## 2022-09-20 DIAGNOSIS — E10.9 TYPE 1 DIABETES MELLITUS WITHOUT COMPLICATION (HCC): ICD-10-CM

## 2022-09-20 DIAGNOSIS — R00.0 TACHYCARDIA: ICD-10-CM

## 2022-09-20 DIAGNOSIS — N18.31 STAGE 3A CHRONIC KIDNEY DISEASE: ICD-10-CM

## 2022-09-20 DIAGNOSIS — I10 ESSENTIAL HYPERTENSION: ICD-10-CM

## 2022-09-20 DIAGNOSIS — Z12.31 ENCOUNTER FOR SCREENING MAMMOGRAM FOR BREAST CANCER: ICD-10-CM

## 2022-09-20 LAB
HBA1C MFR BLD: 6.6 % (ref 0–5.6)
INT CON NEG: ABNORMAL
INT CON POS: ABNORMAL

## 2022-09-20 PROCEDURE — 99214 OFFICE O/P EST MOD 30 MIN: CPT | Performed by: NURSE PRACTITIONER

## 2022-09-20 PROCEDURE — 93000 ELECTROCARDIOGRAM COMPLETE: CPT | Performed by: NURSE PRACTITIONER

## 2022-09-20 PROCEDURE — 83036 HEMOGLOBIN GLYCOSYLATED A1C: CPT | Performed by: NURSE PRACTITIONER

## 2022-09-20 ASSESSMENT — FIBROSIS 4 INDEX: FIB4 SCORE: 1.46

## 2022-09-20 NOTE — PROGRESS NOTES
Subjective:     CC: Diabetes and hypertension follow-up    HPI:   Molly presents today with her daughter Rut for the following:    Essential hypertension  Her initial blood pressure today is 138/60. She is taking losartan 100 mg daily and amlodipine 5 mg daily. For the month of September her blood pressure has been up and ranging 127-179/. She was taking daily but after our last appointment she is trying to check 3 times a week. She states that her best friend has been in the hospital. Review of home blood pressure steps, she has not been sitting for 5 minutes before taking her blood pressure. She has upcoming appointment with vascular medicine on 9/27/2022.      Type 1 diabetes mellitus without complication (HCC)  She is due for A1C today. Last A1C was 6.9%. She continues with Novalog 8-12 units TID depending on blood sugars. She is counting her carbs, but admits that she miscalculates her carbs. She has not followed up with diabetes education. She states that she read past education handouts. She also continues with insulin glargine 10 units in the morning and 12-14 units in evening depending on her blood sugar. Her lowest blood sugar was 58 and 75. She has an upcoming appointment with endocrinology on 11/16/2022.     Bipolar 1 disorder (HCC)  She continues follow-up with her psychiatrist, Dr. Jazmin Chamorro every 4-6 weeks. She has an upcoming appointment in October scheduled.  She continues with her Seroquel 100 mg twice daily, primidone 50 mg twice daily, lithium carbonate 300 mg twice daily, cariprazine 6 mg every evening and carbamazepine 200 mg twice daily.    Stage 3 chronic kidney disease  On repeat today her blood pressure is down to 130/62.  She continues with losartan 100 mg daily and amlodipine 5 mg daily.  Due for updated labs.    Past Medical History:   Diagnosis Date    Anxiety     Bipolar 1 disorder (HCC)     Cancer (HCC)     left breast    Depression     Diabetes (HCC)     Kidney  disease        Social History     Tobacco Use    Smoking status: Never    Smokeless tobacco: Never   Vaping Use    Vaping Use: Never used   Substance Use Topics    Alcohol use: Never    Drug use: Never       Current Outpatient Medications Ordered in Epic   Medication Sig Dispense Refill    insulin aspart (NOVOLOG FLEXPEN) 100 UNIT/ML injection PEN INJECT 12 UNITS TOTAL UNDER THE SKIN THREE (3) TIMES DAILY BEFORE MEALS. Keep upcoming endocrinology appointment. 15 mL 1    insulin glargine 100 UNIT/ML Solution Pen-injector injection INJECT 10 UNITS EVERY MORNING AND 14 UNITS EVERY NIGHT. PLEASE SCHEDULE APPOINTMENT WITH ENDOCRINOLOGY. 15 Each 1    aspirin (ASPIRIN LOW DOSE) 81 MG EC tablet Take 1 Tablet by mouth every day. 90 Tablet 1    amLODIPine (NORVASC) 5 MG Tab TAKE 1 TABLET BY MOUTH EVERY DAY 90 Tablet 1    primidone (MYSOLINE) 50 MG Tab TAKE 1 TABLET BY MOUTH TWICE A  Tablet 1    anastrozole (ARIMIDEX) 1 MG Tab Take 1 Tablet by mouth every evening. Needs to establish with cancer specialist. 90 Tablet 1    atorvastatin (LIPITOR) 80 MG tablet TAKE 1 TABLET BY MOUTH EVERY DAY 90 Tablet 2    losartan (COZAAR) 100 MG Tab Take 1 Tablet by mouth every day. 90 Tablet 2    timolol (TIMOPTIC) 0.5 % Solution INSTILL 1 DROP IN BOTH EYES DAILY      lamoTRIgine (LAMICTAL) 100 MG Tab TAKE 1 TABLET BY MOUTH TWICE A DAY AS DIRECTED      Blood Glucose Meter Kit Test blood sugar as recommended by provider. 1 blood glucose monitoring kit-- DEXCOM 1 Kit 0    Blood Glucose Test Strips Use one testing unit every 10 days to test blood sugar. DEXCOM meter 3 Strip 3    QUEtiapine (SEROQUEL) 100 MG Tab Take 1 Tab by mouth 2 times a day. Takes with quetiapine 200 mg at bedtime (Patient taking differently: Take 100 mg by mouth 3 times a day.) 60 Tab 1    carbamazepine SR (TEGRETOL XR) 200 MG TABLET SR 12 HR extended-release tablet TAKE 1 TABLET BY MOUTH TWICE A DAY      lithium carbonate 300 MG capsule Take 300 mg by mouth.       "hydrOXYzine pamoate (VISTARIL) 50 MG Cap TAKE 1 CAPSULE BY MOUTH 3 TIMES A DAY AS NEEDED FOR 30 DAYS      Cariprazine HCl 6 MG Cap Take 6 mg by mouth every evening.       No current Epic-ordered facility-administered medications on file.       Allergies:  Depakote [valproic acid]    Health Maintenance: Reviewed      Objective:     Vital signs reviewed  Exam:  /62 (BP Location: Right arm, Patient Position: Sitting)   Pulse 96 Comment: EKG  Temp 36.4 °C (97.6 °F) (Temporal)   Ht 1.626 m (5' 4\")   Wt 70.3 kg (155 lb)   SpO2 99%   BMI 26.61 kg/m²  Body mass index is 26.61 kg/m².    Gen: Alert and oriented, No apparent distress. Daughter present in exam room.   Lungs: Normal effort, CTA bilaterally, no wheezes, rhonchi, or rales.  CV: Fast rate and  irregular/regular rhythm. No murmurs, rubs, or gallops.  Ext: No clubbing, cyanosis, edema.      EKG Interpretation-HR is 96 unchanged from previous tracings, normal sinus rhythm      Labs:      Latest Reference Range & Units 9/20/22 09:13   Glycohemoglobin 0.0 - 5.6 % 6.6 !   !: Data is abnormal    Assessment & Plan:     72 y.o. female with the following -     1. Essential hypertension  Chronic stable problem.  On repeat by me today her blood pressure is 130/62.  Review of her home blood pressure logs show that previous to the month of September her blood pressures were all at goal.  Checking updated thyroid function.  She will continue with her losartan 100 mg daily and amlodipine 5 mg daily.  Keep upcoming vascular medicine appointment.  Continue checking home blood pressure 3 times a week.  - TSH WITH REFLEX TO FT4; Future    2. Type 1 diabetes mellitus without complication (HCC)  Chronic stable problem.  Discussed and reviewed her recent A1c.  I encouraged her to schedule with diabetes education as she continues to have difficulties calculating her carbohydrates.  Keep upcoming endocrinology appointment.  She will continue with her current NovoLog and insulin " glargine dosing as above.  Due for updated annual labs.  Would like her to return in 3 months for follow-up after she see's endocrinology.  Daughter will schedule her appointment.  - POCT  A1C  - Lipid Profile; Future  - Comp Metabolic Panel; Future  - MICROALBUMIN CREAT RATIO URINE; Future    3. Bipolar 1 disorder (HCC)  Chronic stable problem.  Continue follow-up with psychiatry.  Continue current doses of Seroquel, primidone, lithium, carbamazepine, and cariprazine.     4. Tachycardia  Acute uncomplicated problem.  Heart rate was 123 on arrival and on assessment heart auscultation.  Irregular.  EKG in office shows sinus tachycardia with a rate of 96.  Checking updated labs.  - EKG    5. Stage 3a chronic kidney disease (HCC)  Chronic stable problem.  Continue with losartan and amlodipine.  Checking updated labs.    6. Encounter for screening mammogram for breast cancer  Chronic stable problem.  Due for annual mammogram.  - MA-SCREENING MAMMO BILAT W/TOMOSYNTHESIS W/CAD; Future        Return in about 3 months (around 12/20/2022) for Diabetes.    Please note that this dictation was created using voice recognition software. I have made every reasonable attempt to correct obvious errors, but I expect that there are errors of grammar and possibly content that I did not discover before finalizing the note.

## 2022-09-20 NOTE — ASSESSMENT & PLAN NOTE
She continues follow-up with her psychiatrist, Dr. Jazmin Chamorro every 4-6 weeks. She has an upcoming appointment in October scheduled.  She continues with her Seroquel 100 mg twice daily, primidone 50 mg twice daily, lithium carbonate 300 mg twice daily, cariprazine 6 mg every evening and carbamazepine 200 mg twice daily.

## 2022-09-20 NOTE — ASSESSMENT & PLAN NOTE
Her initial blood pressure today is 138/60. She is taking losartan 100 mg daily and amlodipine 5 mg daily. For the month of September her blood pressure has been up and ranging 127-179/. She was taking daily but after our last appointment she is trying to check 3 times a week. She states that her best friend has been in the hospital. Review of home blood pressure steps, she has not been sitting for 5 minutes before taking her blood pressure. She has upcoming appointment with vascular medicine on 9/27/2022.

## 2022-09-20 NOTE — ASSESSMENT & PLAN NOTE
She is due for A1C today. Last A1C was 6.9%. She continues with Novalog 8-12 units TID depending on blood sugars. She is counting her carbs, but admits that she miscalculates her carbs. She has not followed up with diabetes education. She states that she read past education handouts. She also continues with insulin glargine 10 units in the morning and 12-14 units in evening depending on her blood sugar. Her lowest blood sugar was 58 and 75. She has an upcoming appointment with endocrinology on 11/16/2022.

## 2022-09-20 NOTE — ASSESSMENT & PLAN NOTE
On repeat today her blood pressure is down to 130/62.  She continues with losartan 100 mg daily and amlodipine 5 mg daily.  Due for updated labs.

## 2022-09-27 ENCOUNTER — OFFICE VISIT (OUTPATIENT)
Dept: VASCULAR LAB | Facility: MEDICAL CENTER | Age: 72
End: 2022-09-27
Attending: INTERNAL MEDICINE
Payer: MEDICARE

## 2022-09-27 VITALS
SYSTOLIC BLOOD PRESSURE: 151 MMHG | DIASTOLIC BLOOD PRESSURE: 78 MMHG | WEIGHT: 153 LBS | HEIGHT: 64 IN | HEART RATE: 118 BPM | BODY MASS INDEX: 26.12 KG/M2

## 2022-09-27 DIAGNOSIS — E10.9 TYPE 1 DIABETES MELLITUS WITHOUT COMPLICATION (HCC): ICD-10-CM

## 2022-09-27 DIAGNOSIS — N18.31 STAGE 3A CHRONIC KIDNEY DISEASE: ICD-10-CM

## 2022-09-27 DIAGNOSIS — E78.5 DYSLIPIDEMIA: ICD-10-CM

## 2022-09-27 DIAGNOSIS — I10 ESSENTIAL HYPERTENSION: ICD-10-CM

## 2022-09-27 PROCEDURE — 99204 OFFICE O/P NEW MOD 45 MIN: CPT | Performed by: INTERNAL MEDICINE

## 2022-09-27 PROCEDURE — 99212 OFFICE O/P EST SF 10 MIN: CPT

## 2022-09-27 ASSESSMENT — FIBROSIS 4 INDEX: FIB4 SCORE: 1.46

## 2022-09-27 NOTE — PROGRESS NOTES
VASCULAR MED[facility]INE CLINIC - INITIAL VISIT  09/27/22     Molly Loera is a 72 y.o.  male who presents today  for   Chief Complaint   Patient presents with    Follow-Up      Subjective    HPI:  Patient referred for evaluation and management of dyslipidemia and hypertension in setting of type 1 dm  Patient was diagnosed with late onset type 1 diabetes about 20 years ago.  She states she is not respond to oral medications.  She has had decent control on insulin.  Rare hypoglycemic events.  She states that her kidney function was off and her last measurement but otherwise has had no complications of diabetes.  She has appointment to see endocrine.  She remains on statin without myalgias.  She has been on the same blood pressure medications for a number of years.  She has variable control in the office.  Home BP 140s/80s, but has an old monitor and not necessarily good technique  Denies NSAIDs or other interfering substances  Good adherence with her medications  No known history of heart disease or stroke      Past Medical History:   Diagnosis Date    Anxiety     Bipolar 1 disorder (HCC)     Cancer (HCC)     left breast    Depression     Diabetes (HCC)     Kidney disease         PSH - no vascular or cardiovascular surgeries    Family History   Problem Relation Age of Onset    Diabetes Daughter         Social History     Tobacco Use    Smoking status: Never    Smokeless tobacco: Never   Vaping Use    Vaping Use: Never used   Substance Use Topics    Alcohol use: Never    Drug use: Never        Current Outpatient Medications on File Prior to Visit   Medication Sig Dispense Refill    insulin aspart (NOVOLOG FLEXPEN) 100 UNIT/ML injection PEN INJECT 12 UNITS TOTAL UNDER THE SKIN THREE (3) TIMES DAILY BEFORE MEALS. Keep upcoming endocrinology appointment. 15 mL 1    insulin glargine 100 UNIT/ML Solution Pen-injector injection INJECT 10 UNITS EVERY MORNING AND 14 UNITS EVERY NIGHT. PLEASE SCHEDULE APPOINTMENT WITH  ENDOCRINOLOGY. 15 Each 1    aspirin (ASPIRIN LOW DOSE) 81 MG EC tablet Take 1 Tablet by mouth every day. 90 Tablet 1    amLODIPine (NORVASC) 5 MG Tab TAKE 1 TABLET BY MOUTH EVERY DAY 90 Tablet 1    primidone (MYSOLINE) 50 MG Tab TAKE 1 TABLET BY MOUTH TWICE A  Tablet 1    anastrozole (ARIMIDEX) 1 MG Tab Take 1 Tablet by mouth every evening. Needs to establish with cancer specialist. 90 Tablet 1    atorvastatin (LIPITOR) 80 MG tablet TAKE 1 TABLET BY MOUTH EVERY DAY 90 Tablet 2    losartan (COZAAR) 100 MG Tab Take 1 Tablet by mouth every day. 90 Tablet 2    timolol (TIMOPTIC) 0.5 % Solution INSTILL 1 DROP IN BOTH EYES DAILY      lamoTRIgine (LAMICTAL) 100 MG Tab TAKE 1 TABLET BY MOUTH TWICE A DAY AS DIRECTED      Blood Glucose Meter Kit Test blood sugar as recommended by provider. 1 blood glucose monitoring kit-- DEXCOM 1 Kit 0    Blood Glucose Test Strips Use one testing unit every 10 days to test blood sugar. DEXCOM meter 3 Strip 3    QUEtiapine (SEROQUEL) 100 MG Tab Take 1 Tab by mouth 2 times a day. Takes with quetiapine 200 mg at bedtime (Patient taking differently: Take 100 mg by mouth 3 times a day.) 60 Tab 1    carbamazepine SR (TEGRETOL XR) 200 MG TABLET SR 12 HR extended-release tablet TAKE 1 TABLET BY MOUTH TWICE A DAY      lithium carbonate 300 MG capsule Take 300 mg by mouth.      hydrOXYzine pamoate (VISTARIL) 50 MG Cap TAKE 1 CAPSULE BY MOUTH 3 TIMES A DAY AS NEEDED FOR 30 DAYS      Cariprazine HCl 6 MG Cap Take 6 mg by mouth every evening.       No current facility-administered medications on file prior to visit.        ALLERGIES  Depakote [valproic acid]     DIET AND EXERCISE:  Weight Change: up a few pounds over past year  Diet: whole foods - reasonably low carb  Exercise: minimal exercise            Objective     Objective:     Vitals:    09/27/22 0907 09/27/22 0910   BP: (!) 168/82 (!) 151/78   BP Location: Left arm Left arm   Patient Position: Sitting Sitting   BP Cuff Size: Adult Adult  "  Pulse: (!) 121 (!) 118   Weight: 69.4 kg (153 lb)    Height: 1.626 m (5' 4\")         Physical Exam  Vitals reviewed.   Constitutional:       General: She is not in acute distress.     Appearance: She is not diaphoretic.   HENT:      Head: Normocephalic and atraumatic.   Eyes:      General: No scleral icterus.     Conjunctiva/sclera: Conjunctivae normal.   Neck:      Vascular: No carotid bruit.   Cardiovascular:      Rate and Rhythm: Normal rate and regular rhythm.      Heart sounds: Normal heart sounds. No murmur heard.  Pulmonary:      Effort: Pulmonary effort is normal. No respiratory distress.      Breath sounds: Normal breath sounds. No wheezing or rales.   Musculoskeletal:      Right lower leg: No edema.      Left lower leg: No edema.   Skin:     Coloration: Skin is not pale.   Neurological:      General: No focal deficit present.      Mental Status: She is alert and oriented to person, place, and time.      Cranial Nerves: No cranial nerve deficit.      Coordination: Coordination normal.      Gait: Gait is intact. Gait normal.   Psychiatric:         Mood and Affect: Mood and affect normal.         Behavior: Behavior normal.        DATA REVIEW    Lab Results   Component Value Date/Time    CHOLSTRLTOT 172 10/21/2021 09:03 AM    LDL 66 10/21/2021 09:03 AM    HDL 85 10/21/2021 09:03 AM    TRIGLYCERIDE 104 10/21/2021 09:03 AM       Lab Results   Component Value Date/Time    SODIUM 135 10/21/2021 09:03 AM    POTASSIUM 4.2 10/21/2021 09:03 AM    CHLORIDE 98 10/21/2021 09:03 AM    CO2 24 10/21/2021 09:03 AM    GLUCOSE 306 (H) 10/21/2021 09:03 AM    BUN 17 10/21/2021 09:03 AM    CREATININE 1.11 10/21/2021 09:03 AM     Lab Results   Component Value Date/Time    ALKPHOSPHAT 169 (H) 10/21/2021 09:03 AM    ASTSGOT 21 10/21/2021 09:03 AM    ALTSGPT 18 10/21/2021 09:03 AM    TBILIRUBIN 0.4 10/21/2021 09:03 AM       Lab Results   Component Value Date/Time    HBA1C 6.6 (A) 09/20/2022 09:13 AM       Lab Results   Component " Value Date/Time    MALBCRT 81 (H) 10/21/2021 09:03 AM    MICROALBUR 18.7 10/21/2021 09:03 AM       Echocardiogram 2020  Technically difficult study.  Hyperdynamic left ventricular systolic function. Left ventricular   ejection fraction is visually estimated to be greater than 75%.   Normal right ventricular size and systolic function.  No significant valvular abnormalities.  No prior study is available for comparison.       Medical Decision Making:  Today's Assessment / Status / Plan:     1. Dyslipidemia        2. Essential hypertension        3. Stage 3a chronic kidney disease (HCC)        4. Type 1 diabetes mellitus without complication (HCC)           Patient Type: Primary Prevention    Etiology of Established CVD if Present: None    Lipid Management: Qualifies for Statin Therapy Based on 2018 ACC/AHA Guidelines: yes  Calculated 10-Year Risk of ASCVD: N/A  Currently on Statin: Yes  Goal LDL less than 100 non-HDL less than 130  Previously at goal  Plan:  -Continue atorvastatin 80 mg a day  -Recheck fasting lipid panel as ordered by PCP    Blood Pressure Management:  [facility] (2017) Blood Pressure Goal <130/80  Unclear overall level of control  Elevated in the office today but has been at goal on some previous office visits  Home readings have been elevated but using poor technique and will monitor  Plan:  -Continue amlodipine 5 mg daily  -Continue losartan 100 mg a day  -Get a new monitor and begin keeping a careful written home log per my instructions  -Consider ABPM if evidence of whitecoat in the future  -Recheck GFR and electrolytes  -Consider addition of thiazide type diuretic if out of office blood pressure remains poorly controlled    Glycemic Status: Diabetic  Apparently late onset type I diabetic  Reasonable control by most recent A1c   rare hypoglycemia  Plan:  -Defer further management to PCP and endocrinology  -Continue current dose of insulin pending establishing with endocrinology  -Report any  increase in frequency of hypoglycemia    Anti-Platelet/Anti-Coagulant Tx: yes  Reasonable to continue low-dose aspirin since she did not tolerate it well and does have multiple cardiovascular risk factors    Smoking: continue complete avoidance of all tobacco products    Physical Activity: She is fairly limited by orthopedic issues but have encouraged her to exercise as much as possible    Weight Management and Nutrition: Continue good diabetic diet    Other:     -CKD, stage IIIa -likely due to diabetic and hypertensive nephrosclerosis.  Blood pressure control and ARB as above.  Recheck GFR and electrolytes and urine for albumin as ordered by PCP.  Avoid excess NSAIDs.  Will follow renal function over time    Instructed to follow-up with PCP for remainder of adult medical needs: yes  We will partner with other providers in the management of established vascular disease and cardiometabolic risk factors.    Studies to Be Obtained: None  Labs to Be Obtained: CMP, lipid panel, TSH, urine for albumin as previously ordered by PCP    Follow up in: 4 weeks    Total time: 45-59min - chart review/prep, review of other providers' records, imaging/lab review, face-to-face time for history/examination, ordering, prescribing,  review of results/meds/ treatment plan with patient/family/caregiver, documentation in EMR, care coordination (as needed)     Michael J Bloch, M.D.     Cc: LOURDES Penn

## 2022-10-18 ENCOUNTER — DOCUMENTATION (OUTPATIENT)
Dept: VASCULAR LAB | Facility: MEDICAL CENTER | Age: 72
End: 2022-10-18
Payer: MEDICARE

## 2022-10-18 NOTE — PROGRESS NOTES
Called and left VM for pt to call back to get rescheduled for f/u vascular appt. Dr.Bloch unavailable for Oct 25th appt due to meeting. Please reschedule to the below or next available.     Thur 10/20 240 - 320     Wed 10/26 1140a- noon     Thur 10/27 240p-320p     Thur nov 3 1140, 240-320 and 340

## 2022-10-21 ENCOUNTER — DOCUMENTATION (OUTPATIENT)
Dept: VASCULAR LAB | Facility: MEDICAL CENTER | Age: 72
End: 2022-10-21
Payer: MEDICARE

## 2022-10-21 NOTE — PROGRESS NOTES
Called and left second VM for pt to call back to get rescheduled for f/u vascular appt. Dr.Bloch unavailable for Oct 25th appt due to meeting. Please reschedule to the below or next available.        Wed 10/26 1140a- noon     Thur 10/27 240p-320p     Thur nov 3 1140, 220-320 and 340

## 2022-10-24 ENCOUNTER — TELEPHONE (OUTPATIENT)
Dept: HEMATOLOGY ONCOLOGY | Facility: MEDICAL CENTER | Age: 72
End: 2022-10-24
Payer: MEDICARE

## 2022-10-24 ENCOUNTER — DOCUMENTATION (OUTPATIENT)
Dept: VASCULAR LAB | Facility: MEDICAL CENTER | Age: 72
End: 2022-10-24
Payer: MEDICARE

## 2022-10-24 NOTE — TELEPHONE ENCOUNTER
Patient called to cancel appointment with Dr. Harris.  She cannot get transportation on Wednesdays.    She will call back to reschedule when she can find transportation for another day.

## 2022-10-25 ENCOUNTER — APPOINTMENT (OUTPATIENT)
Dept: VASCULAR LAB | Facility: MEDICAL CENTER | Age: 72
End: 2022-10-25
Payer: MEDICARE

## 2022-11-03 ENCOUNTER — OFFICE VISIT (OUTPATIENT)
Dept: VASCULAR LAB | Facility: MEDICAL CENTER | Age: 72
End: 2022-11-03
Attending: INTERNAL MEDICINE
Payer: MEDICARE

## 2022-11-03 ENCOUNTER — PATIENT MESSAGE (OUTPATIENT)
Dept: HEALTH INFORMATION MANAGEMENT | Facility: OTHER | Age: 72
End: 2022-11-03

## 2022-11-03 VITALS
DIASTOLIC BLOOD PRESSURE: 95 MMHG | HEART RATE: 106 BPM | SYSTOLIC BLOOD PRESSURE: 187 MMHG | WEIGHT: 156 LBS | BODY MASS INDEX: 26.63 KG/M2 | HEIGHT: 64 IN

## 2022-11-03 DIAGNOSIS — E78.5 DYSLIPIDEMIA: ICD-10-CM

## 2022-11-03 DIAGNOSIS — E10.9 TYPE 1 DIABETES MELLITUS WITHOUT COMPLICATION (HCC): ICD-10-CM

## 2022-11-03 DIAGNOSIS — R03.0 WHITE COAT SYNDROME WITH HIGH BLOOD PRESSURE BUT WITHOUT HYPERTENSION: ICD-10-CM

## 2022-11-03 DIAGNOSIS — N18.31 STAGE 3A CHRONIC KIDNEY DISEASE: ICD-10-CM

## 2022-11-03 PROCEDURE — 99212 OFFICE O/P EST SF 10 MIN: CPT

## 2022-11-03 PROCEDURE — 99214 OFFICE O/P EST MOD 30 MIN: CPT | Performed by: INTERNAL MEDICINE

## 2022-11-03 ASSESSMENT — FIBROSIS 4 INDEX: FIB4 SCORE: 1.46

## 2022-11-03 NOTE — PROGRESS NOTES
"[facility] - Follow up VISIT  11/03/22      Molly Loera is a 72 y.o.  female who presents today  for   Chief Complaint   Patient presents with    Follow-Up      Subjective      HPI:  Here for f/u of dyslipidemia and hypertension in setting of type 1 dm  Patient has been measuring her blood pressure every day at home.  She brings in a written log.  Nearly everyone of her readings is less than 130/80  Good adherence  No interfering substances  No myalgias on statin  She has had no hypoglycemia  She does have an appointment to see endocrinology as an initial visit in short order  She has not yet gotten her blood work    Social History     Tobacco Use    Smoking status: Never    Smokeless tobacco: Never   Vaping Use    Vaping Use: Never used   Substance Use Topics    Alcohol use: Never    Drug use: Never           DIET AND EXERCISE:  Weight Change: up a few pounds over past year  Diet: whole foods - reasonably low carb  Exercise: minimal exercise            Objective     Objective:     Vitals:    11/03/22 1147 11/03/22 1152   BP: (!) 187/73 (!) 187/95   BP Location: Right arm Right arm   Patient Position: Sitting Sitting   BP Cuff Size: Adult Adult   Pulse: (!) 103 (!) 106   Weight: 70.8 kg (156 lb)    Height: 1.626 m (5' 4\")         Physical Exam  Vitals reviewed.   Constitutional:       General: She is not in acute distress.     Appearance: She is not diaphoretic.   HENT:      Head: Normocephalic and atraumatic.   Eyes:      General: No scleral icterus.     Conjunctiva/sclera: Conjunctivae normal.   Neck:      Vascular: No carotid bruit.   Cardiovascular:      Rate and Rhythm: Normal rate and regular rhythm.      Heart sounds: Normal heart sounds. No murmur heard.  Pulmonary:      Effort: Pulmonary effort is normal. No respiratory distress.      Breath sounds: Normal breath sounds. No wheezing or rales.   Musculoskeletal:      Right lower leg: No edema.      Left lower leg: No edema.   Skin:     " Coloration: Skin is not pale.   Neurological:      General: No focal deficit present.      Mental Status: She is alert and oriented to person, place, and time.      Cranial Nerves: No cranial nerve deficit.      Coordination: Coordination normal.      Gait: Gait is intact. Gait normal.   Psychiatric:         Mood and Affect: Mood and affect normal.         Behavior: Behavior normal.        DATA REVIEW    Lab Results   Component Value Date/Time    CHOLSTRLTOT 172 10/21/2021 09:03 AM    LDL 66 10/21/2021 09:03 AM    HDL 85 10/21/2021 09:03 AM    TRIGLYCERIDE 104 10/21/2021 09:03 AM       Lab Results   Component Value Date/Time    SODIUM 135 10/21/2021 09:03 AM    POTASSIUM 4.2 10/21/2021 09:03 AM    CHLORIDE 98 10/21/2021 09:03 AM    CO2 24 10/21/2021 09:03 AM    GLUCOSE 306 (H) 10/21/2021 09:03 AM    BUN 17 10/21/2021 09:03 AM    CREATININE 1.11 10/21/2021 09:03 AM     Lab Results   Component Value Date/Time    ALKPHOSPHAT 169 (H) 10/21/2021 09:03 AM    ASTSGOT 21 10/21/2021 09:03 AM    ALTSGPT 18 10/21/2021 09:03 AM    TBILIRUBIN 0.4 10/21/2021 09:03 AM       Lab Results   Component Value Date/Time    HBA1C 6.6 (A) 09/20/2022 09:13 AM       Lab Results   Component Value Date/Time    MALBCRT 81 (H) 10/21/2021 09:03 AM    MICROALBUR 18.7 10/21/2021 09:03 AM       Echocardiogram 2020  Technically difficult study.  Hyperdynamic left ventricular systolic function. Left ventricular   ejection fraction is visually estimated to be greater than 75%.   Normal right ventricular size and systolic function.  No significant valvular abnormalities.  No prior study is available for comparison.       Medical Decision Making:  Today's Assessment / Status / Plan:     1. Dyslipidemia        2. Stage 3a chronic kidney disease (HCC)        3. Type 1 diabetes mellitus without complication (HCC)        4. Essential hypertension           Patient Type: Primary Prevention    Etiology of Established CVD if Present: None    Lipid Management:  Qualifies for Statin Therapy Based on 2018 ACC/AHA Guidelines: yes  Calculated 10-Year Risk of ASCVD: N/A  Currently on Statin: Yes  Goal LDL less than 100 non-HDL less than 130  Previously at goal  Plan:  -Continue atorvastatin 80 mg a day  -Recheck fasting lipid panel as ordered by PCP    Blood Pressure Management:  [facility] (2017) Blood Pressure Goal <130/80  Home readings under excellent control  Blood pressure elevated in the office consistent with possible whitecoat hypertension  No known evidence of endorgan damage  Plan:  -Continue amlodipine 5 mg daily  -Continue losartan 100 mg a day  -Continue home blood pressure monitoring  -Obtain ABPM  -Recheck GFR and electrolytes  -Consider addition of thiazide type diuretic if out of office blood pressure elevated on ABPM    Glycemic Status: Diabetic  Apparently late onset type I diabetic  Reasonable control by most recent A1c   rare hypoglycemia  Plan:  -Defer further management to PCP and endocrinology  -Continue current dose of insulin pending establishing with endocrinology  -Report any increase in frequency of hypoglycemia    Anti-Platelet/Anti-Coagulant Tx: yes  Reasonable to continue low-dose aspirin since she does tolerate it well and does have multiple cardiovascular risk factors    Smoking: continue complete avoidance of all tobacco products    Physical Activity: She is fairly limited by orthopedic issues but have encouraged her to exercise as much as possible    Weight Management and Nutrition: Continue good diabetic diet    Other:     -CKD, stage IIIa -likely due to diabetic and hypertensive nephrosclerosis.  Blood pressure control and ARB as above.  Recheck GFR and electrolytes and urine for albumin as ordered by PCP.  Avoid excess NSAIDs.  Will follow renal function over time    Instructed to follow-up with PCP for remainder of adult medical needs: yes  We will partner with other providers in the management of established vascular disease and  cardiometabolic risk factors.    Studies to Be Obtained: 24-hour ABPM  Labs to Be Obtained: CMP, lipid panel, TSH, urine for albumin as previously ordered by PCP    Follow up in: 6 months unless needed sooner based on ABPM or blood work results    Time: 30-39min - chart review/prep, review of other providers' records, imaging/lab review, face-to-face time for history/examination, ordering, prescribing,  review of results/meds/ treatment plan with patient/family/caregiver, documentation in EMR, care coordination (as needed)    Michael J Bloch, M.D.     Cc: LOURDES Penn

## 2022-11-04 ENCOUNTER — DOCUMENTATION (OUTPATIENT)
Dept: VASCULAR LAB | Facility: MEDICAL CENTER | Age: 72
End: 2022-11-04
Payer: MEDICARE

## 2022-11-05 ENCOUNTER — HOSPITAL ENCOUNTER (OUTPATIENT)
Dept: LAB | Facility: MEDICAL CENTER | Age: 72
End: 2022-11-05
Attending: NURSE PRACTITIONER
Payer: MEDICARE

## 2022-11-05 DIAGNOSIS — I10 ESSENTIAL HYPERTENSION: ICD-10-CM

## 2022-11-05 DIAGNOSIS — E10.9 TYPE 1 DIABETES MELLITUS WITHOUT COMPLICATION (HCC): ICD-10-CM

## 2022-11-05 LAB
ALBUMIN SERPL BCP-MCNC: 4.3 G/DL (ref 3.2–4.9)
ALBUMIN/GLOB SERPL: 1.5 G/DL
ALP SERPL-CCNC: 171 U/L (ref 30–99)
ALT SERPL-CCNC: 22 U/L (ref 2–50)
ANION GAP SERPL CALC-SCNC: 10 MMOL/L (ref 7–16)
AST SERPL-CCNC: 23 U/L (ref 12–45)
BILIRUB SERPL-MCNC: 0.3 MG/DL (ref 0.1–1.5)
BUN SERPL-MCNC: 15 MG/DL (ref 8–22)
CALCIUM SERPL-MCNC: 10.9 MG/DL (ref 8.5–10.5)
CHLORIDE SERPL-SCNC: 101 MMOL/L (ref 96–112)
CHOLEST SERPL-MCNC: 161 MG/DL (ref 100–199)
CO2 SERPL-SCNC: 26 MMOL/L (ref 20–33)
CREAT SERPL-MCNC: 0.84 MG/DL (ref 0.5–1.4)
CREAT UR-MCNC: 109.88 MG/DL
FASTING STATUS PATIENT QL REPORTED: NORMAL
GFR SERPLBLD CREATININE-BSD FMLA CKD-EPI: 73 ML/MIN/1.73 M 2
GLOBULIN SER CALC-MCNC: 2.8 G/DL (ref 1.9–3.5)
GLUCOSE SERPL-MCNC: 230 MG/DL (ref 65–99)
HDLC SERPL-MCNC: 74 MG/DL
LDLC SERPL CALC-MCNC: 60 MG/DL
MICROALBUMIN UR-MCNC: <1.2 MG/DL
MICROALBUMIN/CREAT UR: NORMAL MG/G (ref 0–30)
POTASSIUM SERPL-SCNC: 4.1 MMOL/L (ref 3.6–5.5)
PROT SERPL-MCNC: 7.1 G/DL (ref 6–8.2)
SODIUM SERPL-SCNC: 137 MMOL/L (ref 135–145)
T4 FREE SERPL-MCNC: 1.06 NG/DL (ref 0.93–1.7)
TRIGL SERPL-MCNC: 134 MG/DL (ref 0–149)
TSH SERPL DL<=0.005 MIU/L-ACNC: 12.6 UIU/ML (ref 0.38–5.33)

## 2022-11-05 PROCEDURE — 36415 COLL VENOUS BLD VENIPUNCTURE: CPT

## 2022-11-05 PROCEDURE — 80061 LIPID PANEL: CPT

## 2022-11-05 PROCEDURE — 84439 ASSAY OF FREE THYROXINE: CPT

## 2022-11-05 PROCEDURE — 84443 ASSAY THYROID STIM HORMONE: CPT

## 2022-11-05 PROCEDURE — 82043 UR ALBUMIN QUANTITATIVE: CPT

## 2022-11-05 PROCEDURE — 82570 ASSAY OF URINE CREATININE: CPT

## 2022-11-05 PROCEDURE — 80053 COMPREHEN METABOLIC PANEL: CPT

## 2022-11-15 ENCOUNTER — TELEMEDICINE (OUTPATIENT)
Dept: MEDICAL GROUP | Facility: PHYSICIAN GROUP | Age: 72
End: 2022-11-15
Payer: MEDICARE

## 2022-11-15 VITALS — TEMPERATURE: 97.5 F | HEIGHT: 65 IN | WEIGHT: 154 LBS | BODY MASS INDEX: 25.66 KG/M2

## 2022-11-15 DIAGNOSIS — R74.8 ELEVATED ALKALINE PHOSPHATASE LEVEL: ICD-10-CM

## 2022-11-15 DIAGNOSIS — M85.852 OSTEOPENIA OF LEFT THIGH: ICD-10-CM

## 2022-11-15 DIAGNOSIS — E03.8 OTHER SPECIFIED HYPOTHYROIDISM: ICD-10-CM

## 2022-11-15 DIAGNOSIS — E83.52 HYPERCALCEMIA: ICD-10-CM

## 2022-11-15 PROBLEM — R79.89 ELEVATED TSH: Status: ACTIVE | Noted: 2022-11-15

## 2022-11-15 PROCEDURE — 99214 OFFICE O/P EST MOD 30 MIN: CPT | Mod: 95 | Performed by: NURSE PRACTITIONER

## 2022-11-15 RX ORDER — LITHIUM CARBONATE 300 MG
300 TABLET ORAL 2 TIMES DAILY
COMMUNITY
Start: 2022-10-12 | End: 2024-02-09

## 2022-11-15 RX ORDER — LEVOTHYROXINE SODIUM 0.03 MG/1
25 TABLET ORAL
Qty: 30 TABLET | Refills: 2 | Status: SHIPPED | OUTPATIENT
Start: 2022-11-15 | End: 2023-01-09 | Stop reason: SDUPTHER

## 2022-11-15 NOTE — ASSESSMENT & PLAN NOTE
She has had persistent elevation in alkaline phosphatase level and recent labs show further increase.  No ultrasound on file.  Denies any right upper quadrant pain, nausea, vomiting or jaundice.

## 2022-11-15 NOTE — ASSESSMENT & PLAN NOTE
DEXA scan in 12/2021 +osteopenia of left femur. Patient taking calcium 600 mg daily. Recent labs show slightly elevated calcium and previous labs have been WNL. TSH elevated with recent labs.

## 2022-11-15 NOTE — PROGRESS NOTES
Virtual Visit: Established Patient   This visit was conducted via Zoom using secure and encrypted videoconferencing technology.   The patient was in their home in the state [facility].    The patient's identity was confirmed and verbal consent was obtained for this virtual visit.     Subjective:   CC:   Chief Complaint   Patient presents with    Lab Results       Molly Loera is a 72 y.o. female presenting for evaluation and management of:    Other specified hypothyroidism  Recent labs show elevated TSH. She is not taking any medication. She has noticed 10 pound weight gain in the last year and morning fatigue. She has chronic constipation. Denies increased hair loss. Denies family history of thyroid disease.  History of type 1 diabetes.    Osteopenia of left thigh  DEXA scan in 12/2021 +osteopenia of left femur. Patient taking calcium 600 mg daily. Recent labs show slightly elevated calcium and previous labs have been WNL. TSH elevated with recent labs.     Elevated alkaline phosphatase level  She has had persistent elevation in alkaline phosphatase level and recent labs show further increase.  No ultrasound on file.  Denies any right upper quadrant pain, nausea, vomiting or jaundice.      ROS   Per HPI    Current medicines (including changes today)  Current Outpatient Medications   Medication Sig Dispense Refill    levothyroxine (SYNTHROID) 25 MCG Tab Take 1 Tablet by mouth every morning on an empty stomach. 30 Tablet 2    insulin aspart (NOVOLOG FLEXPEN) 100 UNIT/ML injection PEN INJECT 12 UNITS TOTAL UNDER THE SKIN THREE (3) TIMES DAILY BEFORE MEALS. Keep upcoming endocrinology appointment. 15 mL 1    insulin glargine 100 UNIT/ML Solution Pen-injector injection INJECT 10 UNITS EVERY MORNING AND 14 UNITS EVERY NIGHT. PLEASE SCHEDULE APPOINTMENT WITH ENDOCRINOLOGY. 15 Each 1    aspirin (ASPIRIN LOW DOSE) 81 MG EC tablet Take 1 Tablet by mouth every day. 90 Tablet 1    amLODIPine (NORVASC) 5 MG Tab TAKE 1  TABLET BY MOUTH EVERY DAY 90 Tablet 1    primidone (MYSOLINE) 50 MG Tab TAKE 1 TABLET BY MOUTH TWICE A  Tablet 1    anastrozole (ARIMIDEX) 1 MG Tab Take 1 Tablet by mouth every evening. Needs to establish with cancer specialist. 90 Tablet 1    atorvastatin (LIPITOR) 80 MG tablet TAKE 1 TABLET BY MOUTH EVERY DAY 90 Tablet 2    losartan (COZAAR) 100 MG Tab Take 1 Tablet by mouth every day. 90 Tablet 2    timolol (TIMOPTIC) 0.5 % Solution INSTILL 1 DROP IN BOTH EYES DAILY      lamoTRIgine (LAMICTAL) 100 MG Tab TAKE 1 TABLET BY MOUTH TWICE A DAY AS DIRECTED      Blood Glucose Meter Kit Test blood sugar as recommended by provider. 1 blood glucose monitoring kit-- DEXCOM 1 Kit 0    Blood Glucose Test Strips Use one testing unit every 10 days to test blood sugar. DEXCOM meter 3 Strip 3    QUEtiapine (SEROQUEL) 100 MG Tab Take 1 Tab by mouth 2 times a day. Takes with quetiapine 200 mg at bedtime (Patient taking differently: Take 1 Tablet by mouth 3 times a day.) 60 Tab 1    carbamazepine SR (TEGRETOL XR) 200 MG TABLET SR 12 HR extended-release tablet TAKE 1 TABLET BY MOUTH TWICE A DAY      lithium carbonate 300 MG capsule Take 1 Capsule by mouth.      hydrOXYzine pamoate (VISTARIL) 50 MG Cap TAKE 1 CAPSULE BY MOUTH 3 TIMES A DAY AS NEEDED FOR 30 DAYS      Cariprazine HCl 6 MG Cap Take 6 mg by mouth every evening.       No current facility-administered medications for this visit.       Patient Active Problem List    Diagnosis Date Noted    Other specified hypothyroidism 11/15/2022    Osteopenia of left thigh 11/15/2022    Elevated alkaline phosphatase level 11/15/2022    Malignant neoplasm of left female breast (HCC) 02/27/2020    Stage 3 chronic kidney disease (HCC) 02/27/2020    Bipolar 1 disorder (HCC) 09/23/2019    Type 1 diabetes mellitus without complication (HCC) 09/23/2019    Generalized anxiety disorder 09/23/2019    Essential hypertension 09/23/2019    Dyslipidemia 09/23/2019        Objective:   Temp 36.4 °C  "(97.5 °F) (Temporal)   Ht 1.651 m (5' 5\")   Wt 69.9 kg (154 lb)   BMI 25.63 kg/m²   Vital signs reviewed    Physical Exam:  Constitutional: Alert, no distress, well-groomed. Daughter Rut present.   Skin: No rashes in visible areas.  Eye: Round. Conjunctiva clear, lids normal. No icterus. Good eye contact.  ENMT: Lips pink without lesions, good dentition, moist mucous membranes. Phonation normal.  Neck: No masses, no thyromegaly. Moves freely without pain.  Respiratory: Unlabored respiratory effort, no cough or audible wheeze  Psych: Alert and oriented x3, normal affect and mood.     Assessment and Plan:   The following treatment plan was discussed:     1. Other specified hypothyroidism  Acute uncomplicated problem.  New problem to examiner.  Recent labs show elevated TSH and along with her symptoms of fatigue and weight gain she is in agreement to start low-dose levothyroxine.  We will start her on levothyroxine 25 mcg daily on an empty stomach.  We discussed administration.  She does take a calcium supplement in the evening.  Plan to recheck labs in 4 weeks.  Patient daughter states that she will schedule follow-up appointment with me.  - levothyroxine (SYNTHROID) 25 MCG Tab; Take 1 Tablet by mouth every morning on an empty stomach.  Dispense: 30 Tablet; Refill: 2  - TSH; Future  - FREE THYROXINE; Future  - TRIIDOTHYRONINE; Future  - THYROID PEROXIDASE  (TPO) AB; Future    2. Elevated alkaline phosphatase level  Chronic exacerbated problem.  Recent labs show further elevation alkaline phosphatase.  She is not having any symptoms.  We did discuss ultrasound versus referral to GI and she like to proceed with ultrasound at this time.  I did provide her daughter with the telephone number to schedule ultrasound.  Recheck labs in 1 month with her thyroid.  Labs are nonfasting.  - US-RUQ; Future  - Comp Metabolic Panel; Future    3. Hypercalcemia  Acute uncomplicated problem.  New problem to examiner.  Previous " calcium levels have been at goal with her calcium 600 mg daily.  Suspect elevation related to elevated TSH.  Starting levothyroxine and plan to recheck calcium in 1 month.  - PTH WITH CALCIUM; Future  - Comp Metabolic Panel; Future    4. Osteopenia of left thigh  Chronic stable problem.  Continue with calcium supplement at this time.  Continue with weightbearing exercises.        Follow-up: Return in about 5 weeks (around 12/20/2022) for Labs.    Educated in proper administration of medication(s) ordered today including safety, possible SE, risks, benefits, rationale and alternatives to therapy.     Please note that this dictation was created using voice recognition software. I have made every reasonable attempt to correct obvious errors, but I expect that there are errors of grammar and possibly content that I did not discover before finalizing the note.

## 2022-11-15 NOTE — ASSESSMENT & PLAN NOTE
Recent labs show elevated TSH. She is not taking any medication. She has noticed 10 pound weight gain in the last year and morning fatigue. She has chronic constipation. Denies increased hair loss. Denies family history of thyroid disease.  History of type 1 diabetes.

## 2022-11-16 ENCOUNTER — OFFICE VISIT (OUTPATIENT)
Dept: ENDOCRINOLOGY | Facility: MEDICAL CENTER | Age: 72
End: 2022-11-16
Payer: MEDICARE

## 2022-11-16 VITALS
BODY MASS INDEX: 23.04 KG/M2 | HEIGHT: 68 IN | OXYGEN SATURATION: 98 % | HEART RATE: 119 BPM | SYSTOLIC BLOOD PRESSURE: 154 MMHG | DIASTOLIC BLOOD PRESSURE: 76 MMHG | WEIGHT: 152 LBS

## 2022-11-16 DIAGNOSIS — E11.9 TYPE 2 DIABETES MELLITUS TREATED WITH INSULIN (HCC): ICD-10-CM

## 2022-11-16 DIAGNOSIS — E78.49 OTHER HYPERLIPIDEMIA: ICD-10-CM

## 2022-11-16 DIAGNOSIS — I10 ESSENTIAL HYPERTENSION: ICD-10-CM

## 2022-11-16 DIAGNOSIS — E03.9 HYPOTHYROIDISM, ACQUIRED: ICD-10-CM

## 2022-11-16 DIAGNOSIS — E55.9 VITAMIN D DEFICIENCY: ICD-10-CM

## 2022-11-16 DIAGNOSIS — Z79.4 TYPE 2 DIABETES MELLITUS TREATED WITH INSULIN (HCC): ICD-10-CM

## 2022-11-16 PROCEDURE — 99212 OFFICE O/P EST SF 10 MIN: CPT

## 2022-11-16 PROCEDURE — 99204 OFFICE O/P NEW MOD 45 MIN: CPT

## 2022-11-16 NOTE — PROGRESS NOTES
"Chief Complaint:  Consult requested by STACIE Paz for initial evaluation of the following conditions    HPI:   Molly Loera is a 72 y.o. female     Type 2 diabetes mellitus treated with insulin:   Diagnosed 20 years ago   She denies hospitalizations for DKA in the past.    She monitors blood glucose  6 times per day.   Blood glucose values range: Average BG at 150, fasting between            POC A1c on 9/20/2022 it was 6.6%   POC A1c on 5/14/2020 was 6.9%    Medication regimen:  Glargine 10-12-hypoglycemia with 12, she began taking 10 units since last night  Novolog 10 with each meal, 7 with breakfast and lunch depending on blood sugars.   1:10  carb ratioin, correction factor 1:50 > 50      She reports hypoglycemic episodes occurring in the mornings occassionally   She  reports hypoglycemic unawareness.   She denies episodes of severe hypoglycemia requiring third party assistance.    She  is not wearing a medical alert bracelet or necklace.    She does not a glucagon emergency kit.    She reports attending diabetes education classes-for the past years.   Diet: \"healthy\" diet  in general.    Diabetes Complications   She  denies history of retinopathy.    She denies laser eye surgery.   Last eye exam: October 2022. For Glaucoma  She denies history of peripheral sensory neuropathy.    She denies numbness, tingling in both feet.    She denies history of foot sores.   She denies history of kidney damage.    She is on ACE inhibitor or ARB.   She denies history of coronary artery disease.    She  denies history of stroke and denies TIA.    She denies history of PAD.  She reports history of hyperlipidemia.    Currently taking losartan 100 mg daily  No microalbuminuria     Latest Reference Range & Units 11/05/22 08:16   Micro Alb Creat Ratio 0 - 30 mg/g see below   Creatinine, Urine mg/dL 109.88   Microalbumin, Urine Random mg/dL <1.2     2.  Essential hypertension:  FV by Cardiologist Dr." Block. Last appt 2 weeks ago w/o changes on her medications. Holter study in a few weeks.   Currently taking amlodipine 5 mg daily, losartan 100 mg daily  BP at 154/76     3.  Other hyperlipidemia:  Currently taking atorvastatin 80 mg daily  Denies muscle aches    Latest Reference Range & Units 11/05/22 08:16   Cholesterol,Tot 100 - 199 mg/dL 161   Triglycerides 0 - 149 mg/dL 134   HDL >=40 mg/dL 74   LDL <100 mg/dL 60     4.  Hypothyroidism, acquired:  Currently taking Synthroid 25 mcg daily  Denies biotoin containing products  Denies taking iron, antiacids  Reports taking calcium in the evening   Reports fatigue, constipation, dry skin, weight gain, losing hair   Latest Reference Range & Units 11/05/22 08:16   TSH 0.380 - 5.330 uIU/mL 12.600 (H)   Free T-4 0.93 - 1.70 ng/dL 1.06     5.  Vitamin D deficiency:  Currently taking  D3-20 mcg daily with her calcium  I will evaluate vitamin D levels    ROS:     CONS:     No fever, no chills, no weight loss, no fatigue   EYES:      No diplopia, no blurry vision, no redness of eyes, no swelling of eyelids   ENT:    No hearing loss, No ear pain, No sore throat, no dysphagia, no neck swelling   CV:     No chest pain, no palpitations, no claudication, no orthopnea, no PND   PULM:    No SOB, no cough, no hemoptysis, no wheezing    GI:   No nausea, no vomiting, no diarrhea, no constipation, no bloody stools   :  Passing urine well, no dysuria, no hematuria   ENDO:   No polyuria, no polydipsia, no heat intolerance, no cold intolerance   NEURO: No headaches, no dizziness, no convulsions, no tremors   MUSC:  No joint swellings, no arthralgias, no myalgias, no weakness   SKIN:   No rash, no ulcers, no dry skin   PSYCH:   No depression, no anxiety, no difficulty sleeping       Past Medical History:  Patient Active Problem List    Diagnosis Date Noted    Other specified hypothyroidism 11/15/2022    Osteopenia of left thigh 11/15/2022    Elevated alkaline phosphatase level  11/15/2022    Malignant neoplasm of left female breast (HCC) 02/27/2020    Stage 3 chronic kidney disease (HCC) 02/27/2020    Bipolar 1 disorder (HCC) 09/23/2019    Type 1 diabetes mellitus without complication (HCC) 09/23/2019    Generalized anxiety disorder 09/23/2019    Essential hypertension 09/23/2019    Dyslipidemia 09/23/2019       Past Surgical History:  No past surgical history on file.     Allergies:  Depakote [valproic acid]     Current Medications:    Current Outpatient Medications:     levothyroxine (SYNTHROID) 25 MCG Tab, Take 1 Tablet by mouth every morning on an empty stomach., Disp: 30 Tablet, Rfl: 2    lithium carbonate, IR, 300 MG Tab tablet, Take 1 Tablet by mouth 2 times a day., Disp: , Rfl:     insulin aspart (NOVOLOG FLEXPEN) 100 UNIT/ML injection PEN, INJECT 12 UNITS TOTAL UNDER THE SKIN THREE (3) TIMES DAILY BEFORE MEALS. Keep upcoming endocrinology appointment., Disp: 15 mL, Rfl: 1    insulin glargine 100 UNIT/ML Solution Pen-injector injection, INJECT 10 UNITS EVERY MORNING AND 14 UNITS EVERY NIGHT. PLEASE SCHEDULE APPOINTMENT WITH ENDOCRINOLOGY., Disp: 15 Each, Rfl: 1    aspirin (ASPIRIN LOW DOSE) 81 MG EC tablet, Take 1 Tablet by mouth every day., Disp: 90 Tablet, Rfl: 1    amLODIPine (NORVASC) 5 MG Tab, TAKE 1 TABLET BY MOUTH EVERY DAY, Disp: 90 Tablet, Rfl: 1    primidone (MYSOLINE) 50 MG Tab, TAKE 1 TABLET BY MOUTH TWICE A DAY, Disp: 180 Tablet, Rfl: 1    anastrozole (ARIMIDEX) 1 MG Tab, Take 1 Tablet by mouth every evening. Needs to establish with cancer specialist., Disp: 90 Tablet, Rfl: 1    atorvastatin (LIPITOR) 80 MG tablet, TAKE 1 TABLET BY MOUTH EVERY DAY, Disp: 90 Tablet, Rfl: 2    losartan (COZAAR) 100 MG Tab, Take 1 Tablet by mouth every day., Disp: 90 Tablet, Rfl: 2    timolol (TIMOPTIC) 0.5 % Solution, INSTILL 1 DROP IN BOTH EYES DAILY, Disp: , Rfl:     lamoTRIgine (LAMICTAL) 100 MG Tab, TAKE 1 TABLET BY MOUTH TWICE A DAY AS DIRECTED, Disp: , Rfl:     Blood Glucose Meter  "Kit, Test blood sugar as recommended by provider. 1 blood glucose monitoring kit-- DEXCOM, Disp: 1 Kit, Rfl: 0    Blood Glucose Test Strips, Use one testing unit every 10 days to test blood sugar. DEXCOM meter, Disp: 3 Strip, Rfl: 3    QUEtiapine (SEROQUEL) 100 MG Tab, Take 1 Tab by mouth 2 times a day. Takes with quetiapine 200 mg at bedtime (Patient taking differently: Take 1 Tablet by mouth 3 times a day.), Disp: 60 Tab, Rfl: 1    carbamazepine SR (TEGRETOL XR) 200 MG TABLET SR 12 HR extended-release tablet, TAKE 1 TABLET BY MOUTH TWICE A DAY, Disp: , Rfl:     hydrOXYzine pamoate (VISTARIL) 50 MG Cap, TAKE 1 CAPSULE BY MOUTH 3 TIMES A DAY AS NEEDED FOR 30 DAYS, Disp: , Rfl:     Cariprazine HCl 6 MG Cap, Take 6 mg by mouth every evening., Disp: , Rfl:     Social History:  Social History     Socioeconomic History    Marital status:      Spouse name: Not on file    Number of children: Not on file    Years of education: Not on file    Highest education level: Not on file   Occupational History    Not on file   Tobacco Use    Smoking status: Never    Smokeless tobacco: Never   Vaping Use    Vaping Use: Never used   Substance and Sexual Activity    Alcohol use: Never    Drug use: Never    Sexual activity: Not Currently   Other Topics Concern    Not on file   Social History Narrative    Not on file     Social Determinants of Health     Financial Resource Strain: Not on file   Food Insecurity: Not on file   Transportation Needs: Not on file   Physical Activity: Not on file   Stress: Not on file   Social Connections: Not on file   Intimate Partner Violence: Not on file   Housing Stability: Not on file        Family History:   Family History   Problem Relation Age of Onset    Diabetes Daughter        PHYSICAL EXAM:   Vital signs: BP (!) 154/76 (BP Location: Left arm, Patient Position: Sitting)   Pulse (!) 119   Ht 1.727 m (5' 8\")   Wt 68.9 kg (152 lb)   SpO2 98%   BMI 23.11 kg/m²   GENERAL: Well-developed, " well-nourished  in no apparent distress.   EYE: No ocular and eyelid asymmetry, Anicteric sclerae,  PERRL, No exophthalmos or lidlag  HENT: Hearing grossly intact, Normocephalic, atraumatic.   NECK: Supple. Trachea midline. thyroid is normal in size without nodules or tenderness  CARDIOVASCULAR: Regular rate and rhythm. No murmurs, rubs, or gallops.   LUNGS: Clear to auscultation bilaterally    EXTREMITIES: No clubbing, cyanosis, or edema.   NEUROLOGICAL: Cranial nerves II-XII are grossly intact   Symmetric reflexes at the patella no proximal muscle weakness, No visible tremor with both outstretched hands  LYMPH: No cervical, supraclavicular,  adenopathy palpated.   SKIN: No rashes, lesions. Turgor is normal.  FOOT: Normal sensation to monofilament testing, normal pulses, no ulcers.  Normal Vibration quantitative sensation test.    Labs:  Lab Results   Component Value Date/Time    HBA1C 6.6 (A) 09/20/2022 0913    AVGLUC 111 10/21/2021 0903       Lab Results   Component Value Date/Time    CHOLSTRLTOT 161 11/05/2022 0816    TRIGLYCERIDE 134 11/05/2022 0816    HDL 74 11/05/2022 0816    LDL 60 11/05/2022 0816       Lab Results   Component Value Date/Time    MALBCRT see below 11/05/2022 08:16 AM    MICROALBUR <1.2 11/05/2022 08:16 AM        Lab Results   Component Value Date/Time    TSHULTRASEN 12.600 (H) 11/05/2022 0816         ASSESSMENT/PLAN:   1. Type 2 diabetes mellitus treated with insulin ([facility])  Controlled with an A1c of 6.6%  Lifestyle modifications discussed    Medication regimen:  Glargine 10-12-hypoglycemia with 12, she began taking 10 units since last night-continue taking 10 to 11 units nightly  Novolog 10 with each meal, 7 with breakfast and lunch depending on blood sugars.  Change to a carb ratio of 1:10 with a correction factor of 1: 50 >150    - MICROALBUMIN CREAT RATIO URINE; Future  - MICHELLE-65; Future  - C-PEPTIDE; Future  - IA-2 AUTOANTIBODIES    2. Essential hypertension  Stable  Continue  regimen-HPI    3. Other hyperlipidemia  Stable  Continue regimen-HPI  - Lipid Profile; Future    4. Hypothyroidism, acquired  Clinically unstable  Biochemically unstable  Newly diagnosed, prescription was sent to her pharmacy and she has not picked it up yet  Discussed to  her prescription and begin taking it every morning on empty stomach  Discussed that iron, calcium, and antiacid need to be taken 4 hours prior to the medication  Discussed with Stop biotin and all biotin containing products 5 days before thyroid blood work    - FREE THYROXINE; Future  - TSH; Future  - THYROID PEROXIDASE  (TPO) AB; Future  - ANTITHYROGLOBULIN AB; Future    Ultrasound ordered for evaluation    5. Vitamin D deficiency  I will evaluate levels  - VITAMIN D,25 HYDROXY (DEFICIENCY); Future       Disposition: Make an appointment to follow-up in 4 months  Do your blood work 1 to 2 weeks prior to next appointment  Schedule thyroid ultrasound as soon as possible    Thank you kindly for allowing me to participate in the diabetes care plan for this patient.    STACIE Mohan  11/16/22    CC:   STACIE Paz

## 2022-11-16 NOTE — PROGRESS NOTES
"RN-CDE Note    Subjective:   Endocrinology Clinic Progress Note  PCP: STACIE Paz    HPI:  Molly Loera is a 72 y.o. old patient who is seen today by the Diabetes Nurse Specialist for review of her type 1 diabetes.  She is establishing care here at [facility] today   DM:   Last A1c:   Lab Results   Component Value Date/Time    HBA1C 6.6 (A) 09/20/2022 09:13 AM      Previous A1c was 6.9 on 5/4/22  A1C GOAL: < 7    Diabetes Medications:   Basaglar 10 units in the morning and 12 units at hs.   Novolog using between 7-10 units at breakfast and lunch and 10 units with dinner      Exercise: no regular exercise, sedentary  Diet: \"healthy\" diet  in general  Patient's body mass index is 23.11 kg/m². Exercise and nutrition counseling were performed at this visit.    Glucose monitoring frequency: testing 4-5 times per day    Hypoglycemic episodes: yes - states she is having frequent low blood sugars in the morning.   Last Retinal Exam: on file and up-to-date    Foot Exam:  Monofilament testing with a 10 gram force: sensation intact: intact bilaterally  Visual Inspection: Feet without maceration, ulcers, fissures.  Pedal pulses: intact bilaterally    Lab Results   Component Value Date/Time    MALBCRT see below 11/05/2022 08:16 AM    MICROALBUR <1.2 11/05/2022 08:16 AM        ACR Albumin/Creatinine Ratio goal <30     HTN:   Blood pressure goal <140/<80 .   Currently Rx ACE/ARB: Yes  Losartan 100 mg daily    Dyslipidemia:    Lab Results   Component Value Date/Time    CHOLSTRLTOT 161 11/05/2022 08:16 AM    LDL 60 11/05/2022 08:16 AM    HDL 74 11/05/2022 08:16 AM    TRIGLYCERIDE 134 11/05/2022 08:16 AM         Currently Rx Statin: Yes  Atorvastatin 80 mg daily    She  reports that she has never smoked. She has never used smokeless tobacco.      Plan:     Discussed and educated on:   - All medications, side effects and compliance (discussed carefully)  - Annual eye examinations at Ophthalmology  - " Foot Care:   - HbA1C:   - Home glucose monitoring emphasized  - Weight control and daily exercise

## 2022-11-17 DIAGNOSIS — I15.2 HYPERTENSION ASSOCIATED WITH DIABETES (HCC): ICD-10-CM

## 2022-11-17 DIAGNOSIS — E11.59 HYPERTENSION ASSOCIATED WITH DIABETES (HCC): ICD-10-CM

## 2022-11-17 DIAGNOSIS — E10.9 TYPE 1 DIABETES MELLITUS WITHOUT COMPLICATION (HCC): ICD-10-CM

## 2022-11-17 DIAGNOSIS — C50.912 MALIGNANT NEOPLASM OF LEFT FEMALE BREAST, UNSPECIFIED ESTROGEN RECEPTOR STATUS, UNSPECIFIED SITE OF BREAST (HCC): ICD-10-CM

## 2022-11-21 RX ORDER — ANASTROZOLE 1 MG/1
1 TABLET ORAL EVERY EVENING
Qty: 90 TABLET | Refills: 0 | Status: SHIPPED | OUTPATIENT
Start: 2022-11-21 | End: 2023-02-01

## 2022-11-21 RX ORDER — AMLODIPINE BESYLATE 5 MG/1
TABLET ORAL
Qty: 90 TABLET | Refills: 1 | Status: SHIPPED | OUTPATIENT
Start: 2022-11-21 | End: 2023-06-01

## 2022-11-21 RX ORDER — INSULIN GLARGINE 100 [IU]/ML
INJECTION, SOLUTION SUBCUTANEOUS
Qty: 15 EACH | Refills: 1 | Status: SHIPPED | OUTPATIENT
Start: 2022-11-21 | End: 2024-02-09

## 2022-11-21 RX ORDER — INSULIN ASPART 100 [IU]/ML
INJECTION, SOLUTION INTRAVENOUS; SUBCUTANEOUS
Qty: 15 EACH | Refills: 1 | Status: SHIPPED | OUTPATIENT
Start: 2022-11-21 | End: 2023-03-27 | Stop reason: SDUPTHER

## 2022-11-22 NOTE — TELEPHONE ENCOUNTER
Requested Prescriptions     Signed Prescriptions Disp Refills    anastrozole (ARIMIDEX) 1 MG Tab 90 Tablet 0     Sig: TAKE 1 TABLET BY MOUTH EVERY EVENING. NEEDS TO ESTABLISH WITH CANCER SPECIALIST.     Authorizing Provider: RJ LAM    insulin glargine 100 UNIT/ML Solution Pen-injector injection 15 Each 1     Sig: INJECT 10 UNITS EVERY MORNING AND 10-11 UNITS EVERY NIGHT.     Authorizing Provider: RJ LAM    insulin aspart (NOVOLOG FLEXPEN) 100 UNIT/ML injection PEN 15 Each 1     Sig: INJECT 7-10 UNITS TOTAL UNDER THE SKIN 3 TIMES DAILY BEFORE MEALS.     Authorizing Provider: RJ LAM    amLODIPine (NORVASC) 5 MG Tab 90 Tablet 1     Sig: TAKE 1 TABLET BY MOUTH EVERY DAY     Authorizing Provider: RJ LAM A.P.R.N.

## 2022-12-02 ENCOUNTER — HOSPITAL ENCOUNTER (OUTPATIENT)
Dept: HEMATOLOGY ONCOLOGY | Facility: MEDICAL CENTER | Age: 72
End: 2022-12-02
Attending: INTERNAL MEDICINE
Payer: MEDICARE

## 2022-12-02 VITALS
OXYGEN SATURATION: 98 % | HEART RATE: 113 BPM | TEMPERATURE: 97.4 F | HEIGHT: 68 IN | BODY MASS INDEX: 23.52 KG/M2 | DIASTOLIC BLOOD PRESSURE: 90 MMHG | RESPIRATION RATE: 18 BRPM | SYSTOLIC BLOOD PRESSURE: 140 MMHG | WEIGHT: 155.2 LBS

## 2022-12-02 DIAGNOSIS — C50.912 MALIGNANT NEOPLASM OF LEFT FEMALE BREAST, UNSPECIFIED ESTROGEN RECEPTOR STATUS, UNSPECIFIED SITE OF BREAST (HCC): ICD-10-CM

## 2022-12-02 PROCEDURE — 99203 OFFICE O/P NEW LOW 30 MIN: CPT | Performed by: INTERNAL MEDICINE

## 2022-12-02 PROCEDURE — 99212 OFFICE O/P EST SF 10 MIN: CPT | Performed by: INTERNAL MEDICINE

## 2022-12-02 ASSESSMENT — ENCOUNTER SYMPTOMS
NEUROLOGICAL NEGATIVE: 1
GASTROINTESTINAL NEGATIVE: 1
MUSCULOSKELETAL NEGATIVE: 1
PSYCHIATRIC NEGATIVE: 1
EYES NEGATIVE: 1
CONSTITUTIONAL NEGATIVE: 1
RESPIRATORY NEGATIVE: 1
CARDIOVASCULAR NEGATIVE: 1

## 2022-12-02 ASSESSMENT — PAIN SCALES - GENERAL: PAINLEVEL: NO PAIN

## 2022-12-03 NOTE — PROGRESS NOTES
Consult:  Hematology/Oncology      Referring Physician:  STACIE Paz  Primary Care:  STACIE Paz    Diagnosis: HER2 positive ER positive left breast cancer    Chief Complaint: HER2 positive ER positive left breast cancer    History of Presenting Illness:  Molly Loera is a 72 y.o. female who was found to have a carcinoma of the left breast in 2017.  In June 2017 she had a left mastectomy.  Pathology demonstrated a 7.2 cm invasive lobular carcinoma, grade 3, with extensive LCIS.  The invasive tumor was ER +95%, MD +5%, HER2 3+ by IHC and FISH positive.  She received adjuvant TCH x6 followed by HP x6 cycles then age alone to complete a year and 718.  She started anastrozole in February 2018 and has tolerated it extremely well.  She has no hot flashes or night sweats and no musculoskeletal symptoms.  She moved to [facility] ago and her primary care provider suggested medical oncology follow-up.  Has not had a mammogram in over a year.  She did have reconstruction, delayed of her left breast and she is not satisfied with the asthmatic result.  She would consider seeing another plastic surgeon for revision in the future.      Past Medical History:   Diagnosis Date    Anxiety     Bipolar 1 disorder (HCC)     Cancer (HCC)     left breast    Depression     Diabetes (HCC)     Kidney disease        History reviewed. No pertinent surgical history.    Social History     Tobacco Use    Smoking status: Never    Smokeless tobacco: Never   Vaping Use    Vaping Use: Never used   Substance Use Topics    Alcohol use: Never    Drug use: Never        Family History   Problem Relation Age of Onset    Diabetes Daughter        Allergies as of 12/02/2022 - Reviewed 12/02/2022   Allergen Reaction Noted    Depakote [valproic acid]  09/23/2019         Current Outpatient Medications:     anastrozole (ARIMIDEX) 1 MG Tab, TAKE 1 TABLET BY MOUTH EVERY EVENING. NEEDS TO ESTABLISH WITH CANCER SPECIALIST.,  Disp: 90 Tablet, Rfl: 0    insulin glargine 100 UNIT/ML Solution Pen-injector injection, INJECT 10 UNITS EVERY MORNING AND 10-11 UNITS EVERY NIGHT., Disp: 15 Each, Rfl: 1    insulin aspart (NOVOLOG FLEXPEN) 100 UNIT/ML injection PEN, INJECT 7-10 UNITS TOTAL UNDER THE SKIN 3 TIMES DAILY BEFORE MEALS., Disp: 15 Each, Rfl: 1    amLODIPine (NORVASC) 5 MG Tab, TAKE 1 TABLET BY MOUTH EVERY DAY, Disp: 90 Tablet, Rfl: 1    levothyroxine (SYNTHROID) 25 MCG Tab, Take 1 Tablet by mouth every morning on an empty stomach., Disp: 30 Tablet, Rfl: 2    lithium carbonate, IR, 300 MG Tab tablet, Take 1 Tablet by mouth 2 times a day., Disp: , Rfl:     aspirin (ASPIRIN LOW DOSE) 81 MG EC tablet, Take 1 Tablet by mouth every day., Disp: 90 Tablet, Rfl: 1    primidone (MYSOLINE) 50 MG Tab, TAKE 1 TABLET BY MOUTH TWICE A DAY, Disp: 180 Tablet, Rfl: 1    atorvastatin (LIPITOR) 80 MG tablet, TAKE 1 TABLET BY MOUTH EVERY DAY, Disp: 90 Tablet, Rfl: 2    losartan (COZAAR) 100 MG Tab, Take 1 Tablet by mouth every day., Disp: 90 Tablet, Rfl: 2    timolol (TIMOPTIC) 0.5 % Solution, INSTILL 1 DROP IN BOTH EYES DAILY, Disp: , Rfl:     lamoTRIgine (LAMICTAL) 100 MG Tab, TAKE 1 TABLET BY MOUTH TWICE A DAY AS DIRECTED, Disp: , Rfl:     Blood Glucose Meter Kit, Test blood sugar as recommended by provider. 1 blood glucose monitoring kit-- DEXCOM, Disp: 1 Kit, Rfl: 0    Blood Glucose Test Strips, Use one testing unit every 10 days to test blood sugar. DEXCOM meter, Disp: 3 Strip, Rfl: 3    QUEtiapine (SEROQUEL) 100 MG Tab, Take 1 Tab by mouth 2 times a day. Takes with quetiapine 200 mg at bedtime (Patient taking differently: Take 100 mg by mouth 3 times a day.), Disp: 60 Tab, Rfl: 1    carbamazepine SR (TEGRETOL XR) 200 MG TABLET SR 12 HR extended-release tablet, TAKE 1 TABLET BY MOUTH TWICE A DAY, Disp: , Rfl:     hydrOXYzine pamoate (VISTARIL) 50 MG Cap, TAKE 1 CAPSULE BY MOUTH 3 TIMES A DAY AS NEEDED FOR 30 DAYS, Disp: , Rfl:     Cariprazine HCl 6 MG  "Cap, Take 6 mg by mouth every evening., Disp: , Rfl:     Review of Systems:  Review of Systems   Constitutional: Negative.    HENT: Negative.     Eyes: Negative.    Respiratory: Negative.     Cardiovascular: Negative.    Gastrointestinal: Negative.    Genitourinary: Negative.    Musculoskeletal: Negative.    Skin: Negative.    Neurological: Negative.    Endo/Heme/Allergies: Negative.    Psychiatric/Behavioral: Negative.          Physical Exam:  Vitals:    12/02/22 1324   BP: (!) 140/90   BP Location: Right arm   Patient Position: Sitting   BP Cuff Size: Adult   Pulse: (!) 113   Resp: 18   Temp: 36.3 °C (97.4 °F)   TempSrc: Temporal   SpO2: 98%   Weight: 70.4 kg (155 lb 3.3 oz)   Height: 1.727 m (5' 7.99\")       DESC; KARNOFSKY SCALE WITH ECOG EQUIVALENT: 100, Fully active, able to carry on all pre-disease performed without restriction (ECOG equivalent 0)    DISTRESS LEVEL: no apparent distress    Physical Exam  Constitutional:       Appearance: Normal appearance.   HENT:      Head: Normocephalic.      Mouth/Throat:      Mouth: Mucous membranes are moist.      Pharynx: Oropharynx is clear.   Eyes:      Extraocular Movements: Extraocular movements intact.      Conjunctiva/sclera: Conjunctivae normal.      Pupils: Pupils are equal, round, and reactive to light.   Cardiovascular:      Rate and Rhythm: Normal rate and regular rhythm.      Pulses: Normal pulses.   Pulmonary:      Effort: Pulmonary effort is normal.      Breath sounds: Normal breath sounds.   Chest:      Comments: Left mastectomy scar well-healed with fair cosmetic result after tissue implant reconstruction.  The right breast is without masses nipple discharge or tenderness.  No axillary adenopathy is noted bilaterally.  Abdominal:      General: Abdomen is flat. Bowel sounds are normal.      Palpations: Abdomen is soft. There is no mass.   Musculoskeletal:         General: Normal range of motion.   Skin:     General: Skin is warm.   Neurological:      " General: No focal deficit present.      Mental Status: She is alert and oriented to person, place, and time.   Psychiatric:         Mood and Affect: Mood normal.         Behavior: Behavior normal.        Labs:  Hospital Outpatient Visit on 11/05/2022   Component Date Value Ref Range Status    TSH 11/05/2022 12.600 (H)  0.380 - 5.330 uIU/mL Final    Comment: The 2011 American Thyroid Association (KHADRA) guidelines  recommended that the interpretation of thyroid function in  pregnancy be based on trimester specific reference ranges.    1st Trimester  0.100-2.500 mIU/L  2nd Trimester  0.200-3.000 mIU/L  3rd Trimester  0.300-3.500 mIU/L    These established reference ranges have not been validated  at [facility] [email or web address].      Creatinine, Urine 11/05/2022 109.88  mg/dL Final    Microalbumin, Urine Random 11/05/2022 <1.2  mg/dL Final    Micro Alb Creat Ratio 11/05/2022 see below  0 - 30 mg/g Final    Comment: Unable to calculate the microalbumin/creatinine ratio due to  the microalbumin result or the urine creatinine result being  outside the measurement range of the analyzer.      Sodium 11/05/2022 137  135 - 145 mmol/L Final    Potassium 11/05/2022 4.1  3.6 - 5.5 mmol/L Final    Chloride 11/05/2022 101  96 - 112 mmol/L Final    Co2 11/05/2022 26  20 - 33 mmol/L Final    Anion Gap 11/05/2022 10.0  7.0 - 16.0 Final    Glucose 11/05/2022 230 (H)  65 - 99 mg/dL Final    Bun 11/05/2022 15  8 - 22 mg/dL Final    Creatinine 11/05/2022 0.84  0.50 - 1.40 mg/dL Final    Calcium 11/05/2022 10.9 (H)  8.5 - 10.5 mg/dL Final    AST(SGOT) 11/05/2022 23  12 - 45 U/L Final    ALT(SGPT) 11/05/2022 22  2 - 50 U/L Final    Alkaline Phosphatase 11/05/2022 171 (H)  30 - 99 U/L Final    Total Bilirubin 11/05/2022 0.3  0.1 - 1.5 mg/dL Final    Albumin 11/05/2022 4.3  3.2 - 4.9 g/dL Final    Total Protein 11/05/2022 7.1  6.0 - 8.2 g/dL Final    Globulin 11/05/2022 2.8  1.9 - 3.5 g/dL Final    A-G Ratio 11/05/2022 1.5  g/dL Final     Cholesterol,Tot 11/05/2022 161  100 - 199 mg/dL Final    Triglycerides 11/05/2022 134  0 - 149 mg/dL Final    HDL 11/05/2022 74  >=40 mg/dL Final    LDL 11/05/2022 60  <100 mg/dL Final    Fasting Status 11/05/2022 Fasting   Final    GFR (CKD-EPI) 11/05/2022 73  >60 mL/min/1.73 m 2 Final    Comment: Estimated Glomerular Filtration Rate is calculated using  race neutral CKD-EPI 2021 equation per NKF-ASN recommendations.      Free T-4 11/05/2022 1.06  0.93 - 1.70 ng/dL Final       Imaging:   All listed images below have been independently reviewed by me. I agree with the findings as summarized below:    No results found.     Pathology:      Assessment & Plan:  1. Malignant neoplasm of left female breast, unspecified estrogen receptor status, unspecified site of breast (HCC)     1.  Invasive lobular carcinoma, grade 3, stage IIa (pT3, PN 0) ER +95% NM positive and 5%, HER2 3+, FISH positive, status post adjuvant chemotherapy and 1 year of anti-HER2 therapy.  On anastrozole since 2/18.  She is tolerating it very well.      Plan we are going to obtain a routine mammogram.  She will continue anastrozole to complete 7 years of therapy.  I will see her back in 6 months for routine follow-up.      Any questions and concerns raised by the patient were answered to the best of my ability. Thank you for allowing me to participate in the care for this patient. Please feel free to contact me for any questions or concerns.     Brain Harris M.D.

## 2022-12-09 ENCOUNTER — APPOINTMENT (OUTPATIENT)
Dept: RADIOLOGY | Facility: MEDICAL CENTER | Age: 72
End: 2022-12-09
Attending: NURSE PRACTITIONER
Payer: MEDICARE

## 2022-12-09 ENCOUNTER — APPOINTMENT (OUTPATIENT)
Dept: RADIOLOGY | Facility: MEDICAL CENTER | Age: 72
End: 2022-12-09
Payer: MEDICARE

## 2022-12-09 DIAGNOSIS — R74.8 ELEVATED ALKALINE PHOSPHATASE LEVEL: ICD-10-CM

## 2022-12-09 DIAGNOSIS — E03.9 HYPOTHYROIDISM, ACQUIRED: ICD-10-CM

## 2022-12-09 PROCEDURE — 76536 US EXAM OF HEAD AND NECK: CPT

## 2022-12-09 PROCEDURE — 76705 ECHO EXAM OF ABDOMEN: CPT

## 2022-12-12 DIAGNOSIS — R25.1 SHAKING: ICD-10-CM

## 2022-12-14 RX ORDER — PRIMIDONE 50 MG/1
TABLET ORAL
Qty: 180 TABLET | Refills: 1 | Status: SHIPPED | OUTPATIENT
Start: 2022-12-14 | End: 2023-06-01

## 2022-12-14 NOTE — TELEPHONE ENCOUNTER
Requested Prescriptions     Signed Prescriptions Disp Refills    primidone (MYSOLINE) 50 MG Tab 180 Tablet 1     Sig: TAKE 1 TABLET BY MOUTH TWICE A DAY     Authorizing Provider: RJ LAM A.P.R.N.

## 2022-12-19 ENCOUNTER — NON-PROVIDER VISIT (OUTPATIENT)
Dept: VASCULAR LAB | Facility: MEDICAL CENTER | Age: 72
End: 2022-12-19
Attending: INTERNAL MEDICINE
Payer: MEDICARE

## 2022-12-19 DIAGNOSIS — I10 ESSENTIAL HYPERTENSION: ICD-10-CM

## 2022-12-19 PROCEDURE — 93788 AMBL BP MNTR W/SW A/R: CPT

## 2022-12-19 PROCEDURE — 93790 AMBL BP MNTR W/SW I&R: CPT | Performed by: INTERNAL MEDICINE

## 2022-12-19 PROCEDURE — 93786 AMBL BP MNTR W/SW REC ONLY: CPT

## 2022-12-19 NOTE — NON-PROVIDER
ABPM placed at 0810. Pt to wear monitor until 0830 to ensure 24hrs of readings. Pt to return monitor tomorrow after 0830.

## 2022-12-20 NOTE — PROGRESS NOTES
Ambulatory blood pressure monitor results reviewed  Full report under media tab  Reasonable data acquisition    Mean daytime: 113/61 consistent with well-controlled out of office blood pressure    Nocturnal dip: Perhaps blunted    Clinical correlation needed.  Will discuss with patient at follow-up visit    Michael Bloch, MD  Vascular Care

## 2022-12-27 ENCOUNTER — TELEPHONE (OUTPATIENT)
Dept: VASCULAR LAB | Facility: MEDICAL CENTER | Age: 72
End: 2022-12-27
Payer: MEDICARE

## 2022-12-27 NOTE — TELEPHONE ENCOUNTER
Called and spoke to pt about the below. Pt acknowledged. Will continue current regimen and f/u as planned.     ----- Message from Michael J Bloch, M.D. sent at 12/20/2022  1:35 PM PST -----  Regarding: abpm  Please let patient know that her ABPM looked great.  Mean is 113/61

## 2023-01-06 DIAGNOSIS — E03.8 OTHER SPECIFIED HYPOTHYROIDISM: ICD-10-CM

## 2023-01-06 DIAGNOSIS — I10 ESSENTIAL HYPERTENSION: ICD-10-CM

## 2023-01-09 RX ORDER — LEVOTHYROXINE SODIUM 0.03 MG/1
25 TABLET ORAL
Qty: 30 TABLET | Refills: 2 | Status: SHIPPED | OUTPATIENT
Start: 2023-01-09 | End: 2023-02-01

## 2023-01-09 RX ORDER — LOSARTAN POTASSIUM 100 MG/1
100 TABLET ORAL DAILY
Qty: 90 TABLET | Refills: 2 | Status: SHIPPED | OUTPATIENT
Start: 2023-01-09 | End: 2023-10-02

## 2023-01-10 NOTE — TELEPHONE ENCOUNTER
Requested Prescriptions     Signed Prescriptions Disp Refills    losartan (COZAAR) 100 MG Tab 90 Tablet 2     Sig: TAKE 1 TABLET BY MOUTH EVERY DAY     Authorizing Provider: RJ LAM    levothyroxine (SYNTHROID) 25 MCG Tab 30 Tablet 2     Sig: Take 1 Tablet by mouth every morning on an empty stomach.     Authorizing Provider: RJ LAM A.P.R.N.

## 2023-01-31 DIAGNOSIS — E03.8 OTHER SPECIFIED HYPOTHYROIDISM: ICD-10-CM

## 2023-01-31 DIAGNOSIS — C50.912 MALIGNANT NEOPLASM OF LEFT FEMALE BREAST, UNSPECIFIED ESTROGEN RECEPTOR STATUS, UNSPECIFIED SITE OF BREAST (HCC): ICD-10-CM

## 2023-02-01 RX ORDER — LEVOTHYROXINE SODIUM 0.03 MG/1
TABLET ORAL
Qty: 90 TABLET | Refills: 0 | Status: SHIPPED | OUTPATIENT
Start: 2023-02-01 | End: 2023-03-27

## 2023-02-01 RX ORDER — ANASTROZOLE 1 MG/1
1 TABLET ORAL EVERY EVENING
Qty: 90 TABLET | Refills: 0 | Status: SHIPPED | OUTPATIENT
Start: 2023-02-01 | End: 2023-06-28 | Stop reason: SDUPTHER

## 2023-02-01 NOTE — TELEPHONE ENCOUNTER
Received request via: Pharmacy    Was the patient seen in the last year in this department? Yes    Does the patient have an active prescription (recently filled or refills available) for medication(s) requested? No    Does the patient have intermediate Plus and need 100 day supply (blood pressure, diabetes and cholesterol meds only)? Patient does not have SCP    Wants 90 day supply of levothyroxine

## 2023-02-01 NOTE — TELEPHONE ENCOUNTER
Requested Prescriptions     Signed Prescriptions Disp Refills    anastrozole (ARIMIDEX) 1 MG Tab 90 Tablet 0     Sig: Take 1 Tablet by mouth every evening. Needs to establish with cancer specialist. Last refill     Authorizing Provider: RJ LAM    levothyroxine (SYNTHROID) 25 MCG Tab 90 Tablet 0     Sig: TAKE 1 TABLET BY MOUTH EVERY DAY IN THE MORNING ON AN EMPTY STOMACH     Authorizing Provider: RJ LAM A.P.R.N.

## 2023-03-11 ENCOUNTER — HOSPITAL ENCOUNTER (OUTPATIENT)
Dept: LAB | Facility: MEDICAL CENTER | Age: 73
End: 2023-03-11
Attending: NURSE PRACTITIONER
Payer: MEDICARE

## 2023-03-11 ENCOUNTER — HOSPITAL ENCOUNTER (OUTPATIENT)
Dept: LAB | Facility: MEDICAL CENTER | Age: 73
End: 2023-03-11
Payer: MEDICARE

## 2023-03-11 DIAGNOSIS — E83.52 HYPERCALCEMIA: ICD-10-CM

## 2023-03-11 DIAGNOSIS — Z79.4 TYPE 2 DIABETES MELLITUS TREATED WITH INSULIN (HCC): ICD-10-CM

## 2023-03-11 DIAGNOSIS — R74.8 ELEVATED ALKALINE PHOSPHATASE LEVEL: ICD-10-CM

## 2023-03-11 DIAGNOSIS — E55.9 VITAMIN D DEFICIENCY: ICD-10-CM

## 2023-03-11 DIAGNOSIS — E78.49 OTHER HYPERLIPIDEMIA: ICD-10-CM

## 2023-03-11 DIAGNOSIS — E11.9 TYPE 2 DIABETES MELLITUS TREATED WITH INSULIN (HCC): ICD-10-CM

## 2023-03-11 DIAGNOSIS — E03.8 OTHER SPECIFIED HYPOTHYROIDISM: ICD-10-CM

## 2023-03-11 DIAGNOSIS — E03.9 HYPOTHYROIDISM, ACQUIRED: ICD-10-CM

## 2023-03-11 LAB
25(OH)D3 SERPL-MCNC: 36 NG/ML (ref 30–100)
ALBUMIN SERPL BCP-MCNC: 4.5 G/DL (ref 3.2–4.9)
ALBUMIN/GLOB SERPL: 1.6 G/DL
ALP SERPL-CCNC: 193 U/L (ref 30–99)
ALT SERPL-CCNC: 28 U/L (ref 2–50)
ANION GAP SERPL CALC-SCNC: 11 MMOL/L (ref 7–16)
AST SERPL-CCNC: 25 U/L (ref 12–45)
BILIRUB SERPL-MCNC: 0.3 MG/DL (ref 0.1–1.5)
BUN SERPL-MCNC: 22 MG/DL (ref 8–22)
CALCIUM ALBUM COR SERPL-MCNC: 10 MG/DL (ref 8.5–10.5)
CALCIUM SERPL-MCNC: 10.4 MG/DL (ref 8.5–10.5)
CHLORIDE SERPL-SCNC: 100 MMOL/L (ref 96–112)
CHOLEST SERPL-MCNC: 159 MG/DL (ref 100–199)
CO2 SERPL-SCNC: 23 MMOL/L (ref 20–33)
CREAT SERPL-MCNC: 0.72 MG/DL (ref 0.5–1.4)
CREAT UR-MCNC: 105.01 MG/DL
FASTING STATUS PATIENT QL REPORTED: NORMAL
GFR SERPLBLD CREATININE-BSD FMLA CKD-EPI: 87 ML/MIN/1.73 M 2
GLOBULIN SER CALC-MCNC: 2.8 G/DL (ref 1.9–3.5)
GLUCOSE SERPL-MCNC: 236 MG/DL (ref 65–99)
HDLC SERPL-MCNC: 73 MG/DL
LDLC SERPL CALC-MCNC: 68 MG/DL
MICROALBUMIN UR-MCNC: 1.6 MG/DL
MICROALBUMIN/CREAT UR: 15 MG/G (ref 0–30)
POTASSIUM SERPL-SCNC: 3.7 MMOL/L (ref 3.6–5.5)
PROT SERPL-MCNC: 7.3 G/DL (ref 6–8.2)
PTH-INTACT SERPL-MCNC: 61.5 PG/ML (ref 14–72)
SODIUM SERPL-SCNC: 134 MMOL/L (ref 135–145)
T3 SERPL-MCNC: 99.9 NG/DL (ref 60–181)
T4 FREE SERPL-MCNC: 1.06 NG/DL (ref 0.93–1.7)
T4 FREE SERPL-MCNC: 1.07 NG/DL (ref 0.93–1.7)
THYROPEROXIDASE AB SERPL-ACNC: 10 IU/ML (ref 0–9)
THYROPEROXIDASE AB SERPL-ACNC: 9 IU/ML (ref 0–9)
TRIGL SERPL-MCNC: 89 MG/DL (ref 0–149)
TSH SERPL DL<=0.005 MIU/L-ACNC: 6.82 UIU/ML (ref 0.38–5.33)
TSH SERPL DL<=0.005 MIU/L-ACNC: 6.94 UIU/ML (ref 0.38–5.33)

## 2023-03-11 PROCEDURE — 84443 ASSAY THYROID STIM HORMONE: CPT

## 2023-03-11 PROCEDURE — 82570 ASSAY OF URINE CREATININE: CPT

## 2023-03-11 PROCEDURE — 82306 VITAMIN D 25 HYDROXY: CPT

## 2023-03-11 PROCEDURE — 84439 ASSAY OF FREE THYROXINE: CPT

## 2023-03-11 PROCEDURE — 82043 UR ALBUMIN QUANTITATIVE: CPT

## 2023-03-11 PROCEDURE — 36415 COLL VENOUS BLD VENIPUNCTURE: CPT

## 2023-03-11 PROCEDURE — 84681 ASSAY OF C-PEPTIDE: CPT

## 2023-03-11 PROCEDURE — 80061 LIPID PANEL: CPT

## 2023-03-11 PROCEDURE — 86341 ISLET CELL ANTIBODY: CPT

## 2023-03-11 PROCEDURE — 83970 ASSAY OF PARATHORMONE: CPT

## 2023-03-11 PROCEDURE — 86376 MICROSOMAL ANTIBODY EACH: CPT

## 2023-03-11 PROCEDURE — 80053 COMPREHEN METABOLIC PANEL: CPT

## 2023-03-11 PROCEDURE — 86376 MICROSOMAL ANTIBODY EACH: CPT | Mod: 91

## 2023-03-11 PROCEDURE — 84439 ASSAY OF FREE THYROXINE: CPT | Mod: 91

## 2023-03-11 PROCEDURE — 86800 THYROGLOBULIN ANTIBODY: CPT

## 2023-03-11 PROCEDURE — 84480 ASSAY TRIIODOTHYRONINE (T3): CPT

## 2023-03-11 PROCEDURE — 84443 ASSAY THYROID STIM HORMONE: CPT | Mod: 91

## 2023-03-13 LAB
C PEPTIDE SERPL-MCNC: <0.1 NG/ML (ref 0.5–3.3)
THYROGLOB AB SERPL-ACNC: <0.9 IU/ML (ref 0–4)

## 2023-03-16 LAB — GAD65 AB SER IA-ACNC: <5 IU/ML (ref 0–5)

## 2023-03-27 ENCOUNTER — OFFICE VISIT (OUTPATIENT)
Dept: ENDOCRINOLOGY | Facility: MEDICAL CENTER | Age: 73
End: 2023-03-27
Payer: MEDICARE

## 2023-03-27 VITALS
OXYGEN SATURATION: 100 % | BODY MASS INDEX: 26.12 KG/M2 | HEIGHT: 64 IN | WEIGHT: 153 LBS | HEART RATE: 96 BPM | SYSTOLIC BLOOD PRESSURE: 124 MMHG | DIASTOLIC BLOOD PRESSURE: 70 MMHG

## 2023-03-27 DIAGNOSIS — E03.9 HYPOTHYROIDISM, ACQUIRED: ICD-10-CM

## 2023-03-27 DIAGNOSIS — Z79.4 TYPE 2 DIABETES MELLITUS TREATED WITH INSULIN (HCC): ICD-10-CM

## 2023-03-27 DIAGNOSIS — R80.9 MICROALBUMINURIA: ICD-10-CM

## 2023-03-27 DIAGNOSIS — E55.9 VITAMIN D DEFICIENCY: ICD-10-CM

## 2023-03-27 DIAGNOSIS — E78.49 OTHER HYPERLIPIDEMIA: ICD-10-CM

## 2023-03-27 DIAGNOSIS — I10 ESSENTIAL HYPERTENSION: ICD-10-CM

## 2023-03-27 DIAGNOSIS — E11.9 TYPE 2 DIABETES MELLITUS TREATED WITH INSULIN (HCC): ICD-10-CM

## 2023-03-27 LAB
HBA1C MFR BLD: 7.3 % (ref ?–5.8)
POCT INT CON NEG: NEGATIVE
POCT INT CON POS: POSITIVE

## 2023-03-27 PROCEDURE — 99213 OFFICE O/P EST LOW 20 MIN: CPT

## 2023-03-27 PROCEDURE — 99215 OFFICE O/P EST HI 40 MIN: CPT

## 2023-03-27 PROCEDURE — 83036 HEMOGLOBIN GLYCOSYLATED A1C: CPT

## 2023-03-27 RX ORDER — LEVOTHYROXINE SODIUM 0.05 MG/1
50 TABLET ORAL
Qty: 90 TABLET | Refills: 1 | Status: SHIPPED | OUTPATIENT
Start: 2023-03-27 | End: 2023-04-11

## 2023-03-27 RX ORDER — INSULIN ASPART 100 [IU]/ML
10-20 INJECTION, SOLUTION INTRAVENOUS; SUBCUTANEOUS
Qty: 15 ML | Refills: 11 | Status: SHIPPED | OUTPATIENT
Start: 2023-03-27 | End: 2023-04-26

## 2023-03-27 NOTE — PROGRESS NOTES
"Chief Complaint:  Consult requested by STACIE Paz for initial evaluation of the following conditions    HPI:   Molly Loera is a 72 y.o. female     Type 2 diabetes mellitus treated with insulin:   Diagnosed 20 years ago     She monitors blood glucose  6 times per day.   Blood glucose values range: Average BG at 100,           PCO a1c on 03/27/2023 at 7.3%  POC A1c on 9/20/2022 it was 6.6%   POC A1c on 5/14/2020 was 6.9%    Medication regimen:  Glargine 10-12 at night  Novolog 1:5 carb ratioin, correction factor 1:50 > 50      She reports hypoglycemic episodes occurring every 3 days, usually in the morning when she wakes up   She  reports hypoglycemic unawareness.     Diet: \"healthy\" diet  in general, trying to eat more salads, she is eating more pasta    Diabetes Complications   She  denies history of retinopathy.    She denies laser eye surgery.   Last eye exam: January of 2023. For [facility]   She denies history of peripheral sensory neuropathy.    She denies numbness, tingling in both feet.    She denies history of foot sores.      Latest Reference Range & Units 03/11/23 09:08   C-Peptide 0.5 - 3.3 ng/mL <0.1 (L)      Latest Reference Range & Units 03/11/23 09:08   MICHELLE Antibody 0.0 - 5.0 IU/mL <5.0      Latest Reference Range & Units 03/11/23 09:09   Glucose 65 - 99 mg/dL 236 (H)      Latest Reference Range & Units 03/11/23 09:09   Pth, Intact 14.0 - 72.0 pg/mL 61.5     2.  Microalbuminuria:  Currently taking losartan 100 mg daily  BP today at 124/70   Latest Reference Range & Units 03/11/23 09:08   Micro Alb Creat Ratio 0 - 30 mg/g 15   Creatinine, Urine mg/dL 105.01   Microalbumin, Urine Random mg/dL 1.6      Latest Reference Range & Units 03/11/23 09:09   GFR (CKD-EPI) >60 mL/min/1.73 m 2 87      Latest Reference Range & Units 03/11/23 09:09   Potassium 3.6 - 5.5 mmol/L 3.7       3. Essential hypertension:  FV by Cardiologist Dr. Crawford. Last appt 2 weeks ago " w/o changes on her medications. Holter study in a few weeks.   Currently taking amlodipine 5 mg daily, losartan 100 mg daily  BP at  124/70    4.  Other hyperlipidemia:  Currently taking atorvastatin 80 mg daily  Denies muscle aches    Latest Reference Range & Units 03/11/23 09:08   Cholesterol,Tot 100 - 199 mg/dL 159   Triglycerides 0 - 149 mg/dL 89   HDL >=40 mg/dL 73   LDL <100 mg/dL 68       5.  Hypothyroidism, acquired:  Currently taking Synthroid 25 mcg daily  Denies biotoin containing products  Denies taking iron, antiacids  Reports taking calcium in the evening     Reports fatigue, constipation, dry skin, weight gain, losing hair     Latest Reference Range & Units 03/11/23 09:09   TSH 0.380 - 5.330 uIU/mL 6.820 (H)   Free T-4 0.93 - 1.70 ng/dL 1.06   T3 60.0 - 181.0 ng/dL 99.9      Latest Reference Range & Units 03/11/23 09:09   Microsomal -Tpo- Abs 0.0 - 9.0 IU/mL 9.0     Thyroid ultrasound done on 12/9/2022 showed no thyroid nodules greater than 1 cm    6.  Vitamin D deficiency:  Currently taking  D3-20 mcg daily with her calcium   Latest Reference Range & Units 03/11/23 09:08   25-Hydroxy   Vitamin D 25 30 - 100 ng/mL 36       ROS:     CONS:     No fever, no chills, no weight loss, no fatigue   EYES:      No diplopia, no blurry vision, no redness of eyes, no swelling of eyelids   ENT:    No hearing loss, No ear pain, No sore throat, no dysphagia, no neck swelling   CV:     No chest pain, no palpitations, no claudication, no orthopnea, no PND   PULM:    No SOB, no cough, no hemoptysis, no wheezing    GI:   No nausea, no vomiting, no diarrhea, no constipation, no bloody stools   :  Passing urine well, no dysuria, no hematuria   ENDO:   No polyuria, no polydipsia, no heat intolerance, no cold intolerance   NEURO: No headaches, no dizziness, no convulsions, no tremors   MUSC:  No joint swellings, no arthralgias, no myalgias, no weakness   SKIN:   No rash, no ulcers, no dry skin   PSYCH:   No depression, no  anxiety, no difficulty sleeping       Past Medical History:  Patient Active Problem List    Diagnosis Date Noted    Other specified hypothyroidism 11/15/2022    Osteopenia of left thigh 11/15/2022    Elevated alkaline phosphatase level 11/15/2022    Malignant neoplasm of left female breast (HCC) 02/27/2020    Stage 3 chronic kidney disease (HCC) 02/27/2020    Bipolar 1 disorder (HCC) 09/23/2019    Type 1 diabetes mellitus without complication (HCC) 09/23/2019    Generalized anxiety disorder 09/23/2019    Essential hypertension 09/23/2019    Dyslipidemia 09/23/2019       Past Surgical History:  No past surgical history on file.     Allergies:  Depakote [valproic acid]     Current Medications:    Current Outpatient Medications:     anastrozole (ARIMIDEX) 1 MG Tab, Take 1 Tablet by mouth every evening. Needs to establish with cancer specialist. Last refill, Disp: 90 Tablet, Rfl: 0    levothyroxine (SYNTHROID) 25 MCG Tab, TAKE 1 TABLET BY MOUTH EVERY DAY IN THE MORNING ON AN EMPTY STOMACH, Disp: 90 Tablet, Rfl: 0    losartan (COZAAR) 100 MG Tab, TAKE 1 TABLET BY MOUTH EVERY DAY, Disp: 90 Tablet, Rfl: 2    primidone (MYSOLINE) 50 MG Tab, TAKE 1 TABLET BY MOUTH TWICE A DAY, Disp: 180 Tablet, Rfl: 1    insulin glargine 100 UNIT/ML Solution Pen-injector injection, INJECT 10 UNITS EVERY MORNING AND 10-11 UNITS EVERY NIGHT., Disp: 15 Each, Rfl: 1    insulin aspart (NOVOLOG FLEXPEN) 100 UNIT/ML injection PEN, INJECT 7-10 UNITS TOTAL UNDER THE SKIN 3 TIMES DAILY BEFORE MEALS., Disp: 15 Each, Rfl: 1    amLODIPine (NORVASC) 5 MG Tab, TAKE 1 TABLET BY MOUTH EVERY DAY, Disp: 90 Tablet, Rfl: 1    lithium carbonate, IR, 300 MG Tab tablet, Take 1 Tablet by mouth 2 times a day., Disp: , Rfl:     aspirin (ASPIRIN LOW DOSE) 81 MG EC tablet, Take 1 Tablet by mouth every day., Disp: 90 Tablet, Rfl: 1    atorvastatin (LIPITOR) 80 MG tablet, TAKE 1 TABLET BY MOUTH EVERY DAY, Disp: 90 Tablet, Rfl: 2    timolol (TIMOPTIC) 0.5 % Solution,  INSTILL 1 DROP IN BOTH EYES DAILY, Disp: , Rfl:     lamoTRIgine (LAMICTAL) 100 MG Tab, TAKE 1 TABLET BY MOUTH TWICE A DAY AS DIRECTED, Disp: , Rfl:     Blood Glucose Meter Kit, Test blood sugar as recommended by provider. 1 blood glucose monitoring kit-- DEXCOM, Disp: 1 Kit, Rfl: 0    Blood Glucose Test Strips, Use one testing unit every 10 days to test blood sugar. DEXCOM meter, Disp: 3 Strip, Rfl: 3    QUEtiapine (SEROQUEL) 100 MG Tab, Take 1 Tab by mouth 2 times a day. Takes with quetiapine 200 mg at bedtime (Patient taking differently: Take 100 mg by mouth 3 times a day.), Disp: 60 Tab, Rfl: 1    carbamazepine SR (TEGRETOL XR) 200 MG TABLET SR 12 HR extended-release tablet, TAKE 1 TABLET BY MOUTH TWICE A DAY, Disp: , Rfl:     hydrOXYzine pamoate (VISTARIL) 50 MG Cap, TAKE 1 CAPSULE BY MOUTH 3 TIMES A DAY AS NEEDED FOR 30 DAYS, Disp: , Rfl:     Cariprazine HCl 6 MG Cap, Take 6 mg by mouth every evening., Disp: , Rfl:     Social History:  Social History     Socioeconomic History    Marital status:      Spouse name: Not on file    Number of children: Not on file    Years of education: Not on file    Highest education level: Not on file   Occupational History    Not on file   Tobacco Use    Smoking status: Never    Smokeless tobacco: Never   Vaping Use    Vaping Use: Never used   Substance and Sexual Activity    Alcohol use: Never    Drug use: Never    Sexual activity: Not Currently   Other Topics Concern    Not on file   Social History Narrative    Not on file     Social Determinants of Health     Financial Resource Strain: Not on file   Food Insecurity: Not on file   Transportation Needs: Not on file   Physical Activity: Not on file   Stress: Not on file   Social Connections: Not on file   Intimate Partner Violence: Not on file   Housing Stability: Not on file        Family History:   Family History   Problem Relation Age of Onset    Diabetes Daughter        PHYSICAL EXAM:   Vital signs: /70   Pulse  "96   Ht 1.626 m (5' 4\")   Wt 69.4 kg (153 lb)   SpO2 100%   BMI 26.26 kg/m²   GENERAL: Well-developed, well-nourished  in no apparent distress.   EYE: No ocular and eyelid asymmetry, Anicteric sclerae,  PERRL, No exophthalmos or lidlag  HENT: Hearing grossly intact, Normocephalic, atraumatic.   NECK: Supple. Trachea midline. thyroid is normal in size without nodules or tenderness  CARDIOVASCULAR: Regular rate and rhythm. No murmurs, rubs, or gallops.   LUNGS: Clear to auscultation bilaterally    EXTREMITIES: No clubbing, cyanosis, or edema.   NEUROLOGICAL: Cranial nerves II-XII are grossly intact   Symmetric reflexes at the patella no proximal muscle weakness, No visible tremor with both outstretched hands  LYMPH: No cervical, supraclavicular,  adenopathy palpated.   SKIN: No rashes, lesions. Turgor is normal.  FOOT: Normal sensation to monofilament testing, normal pulses, no ulcers.  Normal Vibration quantitative sensation test.    Labs:  Lab Results   Component Value Date/Time    HBA1C 6.6 (A) 09/20/2022 0913    AVGLUC 111 10/21/2021 0903       Lab Results   Component Value Date/Time    CHOLSTRLTOT 161 11/05/2022 0816    TRIGLYCERIDE 134 11/05/2022 0816    HDL 74 11/05/2022 0816    LDL 60 11/05/2022 0816       Lab Results   Component Value Date/Time    MALBCRT 15 03/11/2023 09:08 AM    MICROALBUR 1.6 03/11/2023 09:08 AM        Lab Results   Component Value Date/Time    TSHULTRASEN 12.600 (H) 11/05/2022 0816         ASSESSMENT/PLAN:   1. Type 2 diabetes mellitus treated with insulin (HCC)  Unstable with an A1c of 7.3%  Discussed minimizing carbohydrate consumption, 1-2 cup to 1 cup of carbohydrate/meal  Discussed to increase exercise activity    Medication regimen:  Glargine 10-12 at night-decrease to 11 units at night to minimize hypoglycemic episodes during the morning  Novolog 1:5 carb ratioin, correction factor 1:50 > 50-continue carb ratio, correction factor I: 30>130    Dexcom 7 parachuted to [employer].     - " insulin aspart (NOVOLOG FLEXPEN) 100 UNIT/ML injection PEN; Inject 10-20 Units under the skin 3 times a day before meals for 30 days. INJECT 7-10 UNITS TOTAL UNDER THE SKIN 3 TIMES DAILY BEFORE MEALS.  Dispense: 15 mL; Refill: 11    - POCT  A1C    2. Microalbuminuria  Unstable  Continue regimen-HPI    3. Essential hypertension  Stable  Continue regimen-HPI    4. Other hyperlipidemia  Stable  Continue regimen-HPI    5. Hypothyroidism, acquired  Clinically stable  Biochemically stable  Levothyroxine increased to 50 mcg daily on an empty stomach  - levothyroxine (SYNTHROID) 50 MCG Tab; Take 1 Tablet by mouth every morning on an empty stomach.  Dispense: 90 Tablet; Refill: 1  - TSH; Future  - FREE THYROXINE; Future  - Comp Metabolic Panel; Future    Thyroid ultrasound will be completed on December 2023    6. Vitamin D deficiency  Unstable  Continue regimen-HPI      Disposition: Make an appointment to follow-up in 3 months  Do your blood work 2 weeks prior to next appointment    Thank you kindly for allowing me to participate in the diabetes care plan for this patient.

## 2023-04-11 DIAGNOSIS — E03.9 HYPOTHYROIDISM, ACQUIRED: ICD-10-CM

## 2023-04-11 RX ORDER — LEVOTHYROXINE SODIUM 0.03 MG/1
25 TABLET ORAL
Qty: 102 TABLET | Refills: 1 | Status: SHIPPED | OUTPATIENT
Start: 2023-04-11 | End: 2023-06-09 | Stop reason: SDUPTHER

## 2023-04-28 ENCOUNTER — HOSPITAL ENCOUNTER (OUTPATIENT)
Dept: RADIOLOGY | Facility: MEDICAL CENTER | Age: 73
End: 2023-04-28
Attending: NURSE PRACTITIONER
Payer: MEDICARE

## 2023-04-28 DIAGNOSIS — Z12.31 ENCOUNTER FOR SCREENING MAMMOGRAM FOR BREAST CANCER: ICD-10-CM

## 2023-04-28 PROCEDURE — 77063 BREAST TOMOSYNTHESIS BI: CPT | Mod: 52

## 2023-05-09 ENCOUNTER — TELEPHONE (OUTPATIENT)
Dept: ENDOCRINOLOGY | Facility: MEDICAL CENTER | Age: 73
End: 2023-05-09
Payer: MEDICARE

## 2023-05-12 ENCOUNTER — OFFICE VISIT (OUTPATIENT)
Dept: VASCULAR LAB | Facility: MEDICAL CENTER | Age: 73
End: 2023-05-12
Attending: INTERNAL MEDICINE
Payer: MEDICARE

## 2023-05-12 VITALS
WEIGHT: 155 LBS | BODY MASS INDEX: 26.61 KG/M2 | HEART RATE: 108 BPM | DIASTOLIC BLOOD PRESSURE: 69 MMHG | SYSTOLIC BLOOD PRESSURE: 135 MMHG

## 2023-05-12 DIAGNOSIS — E78.5 DYSLIPIDEMIA: ICD-10-CM

## 2023-05-12 DIAGNOSIS — R03.0 WHITE COAT SYNDROME WITH HIGH BLOOD PRESSURE BUT WITHOUT HYPERTENSION: ICD-10-CM

## 2023-05-12 DIAGNOSIS — I10 ESSENTIAL HYPERTENSION: ICD-10-CM

## 2023-05-12 DIAGNOSIS — N18.31 STAGE 3A CHRONIC KIDNEY DISEASE: ICD-10-CM

## 2023-05-12 DIAGNOSIS — E10.9 TYPE 1 DIABETES MELLITUS WITHOUT COMPLICATION (HCC): ICD-10-CM

## 2023-05-12 PROCEDURE — 3075F SYST BP GE 130 - 139MM HG: CPT

## 2023-05-12 PROCEDURE — 1126F AMNT PAIN NOTED NONE PRSNT: CPT

## 2023-05-12 PROCEDURE — 99212 OFFICE O/P EST SF 10 MIN: CPT

## 2023-05-12 PROCEDURE — 3078F DIAST BP <80 MM HG: CPT

## 2023-05-12 PROCEDURE — 99214 OFFICE O/P EST MOD 30 MIN: CPT

## 2023-05-12 NOTE — PROGRESS NOTES
[facility] - Follow up VISIT  05/12/2023      Molly Loera is a 72 y.o.  female who presents today  for   Chief Complaint   Patient presents with    Follow-Up      Subjective      HPI:  Here for f/u of dyslipidemia and hypertension in setting of type 1 dm  Doing well overall  Has seen endo, and is working on adjusting nighttime insulin dosing  Occasional hypoglycemia  Home bps 120s/70s - checking daily  Denies dizziness, lightheadedness, HA or vision changes  Denies CV symptoms  Good adherence  No interfering substances  No myalgias on statin  Has labs in March  Energy level is good, working with endo on thyroid med  Doing cabbage diet now      Social History     Tobacco Use    Smoking status: Never    Smokeless tobacco: Never   Vaping Use    Vaping Use: Never used   Substance Use Topics    Alcohol use: Never    Drug use: Never           DIET AND EXERCISE:  Weight Change: up a few pounds over past year, stable  Diet: whole foods - reasonably low carb  Exercise: minimal exercise, limited by ortho pain in knees and feet         Objective     Objective:     Vitals:    05/12/23 0807 05/12/23 0809   BP: (!) 162/77 135/69   BP Location: Right arm Right arm   Patient Position: Sitting Sitting   BP Cuff Size: Adult Adult   Pulse: (!) 111 (!) 108   Weight: 70.3 kg (155 lb)           Physical Exam  Vitals reviewed.   Constitutional:       General: She is not in acute distress.     Appearance: She is not diaphoretic.   HENT:      Head: Normocephalic and atraumatic.   Eyes:      General: No scleral icterus.     Conjunctiva/sclera: Conjunctivae normal.   Cardiovascular:      Rate and Rhythm: Normal rate and regular rhythm.      Heart sounds: Normal heart sounds. No murmur heard.  Pulmonary:      Effort: Pulmonary effort is normal. No respiratory distress.      Breath sounds: Normal breath sounds. No wheezing or rales.   Musculoskeletal:      Right lower leg: No edema.      Left lower leg: No edema.   Skin:      General: Skin is warm and dry.      Coloration: Skin is not pale.   Neurological:      General: No focal deficit present.      Mental Status: She is alert and oriented to person, place, and time.      Coordination: Coordination normal.      Gait: Gait is intact. Gait normal.   Psychiatric:         Mood and Affect: Mood and affect normal.         Behavior: Behavior normal.          DATA REVIEW    Lab Results   Component Value Date/Time    CHOLSTRLTOT 159 03/11/2023 09:08 AM    LDL 68 03/11/2023 09:08 AM    HDL 73 03/11/2023 09:08 AM    TRIGLYCERIDE 89 03/11/2023 09:08 AM       Lab Results   Component Value Date/Time    SODIUM 134 (L) 03/11/2023 09:09 AM    POTASSIUM 3.7 03/11/2023 09:09 AM    CHLORIDE 100 03/11/2023 09:09 AM    CO2 23 03/11/2023 09:09 AM    GLUCOSE 236 (H) 03/11/2023 09:09 AM    BUN 22 03/11/2023 09:09 AM    CREATININE 0.72 03/11/2023 09:09 AM     Lab Results   Component Value Date/Time    ALKPHOSPHAT 193 (H) 03/11/2023 09:09 AM    ASTSGOT 25 03/11/2023 09:09 AM    ALTSGPT 28 03/11/2023 09:09 AM    TBILIRUBIN 0.3 03/11/2023 09:09 AM       Lab Results   Component Value Date/Time    HBA1C 7.3 (A) 03/27/2023 10:17 AM       Lab Results   Component Value Date/Time    MALBCRT 15 03/11/2023 09:08 AM    MICROALBUR 1.6 03/11/2023 09:08 AM       Echocardiogram 2020  Technically difficult study.  Hyperdynamic left ventricular systolic function. Left ventricular   ejection fraction is visually estimated to be greater than 75%.   Normal right ventricular size and systolic function.  No significant valvular abnormalities.  No prior study is available for comparison.     ABPM 12/2022  Mean daytime: 113/61 consistent with well-controlled out of office blood pressure   Nocturnal dip: Perhaps blunted        Medical Decision Making:  Today's Assessment / Status / Plan:     1. Essential hypertension        2. Dyslipidemia        3. Type 1 diabetes mellitus without complication (HCC)        4. White coat syndrome with  high blood pressure but without hypertension        5. Stage 3a chronic kidney disease (HCC)           Patient Type: Primary Prevention    Etiology of Established CVD if Present: None    Lipid Management: Qualifies for Statin Therapy Based on 2018 ACC/AHA Guidelines: yes  Calculated 10-Year Risk of ASCVD: N/A  Currently on Statin: Yes  Goal LDL less than 100 non-HDL less than 130  At goal on recent labs  Plan:  -Continue atorvastatin 80 mg a day  -Recheck fasting lipid panel as ordered by PCP    Blood Pressure Management:  [facility] (2017) Blood Pressure Goal <130/80  Home readings under excellent control  ABPM 12/2022 shows excellent out of office BP control,   Blood pressure elevated in the office consistent with whitecoat hypertension  No known evidence of endorgan damage  Plan:  -Continue amlodipine 5 mg daily  -Continue losartan 100 mg a day  -Continue home blood pressure monitoring  -Recheck GFR and electrolytes as per endo and PCP - has labs every few months for both  -Consider addition of thiazide type diuretic if out of office blood pressure elevated in future    Glycemic Status: Diabetic  Apparently late onset type I diabetic  Reasonable control by most recent A1c   rare hypoglycemia  Plan:  -Defer further management to PCP and endocrinology  -Continue current dose adjustments of insulin as per endocrinology  -Report any increase in frequency of hypoglycemia    Anti-Platelet/Anti-Coagulant Tx: yes  Reasonable to continue low-dose aspirin since she does tolerate it well and does have multiple cardiovascular risk factors    Smoking: continue complete avoidance of all tobacco products    Physical Activity: She is fairly limited by orthopedic issues but have encouraged her to exercise as much as possible    Weight Management and Nutrition: Continue good diabetic diet    Other:     -CKD, stage IIIa -likely due to diabetic and hypertensive nephrosclerosis.  Blood pressure control and ARB as above.  Recheck GFR and  electrolytes and urine for albumin as ordered by PCP.  Avoid excess NSAIDs.  Will follow renal function over time    Instructed to follow-up with PCP for remainder of adult medical needs: yes  We will partner with other providers in the management of established vascular disease and cardiometabolic risk factors.    Studies to Be Obtained: none  Labs to Be Obtained: CMP, lipid panel, a1c, urine for albumin as previously ordered by PCP/endo    Follow up in: 6 months unless needed sooner       Shaista HARRIS  [facility]      Cc: LOURDES Penn

## 2023-05-24 DIAGNOSIS — E78.5 DYSLIPIDEMIA: ICD-10-CM

## 2023-05-24 RX ORDER — ATORVASTATIN CALCIUM 80 MG/1
80 TABLET, FILM COATED ORAL DAILY
Qty: 90 TABLET | Refills: 2 | Status: SHIPPED | OUTPATIENT
Start: 2023-05-24 | End: 2024-02-09

## 2023-05-24 NOTE — TELEPHONE ENCOUNTER
Requested Prescriptions     Signed Prescriptions Disp Refills    atorvastatin (LIPITOR) 80 MG tablet 90 Tablet 2     Sig: TAKE 1 TABLET BY MOUTH EVERY DAY     Authorizing Provider: RJ LAM A.P.R.N.

## 2023-05-31 DIAGNOSIS — C50.912 MALIGNANT NEOPLASM OF LEFT FEMALE BREAST, UNSPECIFIED ESTROGEN RECEPTOR STATUS, UNSPECIFIED SITE OF BREAST (HCC): ICD-10-CM

## 2023-05-31 DIAGNOSIS — I15.2 HYPERTENSION ASSOCIATED WITH DIABETES (HCC): ICD-10-CM

## 2023-05-31 DIAGNOSIS — R25.1 SHAKING: ICD-10-CM

## 2023-05-31 DIAGNOSIS — E11.59 HYPERTENSION ASSOCIATED WITH DIABETES (HCC): ICD-10-CM

## 2023-06-01 RX ORDER — AMLODIPINE BESYLATE 5 MG/1
TABLET ORAL
Qty: 90 TABLET | Refills: 1 | Status: SHIPPED | OUTPATIENT
Start: 2023-06-01 | End: 2023-10-30

## 2023-06-01 RX ORDER — PRIMIDONE 50 MG/1
TABLET ORAL
Qty: 180 TABLET | Refills: 1 | Status: SHIPPED | OUTPATIENT
Start: 2023-06-01 | End: 2023-09-30 | Stop reason: SDUPTHER

## 2023-06-01 RX ORDER — ANASTROZOLE 1 MG/1
TABLET ORAL
Qty: 90 TABLET | Refills: 0 | OUTPATIENT
Start: 2023-06-01

## 2023-06-01 NOTE — TELEPHONE ENCOUNTER
Requested Prescriptions     Signed Prescriptions Disp Refills    amLODIPine (NORVASC) 5 MG Tab 90 Tablet 1     Sig: TAKE 1 TABLET BY MOUTH EVERY DAY     Authorizing Provider: RJ LAM    primidone (MYSOLINE) 50 MG Tab 180 Tablet 1     Sig: TAKE 1 TABLET BY MOUTH TWICE A DAY     Authorizing Provider: RJ LAM     Refused Prescriptions Disp Refills    anastrozole (ARIMIDEX) 1 MG Tab [Pharmacy Med Name: ANASTROZOLE 1 MG TABLET] 90 Tablet 0     Sig: TAKE 1 TABLET BY MOUTH EVERY EVENING. NEEDS TO ESTABLISH WITH CANCER SPECIALIST.     Refused By: RJ LAM     Reason for Refusal: Patient needs appointment.     STACIE Paz

## 2023-06-02 ENCOUNTER — HOSPITAL ENCOUNTER (OUTPATIENT)
Dept: LAB | Facility: MEDICAL CENTER | Age: 73
End: 2023-06-02
Payer: MEDICARE

## 2023-06-02 DIAGNOSIS — E03.9 HYPOTHYROIDISM, ACQUIRED: ICD-10-CM

## 2023-06-02 LAB
ALBUMIN SERPL BCP-MCNC: 4.5 G/DL (ref 3.2–4.9)
ALBUMIN/GLOB SERPL: 1.9 G/DL
ALP SERPL-CCNC: 187 U/L (ref 30–99)
ALT SERPL-CCNC: 37 U/L (ref 2–50)
ANION GAP SERPL CALC-SCNC: 14 MMOL/L (ref 7–16)
AST SERPL-CCNC: 31 U/L (ref 12–45)
BILIRUB SERPL-MCNC: 0.4 MG/DL (ref 0.1–1.5)
BUN SERPL-MCNC: 25 MG/DL (ref 8–22)
CALCIUM ALBUM COR SERPL-MCNC: 10.3 MG/DL (ref 8.5–10.5)
CALCIUM SERPL-MCNC: 10.7 MG/DL (ref 8.5–10.5)
CHLORIDE SERPL-SCNC: 101 MMOL/L (ref 96–112)
CO2 SERPL-SCNC: 26 MMOL/L (ref 20–33)
CREAT SERPL-MCNC: 0.83 MG/DL (ref 0.5–1.4)
GFR SERPLBLD CREATININE-BSD FMLA CKD-EPI: 73 ML/MIN/1.73 M 2
GLOBULIN SER CALC-MCNC: 2.4 G/DL (ref 1.9–3.5)
GLUCOSE SERPL-MCNC: 355 MG/DL (ref 65–99)
POTASSIUM SERPL-SCNC: 4.1 MMOL/L (ref 3.6–5.5)
PROT SERPL-MCNC: 6.9 G/DL (ref 6–8.2)
SODIUM SERPL-SCNC: 141 MMOL/L (ref 135–145)
T4 FREE SERPL-MCNC: 1.16 NG/DL (ref 0.93–1.7)
TSH SERPL DL<=0.005 MIU/L-ACNC: 7.33 UIU/ML (ref 0.38–5.33)

## 2023-06-02 PROCEDURE — 84439 ASSAY OF FREE THYROXINE: CPT

## 2023-06-02 PROCEDURE — 36415 COLL VENOUS BLD VENIPUNCTURE: CPT

## 2023-06-02 PROCEDURE — 80053 COMPREHEN METABOLIC PANEL: CPT

## 2023-06-02 PROCEDURE — 84443 ASSAY THYROID STIM HORMONE: CPT

## 2023-06-07 ENCOUNTER — HOSPITAL ENCOUNTER (OUTPATIENT)
Dept: HEMATOLOGY ONCOLOGY | Facility: MEDICAL CENTER | Age: 73
End: 2023-06-07
Attending: INTERNAL MEDICINE
Payer: MEDICARE

## 2023-06-07 VITALS
TEMPERATURE: 99.8 F | WEIGHT: 153.44 LBS | BODY MASS INDEX: 26.2 KG/M2 | OXYGEN SATURATION: 95 % | HEIGHT: 64 IN | HEART RATE: 113 BPM | RESPIRATION RATE: 19 BRPM

## 2023-06-07 DIAGNOSIS — Z79.811 LONG TERM (CURRENT) USE OF AROMATASE INHIBITORS: ICD-10-CM

## 2023-06-07 DIAGNOSIS — C50.912 MALIGNANT NEOPLASM OF LEFT BREAST IN FEMALE, ESTROGEN RECEPTOR POSITIVE, UNSPECIFIED SITE OF BREAST (HCC): ICD-10-CM

## 2023-06-07 DIAGNOSIS — Z17.0 MALIGNANT NEOPLASM OF LEFT BREAST IN FEMALE, ESTROGEN RECEPTOR POSITIVE, UNSPECIFIED SITE OF BREAST (HCC): ICD-10-CM

## 2023-06-07 PROCEDURE — 99213 OFFICE O/P EST LOW 20 MIN: CPT | Performed by: INTERNAL MEDICINE

## 2023-06-07 PROCEDURE — 99212 OFFICE O/P EST SF 10 MIN: CPT | Performed by: INTERNAL MEDICINE

## 2023-06-07 ASSESSMENT — ENCOUNTER SYMPTOMS
PSYCHIATRIC NEGATIVE: 1
EYES NEGATIVE: 1
RESPIRATORY NEGATIVE: 1
GASTROINTESTINAL NEGATIVE: 1
CARDIOVASCULAR NEGATIVE: 1
MUSCULOSKELETAL NEGATIVE: 1
CONSTITUTIONAL NEGATIVE: 1
NEUROLOGICAL NEGATIVE: 1

## 2023-06-07 ASSESSMENT — PAIN SCALES - GENERAL: PAINLEVEL: 4=SLIGHT-MODERATE PAIN

## 2023-06-07 NOTE — PROGRESS NOTES
Medical oncology follow-up visit: 6/7/2023      Referring Physician:  STACIE Paz  Primary Care:  STACIE Paz    Diagnosis: HER2 positive ER positive left breast cancer    Chief Complaint: HER2 positive ER positive left breast cancer    History of Presenting Illness:  Molly Loera is a 72 y.o. female who was found to have a carcinoma of the left breast in 2017.  In June 2017 she had a left mastectomy.  Pathology demonstrated a 7.2 cm invasive lobular carcinoma, grade 3, with extensive LCIS.  The invasive tumor was ER +95%, SC +5%, HER2 3+ by IHC and FISH positive.  She received adjuvant TCH x6 followed by HP x6 cycles then age alone to complete a year and 718.  She started anastrozole in February 2018 and has tolerated it extremely well.  She has no hot flashes or night sweats and no musculoskeletal symptoms.  She moved to [facility] ago and her primary care provider suggested medical oncology follow-up.  Has not had a mammogram in over a year.  She did have reconstruction, delayed of her left breast and she is not satisfied with the asthmatic result.  She would consider seeing another plastic surgeon for revision in the future.    Interval history 6/7/2023: She is doing very well over the past 6 months.  Her thyroid has been unregulated with TSH going up and she is adjusting her T4 dose.  She did get tremors on 50 mcg of Synthroid.  She is seeing the endocrinologist for evaluation soon.  Mammogram in April 2023 was fine.  She has no significant hot flashes night sweats or musculoskeletal symptoms.      Past Medical History:   Diagnosis Date    Anxiety     Bipolar 1 disorder (HCC)     Cancer (HCC)     left breast    Depression     Diabetes (HCC)     Kidney disease        History reviewed. No pertinent surgical history.    Social History     Tobacco Use    Smoking status: Never    Smokeless tobacco: Never   Vaping Use    Vaping Use: Never used   Substance Use Topics     Alcohol use: Never    Drug use: Never        Family History   Problem Relation Age of Onset    Diabetes Daughter        Allergies as of 06/07/2023 - Reviewed 06/07/2023   Allergen Reaction Noted    Depakote [valproic acid]  09/23/2019         Current Outpatient Medications:     amLODIPine (NORVASC) 5 MG Tab, TAKE 1 TABLET BY MOUTH EVERY DAY, Disp: 90 Tablet, Rfl: 1    primidone (MYSOLINE) 50 MG Tab, TAKE 1 TABLET BY MOUTH TWICE A DAY, Disp: 180 Tablet, Rfl: 1    atorvastatin (LIPITOR) 80 MG tablet, TAKE 1 TABLET BY MOUTH EVERY DAY, Disp: 90 Tablet, Rfl: 2    levothyroxine (SYNTHROID) 25 MCG Tab, Take 1 Tablet by mouth every morning on an empty stomach., Disp: 102 Tablet, Rfl: 1    Calcium Carb-Cholecalciferol (CALCIUM 500+D3 PO), Take  by mouth., Disp: , Rfl:     Calcium Carbonate-Vitamin D (CALCIUM 600+D PO), Take  by mouth., Disp: , Rfl:     anastrozole (ARIMIDEX) 1 MG Tab, Take 1 Tablet by mouth every evening. Needs to establish with cancer specialist. Last refill, Disp: 90 Tablet, Rfl: 0    losartan (COZAAR) 100 MG Tab, TAKE 1 TABLET BY MOUTH EVERY DAY, Disp: 90 Tablet, Rfl: 2    insulin glargine 100 UNIT/ML Solution Pen-injector injection, INJECT 10 UNITS EVERY MORNING AND 10-11 UNITS EVERY NIGHT. (Patient taking differently: 12 Units 2 times a day. INJECT 10 UNITS EVERY MORNING AND 12 UNITS EVERY NIGHT.), Disp: 15 Each, Rfl: 1    lithium carbonate, IR, 300 MG Tab tablet, Take 1 Tablet by mouth 2 times a day., Disp: , Rfl:     aspirin (ASPIRIN LOW DOSE) 81 MG EC tablet, Take 1 Tablet by mouth every day., Disp: 90 Tablet, Rfl: 1    timolol (TIMOPTIC) 0.5 % Solution, INSTILL 1 DROP IN BOTH EYES DAILY, Disp: , Rfl:     lamoTRIgine (LAMICTAL) 100 MG Tab, TAKE 1 TABLET BY MOUTH TWICE A DAY AS DIRECTED, Disp: , Rfl:     Blood Glucose Meter Kit, Test blood sugar as recommended by provider. 1 blood glucose monitoring kit-- DEXCOM, Disp: 1 Kit, Rfl: 0    Blood Glucose Test Strips, Use one testing unit every 10 days to  "test blood sugar. DEXCOM meter, Disp: 3 Strip, Rfl: 3    QUEtiapine (SEROQUEL) 100 MG Tab, Take 1 Tab by mouth 2 times a day. Takes with quetiapine 200 mg at bedtime (Patient taking differently: Take 100 mg by mouth 3 times a day.), Disp: 60 Tab, Rfl: 1    carbamazepine SR (TEGRETOL XR) 200 MG TABLET SR 12 HR extended-release tablet, TAKE 1 TABLET BY MOUTH TWICE A DAY, Disp: , Rfl:     hydrOXYzine pamoate (VISTARIL) 50 MG Cap, TAKE 1 CAPSULE BY MOUTH 3 TIMES A DAY AS NEEDED FOR 30 DAYS, Disp: , Rfl:     Cariprazine HCl 6 MG Cap, Take 6 mg by mouth every evening., Disp: , Rfl:     Review of Systems:  Review of Systems   Constitutional: Negative.    HENT: Negative.     Eyes: Negative.    Respiratory: Negative.     Cardiovascular: Negative.    Gastrointestinal: Negative.    Genitourinary: Negative.    Musculoskeletal: Negative.    Skin: Negative.    Neurological: Negative.    Endo/Heme/Allergies: Negative.    Psychiatric/Behavioral: Negative.            Physical Exam:  Vitals:    06/07/23 1610   Pulse: (!) 113   Resp: 19   Temp: 37.7 °C (99.8 °F)   TempSrc: Temporal   SpO2: 95%   Weight: 69.6 kg (153 lb 7 oz)   Height: 1.626 m (5' 4\")       DESC; KARNOFSKY SCALE WITH ECOG EQUIVALENT: 100, Fully active, able to carry on all pre-disease performed without restriction (ECOG equivalent 0)    DISTRESS LEVEL: no apparent distress    Physical Exam  Constitutional:       Appearance: Normal appearance.   HENT:      Head: Normocephalic.      Mouth/Throat:      Mouth: Mucous membranes are moist.      Pharynx: Oropharynx is clear.   Eyes:      Extraocular Movements: Extraocular movements intact.      Conjunctiva/sclera: Conjunctivae normal.      Pupils: Pupils are equal, round, and reactive to light.   Cardiovascular:      Rate and Rhythm: Normal rate and regular rhythm.      Pulses: Normal pulses.   Pulmonary:      Effort: Pulmonary effort is normal.      Breath sounds: Normal breath sounds.   Chest:      Comments: Left mastectomy " scar well-healed with fair cosmetic result after tissue implant reconstruction.  The right breast is without masses nipple discharge or tenderness.  No axillary adenopathy is noted bilaterally.  Abdominal:      General: Abdomen is flat. Bowel sounds are normal.      Palpations: Abdomen is soft. There is no mass.   Musculoskeletal:         General: Normal range of motion.   Skin:     General: Skin is warm.   Neurological:      General: No focal deficit present.      Mental Status: She is alert and oriented to person, place, and time.   Psychiatric:         Mood and Affect: Mood normal.         Behavior: Behavior normal.        Labs:  Hospital Outpatient Visit on 11/05/2022   Component Date Value Ref Range Status    TSH 11/05/2022 12.600 (H)  0.380 - 5.330 uIU/mL Final    Comment: The 2011 American Thyroid Association (KHADRA) guidelines  recommended that the interpretation of thyroid function in  pregnancy be based on trimester specific reference ranges.    1st Trimester  0.100-2.500 mIU/L  2nd Trimester  0.200-3.000 mIU/L  3rd Trimester  0.300-3.500 mIU/L    These established reference ranges have not been validated  at [facility].      Creatinine, Urine 11/05/2022 109.88  mg/dL Final    Microalbumin, Urine Random 11/05/2022 <1.2  mg/dL Final    Micro Alb Creat Ratio 11/05/2022 see below  0 - 30 mg/g Final    Comment: Unable to calculate the microalbumin/creatinine ratio due to  the microalbumin result or the urine creatinine result being  outside the measurement range of the analyzer.      Sodium 11/05/2022 137  135 - 145 mmol/L Final    Potassium 11/05/2022 4.1  3.6 - 5.5 mmol/L Final    Chloride 11/05/2022 101  96 - 112 mmol/L Final    Co2 11/05/2022 26  20 - 33 mmol/L Final    Anion Gap 11/05/2022 10.0  7.0 - 16.0 Final    Glucose 11/05/2022 230 (H)  65 - 99 mg/dL Final    Bun 11/05/2022 15  8 - 22 mg/dL Final    Creatinine 11/05/2022 0.84  0.50 - 1.40 mg/dL Final    Calcium 11/05/2022 10.9 (H)  8.5  - 10.5 mg/dL Final    AST(SGOT) 11/05/2022 23  12 - 45 U/L Final    ALT(SGPT) 11/05/2022 22  2 - 50 U/L Final    Alkaline Phosphatase 11/05/2022 171 (H)  30 - 99 U/L Final    Total Bilirubin 11/05/2022 0.3  0.1 - 1.5 mg/dL Final    Albumin 11/05/2022 4.3  3.2 - 4.9 g/dL Final    Total Protein 11/05/2022 7.1  6.0 - 8.2 g/dL Final    Globulin 11/05/2022 2.8  1.9 - 3.5 g/dL Final    A-G Ratio 11/05/2022 1.5  g/dL Final    Cholesterol,Tot 11/05/2022 161  100 - 199 mg/dL Final    Triglycerides 11/05/2022 134  0 - 149 mg/dL Final    HDL 11/05/2022 74  >=40 mg/dL Final    LDL 11/05/2022 60  <100 mg/dL Final    Fasting Status 11/05/2022 Fasting   Final    GFR (CKD-EPI) 11/05/2022 73  >60 mL/min/1.73 m 2 Final    Comment: Estimated Glomerular Filtration Rate is calculated using  race neutral CKD-EPI 2021 equation per NKF-ASN recommendations.      Free T-4 11/05/2022 1.06  0.93 - 1.70 ng/dL Final       Imaging:   All listed images below have been independently reviewed by me. I agree with the findings as summarized below:    No results found.     Pathology:      Assessment & Plan:  There are no diagnoses linked to this encounter.     1.  Invasive lobular carcinoma, grade 3, stage Iib (pT3, PN 0) ER +95% WI positive and 5%, HER2 3+, FISH positive, status post adjuvant chemotherapy and 1 year of anti-HER2 therapy.  On anastrozole since 2/18.  With excellent tolerance in clinical remission.  Plan 7 years of therapy.    Plan see back in 6 months with DEXA scan which we do at the [facility]-year [facility] then.    Any questions and concerns raised by the patient were answered to the best of my ability. Thank you for allowing me to participate in the care for this patient. Please feel free to contact me for any questions or concerns.     Brain Harris M.D.

## 2023-06-08 NOTE — ADDENDUM NOTE
Encounter addended by: Gabi Le, Med Ass't on: 6/8/2023 3:29 PM   Actions taken: Charge Capture section accepted

## 2023-06-09 ENCOUNTER — OFFICE VISIT (OUTPATIENT)
Dept: ENDOCRINOLOGY | Facility: MEDICAL CENTER | Age: 73
End: 2023-06-09
Payer: MEDICARE

## 2023-06-09 VITALS
HEIGHT: 64 IN | OXYGEN SATURATION: 97 % | HEART RATE: 112 BPM | SYSTOLIC BLOOD PRESSURE: 138 MMHG | BODY MASS INDEX: 26.19 KG/M2 | DIASTOLIC BLOOD PRESSURE: 86 MMHG | WEIGHT: 153.4 LBS

## 2023-06-09 DIAGNOSIS — E11.9 TYPE 2 DIABETES MELLITUS TREATED WITH INSULIN (HCC): ICD-10-CM

## 2023-06-09 DIAGNOSIS — E78.5 DYSLIPIDEMIA: ICD-10-CM

## 2023-06-09 DIAGNOSIS — E03.9 HYPOTHYROIDISM, ACQUIRED: ICD-10-CM

## 2023-06-09 DIAGNOSIS — I10 ESSENTIAL HYPERTENSION: ICD-10-CM

## 2023-06-09 DIAGNOSIS — E55.9 VITAMIN D DEFICIENCY: ICD-10-CM

## 2023-06-09 DIAGNOSIS — Z79.4 TYPE 2 DIABETES MELLITUS TREATED WITH INSULIN (HCC): ICD-10-CM

## 2023-06-09 PROCEDURE — 3079F DIAST BP 80-89 MM HG: CPT

## 2023-06-09 PROCEDURE — 3075F SYST BP GE 130 - 139MM HG: CPT

## 2023-06-09 PROCEDURE — 99211 OFF/OP EST MAY X REQ PHY/QHP: CPT

## 2023-06-09 PROCEDURE — 99214 OFFICE O/P EST MOD 30 MIN: CPT

## 2023-06-09 RX ORDER — INSULIN ASPART 100 [IU]/ML
INJECTION, SOLUTION INTRAVENOUS; SUBCUTANEOUS
COMMUNITY
Start: 2023-05-18 | End: 2024-02-09

## 2023-06-09 RX ORDER — LEVOTHYROXINE SODIUM 0.03 MG/1
25 TABLET ORAL
Qty: 110 TABLET | Refills: 4 | Status: SHIPPED | OUTPATIENT
Start: 2023-06-09 | End: 2023-11-20

## 2023-06-09 NOTE — PROGRESS NOTES
"Chief Complaint:  Consult requested by STACIE Paz for initial evaluation of the following conditions    HPI:   Molly Loera is a 72 y.o. female     Type 2 diabetes mellitus treated with insulin:   Diagnosed 20 years ago     She monitors blood glucose  6 times per day.   Blood glucose values range: Average BG at 100s, 150s          PCO a1c on 03/27/2023 at 7.3%  POC A1c on 9/20/2022 it was 6.6%     Medication regimen:  Glargine 10-14 at night-depending on blood sugars-she takes about 14 units when her blood sugars are above 170, 12 units when her blood sugar is above 150, 10 units with her blood sugar is 140 or below  Novolog 1:5 carb ratioin, correction factor 1:30 > 130    Tried and failed Dexcom  She reports hypoglycemic episodes occurring in the am when waking up in the 70s or 60s   She  reports hypoglycemic unawareness.     Diet: \"healthy\" diet  in general, trying to eat more salads, she is eating more pasta    Diabetes Complications   She  denies history of retinopathy.    She denies laser eye surgery.   Last eye exam: January of 2023. For [facility]   She denies history of peripheral sensory neuropathy.    She denies numbness, tingling in both feet.    She denies history of foot sores.      Latest Reference Range & Units 03/11/23 09:08   C-Peptide 0.5 - 3.3 ng/mL <0.1 (L)      Latest Reference Range & Units 03/11/23 09:08   MICHELLE Antibody 0.0 - 5.0 IU/mL <5.0      Latest Reference Range & Units 03/11/23 09:09   Glucose 65 - 99 mg/dL 236 (H)      Latest Reference Range & Units 03/11/23 09:09   Pth, Intact 14.0 - 72.0 pg/mL 61.5       Currently taking losartan 100 mg daily  BP today at 138/86    Latest Reference Range & Units 03/11/23 09:08   Micro Alb Creat Ratio 0 - 30 mg/g 15   Creatinine, Urine mg/dL 105.01   Microalbumin, Urine Random mg/dL 1.6      Latest Reference Range & Units 03/11/23 09:09   GFR (CKD-EPI) >60 mL/min/1.73 m 2 87      Latest Reference " Range & Units 03/11/23 09:09   Potassium 3.6 - 5.5 mmol/L 3.7       2. Essential hypertension:  FV by Cardiologist Dr. Crawford. Last appt 2 weeks ago w/o changes on her medications. Holter study in a few weeks.   Currently taking amlodipine 5 mg daily, losartan 100 mg daily  BP at  138/86    3.  Other hyperlipidemia:  Currently taking atorvastatin 80 mg daily  Denies muscle aches    Latest Reference Range & Units 03/11/23 09:08   Cholesterol,Tot 100 - 199 mg/dL 159   Triglycerides 0 - 149 mg/dL 89   HDL >=40 mg/dL 73   LDL <100 mg/dL 68       4.  Hypothyroidism, acquired:  Currently taking Synthroid 25 mcg daily and 50 mcg on sundays   Denies biotoin containing products  Denies taking iron, antiacids  Reports taking calcium in the evening   Currently taking calcium evening    Reports fatigue in the am, constipation, dry skin  Denies dry skin, weight gain, losing hair, palpitations     Latest Reference Range & Units 06/02/23 08:15   TSH 0.380 - 5.330 uIU/mL 7.330 (H)   Free T-4 0.93 - 1.70 ng/dL 1.16        Latest Reference Range & Units 03/11/23 09:09   Microsomal -Tpo- Abs 0.0 - 9.0 IU/mL 9.0     Thyroid ultrasound done on 12/9/2022 showed no thyroid nodules greater than 1 cm    6.  Vitamin D deficiency:  Currently taking  D3-20 mcg daily with her calcium   Latest Reference Range & Units 03/11/23 09:08   25-Hydroxy   Vitamin D 25 30 - 100 ng/mL 36       ROS:     CONS:     No fever, no chills, no weight loss, no fatigue   EYES:      No diplopia, no blurry vision, no redness of eyes, no swelling of eyelids   ENT:    No hearing loss, No ear pain, No sore throat, no dysphagia, no neck swelling   CV:     No chest pain, no palpitations, no claudication, no orthopnea, no PND   PULM:    No SOB, no cough, no hemoptysis, no wheezing    GI:   No nausea, no vomiting, no diarrhea, no constipation, no bloody stools   :  Passing urine well, no dysuria, no hematuria   ENDO:   No polyuria, no polydipsia, no heat intolerance, no  cold intolerance   NEURO: No headaches, no dizziness, no convulsions, no tremors   MUSC:  No joint swellings, no arthralgias, no myalgias, no weakness   SKIN:   No rash, no ulcers, no dry skin   PSYCH:   No depression, no anxiety, no difficulty sleeping       Past Medical History:  Patient Active Problem List    Diagnosis Date Noted    Other specified hypothyroidism 11/15/2022    Osteopenia of left thigh 11/15/2022    Elevated alkaline phosphatase level 11/15/2022    Malignant neoplasm of left female breast (HCC) 02/27/2020    Stage 3 chronic kidney disease (HCC) 02/27/2020    Bipolar 1 disorder (HCC) 09/23/2019    Type 1 diabetes mellitus without complication (HCC) 09/23/2019    Generalized anxiety disorder 09/23/2019    Essential hypertension 09/23/2019    Dyslipidemia 09/23/2019       Past Surgical History:  No past surgical history on file.     Allergies:  Depakote [valproic acid]     Current Medications:    Current Outpatient Medications:     amLODIPine (NORVASC) 5 MG Tab, TAKE 1 TABLET BY MOUTH EVERY DAY, Disp: 90 Tablet, Rfl: 1    primidone (MYSOLINE) 50 MG Tab, TAKE 1 TABLET BY MOUTH TWICE A DAY, Disp: 180 Tablet, Rfl: 1    atorvastatin (LIPITOR) 80 MG tablet, TAKE 1 TABLET BY MOUTH EVERY DAY, Disp: 90 Tablet, Rfl: 2    levothyroxine (SYNTHROID) 25 MCG Tab, Take 1 Tablet by mouth every morning on an empty stomach., Disp: 102 Tablet, Rfl: 1    Calcium Carb-Cholecalciferol (CALCIUM 500+D3 PO), Take  by mouth., Disp: , Rfl:     Calcium Carbonate-Vitamin D (CALCIUM 600+D PO), Take  by mouth., Disp: , Rfl:     anastrozole (ARIMIDEX) 1 MG Tab, Take 1 Tablet by mouth every evening. Needs to establish with cancer specialist. Last refill, Disp: 90 Tablet, Rfl: 0    losartan (COZAAR) 100 MG Tab, TAKE 1 TABLET BY MOUTH EVERY DAY, Disp: 90 Tablet, Rfl: 2    insulin glargine 100 UNIT/ML Solution Pen-injector injection, INJECT 10 UNITS EVERY MORNING AND 10-11 UNITS EVERY NIGHT. (Patient taking differently: 12 Units 2  times a day. INJECT 10 UNITS EVERY MORNING AND 12 UNITS EVERY NIGHT.), Disp: 15 Each, Rfl: 1    lithium carbonate, IR, 300 MG Tab tablet, Take 1 Tablet by mouth 2 times a day., Disp: , Rfl:     aspirin (ASPIRIN LOW DOSE) 81 MG EC tablet, Take 1 Tablet by mouth every day., Disp: 90 Tablet, Rfl: 1    timolol (TIMOPTIC) 0.5 % Solution, INSTILL 1 DROP IN BOTH EYES DAILY, Disp: , Rfl:     lamoTRIgine (LAMICTAL) 100 MG Tab, TAKE 1 TABLET BY MOUTH TWICE A DAY AS DIRECTED, Disp: , Rfl:     Blood Glucose Meter Kit, Test blood sugar as recommended by provider. 1 blood glucose monitoring kit-- DEXCOM, Disp: 1 Kit, Rfl: 0    Blood Glucose Test Strips, Use one testing unit every 10 days to test blood sugar. DEXCOM meter, Disp: 3 Strip, Rfl: 3    QUEtiapine (SEROQUEL) 100 MG Tab, Take 1 Tab by mouth 2 times a day. Takes with quetiapine 200 mg at bedtime (Patient taking differently: Take 100 mg by mouth 3 times a day.), Disp: 60 Tab, Rfl: 1    carbamazepine SR (TEGRETOL XR) 200 MG TABLET SR 12 HR extended-release tablet, TAKE 1 TABLET BY MOUTH TWICE A DAY, Disp: , Rfl:     hydrOXYzine pamoate (VISTARIL) 50 MG Cap, TAKE 1 CAPSULE BY MOUTH 3 TIMES A DAY AS NEEDED FOR 30 DAYS, Disp: , Rfl:     Cariprazine HCl 6 MG Cap, Take 6 mg by mouth every evening., Disp: , Rfl:     Social History:  Social History     Socioeconomic History    Marital status:      Spouse name: Not on file    Number of children: Not on file    Years of education: Not on file    Highest education level: Not on file   Occupational History    Not on file   Tobacco Use    Smoking status: Never    Smokeless tobacco: Never   Vaping Use    Vaping Use: Never used   Substance and Sexual Activity    Alcohol use: Never    Drug use: Never    Sexual activity: Not Currently   Other Topics Concern    Not on file   Social History Narrative    Not on file     Social Determinants of Health     Financial Resource Strain: Not on file   Food Insecurity: Not on file  "  Transportation Needs: Not on file   Physical Activity: Not on file   Stress: Not on file   Social Connections: Not on file   Intimate Partner Violence: Not on file   Housing Stability: Not on file        Family History:   Family History   Problem Relation Age of Onset    Diabetes Daughter        PHYSICAL EXAM:   Vital signs: /86 (BP Location: Left arm, Patient Position: Sitting)   Pulse (!) 112   Ht 1.626 m (5' 4\")   Wt 69.6 kg (153 lb 6.4 oz)   SpO2 97%   BMI 26.33 kg/m²   GENERAL: Well-developed, well-nourished  in no apparent distress.   EYE: No ocular and eyelid asymmetry, Anicteric sclerae,  PERRL, No exophthalmos or lidlag  FOOT: Normal sensation to monofilament testing, normal pulses, no ulcers.  Normal Vibration quantitative sensation test.    Labs:  Lab Results   Component Value Date/Time    HBA1C 6.6 (A) 09/20/2022 0913    AVGLUC 111 10/21/2021 0903       Lab Results   Component Value Date/Time    CHOLSTRLTOT 161 11/05/2022 0816    TRIGLYCERIDE 134 11/05/2022 0816    HDL 74 11/05/2022 0816    LDL 60 11/05/2022 0816       Lab Results   Component Value Date/Time    MALBCRT 15 03/11/2023 09:08 AM    MICROALBUR 1.6 03/11/2023 09:08 AM        Lab Results   Component Value Date/Time    TSHULTRASEN 12.600 (H) 11/05/2022 0816         ASSESSMENT/PLAN:   1. Type 2 diabetes mellitus treated with insulin (HCC)  Stable with an A1c of 7.3%  Lifestyle modifications discussed  Discussed importance to minimize hypoglycemic episodes, I would like for patient to use the Dexcom CGM, but she states that she does not want to use any CGM as it is too difficult to see when her blood sugar is rising    Medication regimen:  Glargine 10-14 at night-depending on blood sugars-she takes about 14 units when her blood sugars are above 170, 12 units when her blood sugar is above 150, 10 units with her blood sugar is 140 or below-14 units when blood sugars are above or equal to 170, 10 units when blood sugars are above or " equal to 150, 11 units when blood sugars are below or equal 140  Novolog 1:5 carb ratioin, correction factor 1:30 > 130-continue  Discussed carb counting, patient is knowledgeable-demonstrated back understanding  Discussed to download [employer] tani      2. Essential hypertension  Stable  Cont Regimen    3. Dyslipidemia  Stable  Cont Regimen    4. Hypothyroidism, acquired  Clinically unstable  Biochemically unstable  Please take Synthroid 25 mcg 5 days a week, then 50 mcg 2 days a week (Monday and Sunday)  - levothyroxine (SYNTHROID) 25 MCG Tab; Take 1 Tablet by mouth every morning on an empty stomach.  Dispense: 110 Tablet; Refill: 4  - TSH; Future  - FREE THYROXINE; Future  - Comp Metabolic Panel; Future    5. Vitamin D deficiency  Stable  Continue regimen-HPI    Disposition: Make an appointment to follow-up in 6 months  Do your blood work 2 weeks prior to next appointment    Thank you kindly for allowing me to participate in the diabetes care plan for this patient.    STEVEN Mohan.  06/09/23

## 2023-06-25 DIAGNOSIS — C50.912 MALIGNANT NEOPLASM OF LEFT FEMALE BREAST, UNSPECIFIED ESTROGEN RECEPTOR STATUS, UNSPECIFIED SITE OF BREAST (HCC): ICD-10-CM

## 2023-06-28 DIAGNOSIS — C50.912 MALIGNANT NEOPLASM OF LEFT FEMALE BREAST, UNSPECIFIED ESTROGEN RECEPTOR STATUS, UNSPECIFIED SITE OF BREAST (HCC): ICD-10-CM

## 2023-06-28 RX ORDER — ANASTROZOLE 1 MG/1
1 TABLET ORAL EVERY EVENING
Qty: 90 TABLET | Refills: 3 | Status: ON HOLD | OUTPATIENT
Start: 2023-06-28 | End: 2024-02-27

## 2023-06-28 RX ORDER — ANASTROZOLE 1 MG/1
TABLET ORAL
Qty: 90 TABLET | Refills: 0 | OUTPATIENT
Start: 2023-06-28

## 2023-09-22 ENCOUNTER — HOSPITAL ENCOUNTER (OUTPATIENT)
Dept: RADIOLOGY | Facility: MEDICAL CENTER | Age: 73
End: 2023-09-22
Attending: PHYSICAL MEDICINE & REHABILITATION
Payer: MEDICARE

## 2023-09-22 DIAGNOSIS — M54.16 LUMBAR RADICULOPATHY: ICD-10-CM

## 2023-09-22 PROCEDURE — 72110 X-RAY EXAM L-2 SPINE 4/>VWS: CPT

## 2023-09-30 DIAGNOSIS — I10 ESSENTIAL HYPERTENSION: ICD-10-CM

## 2023-09-30 DIAGNOSIS — R25.1 SHAKING: ICD-10-CM

## 2023-10-02 RX ORDER — LOSARTAN POTASSIUM 100 MG/1
100 TABLET ORAL DAILY
Qty: 90 TABLET | Refills: 0 | Status: SHIPPED | OUTPATIENT
Start: 2023-10-02 | End: 2024-02-09

## 2023-10-02 RX ORDER — PRIMIDONE 50 MG/1
50 TABLET ORAL 2 TIMES DAILY
Qty: 180 TABLET | Refills: 0 | Status: SHIPPED | OUTPATIENT
Start: 2023-10-02 | End: 2024-02-09

## 2023-10-02 NOTE — TELEPHONE ENCOUNTER
Requested Prescriptions     Pending Prescriptions Disp Refills   • losartan (COZAAR) 100 MG Tab [Pharmacy Med Name: LOSARTAN POTASSIUM 100 MG TAB] 90 Tablet 0     Sig: TAKE 1 TABLET BY MOUTH EVERY DAY       STEVEN Luo.

## 2023-10-02 NOTE — TELEPHONE ENCOUNTER
Requested Prescriptions     Pending Prescriptions Disp Refills   • primidone (MYSOLINE) 50 MG Tab 180 Tablet 0     Sig: Take 1 Tablet by mouth 2 times a day.       STEVEN Luo.

## 2023-10-07 ENCOUNTER — HOSPITAL ENCOUNTER (OUTPATIENT)
Dept: RADIOLOGY | Facility: MEDICAL CENTER | Age: 73
End: 2023-10-07
Attending: PHYSICAL MEDICINE & REHABILITATION
Payer: MEDICARE

## 2023-10-07 DIAGNOSIS — M54.16 LUMBAR RADICULOPATHY: ICD-10-CM

## 2023-10-07 PROCEDURE — 72148 MRI LUMBAR SPINE W/O DYE: CPT

## 2023-10-28 DIAGNOSIS — I15.2 HYPERTENSION ASSOCIATED WITH DIABETES (HCC): ICD-10-CM

## 2023-10-28 DIAGNOSIS — E11.59 HYPERTENSION ASSOCIATED WITH DIABETES (HCC): ICD-10-CM

## 2023-10-28 DIAGNOSIS — E10.9 TYPE 1 DIABETES MELLITUS WITHOUT COMPLICATION (HCC): ICD-10-CM

## 2023-10-30 RX ORDER — AMLODIPINE BESYLATE 5 MG/1
TABLET ORAL
Qty: 90 TABLET | Refills: 1 | Status: SHIPPED | OUTPATIENT
Start: 2023-10-30 | End: 2024-02-09

## 2023-10-30 RX ORDER — ASPIRIN 81 MG/1
81 TABLET, COATED ORAL
Qty: 90 TABLET | Refills: 1 | Status: ON HOLD | OUTPATIENT
Start: 2023-10-30 | End: 2024-02-27

## 2023-10-31 NOTE — TELEPHONE ENCOUNTER
Requested Prescriptions     Signed Prescriptions Disp Refills    amLODIPine (NORVASC) 5 MG Tab 90 Tablet 1     Sig: TAKE 1 TABLET BY MOUTH EVERY DAY     Authorizing Provider: RJ LAM    aspirin (ASPIRIN LOW DOSE) 81 MG EC tablet 90 Tablet 1     Sig: TAKE 1 TABLET BY MOUTH EVERY DAY     Authorizing Provider: RJ LAM A.P.R.N.

## 2023-11-10 DIAGNOSIS — N18.31 STAGE 3A CHRONIC KIDNEY DISEASE: ICD-10-CM

## 2023-11-10 DIAGNOSIS — I10 ESSENTIAL HYPERTENSION: ICD-10-CM

## 2023-11-10 DIAGNOSIS — E78.5 DYSLIPIDEMIA: ICD-10-CM

## 2023-11-11 ENCOUNTER — HOSPITAL ENCOUNTER (OUTPATIENT)
Dept: LAB | Facility: MEDICAL CENTER | Age: 73
End: 2023-11-11
Payer: MEDICARE

## 2023-11-11 DIAGNOSIS — E03.9 HYPOTHYROIDISM, ACQUIRED: ICD-10-CM

## 2023-11-11 DIAGNOSIS — I10 ESSENTIAL HYPERTENSION: ICD-10-CM

## 2023-11-11 DIAGNOSIS — N18.31 STAGE 3A CHRONIC KIDNEY DISEASE: ICD-10-CM

## 2023-11-11 DIAGNOSIS — E78.5 DYSLIPIDEMIA: ICD-10-CM

## 2023-11-11 LAB
ALBUMIN SERPL BCP-MCNC: 4.6 G/DL (ref 3.2–4.9)
ALBUMIN SERPL BCP-MCNC: 4.7 G/DL (ref 3.2–4.9)
ALBUMIN/GLOB SERPL: 1.5 G/DL
ALBUMIN/GLOB SERPL: 1.6 G/DL
ALP SERPL-CCNC: 193 U/L (ref 30–99)
ALP SERPL-CCNC: 198 U/L (ref 30–99)
ALT SERPL-CCNC: 20 U/L (ref 2–50)
ALT SERPL-CCNC: 22 U/L (ref 2–50)
ANION GAP SERPL CALC-SCNC: 13 MMOL/L (ref 7–16)
ANION GAP SERPL CALC-SCNC: 14 MMOL/L (ref 7–16)
AST SERPL-CCNC: 21 U/L (ref 12–45)
AST SERPL-CCNC: 22 U/L (ref 12–45)
BILIRUB SERPL-MCNC: 0.4 MG/DL (ref 0.1–1.5)
BILIRUB SERPL-MCNC: 0.5 MG/DL (ref 0.1–1.5)
BUN SERPL-MCNC: 21 MG/DL (ref 8–22)
BUN SERPL-MCNC: 22 MG/DL (ref 8–22)
CALCIUM ALBUM COR SERPL-MCNC: 10.2 MG/DL (ref 8.5–10.5)
CALCIUM ALBUM COR SERPL-MCNC: 10.6 MG/DL (ref 8.5–10.5)
CALCIUM SERPL-MCNC: 10.8 MG/DL (ref 8.5–10.5)
CALCIUM SERPL-MCNC: 11.1 MG/DL (ref 8.5–10.5)
CHLORIDE SERPL-SCNC: 96 MMOL/L (ref 96–112)
CHLORIDE SERPL-SCNC: 99 MMOL/L (ref 96–112)
CHOLEST SERPL-MCNC: 176 MG/DL (ref 100–199)
CO2 SERPL-SCNC: 23 MMOL/L (ref 20–33)
CO2 SERPL-SCNC: 23 MMOL/L (ref 20–33)
CREAT SERPL-MCNC: 0.91 MG/DL (ref 0.5–1.4)
CREAT SERPL-MCNC: 0.92 MG/DL (ref 0.5–1.4)
CREAT UR-MCNC: 115.89 MG/DL
FASTING STATUS PATIENT QL REPORTED: NORMAL
GFR SERPLBLD CREATININE-BSD FMLA CKD-EPI: 65 ML/MIN/1.73 M 2
GFR SERPLBLD CREATININE-BSD FMLA CKD-EPI: 66 ML/MIN/1.73 M 2
GLOBULIN SER CALC-MCNC: 3 G/DL (ref 1.9–3.5)
GLOBULIN SER CALC-MCNC: 3 G/DL (ref 1.9–3.5)
GLUCOSE SERPL-MCNC: 428 MG/DL (ref 65–99)
GLUCOSE SERPL-MCNC: 435 MG/DL (ref 65–99)
HDLC SERPL-MCNC: 92 MG/DL
LDLC SERPL CALC-MCNC: 58 MG/DL
MICROALBUMIN UR-MCNC: 3.3 MG/DL
MICROALBUMIN/CREAT UR: 28 MG/G (ref 0–30)
POTASSIUM SERPL-SCNC: 3.9 MMOL/L (ref 3.6–5.5)
POTASSIUM SERPL-SCNC: 4 MMOL/L (ref 3.6–5.5)
PROT SERPL-MCNC: 7.6 G/DL (ref 6–8.2)
PROT SERPL-MCNC: 7.7 G/DL (ref 6–8.2)
SODIUM SERPL-SCNC: 132 MMOL/L (ref 135–145)
SODIUM SERPL-SCNC: 136 MMOL/L (ref 135–145)
T4 FREE SERPL-MCNC: 1.19 NG/DL (ref 0.93–1.7)
TRIGL SERPL-MCNC: 129 MG/DL (ref 0–149)
TSH SERPL DL<=0.005 MIU/L-ACNC: 6.16 UIU/ML (ref 0.38–5.33)

## 2023-11-11 PROCEDURE — 36415 COLL VENOUS BLD VENIPUNCTURE: CPT

## 2023-11-11 PROCEDURE — 84443 ASSAY THYROID STIM HORMONE: CPT

## 2023-11-11 PROCEDURE — 82570 ASSAY OF URINE CREATININE: CPT

## 2023-11-11 PROCEDURE — 80053 COMPREHEN METABOLIC PANEL: CPT

## 2023-11-11 PROCEDURE — 84439 ASSAY OF FREE THYROXINE: CPT

## 2023-11-11 PROCEDURE — 80053 COMPREHEN METABOLIC PANEL: CPT | Mod: 91

## 2023-11-11 PROCEDURE — 80061 LIPID PANEL: CPT

## 2023-11-11 PROCEDURE — 82043 UR ALBUMIN QUANTITATIVE: CPT

## 2023-11-11 NOTE — PROGRESS NOTES
Lab orders placed for prior to appt next week.  Quantif[employer] message sent.  Shaista HARRIS  [facility]

## 2023-11-17 ENCOUNTER — OFFICE VISIT (OUTPATIENT)
Dept: VASCULAR LAB | Facility: MEDICAL CENTER | Age: 73
End: 2023-11-17
Payer: MEDICARE

## 2023-11-17 VITALS
WEIGHT: 146 LBS | SYSTOLIC BLOOD PRESSURE: 150 MMHG | BODY MASS INDEX: 24.92 KG/M2 | HEART RATE: 102 BPM | HEIGHT: 64 IN | DIASTOLIC BLOOD PRESSURE: 75 MMHG

## 2023-11-17 DIAGNOSIS — E10.9 TYPE 1 DIABETES MELLITUS WITHOUT COMPLICATION (HCC): ICD-10-CM

## 2023-11-17 DIAGNOSIS — Z79.02 ANTIPLATELET OR ANTITHROMBOTIC LONG-TERM USE: ICD-10-CM

## 2023-11-17 DIAGNOSIS — I10 ESSENTIAL HYPERTENSION: ICD-10-CM

## 2023-11-17 DIAGNOSIS — E78.5 DYSLIPIDEMIA: ICD-10-CM

## 2023-11-17 DIAGNOSIS — N18.31 STAGE 3A CHRONIC KIDNEY DISEASE: ICD-10-CM

## 2023-11-17 PROCEDURE — 3078F DIAST BP <80 MM HG: CPT

## 2023-11-17 PROCEDURE — 99212 OFFICE O/P EST SF 10 MIN: CPT

## 2023-11-17 PROCEDURE — 3077F SYST BP >= 140 MM HG: CPT

## 2023-11-17 PROCEDURE — 99214 OFFICE O/P EST MOD 30 MIN: CPT

## 2023-11-17 NOTE — PROGRESS NOTES
"VASCULAR MEDICINE CLINIC - Follow up VISIT  11/17/2023      Molly Loera is a 73 y.o.  female who presents today  for   Chief Complaint   Patient presents with    Follow-Up      Subjective      HPI:  Here for f/u of dyslipidemia and hypertension in setting of type 1 dm  Doing well overall  Has seen endo, has upcoming appt next month  Occasional hypoglycemia  Home bps 120-130s/70-80s - only checking once a week  Denies dizziness, lightheadedness, HA or vision changes  Denies CV symptoms  Good adherence  No interfering substances  No myalgias on statin  Energy level is good, working with endo on thyroid med  Exercise limited by back and foot pain - getting injections       Social History     Tobacco Use    Smoking status: Never    Smokeless tobacco: Never   Vaping Use    Vaping Use: Never used   Substance Use Topics    Alcohol use: Never    Drug use: Never           DIET AND EXERCISE:  Weight Change: down 8lbs  Diet: whole foods - reasonably low carb  Exercise: minimal exercise, limited by ortho pain in knees and feet         Objective     Objective:     Vitals:    11/17/23 0805 11/17/23 0810 11/17/23 0827   BP: (!) 199/106 (!) 179/99 (!) 150/75   BP Location: Right arm Right arm Right leg   Patient Position: Sitting Sitting    BP Cuff Size: Adult Adult    Pulse: (!) 121 (!) 120 (!) 102   Weight: 66.2 kg (146 lb)     Height: 1.626 m (5' 4\")            Physical Exam  Vitals reviewed.   Constitutional:       General: She is not in acute distress.     Appearance: She is not diaphoretic.   HENT:      Head: Normocephalic and atraumatic.   Eyes:      General: No scleral icterus.     Conjunctiva/sclera: Conjunctivae normal.   Cardiovascular:      Rate and Rhythm: Regular rhythm. Tachycardia present.      Heart sounds: Normal heart sounds. No murmur heard.  Pulmonary:      Effort: Pulmonary effort is normal. No respiratory distress.      Breath sounds: Normal breath sounds. No wheezing or rales.   Musculoskeletal:      " Right lower leg: No edema.      Left lower leg: No edema.   Skin:     General: Skin is warm and dry.      Coloration: Skin is not pale.   Neurological:      General: No focal deficit present.      Mental Status: She is alert and oriented to person, place, and time.      Coordination: Coordination normal.      Gait: Gait is intact. Gait normal.   Psychiatric:         Mood and Affect: Mood and affect normal.         Behavior: Behavior normal.          DATA REVIEW    Lab Results   Component Value Date/Time    CHOLSTRLTOT 176 11/11/2023 09:02 AM    LDL 58 11/11/2023 09:02 AM    HDL 92 11/11/2023 09:02 AM    TRIGLYCERIDE 129 11/11/2023 09:02 AM       Lab Results   Component Value Date/Time    SODIUM 132 (L) 11/11/2023 09:07 AM    POTASSIUM 3.9 11/11/2023 09:07 AM    CHLORIDE 96 11/11/2023 09:07 AM    CO2 23 11/11/2023 09:07 AM    GLUCOSE 435 (H) 11/11/2023 09:07 AM    BUN 22 11/11/2023 09:07 AM    CREATININE 0.92 11/11/2023 09:07 AM     Lab Results   Component Value Date/Time    ALKPHOSPHAT 198 (H) 11/11/2023 09:07 AM    ASTSGOT 22 11/11/2023 09:07 AM    ALTSGPT 22 11/11/2023 09:07 AM    TBILIRUBIN 0.4 11/11/2023 09:07 AM       Lab Results   Component Value Date/Time    HBA1C 7.3 (A) 03/27/2023 10:17 AM       Lab Results   Component Value Date/Time    MALBCRT 28 11/11/2023 09:02 AM    MICROALBUR 3.3 11/11/2023 09:02 AM       Echocardiogram 2020  Technically difficult study.  Hyperdynamic left ventricular systolic function. Left ventricular   ejection fraction is visually estimated to be greater than 75%.   Normal right ventricular size and systolic function.  No significant valvular abnormalities.  No prior study is available for comparison.     ABPM 12/2022  Mean daytime: 113/61 consistent with well-controlled out of office blood pressure   Nocturnal dip: Perhaps blunted        Medical Decision Making:  Today's Assessment / Status / Plan:     1. Essential hypertension  Comp Metabolic Panel    MICROALBUMIN CREAT RATIO  URINE      2. Dyslipidemia        3. Stage 3a chronic kidney disease (HCC)  Comp Metabolic Panel    MICROALBUMIN CREAT RATIO URINE      4. Type 1 diabetes mellitus without complication (HCC)  Comp Metabolic Panel    MICROALBUMIN CREAT RATIO URINE      5. Antiplatelet or antithrombotic long-term use  CBC WITHOUT DIFFERENTIAL         Patient Type: Primary Prevention    Etiology of Established CVD if Present: None    Lipid Management: Qualifies for Statin Therapy Based on 2018 ACC/AHA Guidelines: yes  Calculated 10-Year Risk of ASCVD: N/A  Currently on Statin: Yes  Goal LDL less than 100 non-HDL less than 130  At goal on recent labs 11/2023  Plan:  -Continue atorvastatin 80 mg a day  -Recheck fasting lipid panel as ordered by PCP, or order at next appt if not done    Blood Pressure Management:  [facility] (2017) Blood Pressure Goal <130/80  Home readings under excellent control  ABPM 12/2022 shows excellent out of office BP control,   Blood pressure elevated in the office consistent with whitecoat hypertension  No known evidence of endorgan damage  Plan:  -Continue amlodipine 5 mg daily  -Continue losartan 100 mg a day  -Continue home blood pressure monitoring  -Recheck GFR and electrolytes as per endo and PCP - has labs every few months for both  -Consider addition of low dose thiazide type diuretic if out of office blood pressure elevated in future, or increase amlodipine to minimize pill burden    Glycemic Status: Diabetic  Apparently late onset type I diabetic  Reasonable control by most recent A1c, will have done next month in office at endo   rare hypoglycemia  Plan:  -Defer further management to PCP and endocrinology  -Continue current dose adjustments of insulin as per endocrinology  -Report any increase in frequency of hypoglycemia    Anti-Platelet/Anti-Coagulant Tx: yes  Reasonable to continue low-dose aspirin since she does tolerate it well and does have multiple cardiovascular risk factors    Smoking: continue  complete avoidance of all tobacco products    Physical Activity: She is fairly limited by orthopedic issues but have encouraged her to exercise as much as possible    Weight Management and Nutrition: Continue good diabetic diet    Other:     -CKD, stage IIIa -likely due to diabetic and hypertensive nephrosclerosis.  Blood pressure control and ARB as above.  Recheck GFR and electrolytes and urine for albumin as ordered by PCP.  Avoid excess NSAIDs.  Will follow renal function over time    Instructed to follow-up with PCP for remainder of adult medical needs: yes  We will partner with other providers in the management of established vascular disease and cardiometabolic risk factors.    Studies to Be Obtained: none  Labs to Be Obtained: CMP, A1c (per endo), urine for albumin, and cbc    Follow up in: 6 months unless needed sooner       Shaista HARRIS  [facility]      Cc: LOURDES Penn

## 2023-11-19 DIAGNOSIS — E03.9 HYPOTHYROIDISM, ACQUIRED: ICD-10-CM

## 2023-11-20 RX ORDER — LEVOTHYROXINE SODIUM 0.03 MG/1
TABLET ORAL
Qty: 102 TABLET | Refills: 1 | Status: SHIPPED | OUTPATIENT
Start: 2023-11-20 | End: 2024-02-09

## 2023-11-29 ENCOUNTER — PATIENT MESSAGE (OUTPATIENT)
Dept: HEALTH INFORMATION MANAGEMENT | Facility: OTHER | Age: 73
End: 2023-11-29

## 2023-12-05 ENCOUNTER — HOSPITAL ENCOUNTER (OUTPATIENT)
Dept: RADIOLOGY | Facility: MEDICAL CENTER | Age: 73
End: 2023-12-05
Attending: INTERNAL MEDICINE
Payer: MEDICARE

## 2023-12-05 DIAGNOSIS — Z79.811 LONG TERM (CURRENT) USE OF AROMATASE INHIBITORS: ICD-10-CM

## 2023-12-05 DIAGNOSIS — Z17.0 MALIGNANT NEOPLASM OF LEFT BREAST IN FEMALE, ESTROGEN RECEPTOR POSITIVE, UNSPECIFIED SITE OF BREAST (HCC): ICD-10-CM

## 2023-12-05 DIAGNOSIS — C50.912 MALIGNANT NEOPLASM OF LEFT BREAST IN FEMALE, ESTROGEN RECEPTOR POSITIVE, UNSPECIFIED SITE OF BREAST (HCC): ICD-10-CM

## 2023-12-05 PROCEDURE — 77080 DXA BONE DENSITY AXIAL: CPT

## 2023-12-13 ENCOUNTER — APPOINTMENT (OUTPATIENT)
Dept: HEMATOLOGY ONCOLOGY | Facility: MEDICAL CENTER | Age: 73
End: 2023-12-13
Payer: MEDICARE

## 2024-02-09 ENCOUNTER — APPOINTMENT (OUTPATIENT)
Dept: RADIOLOGY | Facility: MEDICAL CENTER | Age: 74
DRG: 085 | End: 2024-02-09
Attending: STUDENT IN AN ORGANIZED HEALTH CARE EDUCATION/TRAINING PROGRAM
Payer: MEDICARE

## 2024-02-09 ENCOUNTER — HOSPITAL ENCOUNTER (INPATIENT)
Facility: MEDICAL CENTER | Age: 74
LOS: 5 days | DRG: 085 | End: 2024-02-14
Attending: STUDENT IN AN ORGANIZED HEALTH CARE EDUCATION/TRAINING PROGRAM | Admitting: SURGERY
Payer: MEDICARE

## 2024-02-09 ENCOUNTER — APPOINTMENT (OUTPATIENT)
Dept: RADIOLOGY | Facility: MEDICAL CENTER | Age: 74
DRG: 085 | End: 2024-02-09
Attending: SURGERY
Payer: MEDICARE

## 2024-02-09 DIAGNOSIS — I60.9: ICD-10-CM

## 2024-02-09 DIAGNOSIS — R73.9 HYPERGLYCEMIA: ICD-10-CM

## 2024-02-09 DIAGNOSIS — T14.90XA TRAUMA: ICD-10-CM

## 2024-02-09 DIAGNOSIS — S09.90XA CLOSED HEAD INJURY, INITIAL ENCOUNTER: ICD-10-CM

## 2024-02-09 DIAGNOSIS — I60.9 SUBARACHNOID HEMORRHAGE (HCC): ICD-10-CM

## 2024-02-09 DIAGNOSIS — Z79.01 ON CONTINUOUS ORAL ANTICOAGULATION: ICD-10-CM

## 2024-02-09 PROBLEM — G40.909 SEIZURE DISORDER (HCC): Status: ACTIVE | Noted: 2024-02-09

## 2024-02-09 LAB
ALBUMIN SERPL BCP-MCNC: 3.8 G/DL (ref 3.2–4.9)
ALBUMIN/GLOB SERPL: 1.1 G/DL
ALP SERPL-CCNC: 209 U/L (ref 30–99)
ALT SERPL-CCNC: 63 U/L (ref 2–50)
ANION GAP SERPL CALC-SCNC: 14 MMOL/L (ref 7–16)
APTT PPP: 28.2 SEC (ref 24.7–36)
AST SERPL-CCNC: 61 U/L (ref 12–45)
B-OH-BUTYR SERPL-MCNC: 0.61 MMOL/L (ref 0.02–0.27)
BASE EXCESS BLDV CALC-SCNC: -2 MMOL/L
BASOPHILS # BLD AUTO: 0.4 % (ref 0–1.8)
BASOPHILS # BLD: 0.03 K/UL (ref 0–0.12)
BILIRUB SERPL-MCNC: 0.4 MG/DL (ref 0.1–1.5)
BODY TEMPERATURE: 36.7 CENTIGRADE
BUN SERPL-MCNC: 21 MG/DL (ref 8–22)
CALCIUM ALBUM COR SERPL-MCNC: 10.3 MG/DL (ref 8.5–10.5)
CALCIUM SERPL-MCNC: 10.1 MG/DL (ref 8.5–10.5)
CFT BLD TEG: 4.4 MIN (ref 4.6–9.1)
CFT P HPASE BLD TEG: 4.2 MIN (ref 4.3–8.3)
CHLORIDE SERPL-SCNC: 95 MMOL/L (ref 96–112)
CLOT ANGLE BLD TEG: 79.8 DEGREES (ref 63–78)
CLOT LYSIS 30M P MA LENFR BLD TEG: 0.1 % (ref 0–2.6)
CO2 SERPL-SCNC: 22 MMOL/L (ref 20–33)
CREAT SERPL-MCNC: 0.92 MG/DL (ref 0.5–1.4)
CT.EXTRINSIC BLD ROTEM: 0.8 MIN (ref 0.8–2.1)
EKG IMPRESSION: NORMAL
EOSINOPHIL # BLD AUTO: 0.14 K/UL (ref 0–0.51)
EOSINOPHIL NFR BLD: 2 % (ref 0–6.9)
ERYTHROCYTE [DISTWIDTH] IN BLOOD BY AUTOMATED COUNT: 47.4 FL (ref 35.9–50)
GFR SERPLBLD CREATININE-BSD FMLA CKD-EPI: 65 ML/MIN/1.73 M 2
GLOBULIN SER CALC-MCNC: 3.5 G/DL (ref 1.9–3.5)
GLUCOSE BLD STRIP.AUTO-MCNC: 132 MG/DL (ref 65–99)
GLUCOSE BLD STRIP.AUTO-MCNC: 285 MG/DL (ref 65–99)
GLUCOSE BLD STRIP.AUTO-MCNC: 503 MG/DL (ref 65–99)
GLUCOSE SERPL-MCNC: 682 MG/DL (ref 65–99)
HCO3 BLDV-SCNC: 23 MMOL/L (ref 24–28)
HCT VFR BLD AUTO: 35.2 % (ref 37–47)
HGB BLD-MCNC: 11.2 G/DL (ref 12–16)
IMM GRANULOCYTES # BLD AUTO: 0.03 K/UL (ref 0–0.11)
IMM GRANULOCYTES NFR BLD AUTO: 0.4 % (ref 0–0.9)
INHALED O2 FLOW RATE: ABNORMAL L/MIN
INR PPP: 1.08 (ref 0.87–1.13)
LYMPHOCYTES # BLD AUTO: 1.13 K/UL (ref 1–4.8)
LYMPHOCYTES NFR BLD: 15.9 % (ref 22–41)
MCF BLD TEG: 69 MM (ref 52–69)
MCF.PLATELET INHIB BLD ROTEM: 41.7 MM (ref 15–32)
MCH RBC QN AUTO: 28 PG (ref 27–33)
MCHC RBC AUTO-ENTMCNC: 31.8 G/DL (ref 32.2–35.5)
MCV RBC AUTO: 88 FL (ref 81.4–97.8)
MONOCYTES # BLD AUTO: 0.62 K/UL (ref 0–0.85)
MONOCYTES NFR BLD AUTO: 8.7 % (ref 0–13.4)
NEUTROPHILS # BLD AUTO: 5.16 K/UL (ref 1.82–7.42)
NEUTROPHILS NFR BLD: 72.6 % (ref 44–72)
NRBC # BLD AUTO: 0 K/UL
NRBC BLD-RTO: 0 /100 WBC (ref 0–0.2)
PA AA BLD-ACNC: 0 % (ref 0–11)
PA ADP BLD-ACNC: 1.1 % (ref 0–17)
PCO2 BLDV: 40.2 MMHG (ref 41–51)
PH BLDV: 7.38 [PH] (ref 7.31–7.45)
PLATELET # BLD AUTO: 428 K/UL (ref 164–446)
PMV BLD AUTO: 11.2 FL (ref 9–12.9)
PO2 BLDV: 22.7 MMHG (ref 25–40)
POTASSIUM SERPL-SCNC: 5.3 MMOL/L (ref 3.6–5.5)
PROT SERPL-MCNC: 7.3 G/DL (ref 6–8.2)
PROTHROMBIN TIME: 14.1 SEC (ref 12–14.6)
RBC # BLD AUTO: 4 M/UL (ref 4.2–5.4)
SAO2 % BLDV: 34.3 %
SODIUM SERPL-SCNC: 131 MMOL/L (ref 135–145)
TEG ALGORITHM TGALG: ABNORMAL
WBC # BLD AUTO: 7.1 K/UL (ref 4.8–10.8)

## 2024-02-09 PROCEDURE — 700111 HCHG RX REV CODE 636 W/ 250 OVERRIDE (IP): Mod: JZ | Performed by: SURGERY

## 2024-02-09 PROCEDURE — 85025 COMPLETE CBC W/AUTO DIFF WBC: CPT

## 2024-02-09 PROCEDURE — 770022 HCHG ROOM/CARE - ICU (200)

## 2024-02-09 PROCEDURE — 70450 CT HEAD/BRAIN W/O DYE: CPT

## 2024-02-09 PROCEDURE — 99291 CRITICAL CARE FIRST HOUR: CPT

## 2024-02-09 PROCEDURE — 99233 SBSQ HOSP IP/OBS HIGH 50: CPT | Performed by: SURGERY

## 2024-02-09 PROCEDURE — A9270 NON-COVERED ITEM OR SERVICE: HCPCS

## 2024-02-09 PROCEDURE — 700102 HCHG RX REV CODE 250 W/ 637 OVERRIDE(OP): Performed by: SURGERY

## 2024-02-09 PROCEDURE — 93005 ELECTROCARDIOGRAM TRACING: CPT | Performed by: STUDENT IN AN ORGANIZED HEALTH CARE EDUCATION/TRAINING PROGRAM

## 2024-02-09 PROCEDURE — 85347 COAGULATION TIME ACTIVATED: CPT

## 2024-02-09 PROCEDURE — G0390 TRAUMA RESPONS W/HOSP CRITI: HCPCS

## 2024-02-09 PROCEDURE — 700101 HCHG RX REV CODE 250

## 2024-02-09 PROCEDURE — 85384 FIBRINOGEN ACTIVITY: CPT | Mod: 91

## 2024-02-09 PROCEDURE — 700105 HCHG RX REV CODE 258: Performed by: STUDENT IN AN ORGANIZED HEALTH CARE EDUCATION/TRAINING PROGRAM

## 2024-02-09 PROCEDURE — 80053 COMPREHEN METABOLIC PANEL: CPT

## 2024-02-09 PROCEDURE — 85576 BLOOD PLATELET AGGREGATION: CPT | Mod: 91

## 2024-02-09 PROCEDURE — 85610 PROTHROMBIN TIME: CPT

## 2024-02-09 PROCEDURE — 700102 HCHG RX REV CODE 250 W/ 637 OVERRIDE(OP)

## 2024-02-09 PROCEDURE — 82962 GLUCOSE BLOOD TEST: CPT | Mod: 91

## 2024-02-09 PROCEDURE — A9270 NON-COVERED ITEM OR SERVICE: HCPCS | Performed by: SURGERY

## 2024-02-09 PROCEDURE — 700105 HCHG RX REV CODE 258: Performed by: SURGERY

## 2024-02-09 PROCEDURE — 85730 THROMBOPLASTIN TIME PARTIAL: CPT

## 2024-02-09 PROCEDURE — 82803 BLOOD GASES ANY COMBINATION: CPT

## 2024-02-09 PROCEDURE — 36415 COLL VENOUS BLD VENIPUNCTURE: CPT

## 2024-02-09 PROCEDURE — 82010 KETONE BODYS QUAN: CPT

## 2024-02-09 RX ORDER — ACETAMINOPHEN 500 MG
1000 TABLET ORAL EVERY 6 HOURS PRN
Status: DISCONTINUED | OUTPATIENT
Start: 2024-02-14 | End: 2024-02-13

## 2024-02-09 RX ORDER — DEXTROSE MONOHYDRATE 25 G/50ML
25 INJECTION, SOLUTION INTRAVENOUS
Status: DISCONTINUED | OUTPATIENT
Start: 2024-02-09 | End: 2024-02-09

## 2024-02-09 RX ORDER — INSULIN LISPRO 100 [IU]/ML
2-9 INJECTION, SOLUTION INTRAVENOUS; SUBCUTANEOUS EVERY 4 HOURS
Status: DISCONTINUED | OUTPATIENT
Start: 2024-02-09 | End: 2024-02-10

## 2024-02-09 RX ORDER — BISACODYL 10 MG
10 SUPPOSITORY, RECTAL RECTAL
Status: DISCONTINUED | OUTPATIENT
Start: 2024-02-09 | End: 2024-02-14 | Stop reason: HOSPADM

## 2024-02-09 RX ORDER — LAMOTRIGINE 100 MG/1
100 TABLET ORAL DAILY
Status: DISCONTINUED | OUTPATIENT
Start: 2024-02-10 | End: 2024-02-14 | Stop reason: HOSPADM

## 2024-02-09 RX ORDER — PRIMIDONE 50 MG/1
100 TABLET ORAL
Status: DISCONTINUED | OUTPATIENT
Start: 2024-02-09 | End: 2024-02-14 | Stop reason: HOSPADM

## 2024-02-09 RX ORDER — ACETAMINOPHEN 500 MG
1000 TABLET ORAL EVERY 6 HOURS
Status: DISCONTINUED | OUTPATIENT
Start: 2024-02-09 | End: 2024-02-13

## 2024-02-09 RX ORDER — LEVETIRACETAM 500 MG/5ML
500 INJECTION, SOLUTION, CONCENTRATE INTRAVENOUS EVERY 12 HOURS
Status: DISCONTINUED | OUTPATIENT
Start: 2024-02-09 | End: 2024-02-14 | Stop reason: HOSPADM

## 2024-02-09 RX ORDER — SODIUM CHLORIDE 9 MG/ML
INJECTION, SOLUTION INTRAVENOUS CONTINUOUS
Status: DISCONTINUED | OUTPATIENT
Start: 2024-02-09 | End: 2024-02-13

## 2024-02-09 RX ORDER — BUSPIRONE HYDROCHLORIDE 10 MG/1
5 TABLET ORAL 2 TIMES DAILY
Status: DISCONTINUED | OUTPATIENT
Start: 2024-02-09 | End: 2024-02-14 | Stop reason: HOSPADM

## 2024-02-09 RX ORDER — DEXTROSE MONOHYDRATE 25 G/50ML
25 INJECTION, SOLUTION INTRAVENOUS
Status: DISCONTINUED | OUTPATIENT
Start: 2024-02-09 | End: 2024-02-10

## 2024-02-09 RX ORDER — LOSARTAN POTASSIUM 100 MG/1
100 TABLET ORAL DAILY
Status: ON HOLD | COMMUNITY
End: 2024-02-27

## 2024-02-09 RX ORDER — CARBAMAZEPINE 200 MG/1
200 TABLET ORAL 2 TIMES DAILY
Status: DISCONTINUED | OUTPATIENT
Start: 2024-02-09 | End: 2024-02-14 | Stop reason: HOSPADM

## 2024-02-09 RX ORDER — ATORVASTATIN CALCIUM 80 MG/1
80 TABLET, FILM COATED ORAL NIGHTLY
Status: ON HOLD | COMMUNITY
End: 2024-02-27

## 2024-02-09 RX ORDER — BUSPIRONE HYDROCHLORIDE 5 MG/1
5 TABLET ORAL 2 TIMES DAILY
Status: ON HOLD | COMMUNITY
End: 2024-02-27

## 2024-02-09 RX ORDER — AMOXICILLIN 250 MG
1 CAPSULE ORAL
Status: DISCONTINUED | OUTPATIENT
Start: 2024-02-09 | End: 2024-02-14 | Stop reason: HOSPADM

## 2024-02-09 RX ORDER — LEVOTHYROXINE SODIUM 0.03 MG/1
25 TABLET ORAL
Status: DISCONTINUED | OUTPATIENT
Start: 2024-02-10 | End: 2024-02-14 | Stop reason: HOSPADM

## 2024-02-09 RX ORDER — OXYCODONE HYDROCHLORIDE 5 MG/1
5 TABLET ORAL
Status: DISCONTINUED | OUTPATIENT
Start: 2024-02-09 | End: 2024-02-11

## 2024-02-09 RX ORDER — SODIUM CHLORIDE, SODIUM LACTATE, POTASSIUM CHLORIDE, CALCIUM CHLORIDE 600; 310; 30; 20 MG/100ML; MG/100ML; MG/100ML; MG/100ML
1000 INJECTION, SOLUTION INTRAVENOUS ONCE
Status: COMPLETED | OUTPATIENT
Start: 2024-02-09 | End: 2024-02-09

## 2024-02-09 RX ORDER — ATORVASTATIN CALCIUM 80 MG/1
80 TABLET, FILM COATED ORAL NIGHTLY
Status: DISCONTINUED | OUTPATIENT
Start: 2024-02-09 | End: 2024-02-14 | Stop reason: HOSPADM

## 2024-02-09 RX ORDER — ENEMA 19; 7 G/133ML; G/133ML
1 ENEMA RECTAL
Status: DISCONTINUED | OUTPATIENT
Start: 2024-02-09 | End: 2024-02-14 | Stop reason: HOSPADM

## 2024-02-09 RX ORDER — OXYCODONE HYDROCHLORIDE 10 MG/1
10 TABLET ORAL
Status: DISCONTINUED | OUTPATIENT
Start: 2024-02-09 | End: 2024-02-11

## 2024-02-09 RX ORDER — FAMOTIDINE 20 MG/1
20 TABLET, FILM COATED ORAL 2 TIMES DAILY
Status: DISCONTINUED | OUTPATIENT
Start: 2024-02-09 | End: 2024-02-10

## 2024-02-09 RX ORDER — INSULIN GLARGINE 100 [IU]/ML
10-12 INJECTION, SOLUTION SUBCUTANEOUS 2 TIMES DAILY
Status: ON HOLD | COMMUNITY
End: 2024-02-13

## 2024-02-09 RX ORDER — CARBAMAZEPINE 200 MG/1
200 TABLET ORAL 2 TIMES DAILY
Status: ON HOLD | COMMUNITY
End: 2024-02-27

## 2024-02-09 RX ORDER — LABETALOL HYDROCHLORIDE 5 MG/ML
10 INJECTION, SOLUTION INTRAVENOUS EVERY 4 HOURS PRN
Status: DISCONTINUED | OUTPATIENT
Start: 2024-02-09 | End: 2024-02-14 | Stop reason: HOSPADM

## 2024-02-09 RX ORDER — CARIPRAZINE 6 MG/1
6 CAPSULE, GELATIN COATED ORAL
COMMUNITY
End: 2024-03-18

## 2024-02-09 RX ORDER — ONDANSETRON 4 MG/1
4 TABLET, ORALLY DISINTEGRATING ORAL EVERY 4 HOURS PRN
Status: DISCONTINUED | OUTPATIENT
Start: 2024-02-09 | End: 2024-02-14 | Stop reason: HOSPADM

## 2024-02-09 RX ORDER — AMLODIPINE BESYLATE 5 MG/1
5 TABLET ORAL EVERY EVENING
Status: ON HOLD | COMMUNITY
End: 2024-02-27

## 2024-02-09 RX ORDER — POLYETHYLENE GLYCOL 3350 17 G/17G
1 POWDER, FOR SOLUTION ORAL 2 TIMES DAILY
Status: DISCONTINUED | OUTPATIENT
Start: 2024-02-09 | End: 2024-02-14 | Stop reason: HOSPADM

## 2024-02-09 RX ORDER — DOCUSATE SODIUM 100 MG/1
100 CAPSULE, LIQUID FILLED ORAL 2 TIMES DAILY
Status: DISCONTINUED | OUTPATIENT
Start: 2024-02-09 | End: 2024-02-14 | Stop reason: HOSPADM

## 2024-02-09 RX ORDER — ONDANSETRON 2 MG/ML
4 INJECTION INTRAMUSCULAR; INTRAVENOUS EVERY 4 HOURS PRN
Status: DISCONTINUED | OUTPATIENT
Start: 2024-02-09 | End: 2024-02-14 | Stop reason: HOSPADM

## 2024-02-09 RX ORDER — LEVOTHYROXINE SODIUM 0.03 MG/1
25 TABLET ORAL
Status: ON HOLD | COMMUNITY
End: 2024-02-27

## 2024-02-09 RX ORDER — AMLODIPINE BESYLATE 5 MG/1
5 TABLET ORAL NIGHTLY
Status: DISCONTINUED | OUTPATIENT
Start: 2024-02-09 | End: 2024-02-14 | Stop reason: HOSPADM

## 2024-02-09 RX ORDER — PRIMIDONE 50 MG/1
100 TABLET ORAL
Status: ON HOLD | COMMUNITY
End: 2024-02-27

## 2024-02-09 RX ORDER — AMOXICILLIN 250 MG
1 CAPSULE ORAL NIGHTLY
Status: DISCONTINUED | OUTPATIENT
Start: 2024-02-09 | End: 2024-02-14 | Stop reason: HOSPADM

## 2024-02-09 RX ORDER — TIMOLOL MALEATE 5 MG/ML
1 SOLUTION/ DROPS OPHTHALMIC
Status: DISCONTINUED | OUTPATIENT
Start: 2024-02-09 | End: 2024-02-14 | Stop reason: HOSPADM

## 2024-02-09 RX ORDER — LOSARTAN POTASSIUM 50 MG/1
100 TABLET ORAL DAILY
Status: DISCONTINUED | OUTPATIENT
Start: 2024-02-10 | End: 2024-02-14 | Stop reason: HOSPADM

## 2024-02-09 RX ORDER — LEVETIRACETAM 500 MG/1
500 TABLET ORAL EVERY 12 HOURS
Status: DISCONTINUED | OUTPATIENT
Start: 2024-02-09 | End: 2024-02-14 | Stop reason: HOSPADM

## 2024-02-09 RX ADMIN — AMLODIPINE BESYLATE 5 MG: 5 TABLET ORAL at 21:02

## 2024-02-09 RX ADMIN — INSULIN GLARGINE-YFGN 12 UNITS: 100 INJECTION, SOLUTION SUBCUTANEOUS at 20:07

## 2024-02-09 RX ADMIN — CARBAMAZEPINE 200 MG: 200 TABLET ORAL at 19:56

## 2024-02-09 RX ADMIN — FAMOTIDINE 20 MG: 10 INJECTION, SOLUTION INTRAVENOUS at 19:57

## 2024-02-09 RX ADMIN — ACETAMINOPHEN 1000 MG: 500 TABLET ORAL at 19:56

## 2024-02-09 RX ADMIN — CARIPRAZINE 6 MG: 3 CAPSULE, GELATIN COATED ORAL at 21:02

## 2024-02-09 RX ADMIN — LEVETIRACETAM 500 MG: 100 INJECTION, SOLUTION, CONCENTRATE INTRAVENOUS at 19:57

## 2024-02-09 RX ADMIN — TIMOLOL MALEATE 1 DROP: 5 SOLUTION OPHTHALMIC at 21:03

## 2024-02-09 RX ADMIN — INSULIN LISPRO 9 UNITS: 100 INJECTION, SOLUTION INTRAVENOUS; SUBCUTANEOUS at 20:07

## 2024-02-09 RX ADMIN — SODIUM CHLORIDE: 9 INJECTION, SOLUTION INTRAVENOUS at 21:17

## 2024-02-09 RX ADMIN — BUSPIRONE HYDROCHLORIDE 5 MG: 10 TABLET ORAL at 19:57

## 2024-02-09 RX ADMIN — PRIMIDONE 100 MG: 50 TABLET ORAL at 21:02

## 2024-02-09 RX ADMIN — ATORVASTATIN CALCIUM 80 MG: 80 TABLET, FILM COATED ORAL at 21:02

## 2024-02-09 RX ADMIN — LABETALOL HYDROCHLORIDE 10 MG: 5 INJECTION, SOLUTION INTRAVENOUS at 20:11

## 2024-02-09 RX ADMIN — INSULIN LISPRO 5 UNITS: 100 INJECTION, SOLUTION INTRAVENOUS; SUBCUTANEOUS at 22:16

## 2024-02-09 RX ADMIN — SODIUM CHLORIDE, POTASSIUM CHLORIDE, SODIUM LACTATE AND CALCIUM CHLORIDE 1000 ML: 600; 310; 30; 20 INJECTION, SOLUTION INTRAVENOUS at 17:46

## 2024-02-09 ASSESSMENT — COPD QUESTIONNAIRES
COPD SCREENING SCORE: 2
HAVE YOU SMOKED AT LEAST 100 CIGARETTES IN YOUR ENTIRE LIFE: NO/DON'T KNOW
DURING THE PAST 4 WEEKS HOW MUCH DID YOU FEEL SHORT OF BREATH: NONE/LITTLE OF THE TIME
DO YOU EVER COUGH UP ANY MUCUS OR PHLEGM?: NO/ONLY WITH OCCASIONAL COLDS OR INFECTIONS

## 2024-02-09 ASSESSMENT — PAIN DESCRIPTION - PAIN TYPE
TYPE: ACUTE PAIN
TYPE: ACUTE PAIN

## 2024-02-10 PROBLEM — Z53.09 CONTRAINDICATION TO DEEP VEIN THROMBOSIS (DVT) PROPHYLAXIS: Status: ACTIVE | Noted: 2024-02-10

## 2024-02-10 PROBLEM — I26.99 PE (PULMONARY THROMBOEMBOLISM) (HCC): Status: ACTIVE | Noted: 2024-02-10

## 2024-02-10 PROBLEM — Z79.01 CURRENT USE OF LONG TERM ANTICOAGULATION: Status: ACTIVE | Noted: 2024-02-10

## 2024-02-10 PROBLEM — Z01.89 ENCOUNTER FOR GERIATRIC ASSESSMENT: Status: ACTIVE | Noted: 2024-02-10

## 2024-02-10 LAB
ALBUMIN SERPL BCP-MCNC: 3.2 G/DL (ref 3.2–4.9)
ALBUMIN/GLOB SERPL: 1.2 G/DL
ALP SERPL-CCNC: 165 U/L (ref 30–99)
ALT SERPL-CCNC: 44 U/L (ref 2–50)
ANION GAP SERPL CALC-SCNC: 11 MMOL/L (ref 7–16)
AST SERPL-CCNC: 41 U/L (ref 12–45)
BASOPHILS # BLD AUTO: 0.8 % (ref 0–1.8)
BASOPHILS # BLD: 0.05 K/UL (ref 0–0.12)
BILIRUB SERPL-MCNC: 0.3 MG/DL (ref 0.1–1.5)
BUN SERPL-MCNC: 14 MG/DL (ref 8–22)
CALCIUM ALBUM COR SERPL-MCNC: 9.8 MG/DL (ref 8.5–10.5)
CALCIUM SERPL-MCNC: 9.2 MG/DL (ref 8.5–10.5)
CHLORIDE SERPL-SCNC: 106 MMOL/L (ref 96–112)
CO2 SERPL-SCNC: 23 MMOL/L (ref 20–33)
CREAT SERPL-MCNC: 0.65 MG/DL (ref 0.5–1.4)
EOSINOPHIL # BLD AUTO: 0.23 K/UL (ref 0–0.51)
EOSINOPHIL NFR BLD: 3.7 % (ref 0–6.9)
ERYTHROCYTE [DISTWIDTH] IN BLOOD BY AUTOMATED COUNT: 47.5 FL (ref 35.9–50)
GFR SERPLBLD CREATININE-BSD FMLA CKD-EPI: 91 ML/MIN/1.73 M 2
GLOBULIN SER CALC-MCNC: 2.6 G/DL (ref 1.9–3.5)
GLUCOSE BLD STRIP.AUTO-MCNC: 117 MG/DL (ref 65–99)
GLUCOSE BLD STRIP.AUTO-MCNC: 179 MG/DL (ref 65–99)
GLUCOSE BLD STRIP.AUTO-MCNC: 197 MG/DL (ref 65–99)
GLUCOSE BLD STRIP.AUTO-MCNC: 258 MG/DL (ref 65–99)
GLUCOSE BLD STRIP.AUTO-MCNC: 361 MG/DL (ref 65–99)
GLUCOSE BLD STRIP.AUTO-MCNC: 41 MG/DL (ref 65–99)
GLUCOSE BLD STRIP.AUTO-MCNC: 425 MG/DL (ref 65–99)
GLUCOSE SERPL-MCNC: 183 MG/DL (ref 65–99)
HCT VFR BLD AUTO: 30.3 % (ref 37–47)
HGB BLD-MCNC: 9.7 G/DL (ref 12–16)
IMM GRANULOCYTES # BLD AUTO: 0.01 K/UL (ref 0–0.11)
IMM GRANULOCYTES NFR BLD AUTO: 0.2 % (ref 0–0.9)
LYMPHOCYTES # BLD AUTO: 1.44 K/UL (ref 1–4.8)
LYMPHOCYTES NFR BLD: 23.5 % (ref 22–41)
MCH RBC QN AUTO: 28.4 PG (ref 27–33)
MCHC RBC AUTO-ENTMCNC: 32 G/DL (ref 32.2–35.5)
MCV RBC AUTO: 88.6 FL (ref 81.4–97.8)
MONOCYTES # BLD AUTO: 0.88 K/UL (ref 0–0.85)
MONOCYTES NFR BLD AUTO: 14.3 % (ref 0–13.4)
NEUTROPHILS # BLD AUTO: 3.53 K/UL (ref 1.82–7.42)
NEUTROPHILS NFR BLD: 57.5 % (ref 44–72)
NRBC # BLD AUTO: 0 K/UL
NRBC BLD-RTO: 0 /100 WBC (ref 0–0.2)
PLATELET # BLD AUTO: 230 K/UL (ref 164–446)
PMV BLD AUTO: 11.9 FL (ref 9–12.9)
POTASSIUM SERPL-SCNC: 3.6 MMOL/L (ref 3.6–5.5)
PROT SERPL-MCNC: 5.8 G/DL (ref 6–8.2)
RBC # BLD AUTO: 3.42 M/UL (ref 4.2–5.4)
SODIUM SERPL-SCNC: 140 MMOL/L (ref 135–145)
WBC # BLD AUTO: 6.1 K/UL (ref 4.8–10.8)

## 2024-02-10 PROCEDURE — 82962 GLUCOSE BLOOD TEST: CPT | Mod: 91

## 2024-02-10 PROCEDURE — 700105 HCHG RX REV CODE 258: Performed by: SURGERY

## 2024-02-10 PROCEDURE — 770006 HCHG ROOM/CARE - MED/SURG/GYN SEMI*

## 2024-02-10 PROCEDURE — 85025 COMPLETE CBC W/AUTO DIFF WBC: CPT

## 2024-02-10 PROCEDURE — 700102 HCHG RX REV CODE 250 W/ 637 OVERRIDE(OP): Performed by: SURGERY

## 2024-02-10 PROCEDURE — 99232 SBSQ HOSP IP/OBS MODERATE 35: CPT | Performed by: NURSE PRACTITIONER

## 2024-02-10 PROCEDURE — 99223 1ST HOSP IP/OBS HIGH 75: CPT | Performed by: STUDENT IN AN ORGANIZED HEALTH CARE EDUCATION/TRAINING PROGRAM

## 2024-02-10 PROCEDURE — A9270 NON-COVERED ITEM OR SERVICE: HCPCS | Performed by: SURGERY

## 2024-02-10 PROCEDURE — 700101 HCHG RX REV CODE 250

## 2024-02-10 PROCEDURE — 80053 COMPREHEN METABOLIC PANEL: CPT

## 2024-02-10 PROCEDURE — A9270 NON-COVERED ITEM OR SERVICE: HCPCS

## 2024-02-10 PROCEDURE — 700111 HCHG RX REV CODE 636 W/ 250 OVERRIDE (IP): Mod: JZ | Performed by: SURGERY

## 2024-02-10 PROCEDURE — 700102 HCHG RX REV CODE 250 W/ 637 OVERRIDE(OP)

## 2024-02-10 RX ORDER — INSULIN LISPRO 100 [IU]/ML
2-9 INJECTION, SOLUTION INTRAVENOUS; SUBCUTANEOUS
Status: DISCONTINUED | OUTPATIENT
Start: 2024-02-10 | End: 2024-02-11

## 2024-02-10 RX ORDER — DEXTROSE MONOHYDRATE 25 G/50ML
25 INJECTION, SOLUTION INTRAVENOUS
Status: DISCONTINUED | OUTPATIENT
Start: 2024-02-10 | End: 2024-02-11

## 2024-02-10 RX ADMIN — DEXTROSE MONOHYDRATE 25 G: 25 INJECTION, SOLUTION INTRAVENOUS at 02:38

## 2024-02-10 RX ADMIN — LEVETIRACETAM 500 MG: 100 INJECTION, SOLUTION, CONCENTRATE INTRAVENOUS at 05:19

## 2024-02-10 RX ADMIN — SODIUM CHLORIDE: 9 INJECTION, SOLUTION INTRAVENOUS at 06:26

## 2024-02-10 RX ADMIN — ACETAMINOPHEN 1000 MG: 500 TABLET ORAL at 16:59

## 2024-02-10 RX ADMIN — INSULIN LISPRO 9 UNITS: 100 INJECTION, SOLUTION INTRAVENOUS; SUBCUTANEOUS at 12:46

## 2024-02-10 RX ADMIN — INSULIN LISPRO 5 UNITS: 100 INJECTION, SOLUTION INTRAVENOUS; SUBCUTANEOUS at 20:32

## 2024-02-10 RX ADMIN — INSULIN LISPRO 8 UNITS: 100 INJECTION, SOLUTION INTRAVENOUS; SUBCUTANEOUS at 17:02

## 2024-02-10 RX ADMIN — FAMOTIDINE 20 MG: 10 INJECTION, SOLUTION INTRAVENOUS at 05:19

## 2024-02-10 RX ADMIN — TIMOLOL MALEATE 1 DROP: 5 SOLUTION OPHTHALMIC at 20:24

## 2024-02-10 RX ADMIN — CARBAMAZEPINE 200 MG: 200 TABLET ORAL at 17:01

## 2024-02-10 RX ADMIN — BUSPIRONE HYDROCHLORIDE 5 MG: 10 TABLET ORAL at 16:59

## 2024-02-10 RX ADMIN — PRIMIDONE 100 MG: 50 TABLET ORAL at 20:23

## 2024-02-10 RX ADMIN — LEVETIRACETAM 500 MG: 500 TABLET, FILM COATED ORAL at 16:59

## 2024-02-10 RX ADMIN — CARIPRAZINE 6 MG: 3 CAPSULE, GELATIN COATED ORAL at 20:23

## 2024-02-10 RX ADMIN — ATORVASTATIN CALCIUM 80 MG: 80 TABLET, FILM COATED ORAL at 20:24

## 2024-02-10 ASSESSMENT — ENCOUNTER SYMPTOMS
BLURRED VISION: 0
HEADACHES: 0
PALPITATIONS: 0
HEARTBURN: 0
DEPRESSION: 0
CHILLS: 0
DOUBLE VISION: 0
NECK PAIN: 0
SPUTUM PRODUCTION: 0
COUGH: 0
FOCAL WEAKNESS: 0
FEVER: 0
SHORTNESS OF BREATH: 0
MYALGIAS: 0
DIZZINESS: 0
NAUSEA: 0
ORTHOPNEA: 0
SORE THROAT: 0
ABDOMINAL PAIN: 0
FALLS: 1
VOMITING: 0

## 2024-02-10 ASSESSMENT — COGNITIVE AND FUNCTIONAL STATUS - GENERAL
MOVING TO AND FROM BED TO CHAIR: A LOT
TURNING FROM BACK TO SIDE WHILE IN FLAT BAD: A LOT
MOBILITY SCORE: 12
DRESSING REGULAR LOWER BODY CLOTHING: A LOT
SUGGESTED CMS G CODE MODIFIER MOBILITY: CL
DRESSING REGULAR UPPER BODY CLOTHING: A LOT
MOVING FROM LYING ON BACK TO SITTING ON SIDE OF FLAT BED: A LOT
DAILY ACTIVITIY SCORE: 12
WALKING IN HOSPITAL ROOM: A LOT
SUGGESTED CMS G CODE MODIFIER DAILY ACTIVITY: CL
PERSONAL GROOMING: A LOT
TOILETING: A LOT
CLIMB 3 TO 5 STEPS WITH RAILING: A LOT
EATING MEALS: A LOT
STANDING UP FROM CHAIR USING ARMS: A LOT
HELP NEEDED FOR BATHING: A LOT

## 2024-02-10 ASSESSMENT — PAIN DESCRIPTION - PAIN TYPE
TYPE: ACUTE PAIN

## 2024-02-10 ASSESSMENT — FIBROSIS 4 INDEX: FIB4 SCORE: 1.35

## 2024-02-10 NOTE — PROGRESS NOTES
"Hospital Medicine Daily Progress Note    Date of Service  2/5/2021    Chief Complaint  79 y.o. male admitted 2/1/2021 with right leg swelling    Hospital Course  An 79 y.o. male who presented 2/1/2021 with leg swelling. Patient with hx of septic arthritis of right knee, s/p multiple surgeries recently completed IV cefepime x4 weeks end date 1/15/2021, COPD, CKD, who presents for right leg swelling. Patient reports right leg swelling, redness, purulent drainage for past week, with associated chills. Denies fever, sweating, joint pain, chest pain, cough, dyspnea. No recent trauma to leg.  In the ED, , RR 22, WBC 12.2, LA 1.6. Sepsis protocol initiated, given IV fluids and started on vancomycin. Orthopedic surgery team to see patient in AM. He will be admitted for evaluation of leg cellulitis.      Interval Problem Update  Patient was seen and examined at bedside, patient's son at bedside.  No acute events overnight. No active c/o on AM round.   S/p  incision and drainage of right prepatellar bursa 2/2 by Dr. Sol. Hold knee ROM x 2 weeks. Staples out- 10-14 days post operatively. Follow-Up: needs appointment with Dr. Sol at [facility] at 10-14 days post-op for re-evaluation, staple removal and radiographs.    ID on Board.   Per ID: \"Continue IV Ancef 2 g every 8 hours. Follow pending synovial fluid cultures.  If this turns positive, may need arthrotomy and drainage. Anticipate a 4-week course of oral antibiotics -probably doxycycline instead of Bactrim given CKD \" OR Cx showing MSSA.  PT/OT   SNF order placed but the pt wants to go home with . Son is also agreed with . Last admission, Pt completed IV ABx course at home.   HH order placed.     Consultants/Specialty  Orthopedic surgery  ID    Code Status  Full Code    Disposition  TBD    Review of Systems  Review of Systems   Constitutional: Positive for malaise/fatigue. Negative for chills and fever.   HENT: Positive for congestion. Negative " Hypoglycemia Intervention    Hypoglycemia protocol intervention:  Blood glucose 41 at 0207.  Intervention: 25 g IV dextrose per MAR only IV infiltrated. Attempting ultrasound guided IV. Medication given 0238  Repeat blood glucose 179 at 0253.  Repeat blood glucose 197 at 0400  Additional interventions needed: N/A             for ear discharge, ear pain, sinus pain and sore throat.    Eyes: Negative for blurred vision and double vision.   Respiratory: Negative for cough, sputum production, shortness of breath and wheezing.    Cardiovascular: Negative for chest pain, palpitations and leg swelling.   Gastrointestinal: Negative for abdominal pain, constipation, diarrhea, nausea and vomiting.   Genitourinary: Negative for dysuria, frequency and urgency.   Musculoskeletal: Positive for joint pain. Negative for myalgias.   Neurological: Negative for dizziness, focal weakness and headaches.   Psychiatric/Behavioral: The patient is not nervous/anxious.         Physical Exam  Temp:  [35.9 °C (96.7 °F)-36.4 °C (97.6 °F)] 36.4 °C (97.5 °F)  Pulse:  [75-95] 86  Resp:  [14-17] 14  BP: (137-173)/() 166/108  SpO2:  [92 %-97 %] 92 %    Physical Exam  Constitutional:       General: He is not in acute distress.  HENT:      Head: Normocephalic and atraumatic.      Nose: Nose normal.      Mouth/Throat:      Mouth: Mucous membranes are moist.      Pharynx: No posterior oropharyngeal erythema.   Eyes:      General: No scleral icterus.     Extraocular Movements: Extraocular movements intact.      Conjunctiva/sclera: Conjunctivae normal.      Pupils: Pupils are equal, round, and reactive to light.   Neck:      Musculoskeletal: Normal range of motion and neck supple. No neck rigidity.   Cardiovascular:      Rate and Rhythm: Normal rate and regular rhythm.      Pulses: Normal pulses.      Heart sounds: Normal heart sounds. No murmur. No gallop.    Pulmonary:      Effort: Pulmonary effort is normal.      Breath sounds: Normal breath sounds. No stridor. No wheezing, rhonchi or rales.   Abdominal:      General: Bowel sounds are normal.      Palpations: Abdomen is soft.   Musculoskeletal:         General: Swelling and tenderness present.      Comments: Right knee covered in dressing, s/p I & D surgery.  Wound Vac in placed   Skin:     General: Skin is warm.  "  Neurological:      General: No focal deficit present.      Mental Status: He is alert and oriented to person, place, and time.   Psychiatric:         Mood and Affect: Mood normal.         Behavior: Behavior normal.         Fluids    Intake/Output Summary (Last 24 hours) at 2/5/2021 1210  Last data filed at 2/5/2021 1005  Gross per 24 hour   Intake 340 ml   Output 700 ml   Net -360 ml       Laboratory  Recent Labs     02/03/21  0438 02/05/21  0553   WBC 7.5 7.1   RBC 3.17* 3.13*   HEMOGLOBIN 9.9* 10.0*   HEMATOCRIT 32.3* 32.4*   .9* 103.5*   MCH 31.2 31.9   MCHC 30.7* 30.9*   RDW 57.1* 57.8*   PLATELETCT 163* 185   MPV 10.8 10.3     Recent Labs     02/03/21  0438 02/05/21  0553   SODIUM 140 138   POTASSIUM 4.1 3.5*   CHLORIDE 107 106   CO2 23 25   GLUCOSE 97 118*   BUN 24* 22   CREATININE 1.37 1.46*   CALCIUM 8.3* 8.0*                   Imaging  IR-US GUIDED PIV   Final Result    Ultrasound-guided PERIPHERAL IV INSERTION performed by    qualified nursing staff as above.      DX-CHEST-PORTABLE (1 VIEW)   Final Result      Stable chest without acute/new abnormality.           Assessment/Plan  * Cellulitis of extremity  Assessment & Plan  History of septic arthritis of R knee s/p multiple surgeries and washout, hx of pseudomonas growth on knee aspiration  S/p 4 week course of cefepime prior to this hospitalization, completed 1/15/2021  New cellulitis with purulent drainage  Wound and blood cultures taken    -S/p  incision and drainage of right prepatellar bursa 2/2 by Dr. Sol. Hold knee ROM x 2 weeks. Staples out- 10-14 days post operatively. Follow-Up: needs appointment with Dr. Sol at [facility] at 10-14 days post-op for re-evaluation, staple removal and radiographs.    ID on Board.   Per ID: \"Continue IV Ancef 2 g every 8 hours. Follow pending synovial fluid cultures.  If this turns positive, may need arthrotomy and drainage. Anticipate a 4-week course of oral antibiotics -probably " "doxycycline instead of Bactrim given CKD \" OR Cx showing MSSA.  PT/OT   Pain control   Fall Precautions   SNF order placed but the pt wants to go home with . Son is also agreed with . Last admission, Pt completed IV ABx course at home.   HH order placed.           Sepsis (HCC)  Assessment & Plan  This is Sepsis Present on admission  SIRS criteria identified on my evaluation include: Tachycardia, with heart rate greater than 90 BPM  Source is leg cellulitis  Sepsis protocol initiated  Fluid resuscitation ordered per protocol  IV antibiotics as appropriate for source of sepsis  While organ dysfunction may be noted elsewhere in this problem list or in the chart, degree of organ dysfunction does not meet CMS criteria for severe sepsis    -S/p  incision and drainage of right prepatellar bursa 2/2 by Dr. Sol. Hold knee ROM x 2 weeks. Staples out- 10-14 days post operatively. Follow-Up: needs appointment with Dr. Sol at [facility] at 10-14 days post-op for re-evaluation, staple removal and radiographs.    ID on Board.   Per ID: \"Continue IV Ancef 2 g every 8 hours. Follow pending synovial fluid cultures.  If this turns positive, may need arthrotomy and drainage. Anticipate a 4-week course of oral antibiotics -probably doxycycline instead of Bactrim given CKD \" OR Cx showing MSSA.  PT/OT   SNF order placed but the pt wants to go home with . Son is also agreed with . Last admission, Pt completed IV ABx course at home.   HH order placed.         Chronic respiratory failure with hypoxia (HCC)- (present on admission)  Assessment & Plan  Due to COPD, on 2L O2 at night  On 2L in ER  No new respiratory complaints, CXR with no acute abnormalities  Wean oxygen  RT protocol  Mucinex for congestion    Stage 3 chronic kidney disease- (present on admission)  Assessment & Plan  Will monitor         VTE prophylaxis: SCDs & Heparin     "

## 2024-02-10 NOTE — ASSESSMENT & PLAN NOTE
Mechanical ground level fall. Intracranial hemorrhage.  TBI Alert. The patient was upgraded to a Trauma Green activation for CT imaging demonstrating intracranial hemorrhage.  Yaniv Thornton MD. Trauma Surgery.

## 2024-02-10 NOTE — ASSESSMENT & PLAN NOTE
Chronic condition treated with anastrozole (Arimidex).  Holding maintenance medication during acute traumatic illness.

## 2024-02-10 NOTE — CARE PLAN
The patient is Stable - Low risk of patient condition declining or worsening    Shift Goals  Clinical Goals: Neuro Exam q4, SBP <160  Patient Goals: Water, blanket  Family Goals: Updates    Progress made toward(s) clinical / shift goals:    Problem: Knowledge Deficit - Standard  Goal: Patient and family/care givers will demonstrate understanding of plan of care, disease process/condition, diagnostic tests and medications  Outcome: Progressing     Problem: Skin Integrity  Goal: Skin integrity is maintained or improved  Outcome: Progressing     Problem: Fall Risk  Goal: Patient will remain free from falls  Outcome: Progressing     Problem: Pain - Standard  Goal: Alleviation of pain or a reduction in pain to the patient’s comfort goal  Outcome: Progressing

## 2024-02-10 NOTE — CARE PLAN
Problem: Pain - Standard  Goal: Alleviation of pain or a reduction in pain to the patient’s comfort goal  Outcome: Progressing   Patient does not complain of pain at this time.      The patient is Stable - Low risk of patient condition declining or worsening    Shift Goals  Clinical Goals: Stable neuro exam, SBP <160  Patient Goals: Water, blanket  Family Goals: Updates    Progress made toward(s) clinical / shift goals:  Progressing    Patient is not progressing towards the following goals: N/A

## 2024-02-10 NOTE — ASSESSMENT & PLAN NOTE
"Diagnosed with PE 2 weeks ago. Placed on Apixaban and Aspirin.  No need to reverse per Neurosurgery.  Hold anticoagulation and discuss with neurosurgery before resuming.  2/13 CT CAP form [facility] dated 1/30 notes \"Right lower lobe pulmonary embolus with small right effusion\"  - She is room air, heart rate regular.  - Echo Normal right ventricular size and systolic function. No evidence of right heart strain.  Hold anticoag x 10 days.   "

## 2024-02-10 NOTE — PROGRESS NOTES
Ok for q4 hour neuro checks and transfer to floor. Dr Simental recommends transfer to medicine service due to co-morbid conditions. Medicine MD to contact neuro surgery if they wish to restart anticoag for recent PE.

## 2024-02-10 NOTE — ASSESSMENT & PLAN NOTE
Chronic condition treated with amlodipine (Norvasc) and losartan (Cozaar).  Resumed maintenance medication on admission.  Goal SBP < 160.

## 2024-02-10 NOTE — ASSESSMENT & PLAN NOTE
Isolated closed head injury.  Trauma Brain Injury Guidelines implemented.   BIG 3. Patient takes Apixaban and aspirin for PE.  Repeat head CT stable.  Non-operative management.  Speech Language Pathology cognitive evaluation.  Roldan Simental MD. Neurosurgeon. [facility].

## 2024-02-10 NOTE — CONSULTS
Chief complaint traumatic subarachnoid hemorrhage on Xarelto.  Brief HPI this is a woman is 73 who had a PE last week unknown if there is the extent of the PE but was placed on Xarelto or Eliquis was subsequently presented after ground-level fall she has a GCS 15 per the ER without deficits but on CT scan demonstrated some blush of the left occipital parietal region no mass effect no epidural or subdural hematoma and no intraparenchymal hematoma.    Per the ER's exam the patient is GCS 15 at this time.    CT scan demonstrates a very small amount of traumatic subarachnoid hemorrhage that is layering within the sulci of the patient's left occipital parietal region.    Assessment and plan  At this time we recommended the patient is hyper tensive to lower her to 160s systolic blood pressures if she does not have a history of hypertension we would lower her to 140 systolic blood pressure goals  Every hour neurochecks  Hold her Xarelto or Eliquis no need for reversal at this time  Keppra 500 twice daily  Every hour neurochecks until CT head demonstrates stability of subarachnoid hemorrhage  If the patient has significant PE burden and has had history of right heart strain or other concerns for progressive blood clots in the lungs we can reassess whether or not to restart her Xarelto after the CT scan is stable please let us know if this is an issue and we will address this and in collaboration with the treating physician.    Full note to follow in the morning

## 2024-02-10 NOTE — CONSULTS
DATE OF CONSULTATION:  2/9/2024     REFERRING PHYSICIAN:   Noah Pichardo D.O.     CONSULTING PHYSICIAN:  Yaniv Thornton M.D.     REASON FOR CONSULTATION:  I have been asked by Dr. Pichardo to see the patient in surgical consultation for evaluation of an intracranial hemorrhage.    TIME CONSULTED: 17:37.  TIME RESPONDED: 17:40.    TRIAGE CATEGORY: The patient was triaged as a TBI Alert. The patient was upgraded to a Trauma Green activation for CT imaging demonstrating intracranial hemorrhage. Preliminary evaluation was conducted by the emergency department physician. See Trauma Narrator for full details.    HISTORY OF PRESENT ILLNESS: The patient is a 73 year-old White elderly woman who was injured in a fall. The patient suffered a mechanical ground-level fall. She had no loss of consciousness. The patient is chronically anticoagulated with apixaban (Eliquis). Her last dose of medication was administered yesterday.  The patient takes acetylsalicylic acid (Aspirin) antiplatelet therapy. Her last dose of medication was taken yesterday.   She complains of a mild headache.    PAST MEDICAL HISTORY:  has a past medical history of Anxiety, Bipolar 1 disorder ([facility]), Cancer ([facility]), Depression, Diabetes ([facility]), and Kidney disease.    PAST SURGICAL HISTORY:  has no past surgical history on file.    ALLERGIES:   Allergies   Allergen Reactions    Depakote [Valproic Acid] Unspecified     Per MAR     CURRENT MEDICATIONS:   Home Medications       Reviewed by Manuel Salazar (Pharmacy Tech) on 02/09/24 at 1842  Med List Status: Complete     Medication Last Dose Status   amLODIPine (NORVASC) 5 MG Tab 2/8/2024 Active   anastrozole (ARIMIDEX) 1 MG Tab 2/8/2024 Active   apixaban (ELIQUIS) 5mg Tab 2/9/2024 Active   aspirin (ASPIRIN LOW DOSE) 81 MG EC tablet 2/9/2024 Active   atorvastatin (LIPITOR) 80 MG tablet 2/8/2024 Active   busPIRone (BUSPAR) 5 MG tablet 2/9/2024 Active   carBAMazepine (TEGRETOL) 200 MG Tab 2/9/2024 Active    Cariprazine HCl (VRAYLAR) 6 MG Cap 2/8/2024 Active   insulin glargine 100 UNIT/ML SC SOLN 2/9/2024 Active   lamoTRIgine (LAMICTAL) 100 MG Tab 2/9/2024 Active   levothyroxine (SYNTHROID) 25 MCG Tab 2/9/2024 Active   losartan (COZAAR) 100 MG Tab 2/9/2024 Active   primidone (MYSOLINE) 50 MG Tab 2/8/2024 Active   timolol (TIMOPTIC) 0.5 % Solution 2/8/2024 Active                FAMILY HISTORY: family history includes Diabetes in her daughter.    SOCIAL HISTORY:  reports that she has never smoked. She has never used smokeless tobacco. She reports that she does not drink alcohol and does not use drugs.    REVIEW OF SYSTEMS: Comprehensive review of systems is not able to be elicited from the patient secondary to the acuity of the clinical situation.    PHYSICAL EXAMINATION:      Vital Signs: BP (!) 148/65   Pulse 91   Temp 36.7 °C (98 °F)   Resp 14   Wt 66.2 kg (146 lb)   SpO2 95%   Physical Exam  Vitals and nursing note reviewed.   Constitutional:       General: She is not in acute distress.  HENT:      Head: Normocephalic.      Comments: Small left frontal contusion     Left Ear: Tympanic membrane normal.      Nose:      Comments: Shallow laceration across the bridge of the nose     Mouth/Throat:      Mouth: Mucous membranes are moist.      Pharynx: Oropharynx is clear.   Eyes:      Extraocular Movements: Extraocular movements intact.      Conjunctiva/sclera: Conjunctivae normal.      Pupils: Pupils are equal, round, and reactive to light.   Cardiovascular:      Rate and Rhythm: Regular rhythm. Tachycardia present.      Pulses: Normal pulses.   Pulmonary:      Effort: Pulmonary effort is normal.      Breath sounds: Normal breath sounds.   Chest:      Chest wall: No tenderness.   Abdominal:      General: There is no distension.      Palpations: Abdomen is soft.      Tenderness: There is no abdominal tenderness. There is no guarding.   Musculoskeletal:         General: No swelling, tenderness or deformity. Normal  range of motion.      Cervical back: Normal range of motion and neck supple. No tenderness.   Skin:     General: Skin is warm and dry.      Capillary Refill: Capillary refill takes less than 2 seconds.   Neurological:      General: No focal deficit present.      Mental Status: She is alert.      GCS: GCS eye subscore is 4. GCS verbal subscore is 5. GCS motor subscore is 6.      Cranial Nerves: Cranial nerves 2-12 are intact.      Sensory: Sensation is intact.      Motor: Motor function is intact.      Deep Tendon Reflexes: Reflexes normal.   Psychiatric:         Mood and Affect: Mood normal.         Behavior: Behavior normal.     LABORATORY VALUES:   Recent Labs     02/09/24  1716   WBC 7.1   RBC 4.00*   HEMOGLOBIN 11.2*   HEMATOCRIT 35.2*   MCV 88.0   MCH 28.0   MCHC 31.8*   RDW 47.4   PLATELETCT 428   MPV 11.2     Recent Labs     02/09/24  1716   SODIUM 131*   POTASSIUM 5.3   CHLORIDE 95*   CO2 22   GLUCOSE 682*   BUN 21   CREATININE 0.92   CALCIUM 10.1     Recent Labs     02/09/24  1716   ASTSGOT 61*   ALTSGPT 63*   TBILIRUBIN 0.4   ALKPHOSPHAT 209*   GLOBULIN 3.5   INR 1.08     Recent Labs     02/09/24  1716   APTT 28.2   INR 1.08     Platelet mapping with TEG demonstrates minimal to no arachnid tonic acid pathway platelet inhibition.  R time is normal.    IMAGING:   CT-HEAD W/O   Final Result      1.  LEFT posterior temporal and occipital subarachnoid hemorrhage.   2.  No significant mass effect or midline shift.   3.  Small chronic RIGHT frontal infarct.   4.  Diffuse atrophy.      Based solely on CT findings, the brain injury guideline category is mBIG 2.      Nondisplaced skull fx   SDH 4.1-7.9mm   IPH 4.1-7.9mm   SAH 1 hemisphere, >3 sulci, 1-3mm      The original BIG retrospective analysis found radiographic progression in 0% of BIG 1 patients and 2.6% BIG 2.      These findings were electronically conveyed to and received by HOLLAND CONNELL on 2/9/2024 5:29 PM.      CT-HEAD W/O    (Results Pending)        ASSESSMENT AND PLAN:     Subarachnoid hemorrhage without loss of consciousness (HCC)  Isolated closed head injury.  Trauma Brain Injury Guidelines implemented.   BIG 3. Patient takes Apixaban and aspirin for PE.  Interval Head CT ordered for 2300  Non-operative management.  Post traumatic pharmacologic seizure prophylaxis for 1 week.  Speech Language Pathology cognitive evaluation.  Roldan Simental MD. Neurosurgeon. [facility].    Trauma  Mechanical ground level fall. Intracranial hemorrhage.  TBI Alert. The patient was upgraded to a Trauma Green activation for CT imaging demonstrating intracranial hemorrhage.  Yaniv Thornton MD. Trauma Surgery.    Type 1 diabetes mellitus without complication (HCC)  Chronic condition treated with insulin glargine.  Holding home dose since patient is NPO..  Admission glucose 682  Beta-hydroxybutyric acid 0.61  Initiating sliding scale and Lantus 12 units.    Bipolar 1 disorder (HCC)  Chronic condition treated with cariprazine (Vrylar) and buspirone (Buspar).  Resumed maintenance medication on admission.    Essential hypertension  Chronic condition treated with amlodipine (Norvasc) and losartan (Cozaar).  Resumed maintenance medication on admission.    Dyslipidemia  Chronic condition treated with atorvastatin (Lipitor).  Resumed maintenance medication on admission.    Malignant neoplasm of left female breast (HCC)  Chronic condition treated with anastrozole (Arimidex).  Holding maintenance medication during acute traumatic illness.    Other specified hypothyroidism  Chronic condition treated with levothyroxine.  Resumed maintenance medication on admission.    Seizure disorder (HCC)  Chronic condition treated with primidone (Mysoline), lamotrlgine (Lamictal) and carbamzepine (Tegretol).  Resumed maintenance medication on admission.      DISPOSITION: Trauma ICU.  Interval Trauma tertiary survey.    CRITICAL CARE TIME: My full attention was spent providing medically  necessary critical care to the patient, exclusive of time spent on any procedures, for 36 minutes, with details documented in my note.    The patient has acute impairment of one or more vital organ systems and a high probability of imminent or life-threatening deterioration in condition. Provided high complexity decision making to assess, manipulate, and support vital system functions to treat vital organ system failure and/or to prevent further life-threatening deterioration of the patient's condition. Requires continued ICU and hospital admission.    Critical care interventions include: integration of multiple data points and associated complex medical decision making, parenteral management of hypertension, parenteral management of hyperglycemia, and management of thrombotic surveillance and risk mitigation.       ____________________________________     Yaniv Thornton M.D.    DD: 2/9/2024  5:40 PM

## 2024-02-10 NOTE — CONSULTS
Chief complaint traumatic subarachnoid hemorrhage  Brief HPI this is a 73-year-old woman with multiple comorbidities who was recently placed on Eliquis for PE and no other [place] she is been doing well otherwise she was admitted for ground-level fall with traumatic subarachnoid hemorrhage and systemic anticoagulation to the ICU last night she has a repeat head CT which demonstrates stability of the traumatic blood with interval evolution as expected she has been GCS 15 the entire time and has not required any supportive measures for her pulmonary issues.  At this time it is unclear with the extent of the PEs were if there was any heart strain or requirement for oxygen therapy when she was at the other facility.  At this time she has not been reversed which is appropriate but has not been redosed on her anticoagulation which she was on both Eliquis as well as aspirin therapy the aspirin her therapy was for heart health.    12 point review of systems negative for any loss of consciousness bowel or bladder dysfunction or other elements.    Past medical history please see extensive list of medical comorbidities  Past surgical history noncontributory  Medications she was on both Eliquis as well as aspirin 81 mg.    Physical examination the patient is alert she is able to answer question appropriately she is GCS 15  HEENT shows bruising all over her face body exam shows bruising over the left shoulder and hands.  Motor examination nonfocal the patient is eating comfortably sitting upright in the chair this morning.    CT head demonstrates repeat exam stable traumatic subarachnoid blood with no concerns.    Assessment and plan  At this time the patient has a stable exam she also is a stable CT scan with no concern for expansion of the blood clots.  It is unclear her extent of PE and whether or not she needs to be on Eliquis her aspirin therapy was a preventative measure and has not required other than for heart health.   From our standpoint she should have Eliquis held for total of 10 days from the injury and the aspirin should be reevaluated as a potential medication for the patient given her falls risk and her poor health.  She is okay for every 4 hours neurochecks given the stable CT head and exam this morning  Medical management per medicine and ICU  If the patient has significant PE burden or there is concern for progressive health deterioration without the Eliquis then we can have a discussion with the medicine team as to whether or not it would be appropriate to start today versus in a more abbreviated hold time please call us if there are questions in regards to this otherwise we would have the patient restarted 10 days given that she is not requiring any oxygen does not appear to be affected by the most recent episode.    Otherwise we will sign off at this time please reconsult with questions.    Total of 50 minutes direct patient care coordination consultation evaluation

## 2024-02-10 NOTE — ED TRIAGE NOTES
Pt to rm g38 .  Chief Complaint   Patient presents with    T-5000 FALL     [facility] to rest[facility]. Pt denies loc. Pt has bump to forehead and nasal laceration     High Blood Sugar     Pt was also noted to have high blood sugar by ems

## 2024-02-10 NOTE — PROGRESS NOTES
Trauma / Surgical Daily Progress Note    Date of Service  2/10/2024    Chief Complaint  73 y.o. female admitted 2/9/2024 with a subarachnoid hemorrhage after a mechanical ground level fall.    Interval Events  Pt is cantankerous and not participating fully with exam, GCS 14.  Glucose improved.  Repeat head CT stable.  Neurosurgery recs reviewed.    - Diabetic diet.  - Transfer to Neuro.    Review of Systems  Review of Systems   Unable to perform ROS: Other        Vital Signs  Temp:  [36.3 °C (97.4 °F)-37.1 °C (98.8 °F)] 37.1 °C (98.7 °F)  Pulse:  [] 43  Resp:  [12-29] 29  BP: ()/(44-82) 147/68  SpO2:  [93 %-99 %] 95 %    Physical Exam  Physical Exam  Vitals and nursing note reviewed. Exam conducted with a chaperone present (family at bedside).   Constitutional:       General: She is awake. She is not in acute distress.     Appearance: She is well-developed. She is not ill-appearing.   HENT:      Head: Normocephalic.      Right Ear: External ear normal.      Left Ear: External ear normal.      Nose: Nose normal.      Mouth/Throat:      Mouth: Mucous membranes are dry.      Pharynx: Oropharynx is clear.   Eyes:      Extraocular Movements: Extraocular movements intact.      Pupils: Pupils are equal, round, and reactive to light.   Cardiovascular:      Rate and Rhythm: Normal rate and regular rhythm.      Pulses: Normal pulses.      Heart sounds: Normal heart sounds. No murmur heard.  Pulmonary:      Effort: Pulmonary effort is normal. No respiratory distress.   Chest:      Chest wall: No tenderness.   Abdominal:      General: Bowel sounds are normal. There is no distension.      Palpations: Abdomen is soft.      Tenderness: There is no abdominal tenderness. There is no guarding.   Musculoskeletal:         General: No swelling or tenderness.      Cervical back: Normal range of motion and neck supple. No rigidity or tenderness.      Comments: Moves all extremities   Skin:     General: Skin is warm and dry.       Capillary Refill: Capillary refill takes less than 2 seconds.   Neurological:      GCS: GCS eye subscore is 4. GCS verbal subscore is 4. GCS motor subscore is 6.   Psychiatric:         Attention and Perception: She is inattentive.         Behavior: Behavior is uncooperative.      Comments: Cantankerous         Laboratory  Recent Results (from the past 24 hour(s))   CBC WITH DIFFERENTIAL    Collection Time: 02/09/24  5:16 PM   Result Value Ref Range    WBC 7.1 4.8 - 10.8 K/uL    RBC 4.00 (L) 4.20 - 5.40 M/uL    Hemoglobin 11.2 (L) 12.0 - 16.0 g/dL    Hematocrit 35.2 (L) 37.0 - 47.0 %    MCV 88.0 81.4 - 97.8 fL    MCH 28.0 27.0 - 33.0 pg    MCHC 31.8 (L) 32.2 - 35.5 g/dL    RDW 47.4 35.9 - 50.0 fL    Platelet Count 428 164 - 446 K/uL    MPV 11.2 9.0 - 12.9 fL    Neutrophils-Polys 72.60 (H) 44.00 - 72.00 %    Lymphocytes 15.90 (L) 22.00 - 41.00 %    Monocytes 8.70 0.00 - 13.40 %    Eosinophils 2.00 0.00 - 6.90 %    Basophils 0.40 0.00 - 1.80 %    Immature Granulocytes 0.40 0.00 - 0.90 %    Nucleated RBC 0.00 0.00 - 0.20 /100 WBC    Neutrophils (Absolute) 5.16 1.82 - 7.42 K/uL    Lymphs (Absolute) 1.13 1.00 - 4.80 K/uL    Monos (Absolute) 0.62 0.00 - 0.85 K/uL    Eos (Absolute) 0.14 0.00 - 0.51 K/uL    Baso (Absolute) 0.03 0.00 - 0.12 K/uL    Immature Granulocytes (abs) 0.03 0.00 - 0.11 K/uL    NRBC (Absolute) 0.00 K/uL   Comp Metabolic Panel    Collection Time: 02/09/24  5:16 PM   Result Value Ref Range    Sodium 131 (L) 135 - 145 mmol/L    Potassium 5.3 3.6 - 5.5 mmol/L    Chloride 95 (L) 96 - 112 mmol/L    Co2 22 20 - 33 mmol/L    Anion Gap 14.0 7.0 - 16.0    Glucose 682 (HH) 65 - 99 mg/dL    Bun 21 8 - 22 mg/dL    Creatinine 0.92 0.50 - 1.40 mg/dL    Calcium 10.1 8.5 - 10.5 mg/dL    Correct Calcium 10.3 8.5 - 10.5 mg/dL    AST(SGOT) 61 (H) 12 - 45 U/L    ALT(SGPT) 63 (H) 2 - 50 U/L    Alkaline Phosphatase 209 (H) 30 - 99 U/L    Total Bilirubin 0.4 0.1 - 1.5 mg/dL    Albumin 3.8 3.2 - 4.9 g/dL    Total Protein  7.3 6.0 - 8.2 g/dL    Globulin 3.5 1.9 - 3.5 g/dL    A-G Ratio 1.1 g/dL   VENOUS BLOOD GAS    Collection Time: 24  5:16 PM   Result Value Ref Range    Venous Bg Ph 7.38 7.31 - 7.45    Venous Bg Pco2 40.2 (L) 41.0 - 51.0 mmHg    Venous Bg Po2 22.7 (L) 25.0 - 40.0 mmHg    Venous Bg O2 Saturation 34.3 %    Venous Bg Hco3 23 (L) 24 - 28 mmol/L    Venous Bg Base Excess -2 mmol/L    Body Temp 36.7 Centigrade    O2 Therapy N/A    BETA-HYDROXYBUTYRIC ACID    Collection Time: 24  5:16 PM   Result Value Ref Range    beta-Hydroxybutyric Acid 0.61 (H) 0.02 - 0.27 mmol/L   APTT    Collection Time: 24  5:16 PM   Result Value Ref Range    APTT 28.2 24.7 - 36.0 sec   Prothrombin Time    Collection Time: 24  5:16 PM   Result Value Ref Range    PT 14.1 12.0 - 14.6 sec    INR 1.08 0.87 - 1.13   ESTIMATED GFR    Collection Time: 24  5:16 PM   Result Value Ref Range    GFR (CKD-EPI) 65 >60 mL/min/1.73 m 2   EKG    Collection Time: 24  5:42 PM   Result Value Ref Range    Report       [facility] Emergency Dept.    Test Date:  2024  Pt Name:    JAMIA STARK                  Department: ER  MRN:        [id number]                      Room:       [facility]  Gender:     Female                       Technician:  :        [id number]                   Requested By:HOLLAND CONNELL  Order #:    [id number]                    Allegra MD:    Measurements  Intervals                                Axis  Rate:       90                           P:          68  HI:         205                          QRS:        50  QRSD:       90                           T:          59  QT:         375  QTc:        459    Interpretive Statements  Sinus rhythm  Low voltage, extremity leads  Anterolateral infarct, recent  ST elevation, consider inferior injury  Compared to ECG 2020 16:36:07  Low QRS voltage now present  Myocardial infarct finding now present  ST (T wave) deviation now present  Sinus  tachycardia no longer present     PLATELET MAPPING WITH BASIC TEG    Collection Time: 02/09/24  5:50 PM   Result Value Ref Range    Reaction Time Initial-R 4.4 (L) 4.6 - 9.1 min    React Time Initial Hep 4.2 (L) 4.3 - 8.3 min    Clot Kinetics-K 0.8 0.8 - 2.1 min    Clot Angle-Angle 79.8 (H) 63.0 - 78.0 degrees    Maximum Clot Strength-MA 69.0 52.0 - 69.0 mm    TEG Functional Fibrinogen(MA) 41.7 (H) 15.0 - 32.0 mm    Lysis 30 minutes-LY30 0.1 0.0 - 2.6 %    % Inhibition ADP 1.1 0.0 - 17.0 %    % Inhibition AA 0.0 0.0 - 11.0 %    TEG Algorithm Link Algorithm    POCT glucose device results    Collection Time: 02/09/24  8:06 PM   Result Value Ref Range    POC Glucose, Blood 503 (HH) 65 - 99 mg/dL   POCT glucose device results    Collection Time: 02/09/24 10:13 PM   Result Value Ref Range    POC Glucose, Blood 285 (H) 65 - 99 mg/dL   POCT glucose device results    Collection Time: 02/09/24 11:38 PM   Result Value Ref Range    POC Glucose, Blood 132 (H) 65 - 99 mg/dL   POCT glucose device results    Collection Time: 02/10/24  2:07 AM   Result Value Ref Range    POC Glucose, Blood 41 (L) 65 - 99 mg/dL   POCT glucose device results    Collection Time: 02/10/24  2:53 AM   Result Value Ref Range    POC Glucose, Blood 179 (H) 65 - 99 mg/dL   CBC with Differential: Tomorrow AM    Collection Time: 02/10/24  3:57 AM   Result Value Ref Range    WBC 6.1 4.8 - 10.8 K/uL    RBC 3.42 (L) 4.20 - 5.40 M/uL    Hemoglobin 9.7 (L) 12.0 - 16.0 g/dL    Hematocrit 30.3 (L) 37.0 - 47.0 %    MCV 88.6 81.4 - 97.8 fL    MCH 28.4 27.0 - 33.0 pg    MCHC 32.0 (L) 32.2 - 35.5 g/dL    RDW 47.5 35.9 - 50.0 fL    Platelet Count 230 164 - 446 K/uL    MPV 11.9 9.0 - 12.9 fL    Neutrophils-Polys 57.50 44.00 - 72.00 %    Lymphocytes 23.50 22.00 - 41.00 %    Monocytes 14.30 (H) 0.00 - 13.40 %    Eosinophils 3.70 0.00 - 6.90 %    Basophils 0.80 0.00 - 1.80 %    Immature Granulocytes 0.20 0.00 - 0.90 %    Nucleated RBC 0.00 0.00 - 0.20 /100 WBC    Neutrophils  (Absolute) 3.53 1.82 - 7.42 K/uL    Lymphs (Absolute) 1.44 1.00 - 4.80 K/uL    Monos (Absolute) 0.88 (H) 0.00 - 0.85 K/uL    Eos (Absolute) 0.23 0.00 - 0.51 K/uL    Baso (Absolute) 0.05 0.00 - 0.12 K/uL    Immature Granulocytes (abs) 0.01 0.00 - 0.11 K/uL    NRBC (Absolute) 0.00 K/uL   POCT glucose device results    Collection Time: 02/10/24  4:00 AM   Result Value Ref Range    POC Glucose, Blood 197 (H) 65 - 99 mg/dL   Comp Metabolic Panel    Collection Time: 02/10/24  5:12 AM   Result Value Ref Range    Sodium 140 135 - 145 mmol/L    Potassium 3.6 3.6 - 5.5 mmol/L    Chloride 106 96 - 112 mmol/L    Co2 23 20 - 33 mmol/L    Anion Gap 11.0 7.0 - 16.0    Glucose 183 (H) 65 - 99 mg/dL    Bun 14 8 - 22 mg/dL    Creatinine 0.65 0.50 - 1.40 mg/dL    Calcium 9.2 8.5 - 10.5 mg/dL    Correct Calcium 9.8 8.5 - 10.5 mg/dL    AST(SGOT) 41 12 - 45 U/L    ALT(SGPT) 44 2 - 50 U/L    Alkaline Phosphatase 165 (H) 30 - 99 U/L    Total Bilirubin 0.3 0.1 - 1.5 mg/dL    Albumin 3.2 3.2 - 4.9 g/dL    Total Protein 5.8 (L) 6.0 - 8.2 g/dL    Globulin 2.6 1.9 - 3.5 g/dL    A-G Ratio 1.2 g/dL   ESTIMATED GFR    Collection Time: 02/10/24  5:12 AM   Result Value Ref Range    GFR (CKD-EPI) 91 >60 mL/min/1.73 m 2   POCT glucose device results    Collection Time: 02/10/24  5:14 AM   Result Value Ref Range    POC Glucose, Blood 117 (H) 65 - 99 mg/dL       Fluids    Intake/Output Summary (Last 24 hours) at 2/10/2024 1112  Last data filed at 2/10/2024 1000  Gross per 24 hour   Intake 2558.33 ml   Output 1200 ml   Net 1358.33 ml       Core Measures & Quality Metrics  Labs reviewed, Medications reviewed and Radiology images reviewed  Mcelroy catheter: No Mcelroy      DVT Prophylaxis: Contraindicated - High bleeding risk  DVT prophylaxis - mechanical: SCDs  Ulcer prophylaxis: Not indicated        RAP Score Total: 10    CAGE Results: not completed Blood Alcohol>0.08: not completed       Assessment/Plan  * Trauma- (present on admission)  Assessment &  Plan  Mechanical ground level fall. Intracranial hemorrhage.  TBI Alert. The patient was upgraded to a Trauma Green activation for CT imaging demonstrating intracranial hemorrhage.  Yaniv Thornton MD. Trauma Surgery.    Current use of long term anticoagulation- (present on admission)  Assessment & Plan  Diagnosed with PE 2 weeks ago. Placed on Apixaban and Aspirin.  No need to reverse per Neurosurgery.  Hold anticoagulation and discuss with neurosurgery before resuming.    Seizure disorder (HCC)- (present on admission)  Assessment & Plan  Chronic condition treated with primidone (Mysoline), lamotrlgine (Lamictal) and carbamzepine (Tegretol).  Resumed maintenance medication on admission.  Keppra 500 mg BID per neurosurgery.    Subarachnoid hemorrhage without loss of consciousness (HCC)- (present on admission)  Assessment & Plan  Isolated closed head injury.  Trauma Brain Injury Guidelines implemented.   BIG 3. Patient takes Apixaban and aspirin for PE.  Repeat head CT stable.  Non-operative management.  Speech Language Pathology cognitive evaluation.  Roldan Simental MD. Neurosurgeon. [facility].    Type 1 diabetes mellitus without complication (HCC)- (present on admission)  Assessment & Plan  Chronic condition treated with insulin glargine.  Admission glucose 682.  Beta-hydroxybutyric acid 0.61.  Initiating sliding scale and Lantus 12 units.  Monitor closely.    Encounter for geriatric assessment- (present on admission)  Assessment & Plan  2/10 The patient is 75 years old or older and a geriatrics consult is indicated  Tae Radford MD, Geriatric Hospitalist.    Contraindication to deep vein thrombosis (DVT) prophylaxis- (present on admission)  Assessment & Plan  VTE prophylaxis initially contraindicated secondary to elevated bleeding risk.  2/11 Trauma surveillance venous duplex ultrasonography ordered.    Malignant neoplasm of left female breast (HCC)- (present on admission)  Assessment & Plan  Chronic  condition treated with anastrozole (Arimidex).  Holding maintenance medication during acute traumatic illness.    Dyslipidemia- (present on admission)  Assessment & Plan  Chronic condition treated with atorvastatin (Lipitor).  Resumed maintenance medication on admission.    Essential hypertension- (present on admission)  Assessment & Plan  Chronic condition treated with amlodipine (Norvasc) and losartan (Cozaar).  Resumed maintenance medication on admission.  Goal SBP < 160.    Bipolar 1 disorder (HCC)- (present on admission)  Assessment & Plan  Chronic condition treated with cariprazine (Vrylar) and buspirone (Buspar).  Resumed maintenance medication on admission.    Other specified hypothyroidism- (present on admission)  Assessment & Plan  Chronic condition treated with levothyroxine.  Resumed maintenance medication on admission.      Mental status adequate for full examination?: No    Spine cleared (radiologically and/or clinically): Yes    All current laboratory studies/radiology exams reviewed: Yes    Medications reconciliation has been reviewed: Yes    Completed Consultations:  Dr. Simental, neurosurgery     Pending Consultations:  Dr. Radford, geriatrics    Newly identified diagnoses, injuries and/or co-morbidities:  None    Discussed patient condition with Family, RN, Patient, and trauma surgery. Dr. Radford and Dr. Perkins

## 2024-02-10 NOTE — ASSESSMENT & PLAN NOTE
VTE prophylaxis initially contraindicated secondary to elevated bleeding risk.  2/11 Trauma screening bilateral lower extremity venous duplex negative for above knee DVT.

## 2024-02-10 NOTE — ED NOTES
Taken to restroom in w/c. 1 person assist to transfer. Returned to room. Daughter at bedside. Updated to poc.   Assist staff to bedside for US IV.

## 2024-02-10 NOTE — PROGRESS NOTES
Patient arrived into room at approximately 1844.    Vital Signs:  HR 93  /73  SpO2 92%  Temp 97.5f temporal  RR 24    Weight 58.3kg (bed scale)    4 Eyes Skin Assessment Completed by TONE Bright and TONE Banda.    Head Raised bruising on forehead; Abrasion to forehead  Ears L ear scab  Nose abrasion  Mouth Dried blood  Neck WDL  Breast/Chest Abrasion and Bruising  Shoulder Blades Bruising left shoulder  Spine WDL  (R) Arm/Elbow/Hand Bruising - Large bruise to right medial arm  (L) Arm/Elbow/Hand Bruising left shoulder  Abdomen Bruising  Groin WDL  Scrotum/Coccyx/Buttocks Redness blanching, excoriation, fragile; Healed scar lower back  (R) Leg Bruising  (L) Leg Redness, Bruising, and Abrasion to left lateral thigh  (R) Heel/Foot/Toe Redness, Blanching, and Boggy  (L) Heel/Foot/Toe Redness, Blanching, and Boggy; Scab to middleto; Bruising to superior aspect of foot          Devices In Places Tele Box, Blood Pressure Cuff, and SCD's      Interventions In Place Sacral Mepilex, TAP System, Pillows, Q2 Turns, Low Air Loss Mattress, and Barrier Cream    Possible Skin Injury Yes -- Sacrum    Pictures Uploaded Into Epic Yes  Wound Consult Placed Yes  RN Wound Prevention Protocol Ordered Yes

## 2024-02-10 NOTE — ED PROVIDER NOTES
CHIEF COMPLAINT  Chief Complaint   Patient presents with    T-5000 FALL     Pt fell at [facility] while attempting to go to restroom. Pt denies loc. Pt has bump to forehead and nasal laceration     High Blood Sugar     Pt was also noted to have high blood sugar by ems        LIMITATION TO HISTORY   Select:     HPI    Molly Loera is a 73 y.o. female who presents to the Emergency Department by ambulance for evaluation of a head injury patient was at a rehab she was discharged from [facility] yesterday trying to get to the restroom when she fell down for unclear reasons patient has a abrasion on her forehead and on her nose patient noted to be significantly hyperglycemic by EMS, patient was recently diagnosed with a pulmonary embolism approximately 2 weeks ago placed on apixaban she did not receive any procedures    OUTSIDE HISTORIAN(S):  Select:    EXTERNAL RECORDS REVIEWED  Select:       PAST MEDICAL HISTORY  Past Medical History:   Diagnosis Date    Anxiety     Bipolar 1 disorder (HCC)     Cancer (HCC)     left breast    Depression     Diabetes (HCC)     Kidney disease      .    SURGICAL HISTORY  History reviewed. No pertinent surgical history.      FAMILY HISTORY  Family History   Problem Relation Age of Onset    Diabetes Daughter           SOCIAL HISTORY  Social History     Socioeconomic History    Marital status:      Spouse name: Not on file    Number of children: Not on file    Years of education: Not on file    Highest education level: Not on file   Occupational History    Not on file   Tobacco Use    Smoking status: Never    Smokeless tobacco: Never   Vaping Use    Vaping Use: Never used   Substance and Sexual Activity    Alcohol use: Never    Drug use: Never    Sexual activity: Not Currently   Other Topics Concern    Not on file   Social History Narrative    Not on file     Social Determinants of Health     Financial Resource Strain: Not on file   Food Insecurity: Not  on file   Transportation Needs: Not on file   Physical Activity: Not on file   Stress: Not on file   Social Connections: Not on file   Intimate Partner Violence: Not on file   Housing Stability: Not on file         CURRENT MEDICATIONS  No current facility-administered medications on file prior to encounter.     Current Outpatient Medications on File Prior to Encounter   Medication Sig Dispense Refill    amLODIPine (NORVASC) 5 MG Tab Take 5 mg by mouth every evening. Hold for SBP <110      atorvastatin (LIPITOR) 80 MG tablet Take 80 mg by mouth every evening.      insulin glargine 100 UNIT/ML SC SOLN Inject 10-12 Units under the skin 2 times a day. 10 units = in the AM  12 units = in the PM      levothyroxine (SYNTHROID) 25 MCG Tab Take 25 mcg by mouth every morning on an empty stomach.      losartan (COZAAR) 100 MG Tab Take 100 mg by mouth every day. Hold for SBP < 110      primidone (MYSOLINE) 50 MG Tab Take 100 mg by mouth at bedtime. 100 mg = 2 tabs      Cariprazine HCl (VRAYLAR) 6 MG Cap Take 6 mg by mouth at bedtime.      apixaban (ELIQUIS) 5mg Tab Take 5 mg by mouth 2 times a day.      busPIRone (BUSPAR) 5 MG tablet Take 5 mg by mouth 2 times a day.      carBAMazepine (TEGRETOL) 200 MG Tab Take 200 mg by mouth 2 times a day.      aspirin (ASPIRIN LOW DOSE) 81 MG EC tablet TAKE 1 TABLET BY MOUTH EVERY DAY 90 Tablet 1    anastrozole (ARIMIDEX) 1 MG Tab Take 1 Tablet by mouth every evening. Needs to establish with cancer specialist. Last refill 90 Tablet 3    timolol (TIMOPTIC) 0.5 % Solution Administer 1 Drop into both eyes at bedtime.      lamoTRIgine (LAMICTAL) 100 MG Tab Take 100 mg by mouth every day.             ALLERGIES  Allergies   Allergen Reactions    Depakote [Valproic Acid] Unspecified     Per MAR       PHYSICAL EXAM  VITAL SIGNS:BP 95/55   Pulse 71   Temp 36.8 °C (98.2 °F) (Temporal)   Resp (!) 23   Wt 66.2 kg (146 lb)   SpO2 96%   BMI 25.06 kg/m²       GENERAL: Awake and alert  HEAD:  Normocephalic and abrasion on the left supraorbital area and on the bridge of the nose  NECK: Normal range of motion, without meningismus, no midline neck pain  EYES: Pupils Equal, Round, Reactive to Light, extraocular movements intact, conjunctiva white  ENT: Mucous membranes moist, oropharynx clear  PULMONARY: Normal effort, clear to auscultation  CARDIOVASCULAR: No murmurs, clicks or rubs, peripheral pulses 2+  ABDOMINAL: Soft, non-tender, no guarding or rigidity present, no pulsatile masses  BACK: no midline tenderness, no costovertebral tenderness  NEUROLOGICAL: Alert, oriented to person/place/time, Cranial nerves II-XII intact, strength 5/5 throughout, sensation intact to light touch throughout, speech normal, gait normal, DTR 1+ lower extremities, no extremity dysmetria, normal gait EXTREMITIES: No edema, normal to inspection  SKIN: Warm and dry.  PSYCHIATRIC: Affect is appropriate    DIAGNOSTIC STUDIES / PROCEDURES  EKG  CRITICAL CARE  The very real possibilty of a deterioration of this patient's condition required the highest level of my preparedness for sudden, emergent intervention.  I provided critical care services, which included medication orders, frequent reevaluations of the patient's condition and response to treatment, ordering and reviewing test results, and discussing the case with various consultants.  The critical care time associated with the care of the patient was 40 minutes. Review chart for interventions. This time is exclusive of any other billable procedures.       EKG Interpretation: I independently reviewed the below EKG and did not see signs of a STEMI  Results for orders placed or performed during the hospital encounter of 02/09/24   EKG   Result Value Ref Range    Report       [facility] Emergency Dept.    Test Date:  2024-02-09  Pt Name:    JAMIA STARK                  Department: ER  MRN:        [id number]                      Room:       [facility]  Gender:      Female                       Technician:  :        [id number]                   Requested By:HOLLAND CONNELL  Order #:    [id number]                    Reading MD:    Measurements  Intervals                                Axis  Rate:       90                           P:          68  GA:         205                          QRS:        50  QRSD:       90                           T:          59  QT:         375  QTc:        459    Interpretive Statements  Sinus rhythm  Low voltage, extremity leads  Anterolateral infarct, recent  ST elevation, consider inferior injury  Compared to ECG 2020 16:36:07  Low QRS voltage now present  Myocardial infarct finding now present  ST (T wave) deviation now present  Sinus tachycardia no longer present             LABS  Labs Reviewed   CBC WITH DIFFERENTIAL - Abnormal; Notable for the following components:       Result Value    RBC 4.00 (*)     Hemoglobin 11.2 (*)     Hematocrit 35.2 (*)     MCHC 31.8 (*)     Neutrophils-Polys 72.60 (*)     Lymphocytes 15.90 (*)     All other components within normal limits   COMP METABOLIC PANEL - Abnormal; Notable for the following components:    Sodium 131 (*)     Chloride 95 (*)     Glucose 682 (*)     AST(SGOT) 61 (*)     ALT(SGPT) 63 (*)     Alkaline Phosphatase 209 (*)     All other components within normal limits   VENOUS BLOOD GAS - Abnormal; Notable for the following components:    Venous Bg Pco2 40.2 (*)     Venous Bg Po2 22.7 (*)     Venous Bg Hco3 23 (*)     All other components within normal limits   BETA-HYDROXYBUTYRIC ACID - Abnormal; Notable for the following components:    beta-Hydroxybutyric Acid 0.61 (*)     All other components within normal limits   PLATELET MAPPING WITH BASIC TEG - Abnormal; Notable for the following components:    Reaction Time Initial-R 4.4 (*)     React Time Initial Hep 4.2 (*)     Clot Angle-Angle 79.8 (*)     TEG Functional Fibrinogen(MA) 41.7 (*)     All other components within normal limits     Narrative:     Do you want to extend TEG graph to LY30? (If no, graph will                  terminate at MA)->Yes   POCT GLUCOSE DEVICE RESULTS - Abnormal; Notable for the following components:    POC Glucose, Blood 503 (*)     All other components within normal limits   POCT GLUCOSE DEVICE RESULTS - Abnormal; Notable for the following components:    POC Glucose, Blood 285 (*)     All other components within normal limits   POCT GLUCOSE DEVICE RESULTS - Abnormal; Notable for the following components:    POC Glucose, Blood 132 (*)     All other components within normal limits   APTT    Narrative:     Indicate which anticoagulants the patient is on:->UNKNOWN   PROTHROMBIN TIME    Narrative:     Indicate which anticoagulants the patient is on:->UNKNOWN   ESTIMATED GFR   POCT GLUCOSE   POCT GLUCOSE         RADIOLOGY  I independently reviewed and interpreted the images obtained today in the ER.  Intracranial hemorrhage is present    Radiologist interpretation:   CT-HEAD W/O   Final Result      1.  Similar appearing left temporal and occipital subarachnoid hemorrhage and/or hemorrhagic contusion.   2.  Chronic appearing right frontal lobe infarct again noted.      CT-HEAD W/O   Final Result      1.  LEFT posterior temporal and occipital subarachnoid hemorrhage.   2.  No significant mass effect or midline shift.   3.  Small chronic RIGHT frontal infarct.   4.  Diffuse atrophy.      Based solely on CT findings, the brain injury guideline category is mBIG 2.      Nondisplaced skull fx   SDH 4.1-7.9mm   IPH 4.1-7.9mm   SAH 1 hemisphere, >3 sulci, 1-3mm      The original BIG retrospective analysis found radiographic progression in 0% of BIG 1 patients and 2.6% BIG 2.      These findings were electronically conveyed to and received by HOLLAND CONNELL on 2/9/2024 5:29 PM.           COURSE & MEDICAL DECISION MAKING    ED COURSE:        INTERVENTIONS BY ME:  Medications   Respiratory Therapy Consult (has no administration in time  range)   Pharmacy Consult Request ...Pain Management Review 1 Each (has no administration in time range)   ondansetron (Zofran) syringe/vial injection 4 mg (has no administration in time range)   ondansetron (Zofran ODT) dispertab 4 mg (has no administration in time range)   docusate sodium (Colace) capsule 100 mg (100 mg Oral Refused 2/9/24 1830)   senna-docusate (Pericolace Or Senokot S) 8.6-50 MG per tablet 1 Tablet (1 Tablet Oral Refused 2/9/24 2100)   senna-docusate (Pericolace Or Senokot S) 8.6-50 MG per tablet 1 Tablet (has no administration in time range)   polyethylene glycol/lytes (Miralax) Packet 1 Packet (1 Packet Oral Refused 2/9/24 1830)   magnesium hydroxide (Milk Of Magnesia) suspension 30 mL (has no administration in time range)   bisacodyl (Dulcolax) suppository 10 mg (has no administration in time range)   sodium phosphate (Fleet) enema 133 mL (has no administration in time range)   NS infusion ( Intravenous New Bag 2/9/24 2117)   acetaminophen (Tylenol) tablet 1,000 mg (1,000 mg Oral Refused 2/10/24 0000)     Followed by   acetaminophen (Tylenol) tablet 1,000 mg (has no administration in time range)   oxyCODONE immediate-release (Roxicodone) tablet 5 mg ( Oral See Alternative 2/9/24 2238)     Or   oxyCODONE immediate release (Roxicodone) tablet 10 mg ( Oral See Alternative 2/9/24 2238)     Or   fentaNYL (Sublimaze) injection 50 mcg ( Intravenous Canceled Entry 2/9/24 2238)   labetalol (Normodyne/Trandate) injection 10 mg (10 mg Intravenous Given 2/9/24 2011)   levETIRAcetam (Keppra) tablet 500 mg ( Oral See Alternative 2/9/24 1957)     Or   levETIRAcetam (Keppra) injection 500 mg (500 mg Intravenous Given 2/9/24 1957)   famotidine (Pepcid) tablet 20 mg ( Enteral Tube See Alternative 2/9/24 1957)     Or   famotidine (Pepcid) injection 20 mg (20 mg Intravenous Given 2/9/24 1957)   insulin GLARGINE (Lantus,Semglee) injection (12 Units Subcutaneous Given 2/9/24 2007)     And   insulin lispro  (HumaLOG,AdmeLOG) injection (5 Units Subcutaneous Given 2/9/24 2216)     And   dextrose 50% (D50W) injection 25 g (has no administration in time range)   amLODIPine (Norvasc) tablet 5 mg (5 mg Oral Given 2/9/24 2102)   atorvastatin (Lipitor) tablet 80 mg (80 mg Oral Given 2/9/24 2102)   busPIRone (Buspar) tablet 5 mg (5 mg Oral Given 2/9/24 1957)   carBAMazepine (TEGretol) tablet 200 mg (200 mg Oral Given 2/9/24 1956)   cariprazine (Vraylar) capsule 6 mg (6 mg Oral Given 2/9/24 2102)   lamoTRIgine (LaMICtal) tablet 100 mg (has no administration in time range)   levothyroxine (Synthroid) tablet 25 mcg (has no administration in time range)   losartan (Cozaar) tablet 100 mg (has no administration in time range)   primidone (Mysoline) tablet 100 mg (100 mg Oral Given 2/9/24 2102)   timolol (Timoptic) 0.5 % ophthalmic solution 1 Drop (1 Drop Both Eyes Given 2/9/24 2103)   Bolus of LR (0 mL Intravenous Stopped 2/9/24 1945)       Response on recheck:  5:50 PM neurologic semination is unchanged patient is significantly hypertensive prompting nicardipine drip discussed patient's care with the daughter who is the medical power of  and the patient they are agreeable with plan of care patient has a GCS of 15 at this time    INITIAL ASSESSMENT, COURSE AND PLAN  Care Narrative:   Patient presenting after ground-level fall head to toe trauma evaluation only for trauma to the head patient no midline neck tenderness she is alert oriented and no distracting injuries negative by Nexus criteria and so do not feel CT of the C-spine is indicated given her therapeutic anticoagulation and head strike CT of the brain obtained which was notable for subarachnoid hemorrhage.  Patient had a GCS of 15 discussed case with neurosurgery given the small size of the subarachnoid hemorrhage they recommended against reversal at this time patient admitted to trauma ICU for serial neurologic examinations and repeat imaging patient significantly  hypertensive prompting nicardipine drip blood work notable for significant hyperglycemia without evidence of diabetic ketoacidosis.           ADDITIONAL PROBLEM LIST    DISPOSITION AND DISCUSSIONS  Discussion of management with other [facility] or appropriate source(s): Discussed case with neurosurgeon Dr. Simental who recommended against reversal of anticoagulation recommend against any acute neurosurgical interventions.     I have discussed management of the patient with the following physicians and IVÁN's: Spoke to trauma surgeon Dr. Thornton excepted admission to the trauma ICU      FINAL DIAGNOSIS  1. Subarachnoid hemorrhage (HCC)    2. Closed head injury, initial encounter    3. On continuous oral anticoagulation    4. Hyperglycemia             Electronically signed by: Noah Joaquin DO ,11:54 PM 02/09/24

## 2024-02-10 NOTE — ASSESSMENT & PLAN NOTE
Chronic condition treated with atorvastatin (Lipitor).  Resumed maintenance medication on admission.

## 2024-02-10 NOTE — ED NOTES
Pt incontinent of urine, linens changed purwick placed. Daughter remains at bedside ERP updated to results and poc. Additional labs collected and sent.

## 2024-02-10 NOTE — ASSESSMENT & PLAN NOTE
Chronic condition treated with primidone (Mysoline), lamotrlgine (Lamictal) and carbamzepine (Tegretol).  Resumed maintenance medication on admission.  Keppra 500 mg BID per neurosurgery.

## 2024-02-10 NOTE — ASSESSMENT & PLAN NOTE
2/10 The patient is 73 years old or older and a geriatrics consult is indicated  Tae Radford MD, Geriatric Hospitalist.

## 2024-02-10 NOTE — ASSESSMENT & PLAN NOTE
Chronic condition treated with cariprazine (Vrylar) and buspirone (Buspar).  Resumed maintenance medication on admission.

## 2024-02-10 NOTE — ASSESSMENT & PLAN NOTE
Chronic condition treated with insulin glargine.  Admission glucose 682.  Beta-hydroxybutyric acid 0.61.  Initiating sliding scale and Lantus 12 units.  2/11 Dosing adjusted  Monitor closely.

## 2024-02-11 ENCOUNTER — APPOINTMENT (OUTPATIENT)
Dept: RADIOLOGY | Facility: MEDICAL CENTER | Age: 74
DRG: 085 | End: 2024-02-11
Attending: SURGERY
Payer: MEDICARE

## 2024-02-11 PROBLEM — Z75.8 DISCHARGE PLANNING ISSUES: Status: ACTIVE | Noted: 2024-02-11

## 2024-02-11 PROBLEM — Z02.9 DISCHARGE PLANNING ISSUES: Status: ACTIVE | Noted: 2024-02-11

## 2024-02-11 LAB
ALBUMIN SERPL BCP-MCNC: 2.9 G/DL (ref 3.2–4.9)
ALBUMIN/GLOB SERPL: 1.1 G/DL
ALP SERPL-CCNC: 152 U/L (ref 30–99)
ALT SERPL-CCNC: 36 U/L (ref 2–50)
ANION GAP SERPL CALC-SCNC: 10 MMOL/L (ref 7–16)
AST SERPL-CCNC: 26 U/L (ref 12–45)
BASOPHILS # BLD AUTO: 0.7 % (ref 0–1.8)
BASOPHILS # BLD: 0.04 K/UL (ref 0–0.12)
BILIRUB SERPL-MCNC: 0.2 MG/DL (ref 0.1–1.5)
BUN SERPL-MCNC: 17 MG/DL (ref 8–22)
CALCIUM ALBUM COR SERPL-MCNC: 9.6 MG/DL (ref 8.5–10.5)
CALCIUM SERPL-MCNC: 8.7 MG/DL (ref 8.5–10.5)
CHLORIDE SERPL-SCNC: 102 MMOL/L (ref 96–112)
CO2 SERPL-SCNC: 23 MMOL/L (ref 20–33)
CREAT SERPL-MCNC: 0.65 MG/DL (ref 0.5–1.4)
EOSINOPHIL # BLD AUTO: 0.28 K/UL (ref 0–0.51)
EOSINOPHIL NFR BLD: 4.8 % (ref 0–6.9)
ERYTHROCYTE [DISTWIDTH] IN BLOOD BY AUTOMATED COUNT: 48.3 FL (ref 35.9–50)
GFR SERPLBLD CREATININE-BSD FMLA CKD-EPI: 91 ML/MIN/1.73 M 2
GLOBULIN SER CALC-MCNC: 2.6 G/DL (ref 1.9–3.5)
GLUCOSE BLD STRIP.AUTO-MCNC: 100 MG/DL (ref 65–99)
GLUCOSE BLD STRIP.AUTO-MCNC: 255 MG/DL (ref 65–99)
GLUCOSE BLD STRIP.AUTO-MCNC: 269 MG/DL (ref 65–99)
GLUCOSE BLD STRIP.AUTO-MCNC: 311 MG/DL (ref 65–99)
GLUCOSE BLD STRIP.AUTO-MCNC: 584 MG/DL (ref 65–99)
GLUCOSE SERPL-MCNC: 300 MG/DL (ref 65–99)
GLUCOSE SERPL-MCNC: 639 MG/DL (ref 65–99)
HCT VFR BLD AUTO: 30.6 % (ref 37–47)
HGB BLD-MCNC: 9.7 G/DL (ref 12–16)
IMM GRANULOCYTES # BLD AUTO: 0.02 K/UL (ref 0–0.11)
IMM GRANULOCYTES NFR BLD AUTO: 0.3 % (ref 0–0.9)
LYMPHOCYTES # BLD AUTO: 2.02 K/UL (ref 1–4.8)
LYMPHOCYTES NFR BLD: 34.4 % (ref 22–41)
MCH RBC QN AUTO: 28.2 PG (ref 27–33)
MCHC RBC AUTO-ENTMCNC: 31.7 G/DL (ref 32.2–35.5)
MCV RBC AUTO: 89 FL (ref 81.4–97.8)
MONOCYTES # BLD AUTO: 0.69 K/UL (ref 0–0.85)
MONOCYTES NFR BLD AUTO: 11.8 % (ref 0–13.4)
NEUTROPHILS # BLD AUTO: 2.82 K/UL (ref 1.82–7.42)
NEUTROPHILS NFR BLD: 48 % (ref 44–72)
NRBC # BLD AUTO: 0 K/UL
NRBC BLD-RTO: 0 /100 WBC (ref 0–0.2)
PLATELET # BLD AUTO: 365 K/UL (ref 164–446)
PMV BLD AUTO: 11 FL (ref 9–12.9)
POTASSIUM SERPL-SCNC: 4 MMOL/L (ref 3.6–5.5)
PROT SERPL-MCNC: 5.5 G/DL (ref 6–8.2)
RBC # BLD AUTO: 3.44 M/UL (ref 4.2–5.4)
SODIUM SERPL-SCNC: 135 MMOL/L (ref 135–145)
WBC # BLD AUTO: 5.9 K/UL (ref 4.8–10.8)

## 2024-02-11 PROCEDURE — A9270 NON-COVERED ITEM OR SERVICE: HCPCS

## 2024-02-11 PROCEDURE — 97602 WOUND(S) CARE NON-SELECTIVE: CPT

## 2024-02-11 PROCEDURE — 97166 OT EVAL MOD COMPLEX 45 MIN: CPT

## 2024-02-11 PROCEDURE — 93970 EXTREMITY STUDY: CPT

## 2024-02-11 PROCEDURE — 93970 EXTREMITY STUDY: CPT | Mod: 26 | Performed by: INTERNAL MEDICINE

## 2024-02-11 PROCEDURE — 97535 SELF CARE MNGMENT TRAINING: CPT

## 2024-02-11 PROCEDURE — A9270 NON-COVERED ITEM OR SERVICE: HCPCS | Performed by: NURSE PRACTITIONER

## 2024-02-11 PROCEDURE — 700102 HCHG RX REV CODE 250 W/ 637 OVERRIDE(OP)

## 2024-02-11 PROCEDURE — 700102 HCHG RX REV CODE 250 W/ 637 OVERRIDE(OP): Performed by: STUDENT IN AN ORGANIZED HEALTH CARE EDUCATION/TRAINING PROGRAM

## 2024-02-11 PROCEDURE — 99232 SBSQ HOSP IP/OBS MODERATE 35: CPT | Performed by: STUDENT IN AN ORGANIZED HEALTH CARE EDUCATION/TRAINING PROGRAM

## 2024-02-11 PROCEDURE — 80053 COMPREHEN METABOLIC PANEL: CPT

## 2024-02-11 PROCEDURE — 700102 HCHG RX REV CODE 250 W/ 637 OVERRIDE(OP): Performed by: SURGERY

## 2024-02-11 PROCEDURE — 700102 HCHG RX REV CODE 250 W/ 637 OVERRIDE(OP): Performed by: NURSE PRACTITIONER

## 2024-02-11 PROCEDURE — 99233 SBSQ HOSP IP/OBS HIGH 50: CPT | Performed by: NURSE PRACTITIONER

## 2024-02-11 PROCEDURE — 36415 COLL VENOUS BLD VENIPUNCTURE: CPT

## 2024-02-11 PROCEDURE — 82962 GLUCOSE BLOOD TEST: CPT | Mod: 91

## 2024-02-11 PROCEDURE — 770006 HCHG ROOM/CARE - MED/SURG/GYN SEMI*

## 2024-02-11 PROCEDURE — 700111 HCHG RX REV CODE 636 W/ 250 OVERRIDE (IP): Mod: JZ | Performed by: NURSE PRACTITIONER

## 2024-02-11 PROCEDURE — 85025 COMPLETE CBC W/AUTO DIFF WBC: CPT

## 2024-02-11 PROCEDURE — 82947 ASSAY GLUCOSE BLOOD QUANT: CPT

## 2024-02-11 PROCEDURE — A9270 NON-COVERED ITEM OR SERVICE: HCPCS | Performed by: SURGERY

## 2024-02-11 RX ORDER — DEXTROSE MONOHYDRATE 25 G/50ML
25 INJECTION, SOLUTION INTRAVENOUS
Status: DISCONTINUED | OUTPATIENT
Start: 2024-02-11 | End: 2024-02-12

## 2024-02-11 RX ORDER — INSULIN LISPRO 100 [IU]/ML
2-9 INJECTION, SOLUTION INTRAVENOUS; SUBCUTANEOUS
Status: DISCONTINUED | OUTPATIENT
Start: 2024-02-11 | End: 2024-02-12

## 2024-02-11 RX ORDER — ZIPRASIDONE MESYLATE 20 MG/ML
5 INJECTION, POWDER, LYOPHILIZED, FOR SOLUTION INTRAMUSCULAR EVERY 4 HOURS PRN
Status: DISCONTINUED | OUTPATIENT
Start: 2024-02-11 | End: 2024-02-14 | Stop reason: HOSPADM

## 2024-02-11 RX ORDER — HALOPERIDOL 5 MG/ML
2.5 INJECTION INTRAMUSCULAR ONCE
Status: DISCONTINUED | OUTPATIENT
Start: 2024-02-11 | End: 2024-02-11

## 2024-02-11 RX ORDER — INSULIN LISPRO 100 [IU]/ML
5 INJECTION, SOLUTION INTRAVENOUS; SUBCUTANEOUS
Status: DISCONTINUED | OUTPATIENT
Start: 2024-02-11 | End: 2024-02-12

## 2024-02-11 RX ORDER — OXYCODONE HYDROCHLORIDE 5 MG/1
5 TABLET ORAL
Status: DISCONTINUED | OUTPATIENT
Start: 2024-02-11 | End: 2024-02-14 | Stop reason: HOSPADM

## 2024-02-11 RX ORDER — OXYCODONE HYDROCHLORIDE 5 MG/1
2.5 TABLET ORAL
Status: DISCONTINUED | OUTPATIENT
Start: 2024-02-11 | End: 2024-02-14 | Stop reason: HOSPADM

## 2024-02-11 RX ADMIN — INSULIN GLARGINE-YFGN 15 UNITS: 100 INJECTION, SOLUTION SUBCUTANEOUS at 17:36

## 2024-02-11 RX ADMIN — INSULIN LISPRO 5 UNITS: 100 INJECTION, SOLUTION INTRAVENOUS; SUBCUTANEOUS at 12:38

## 2024-02-11 RX ADMIN — DOCUSATE SODIUM 50 MG AND SENNOSIDES 8.6 MG 1 TABLET: 8.6; 5 TABLET, FILM COATED ORAL at 19:50

## 2024-02-11 RX ADMIN — INSULIN LISPRO 5 UNITS: 100 INJECTION, SOLUTION INTRAVENOUS; SUBCUTANEOUS at 17:37

## 2024-02-11 RX ADMIN — LAMOTRIGINE 100 MG: 100 TABLET ORAL at 06:04

## 2024-02-11 RX ADMIN — BUSPIRONE HYDROCHLORIDE 5 MG: 10 TABLET ORAL at 17:29

## 2024-02-11 RX ADMIN — INSULIN LISPRO 9 UNITS: 100 INJECTION, SOLUTION INTRAVENOUS; SUBCUTANEOUS at 08:02

## 2024-02-11 RX ADMIN — CARBAMAZEPINE 200 MG: 200 TABLET ORAL at 06:07

## 2024-02-11 RX ADMIN — CARBAMAZEPINE 200 MG: 200 TABLET ORAL at 19:51

## 2024-02-11 RX ADMIN — ATORVASTATIN CALCIUM 80 MG: 80 TABLET, FILM COATED ORAL at 19:51

## 2024-02-11 RX ADMIN — INSULIN LISPRO 5 UNITS: 100 INJECTION, SOLUTION INTRAVENOUS; SUBCUTANEOUS at 17:36

## 2024-02-11 RX ADMIN — OXYCODONE 5 MG: 5 TABLET ORAL at 15:47

## 2024-02-11 RX ADMIN — OXYCODONE 5 MG: 5 TABLET ORAL at 19:50

## 2024-02-11 RX ADMIN — CARIPRAZINE 6 MG: 3 CAPSULE, GELATIN COATED ORAL at 19:51

## 2024-02-11 RX ADMIN — ACETAMINOPHEN 1000 MG: 500 TABLET ORAL at 12:33

## 2024-02-11 RX ADMIN — LEVETIRACETAM 500 MG: 500 TABLET, FILM COATED ORAL at 17:30

## 2024-02-11 RX ADMIN — DOCUSATE SODIUM 100 MG: 100 CAPSULE, LIQUID FILLED ORAL at 17:30

## 2024-02-11 RX ADMIN — TIMOLOL MALEATE 1 DROP: 5 SOLUTION OPHTHALMIC at 19:51

## 2024-02-11 RX ADMIN — ACETAMINOPHEN 1000 MG: 500 TABLET ORAL at 17:30

## 2024-02-11 RX ADMIN — ACETAMINOPHEN 1000 MG: 500 TABLET ORAL at 06:03

## 2024-02-11 RX ADMIN — LEVETIRACETAM 500 MG: 500 TABLET, FILM COATED ORAL at 06:03

## 2024-02-11 RX ADMIN — LOSARTAN POTASSIUM 100 MG: 50 TABLET, FILM COATED ORAL at 06:04

## 2024-02-11 RX ADMIN — OXYCODONE 2.5 MG: 5 TABLET ORAL at 12:32

## 2024-02-11 RX ADMIN — LEVOTHYROXINE SODIUM 25 MCG: 0.03 TABLET ORAL at 06:04

## 2024-02-11 RX ADMIN — BUSPIRONE HYDROCHLORIDE 5 MG: 10 TABLET ORAL at 06:04

## 2024-02-11 RX ADMIN — INSULIN LISPRO 5 UNITS: 100 INJECTION, SOLUTION INTRAVENOUS; SUBCUTANEOUS at 10:50

## 2024-02-11 RX ADMIN — INSULIN GLARGINE-YFGN 15 UNITS: 100 INJECTION, SOLUTION SUBCUTANEOUS at 09:13

## 2024-02-11 RX ADMIN — DOCUSATE SODIUM 100 MG: 100 CAPSULE, LIQUID FILLED ORAL at 06:04

## 2024-02-11 RX ADMIN — PRIMIDONE 100 MG: 50 TABLET ORAL at 19:51

## 2024-02-11 ASSESSMENT — ENCOUNTER SYMPTOMS
COUGH: 0
DIZZINESS: 0
DEPRESSION: 0
ABDOMINAL PAIN: 0
SORE THROAT: 0
SPUTUM PRODUCTION: 0
MYALGIAS: 1
PALPITATIONS: 0
FEVER: 0
ORTHOPNEA: 0
HEADACHES: 0
SHORTNESS OF BREATH: 0
FALLS: 1
NAUSEA: 0
CHILLS: 0
HEARTBURN: 0
HEADACHES: 1
BLURRED VISION: 0
MYALGIAS: 0
GASTROINTESTINAL NEGATIVE: 1
FOCAL WEAKNESS: 0
NECK PAIN: 0
VOMITING: 0
RESPIRATORY NEGATIVE: 1
DOUBLE VISION: 0
PND: 0

## 2024-02-11 ASSESSMENT — COGNITIVE AND FUNCTIONAL STATUS - GENERAL
SUGGESTED CMS G CODE MODIFIER DAILY ACTIVITY: CK
PERSONAL GROOMING: A LITTLE
HELP NEEDED FOR BATHING: A LOT
DRESSING REGULAR LOWER BODY CLOTHING: A LOT
DAILY ACTIVITIY SCORE: 16
DRESSING REGULAR UPPER BODY CLOTHING: A LITTLE
TOILETING: A LOT

## 2024-02-11 ASSESSMENT — PAIN DESCRIPTION - PAIN TYPE
TYPE: ACUTE PAIN

## 2024-02-11 ASSESSMENT — ACTIVITIES OF DAILY LIVING (ADL): TOILETING: INDEPENDENT

## 2024-02-11 NOTE — WOUND TEAM
[facility]  Inpatient Services  Wound and Skin Care Brief Evaluation    Admission Date: 2/9/2024     Last order of IP CONSULT TO WOUND CARE was found on 2/9/2024 from Hospital Encounter on 2/9/2024     HPI, PMH, SH: Reviewed    Chief Complaint   Patient presents with    T-5000 FALL     [facility] while attempting to go to restroom. Pt denies loc. Pt has bump to forehead and nasal laceration     High Blood Sugar     Pt was also noted to have high blood sugar by ems      Diagnosis: Trauma [T14.90XA]    Unit where seen by Wound Team: S190/01     Wound consult placed regarding Sacrum. Chart and images reviewed. This clinician in to assess patient. Patient pleasant and agreeable. Sacrum and bilateral heels. Non-selectively debrided with Moist warm washcloth.                   No pressure injuries or advanced wound care needs identified. Wound consult completed. No further follow up unless indicated and consulted.          PREVENTATIVE INTERVENTIONS:    Q shift Zach - performed per nursing policy  Q shift pressure point assessments - performed per nursing policy    Surface/Positioning  Standard/trauma mattress - Currently in Place  Reposition q 2 hours - Currently in Place    Offloading/Redistribution  Sacral offloading dressing (Silicone dressing) - Currently in Place  Heel offloading dressing (Silicone dressing) - Ordered      Mobilization      Ambulate  and x1 assist

## 2024-02-11 NOTE — PROGRESS NOTES
Trauma / Surgical Daily Progress Note    Date of Service  2/11/2024    Chief Complaint  73 y.o. female admitted 2/9/2024 as a TBI/trauma green - [facility] - non op SAH on apixaban and ASA    Interval Events  Transfer to mann   Room air  WBC 8.9 (6.1)  Hgb 9.7 =  Inadequate glucose control - appreciate adjustments made by geriatrics     - Neurosurgery signed off  - Therapies pending  - Physiatry consult placed   - She was previously at [facility] for overall weakness s/p PE and recent COVID - she was at [facility] prior to that for behavioral issues - SNF referral placed as well   - Geodon PRN - avoid Haldol in head bleeds.    Review of Systems  Review of Systems   Constitutional:  Positive for malaise/fatigue.   HENT: Negative.     Respiratory: Negative.     Gastrointestinal: Negative.    Genitourinary:         Voiding   Musculoskeletal:  Positive for myalgias.   Skin: Negative.    Neurological:  Positive for headaches.   All other systems reviewed and are negative.       Vital Signs  Temp:  [36.1 °C (97 °F)-37.1 °C (98.7 °F)] 36.1 °C (97 °F)  Pulse:  [43-84] 84  Resp:  [15-29] 16  BP: (108-147)/(53-99) 142/80  SpO2:  [93 %-98 %] 98 %    Physical Exam  Physical Exam  Vitals and nursing note reviewed.   Constitutional:       Comments: Not very participatory in exam but will follow commands and answers questions when asked. She prefers to lay on side with eyes closed. Per nursing she did walk to the bathroom this am with minimal assistance.   HENT:      Head:      Comments: Bilateral periorbital bruising      Right Ear: External ear normal.      Left Ear: External ear normal.      Nose: Nose normal.      Mouth/Throat:      Mouth: Mucous membranes are moist.   Eyes:      General:         Right eye: No discharge.         Left eye: No discharge.      Extraocular Movements: Extraocular movements intact.   Pulmonary:      Effort: Pulmonary effort is normal. No respiratory distress.   Abdominal:      General: There is no  distension.      Palpations: Abdomen is soft.      Tenderness: There is no abdominal tenderness.   Musculoskeletal:      Cervical back: Normal range of motion.   Skin:     General: Skin is warm.      Capillary Refill: Capillary refill takes 2 to 3 seconds.      Coloration: Skin is pale.      Findings: Abrasion and bruising present.   Neurological:      Mental Status: She is oriented to person, place, and time.   Psychiatric:         Attention and Perception: Attention normal.         Mood and Affect: Affect is flat.         Behavior: Behavior is agitated.         Core Measures & Quality Metrics  Labs reviewed, Medications reviewed and Radiology images reviewed  Mcelroy catheter: No Mcelroy      DVT Prophylaxis: Contraindicated - High bleeding risk  DVT prophylaxis - mechanical: SCDs  Ulcer prophylaxis: Not indicated    Assessed for rehab: Patient was assess for and/or received rehabilitation services during this hospitalization    RAP Score Total: 10    CAGE Results: not completed Blood Alcohol>0.08: not completed       Assessment/Plan  * Trauma- (present on admission)  Assessment & Plan  Mechanical ground level fall. Intracranial hemorrhage.  TBI Alert. The patient was upgraded to a Trauma Green activation for CT imaging demonstrating intracranial hemorrhage.  Yaniv Thornton MD. Trauma Surgery.    Current use of long term anticoagulation- (present on admission)  Assessment & Plan  Diagnosed with PE 2 weeks ago. Placed on Apixaban and Aspirin.  No need to reverse per Neurosurgery.  Hold anticoagulation and discuss with neurosurgery before resuming.  2/10 Requested imaging reads from [facility].    Seizure disorder (HCC)- (present on admission)  Assessment & Plan  Chronic condition treated with primidone (Mysoline), lamotrlgine (Lamictal) and carbamzepine (Tegretol).  Resumed maintenance medication on admission.  Keppra 500 mg BID per neurosurgery.    Subarachnoid hemorrhage without loss of consciousness (HCC)- (present on  admission)  Assessment & Plan  Isolated closed head injury.  Trauma Brain Injury Guidelines implemented.   BIG 3. Patient takes Apixaban and aspirin for PE.  Repeat head CT stable.  Non-operative management.  Speech Language Pathology cognitive evaluation.  Roldan Simental MD. Neurosurgeon. [facility].    Type 1 diabetes mellitus without complication (HCC)- (present on admission)  Assessment & Plan  Chronic condition treated with insulin glargine.  Admission glucose 682.  Beta-hydroxybutyric acid 0.61.  Initiating sliding scale and Lantus 12 units.  Monitor closely.    Encounter for geriatric assessment- (present on admission)  Assessment & Plan  2/10 The patient is 73 years old or older and a geriatrics consult is indicated  Tae Radford MD, Geriatric Hospitalist.    Contraindication to deep vein thrombosis (DVT) prophylaxis- (present on admission)  Assessment & Plan  VTE prophylaxis initially contraindicated secondary to elevated bleeding risk.  2/11 Trauma surveillance venous duplex ultrasonography ordered.    Malignant neoplasm of left female breast (HCC)- (present on admission)  Assessment & Plan  Chronic condition treated with anastrozole (Arimidex).  Holding maintenance medication during acute traumatic illness.    Dyslipidemia- (present on admission)  Assessment & Plan  Chronic condition treated with atorvastatin (Lipitor).  Resumed maintenance medication on admission.    Essential hypertension- (present on admission)  Assessment & Plan  Chronic condition treated with amlodipine (Norvasc) and losartan (Cozaar).  Resumed maintenance medication on admission.  Goal SBP < 160.    Bipolar 1 disorder (HCC)- (present on admission)  Assessment & Plan  Chronic condition treated with cariprazine (Vrylar) and buspirone (Buspar).  Resumed maintenance medication on admission.    Other specified hypothyroidism- (present on admission)  Assessment & Plan  Chronic condition treated with levothyroxine.  Resumed  maintenance medication on admission.    Discussed patient condition with RN, Patient, and Dr. Thornton  .

## 2024-02-11 NOTE — ASSESSMENT & PLAN NOTE
Recent stay at [facility] for behavioral disturbances.  Became ill - went to [facility] - Covid and PE dx.  Transferred to [facility] for deconditioning secondary to above.  Fall at [facility] and transferred to [facility] for trauma.  2/11 Physiatry and SNF referrals placed.  2/12 Medically cleared for transfer.

## 2024-02-11 NOTE — THERAPY
"Occupational Therapy   Initial Evaluation     Patient Name: Molly Loera  Age:  73 y.o., Sex:  female  Medical Record #: [id number]  Today's Date: 2/11/2024     Precautions: Fall Risk    Assessment  Patient is 73 y.o. female with a hx of PE (on eliquis), on ASA, HTN, HLD, CKD 3, DM on insulin, hypothyroidism, bipolar 1, anxiety and hx of breast cancer who presented 2/9/2024 with GLF while [facility]. Pt found to have left posterior temporal and occipital subarachnoid hemorrhage.   Pt seen today & was pleasant, co-operative but had poor insight into her deficits & was a bit impulsive at times with difficulty sequencing tasks.  Pt incontinent of urine while amb to the bathroom.  Pt required Min A to groom standing at the sink.  Pt returned to supine in bed with Min A.  Pt will need Post Acute OT services once medically cleared.  Will need to clarify with pt's daughter how much assist she was providing PTA?    Plan    Occupational Therapy Initial Treatment Plan   Treatment Interventions: Self Care / Activities of Daily Living, Adaptive Equipment, Cognitive Skill Development, Neuro Re-Education / Balance, Therapeutic Exercises, Therapeutic Activity  Treatment Frequency: 3 Times per Week  Duration: Until Therapy Goals Met    DC Equipment Recommendations: Unable to determine at this time  Discharge Recommendations: Recommend post-acute placement for additional occupational therapy services prior to discharge home     Subjective    \"My blood sugar is high right now\"     Objective      Initial Contact Note    Initial Contact Note Order Received and Verified, Occupational Therapy Evaluation in Progress with Full Report to Follow.   Prior Living Situation   Prior Services Unable To Determine At This Time   Housing / Facility 1 Story House   Equipment Owned Front-Wheel Walker   Lives with - Patient's Self Care Capacity Adult Children  (daughter)   Comments Pt is not a relaible historian. "  Pt reports she lives with her daughter.  Was recently at [facility] following PE when she fell   Prior Level of ADL Function   Self Feeding Independent   Grooming / Hygiene Independent   Bathing Independent   Dressing Independent   Toileting Independent   Prior Level of IADL Function   Medication Management Unable To Determine At This Time   Comments unclear how much assist pt required from her daughter PTA?   History of Falls   History of Falls Yes   Date of Last Fall   (reason for admit)   Precautions   Precautions Fall Risk   Vitals   O2 Delivery Device None - Room Air   Pain   Pain Scales 0 to 10 Scale    Intervention Ambulation / Increased Activity   Pain 0 - 10 Group   Location Head   Description Sore   Therapist Pain Assessment During Activity;Nurse Notified;4   Cognition    Cognition / Consciousness X   Speech/ Communication Delayed Responses   Level of Consciousness Alert   Safety Awareness Impaired;Impulsive   New Learning Impaired   Attention Impaired   Comments Pt easily distracted, imppulsive at times, poor insight into deficits & generally appeared confused over current events   Passive ROM Upper Body   Passive ROM Upper Body WDL   Active ROM Upper Body   Active ROM Upper Body  WDL   Strength Upper Body   Upper Body Strength  WDL   Neurological Concerns   Neurological Concerns Yes   Standing Posture During ADL's Posterior Lean  (high fall risk)   Coordination Upper Body   Coordination WDL   Balance Assessment   Sitting Balance (Static) Fair   Sitting Balance (Dynamic) Fair -   Standing Balance (Static) Poor +   Standing Balance (Dynamic) Poor -   Weight Shift Sitting Fair   Weight Shift Standing Fair   Comments pt unsteady on her feet   Bed Mobility    Supine to Sit Minimal Assist   Sit to Supine Minimal Assist   Scooting Minimal Assist   Rolling Minimal Assist to Rt.   ADL Assessment   Eating Supervision   Grooming Minimal Assist;Standing   Upper Body Dressing Minimal Assist   Lower Body Dressing  "Maximal Assist   Toileting Moderate Assist   Functional Mobility   Sit to Stand Minimal Assist   Bed, Chair, Wheelchair Transfer Minimal Assist   Toilet Transfers Minimal Assist   Transfer Method Stand Step   Mobility pt amb HHA to bathroom with urgency to void   Activity Tolerance   Sitting in Chair 5   Sitting Edge of Bed 5   Standing 5   Patient / Family Goals   Patient / Family Goal #1 \"My blood sugar is high right now\"   Short Term Goals   Short Term Goal # 1 Pt will be supervised with ADL transfers   Short Term Goal # 2 Pt will dress LB with Min A   Short Term Goal # 3 Pt will groom standing at the sink with SBA for 10 min with no LOB   Education Group   Role of Occupational Therapist Patient Response Patient;Acceptance;Explanation;Verbal Demonstration   Occupational Therapy Initial Treatment Plan    Treatment Interventions Self Care / Activities of Daily Living;Adaptive Equipment;Cognitive Skill Development;Neuro Re-Education / Balance;Therapeutic Exercises;Therapeutic Activity   Treatment Frequency 3 Times per Week   Duration Until Therapy Goals Met   Problem List   Problem List Decreased Active Daily Living Skills;Decreased Homemaking Skills;Decreased Functional Mobility;Decreased Activity Tolerance;Safety Awareness Deficits / Cognition;Impaired Posture / Trunk Alignment;Impaired Cognitive Function;Impaired Postural Control / Balance   Anticipated Discharge Equipment and Recommendations   DC Equipment Recommendations Unable to determine at this time   Discharge Recommendations Recommend post-acute placement for additional occupational therapy services prior to discharge home   Interdisciplinary Plan of Care Collaboration   IDT Collaboration with  Nursing   Patient Position at End of Therapy In Bed;Bed Alarm On;Call Light within Reach;Tray Table within Reach;Phone within Reach   Collaboration Comments Nsg notified of OT findings   Session Information   Date / Session Number  2/11 #1 (1/3, 2/17) "

## 2024-02-11 NOTE — CARE PLAN
The patient is Stable - Low risk of patient condition declining or worsening    Shift Goals  Clinical Goals: stable neuro, BP management  Patient Goals: sleep  Family Goals: augustus    Progress made toward(s) clinical / shift goals:    Problem: Knowledge Deficit - Standard  Goal: Patient and family/care givers will demonstrate understanding of plan of care, disease process/condition, diagnostic tests and medications  Outcome: Progressing     Problem: Skin Integrity  Goal: Skin integrity is maintained or improved  Outcome: Progressing     Problem: Fall Risk  Goal: Patient will remain free from falls  Outcome: Progressing     Problem: Pain - Standard  Goal: Alleviation of pain or a reduction in pain to the patient’s comfort goal  Outcome: Progressing       Patient is not progressing towards the following goals:

## 2024-02-11 NOTE — PROGRESS NOTES
Geriatric Medicine Daily Progress Note      Date of Service  2/11/2024    Chief Complaint  73 y.o. female admitted 2/9/2024 with GLF    Hospital Course    73 y.o. female with hx of VTE (on eliquis), on ASA, HTN, HLD, CKD 3, DM on insulin, hypothyroidism, bipolar 1, anxiety and hx of breast cancer who presented 2/9/2024 with GLF [facility] Pt was evaluated by Trauma surgery - workups revealed LEFT posterior temporal and occipital subarachnoid hemorrhage. No significant mass effect or midline shift. Small chronic right frontal infarct. Pt was admitted to trauma ICU. Neurosurgery evaluated the patient, recommended aggressive BP management, q1hr neuro check and Keppra.      Repeat CT head 2/10 has been stable - transitioned to q4hrs neurocheck.     Geriatrics medicine was consulted for further management.       Interval Problem Update  2/11  Now on med/surg mann   Afebrile, the rest of the vitals wnl     Pain adequately controlled    Blood glucose - uncontrolled; added premeal coverage and switch long acting similar to home twice daily regimen    Anemia - H&H relatively stable     PE - based on records from [facility], Pt had a right-sided PE. Currently, satting well with stable vitals, likely reasonable to hold AC in the setting of SAH    Consultants/Specialty  Trauma surgery  Neurosurgery    Code Status  FULL    Disposition  TBD - post acute care    Review of Systems  Review of Systems   Constitutional:  Negative for chills, fever and malaise/fatigue.   HENT:  Negative for sore throat.    Eyes:  Negative for blurred vision and double vision.   Respiratory:  Negative for cough, sputum production and shortness of breath.    Cardiovascular:  Negative for chest pain, palpitations, orthopnea, leg swelling and PND.   Gastrointestinal:  Negative for abdominal pain, heartburn, nausea and vomiting.   Genitourinary:  Negative for dysuria, frequency and urgency.   Musculoskeletal:  Positive  for falls. Negative for myalgias and neck pain.   Neurological:  Negative for dizziness, focal weakness and headaches.   Psychiatric/Behavioral:  Negative for depression.         Physical Exam  Temp:  [36.1 °C (97 °F)-36.6 °C (97.9 °F)] 36.4 °C (97.6 °F)  Pulse:  [79-89] 89  Resp:  [16-17] 17  BP: (108-142)/(53-97) 134/66  SpO2:  [96 %-98 %] 98 %    Physical Exam  Vitals and nursing note reviewed.   Constitutional:       General: She is not in acute distress.     Appearance: Normal appearance. She is not ill-appearing.   HENT:      Head: Normocephalic.      Mouth/Throat:      Mouth: Mucous membranes are dry.      Pharynx: Oropharynx is clear.   Eyes:      General: No scleral icterus.     Conjunctiva/sclera: Conjunctivae normal.      Comments: Ecchymosis around medial eyes   Cardiovascular:      Rate and Rhythm: Normal rate and regular rhythm.      Heart sounds: No murmur heard.  Pulmonary:      Effort: Pulmonary effort is normal. No respiratory distress.      Breath sounds: Normal breath sounds.   Abdominal:      General: Bowel sounds are normal. There is no distension.      Tenderness: There is no abdominal tenderness.   Musculoskeletal:         General: No swelling or deformity. Normal range of motion.      Cervical back: Normal range of motion. No rigidity.   Skin:     General: Skin is warm.   Neurological:      Mental Status: She is alert.         CAM  Acute onset and fluctuating course   No   Inattention                                         No   Disorganized thinking                        No   Altered level of consciousness          No     Medication Review    Yes    Laboratory  Recent Labs     02/09/24  1716 02/10/24  0357 02/11/24  0200   WBC 7.1 6.1 5.9   RBC 4.00* 3.42* 3.44*   HEMOGLOBIN 11.2* 9.7* 9.7*   HEMATOCRIT 35.2* 30.3* 30.6*   MCV 88.0 88.6 89.0   MCH 28.0 28.4 28.2   MCHC 31.8* 32.0* 31.7*   RDW 47.4 47.5 48.3   PLATELETCT 428 230 365   MPV 11.2 11.9 11.0     Recent Labs     02/09/24  1716  02/10/24  0512 02/11/24  0200 02/11/24  0820   SODIUM 131* 140 135  --    POTASSIUM 5.3 3.6 4.0  --    CHLORIDE 95* 106 102  --    CO2 22 23 23  --    GLUCOSE 682* 183* 300* 639*   BUN 21 14 17  --    CREATININE 0.92 0.65 0.65  --    CALCIUM 10.1 9.2 8.7  --      Recent Labs     02/09/24  1716   APTT 28.2   INR 1.08               Imaging  CT-HEAD W/O   Final Result      1.  Similar appearing left temporal and occipital subarachnoid hemorrhage and/or hemorrhagic contusion.   2.  Chronic appearing right frontal lobe infarct again noted.      CT-HEAD W/O   Final Result      1.  LEFT posterior temporal and occipital subarachnoid hemorrhage.   2.  No significant mass effect or midline shift.   3.  Small chronic RIGHT frontal infarct.   4.  Diffuse atrophy.      Based solely on CT findings, the brain injury guideline category is mBIG 2.      Nondisplaced skull fx   SDH 4.1-7.9mm   IPH 4.1-7.9mm   SAH 1 hemisphere, >3 sulci, 1-3mm      The original BIG retrospective analysis found radiographic progression in 0% of BIG 1 patients and 2.6% BIG 2.      These findings were electronically conveyed to and received by HOLLAND CONNELL on 2/9/2024 5:29 PM.      US-TRAUMA VEIN SCREEN LOWER BILAT EXTREMITY    (Results Pending)        Assessment/Plan  * Trauma- (present on admission)  Assessment & Plan  Trauma surgery primary  GLF    Current use of long term anticoagulation- (present on admission)  Assessment & Plan  Formal recommendation from Neurosurgery - eliquis ideally held for total of 10 days from the injury     Pt is currently not hypoxic and hemodynamically stable - ok to hold eliquis now for VTE burden  Record from [facility] reviewed - right sided PE  Given noted hx of CKD, hold off additional contrast study     PE (pulmonary thromboembolism) (HCC)- (present on admission)  Assessment & Plan  Formal recommendation from Neurosurgery - eliquis ideally held for total of 10 days from the injury     Pt is currently not hypoxic and  hemodynamically stable - ok to hold eliquis now for VTE burden  Record from [facility] requested.   Given noted hx of CKD, hold off additional contrast study     Encounter for geriatric assessment- (present on admission)  Assessment & Plan  Current Geriatrics Syndromes being addressed  -Fall  -Polypharmacy  -Advanced care planning     Seizure disorder (HCC)- (present on admission)  Assessment & Plan  C/w carbamazepaine and keppra     Subarachnoid hemorrhage without loss of consciousness (HCC)- (present on admission)  Assessment & Plan  Neurosurgery following  Repeat CT 2/10 has been stable     Other specified hypothyroidism- (present on admission)  Assessment & Plan  C/w home levothyroxine    Stage 3 chronic kidney disease (HCC)- (present on admission)  Assessment & Plan  Monitor renal function  Cr appears to be around baseline    Malignant neoplasm of left female breast (HCC)- (present on admission)  Assessment & Plan  On arimidex  Follow with Dr. Harris    Dyslipidemia- (present on admission)  Assessment & Plan  C/w statin    Essential hypertension- (present on admission)  Assessment & Plan  Aggressive BP regimen  Goal SBP<140  Resume home meds    Generalized anxiety disorder- (present on admission)  Assessment & Plan  C/w buspar    Type 1 diabetes mellitus without complication (HCC)- (present on admission)  Assessment & Plan  Blood glucose - uncontrolled; added premeal coverage and switch long acting similar to home twice daily regimen  Monitor coverage needs     Bipolar 1 disorder (HCC)- (present on admission)  Assessment & Plan  C/w home cariprazine, carbamatapine and lacmital          VTE prophylaxis: SCD

## 2024-02-11 NOTE — PROGRESS NOTES
4 Eyes Skin Assessment Completed by TONE Santana and TONE Augustin.    Head Bruising and Swelling  Ears WDL  Nose Discoloration; from bruising  Mouth WDL  Neck WDL  Breast/Chest WDL  Shoulder Blades WDL  Spine WDL  (R) Arm/Elbow/Hand Bruising  (L) Arm/Elbow/Hand Bruising  Abdomen WDL  Groin WDL  Scrotum/Coccyx/Buttocks Redness, Blanching, and Excoriation  (R) Leg Scab  (L) Leg WDL  (R) Heel/Foot/Toe Boggy  (L) Heel/Foot/Toe Boggy          Devices In Places SCD's; purewick      Interventions In Place Pillows    Possible Skin Injury No    Pictures Uploaded Into Epic Yes  Wound Consult Placed Yes  RN Wound Prevention Protocol Ordered Yes

## 2024-02-12 LAB
ALBUMIN SERPL BCP-MCNC: 2.9 G/DL (ref 3.2–4.9)
ALBUMIN/GLOB SERPL: 1.1 G/DL
ALP SERPL-CCNC: 140 U/L (ref 30–99)
ALT SERPL-CCNC: 29 U/L (ref 2–50)
ANION GAP SERPL CALC-SCNC: 12 MMOL/L (ref 7–16)
AST SERPL-CCNC: 21 U/L (ref 12–45)
BASOPHILS # BLD AUTO: 1.2 % (ref 0–1.8)
BASOPHILS # BLD: 0.06 K/UL (ref 0–0.12)
BILIRUB SERPL-MCNC: 0.2 MG/DL (ref 0.1–1.5)
BUN SERPL-MCNC: 16 MG/DL (ref 8–22)
CALCIUM ALBUM COR SERPL-MCNC: 9.6 MG/DL (ref 8.5–10.5)
CALCIUM SERPL-MCNC: 8.7 MG/DL (ref 8.5–10.5)
CHLORIDE SERPL-SCNC: 106 MMOL/L (ref 96–112)
CO2 SERPL-SCNC: 22 MMOL/L (ref 20–33)
CREAT SERPL-MCNC: 0.6 MG/DL (ref 0.5–1.4)
EOSINOPHIL # BLD AUTO: 0.25 K/UL (ref 0–0.51)
EOSINOPHIL NFR BLD: 5 % (ref 0–6.9)
ERYTHROCYTE [DISTWIDTH] IN BLOOD BY AUTOMATED COUNT: 51.5 FL (ref 35.9–50)
GFR SERPLBLD CREATININE-BSD FMLA CKD-EPI: 93 ML/MIN/1.73 M 2
GLOBULIN SER CALC-MCNC: 2.6 G/DL (ref 1.9–3.5)
GLUCOSE BLD STRIP.AUTO-MCNC: 103 MG/DL (ref 65–99)
GLUCOSE BLD STRIP.AUTO-MCNC: 107 MG/DL (ref 65–99)
GLUCOSE BLD STRIP.AUTO-MCNC: 145 MG/DL (ref 65–99)
GLUCOSE BLD STRIP.AUTO-MCNC: 159 MG/DL (ref 65–99)
GLUCOSE BLD STRIP.AUTO-MCNC: 213 MG/DL (ref 65–99)
GLUCOSE BLD STRIP.AUTO-MCNC: 85 MG/DL (ref 65–99)
GLUCOSE SERPL-MCNC: 116 MG/DL (ref 65–99)
HCT VFR BLD AUTO: 31.8 % (ref 37–47)
HGB BLD-MCNC: 9.8 G/DL (ref 12–16)
IMM GRANULOCYTES # BLD AUTO: 0.01 K/UL (ref 0–0.11)
IMM GRANULOCYTES NFR BLD AUTO: 0.2 % (ref 0–0.9)
LYMPHOCYTES # BLD AUTO: 1.95 K/UL (ref 1–4.8)
LYMPHOCYTES NFR BLD: 38.9 % (ref 22–41)
MCH RBC QN AUTO: 28.2 PG (ref 27–33)
MCHC RBC AUTO-ENTMCNC: 30.8 G/DL (ref 32.2–35.5)
MCV RBC AUTO: 91.4 FL (ref 81.4–97.8)
MONOCYTES # BLD AUTO: 0.58 K/UL (ref 0–0.85)
MONOCYTES NFR BLD AUTO: 11.6 % (ref 0–13.4)
NEUTROPHILS # BLD AUTO: 2.16 K/UL (ref 1.82–7.42)
NEUTROPHILS NFR BLD: 43.1 % (ref 44–72)
NRBC # BLD AUTO: 0 K/UL
NRBC BLD-RTO: 0 /100 WBC (ref 0–0.2)
PLATELET # BLD AUTO: 371 K/UL (ref 164–446)
PMV BLD AUTO: 10.8 FL (ref 9–12.9)
POTASSIUM SERPL-SCNC: 3.5 MMOL/L (ref 3.6–5.5)
PROT SERPL-MCNC: 5.5 G/DL (ref 6–8.2)
RBC # BLD AUTO: 3.48 M/UL (ref 4.2–5.4)
SODIUM SERPL-SCNC: 140 MMOL/L (ref 135–145)
WBC # BLD AUTO: 5 K/UL (ref 4.8–10.8)

## 2024-02-12 PROCEDURE — 92523 SPEECH SOUND LANG COMPREHEN: CPT

## 2024-02-12 PROCEDURE — A9270 NON-COVERED ITEM OR SERVICE: HCPCS | Mod: JZ | Performed by: STUDENT IN AN ORGANIZED HEALTH CARE EDUCATION/TRAINING PROGRAM

## 2024-02-12 PROCEDURE — A9270 NON-COVERED ITEM OR SERVICE: HCPCS

## 2024-02-12 PROCEDURE — 97162 PT EVAL MOD COMPLEX 30 MIN: CPT

## 2024-02-12 PROCEDURE — 99233 SBSQ HOSP IP/OBS HIGH 50: CPT | Performed by: NURSE PRACTITIONER

## 2024-02-12 PROCEDURE — 700102 HCHG RX REV CODE 250 W/ 637 OVERRIDE(OP): Mod: JZ | Performed by: STUDENT IN AN ORGANIZED HEALTH CARE EDUCATION/TRAINING PROGRAM

## 2024-02-12 PROCEDURE — 700105 HCHG RX REV CODE 258

## 2024-02-12 PROCEDURE — 700102 HCHG RX REV CODE 250 W/ 637 OVERRIDE(OP): Performed by: NURSE PRACTITIONER

## 2024-02-12 PROCEDURE — 85025 COMPLETE CBC W/AUTO DIFF WBC: CPT

## 2024-02-12 PROCEDURE — A9270 NON-COVERED ITEM OR SERVICE: HCPCS | Performed by: SURGERY

## 2024-02-12 PROCEDURE — 82962 GLUCOSE BLOOD TEST: CPT | Mod: 91

## 2024-02-12 PROCEDURE — 770006 HCHG ROOM/CARE - MED/SURG/GYN SEMI*

## 2024-02-12 PROCEDURE — 36415 COLL VENOUS BLD VENIPUNCTURE: CPT

## 2024-02-12 PROCEDURE — 700102 HCHG RX REV CODE 250 W/ 637 OVERRIDE(OP): Performed by: SURGERY

## 2024-02-12 PROCEDURE — 97535 SELF CARE MNGMENT TRAINING: CPT

## 2024-02-12 PROCEDURE — 99232 SBSQ HOSP IP/OBS MODERATE 35: CPT | Performed by: STUDENT IN AN ORGANIZED HEALTH CARE EDUCATION/TRAINING PROGRAM

## 2024-02-12 PROCEDURE — 700102 HCHG RX REV CODE 250 W/ 637 OVERRIDE(OP)

## 2024-02-12 PROCEDURE — A9270 NON-COVERED ITEM OR SERVICE: HCPCS | Performed by: NURSE PRACTITIONER

## 2024-02-12 PROCEDURE — 80053 COMPREHEN METABOLIC PANEL: CPT

## 2024-02-12 RX ORDER — POTASSIUM CHLORIDE 20 MEQ/1
40 TABLET, EXTENDED RELEASE ORAL ONCE
Status: COMPLETED | OUTPATIENT
Start: 2024-02-12 | End: 2024-02-12

## 2024-02-12 RX ORDER — INSULIN LISPRO 100 [IU]/ML
2-9 INJECTION, SOLUTION INTRAVENOUS; SUBCUTANEOUS
Status: DISCONTINUED | OUTPATIENT
Start: 2024-02-12 | End: 2024-02-14 | Stop reason: HOSPADM

## 2024-02-12 RX ADMIN — ACETAMINOPHEN 1000 MG: 500 TABLET ORAL at 18:44

## 2024-02-12 RX ADMIN — SODIUM CHLORIDE: 9 INJECTION, SOLUTION INTRAVENOUS at 12:57

## 2024-02-12 RX ADMIN — POLYETHYLENE GLYCOL 3350 1 PACKET: 17 POWDER, FOR SOLUTION ORAL at 18:46

## 2024-02-12 RX ADMIN — LOSARTAN POTASSIUM 100 MG: 50 TABLET, FILM COATED ORAL at 05:36

## 2024-02-12 RX ADMIN — DOCUSATE SODIUM 100 MG: 100 CAPSULE, LIQUID FILLED ORAL at 05:35

## 2024-02-12 RX ADMIN — PRIMIDONE 100 MG: 50 TABLET ORAL at 21:00

## 2024-02-12 RX ADMIN — BUSPIRONE HYDROCHLORIDE 5 MG: 10 TABLET ORAL at 18:44

## 2024-02-12 RX ADMIN — LEVETIRACETAM 500 MG: 500 TABLET, FILM COATED ORAL at 05:36

## 2024-02-12 RX ADMIN — INSULIN LISPRO 3 UNITS: 100 INJECTION, SOLUTION INTRAVENOUS; SUBCUTANEOUS at 20:19

## 2024-02-12 RX ADMIN — TIMOLOL MALEATE 1 DROP: 5 SOLUTION OPHTHALMIC at 20:10

## 2024-02-12 RX ADMIN — ACETAMINOPHEN 1000 MG: 500 TABLET ORAL at 13:01

## 2024-02-12 RX ADMIN — LAMOTRIGINE 100 MG: 100 TABLET ORAL at 05:36

## 2024-02-12 RX ADMIN — INSULIN LISPRO 2 UNITS: 100 INJECTION, SOLUTION INTRAVENOUS; SUBCUTANEOUS at 10:22

## 2024-02-12 RX ADMIN — LEVETIRACETAM 500 MG: 500 TABLET, FILM COATED ORAL at 18:45

## 2024-02-12 RX ADMIN — CARIPRAZINE 6 MG: 3 CAPSULE, GELATIN COATED ORAL at 20:10

## 2024-02-12 RX ADMIN — POTASSIUM CHLORIDE 40 MEQ: 1500 TABLET, EXTENDED RELEASE ORAL at 08:38

## 2024-02-12 RX ADMIN — ATORVASTATIN CALCIUM 80 MG: 80 TABLET, FILM COATED ORAL at 20:10

## 2024-02-12 RX ADMIN — DOCUSATE SODIUM 50 MG AND SENNOSIDES 8.6 MG 1 TABLET: 8.6; 5 TABLET, FILM COATED ORAL at 20:13

## 2024-02-12 RX ADMIN — LEVOTHYROXINE SODIUM 25 MCG: 0.03 TABLET ORAL at 05:36

## 2024-02-12 RX ADMIN — SODIUM CHLORIDE: 9 INJECTION, SOLUTION INTRAVENOUS at 03:14

## 2024-02-12 RX ADMIN — INSULIN LISPRO 5 UNITS: 100 INJECTION, SOLUTION INTRAVENOUS; SUBCUTANEOUS at 10:21

## 2024-02-12 RX ADMIN — BUSPIRONE HYDROCHLORIDE 5 MG: 10 TABLET ORAL at 05:35

## 2024-02-12 RX ADMIN — ACETAMINOPHEN 1000 MG: 500 TABLET ORAL at 05:35

## 2024-02-12 RX ADMIN — CARBAMAZEPINE 200 MG: 200 TABLET ORAL at 18:46

## 2024-02-12 RX ADMIN — CARBAMAZEPINE 200 MG: 200 TABLET ORAL at 05:35

## 2024-02-12 RX ADMIN — DOCUSATE SODIUM 100 MG: 100 CAPSULE, LIQUID FILLED ORAL at 18:45

## 2024-02-12 ASSESSMENT — PAIN DESCRIPTION - PAIN TYPE
TYPE: ACUTE PAIN

## 2024-02-12 ASSESSMENT — ENCOUNTER SYMPTOMS
VOMITING: 0
FOCAL WEAKNESS: 0
CHILLS: 0
FEVER: 0
GASTROINTESTINAL NEGATIVE: 1
ABDOMINAL PAIN: 0
ORTHOPNEA: 0
HEARTBURN: 0
PALPITATIONS: 0
PND: 0
HEADACHES: 1
NECK PAIN: 0
DIZZINESS: 0
SHORTNESS OF BREATH: 0
SPUTUM PRODUCTION: 0
SORE THROAT: 0
DOUBLE VISION: 0
DEPRESSION: 0
BLURRED VISION: 0
RESPIRATORY NEGATIVE: 1
MYALGIAS: 0
HEADACHES: 0
MYALGIAS: 1
NAUSEA: 0
COUGH: 0
FALLS: 1

## 2024-02-12 ASSESSMENT — COGNITIVE AND FUNCTIONAL STATUS - GENERAL
SUGGESTED CMS G CODE MODIFIER MOBILITY: CL
MOVING TO AND FROM BED TO CHAIR: A LOT
TURNING FROM BACK TO SIDE WHILE IN FLAT BAD: A LOT
MOBILITY SCORE: 12
MOVING FROM LYING ON BACK TO SITTING ON SIDE OF FLAT BED: A LOT
WALKING IN HOSPITAL ROOM: A LOT
CLIMB 3 TO 5 STEPS WITH RAILING: TOTAL
STANDING UP FROM CHAIR USING ARMS: A LITTLE

## 2024-02-12 ASSESSMENT — GAIT ASSESSMENTS
DEVIATION: BRADYKINETIC;SHUFFLED GAIT
GAIT LEVEL OF ASSIST: MINIMAL ASSIST
ASSISTIVE DEVICE: FRONT WHEEL WALKER
DISTANCE (FEET): 3

## 2024-02-12 NOTE — DISCHARGE PLANNING
[facility] Transitional Care Coordination    Referral from: KATARINA White    Insurance Provider on Facesheet: MCR/[employer]    Potential Rehab Diagnosis: TBI    Chart review indicates patient may have on going medical management and may have therapy needs to possibly meet inpatient rehab facility criteria with the goal of returning to community.    D/C support will need to be verified: Daughter    Physiatry consultation pended per protocol.  Would appreciate a PT eval once appropriate.      Thank you for the referral.

## 2024-02-12 NOTE — CARE PLAN
The patient is Stable - Low risk of patient condition declining or worsening    Shift Goals  Clinical Goals: safety/stable neuro  Patient Goals: sleep/pain management  Family Goals: augustus    Progress made toward(s) clinical / shift goals:    Problem: Knowledge Deficit - Standard  Goal: Patient and family/care givers will demonstrate understanding of plan of care, disease process/condition, diagnostic tests and medications  Outcome: Progressing     Problem: Skin Integrity  Goal: Skin integrity is maintained or improved  Outcome: Progressing     Problem: Fall Risk  Goal: Patient will remain free from falls  Outcome: Progressing     Problem: Pain - Standard  Goal: Alleviation of pain or a reduction in pain to the patient’s comfort goal  Outcome: Progressing       Patient is not progressing towards the following goals:

## 2024-02-12 NOTE — DISCHARGE PLANNING
[phone]  DPA sent [facility] blanket Deshaun/Ambrose except [place] and [place] per RN CM

## 2024-02-12 NOTE — CARE PLAN
The patient is Stable - Low risk of patient condition declining or worsening    Shift Goals  Clinical Goals: Safety  Patient Goals: Rest  Family Goals: augustus    Progress made toward(s) clinical / shift goals:    Problem: Skin Integrity  Goal: Skin integrity is maintained or improved  Outcome: Progressing     Problem: Fall Risk  Goal: Patient will remain free from falls  Outcome: Progressing     Problem: Pain - Standard  Goal: Alleviation of pain or a reduction in pain to the patient’s comfort goal  Outcome: Progressing       Patient is not progressing towards the following goals:

## 2024-02-12 NOTE — THERAPY
Physical Therapy   Initial Evaluation     Patient Name: Molly Loera  Age:  73 y.o., Sex:  female  Medical Record #: [id number]  Today's Date: 2/12/2024     Precautions  Precautions: Fall Risk    Assessment  Patient is a 73 y.o. female with hx of VTE (on eliquis), on ASA, HTN, HLD, CKD 3, DM on insulin, hypothyroidism, bipolar 1, anxiety, and breast cancer. Pt now admitted s/p GLF with L posterior temporal and occipital SAH (non op). PT eval complete, pt currently presents below her functional baseline due to impaired strength, balance, cognition, coordination, activity tolerance, and overall mobility. Pt is generally at Min A for mobility and only able to ambulate short distances at this time with FWW. Pt with consistent R lean in sitting/standing and demonstrates impaired midline orientation. Pt will benefit from post acute placement at this time to maximize functional independence and decrease caregiver burden. Will need to confirm with pt's dtr how much support they can provide for the pt at home. Will follow while admitted for skilled PT intervention.     Plan    Physical Therapy Initial Treatment Plan   Treatment Plan : Bed Mobility, Gait Training, Neuro Re-Education / Balance, Self Care / Home Evaluation, Stair Training, Therapeutic Activities, Therapeutic Exercise  Treatment Frequency: 4 Times per Week  Duration: Until Therapy Goals Met    DC Equipment Recommendations: Unable to determine at this time  Discharge Recommendations: Recommend post-acute placement for additional physical therapy services prior to discharge home         Vitals   O2 (LPM) 0   O2 Delivery Device None - Room Air   Pain 0 - 10 Group   Location Knee   Location Orientation Right   Description Aching   Therapist Pain Assessment During Activity   Prior Living Situation   Housing / Facility 1 Story House   Steps Into Home 1   Equipment Owned Front-Wheel Walker   Lives with - Patient's Self Care Capacity Adult Children   Comments Pt is  not a reliable historian. Pt reports she lives with her daughter. Was recently at [place] following PE when she fell   Prior Level of Functional Mobility   Bed Mobility Independent   Transfer Status Independent   Ambulation Independent   Ambulation Distance household distances   Assistive Devices Used Front-Wheel Walker   Stairs Independent   Comments pt is a poor historian   History of Falls   History of Falls Yes   Date of Last Fall   (reason for admit)   Cognition    Cognition / Consciousness X   Speech/ Communication Delayed Responses   Level of Consciousness Alert   Safety Awareness Impaired   New Learning Impaired   Attention Impaired   Comments pleasant and participatory, poor alertness at times and needing repeated cues   Active ROM Lower Body    Active ROM Lower Body  WDL   Strength Lower Body   Lower Body Strength  X   Comments grossly 4/5 RLE, 4+/5 LLE   Sensation Lower Body   Lower Extremity Sensation   WDL   Neurological Concerns   Neurological Concerns Yes   Sitting Posture During ADL's Lateral Lean Right   Standing Posture During ADL's Lateral Lean Right   Comments pt with impaired midline orientation, slight lean to R feels like midline to her   Coordination Lower Body    Comments mildly delayed B   Balance Assessment   Sitting Balance (Static) Fair   Sitting Balance (Dynamic) Fair -   Standing Balance (Static) Poor +   Standing Balance (Dynamic) Poor +   Weight Shift Sitting Fair   Weight Shift Standing Poor   Comments FWW in standing   Bed Mobility    Supine to Sit Minimal Assist   Sit to Supine Standby Assist   Scooting Standby Assist   Rolling Standby Assist   Gait Analysis   Gait Level Of Assist Minimal Assist   Assistive Device Front Wheel Walker   Distance (Feet) 3   # of Times Distance was Traveled 1   Deviation Bradykinetic;Shuffled Gait   # of Stairs Climbed 0   Weight Bearing Status no restrictions   Comments distance limited by balance and fatigue, pt thinks she can do more when she's  more awake   Functional Mobility   Sit to Stand Minimal Assist   Mobility STS x2 from EOB with FWW   Comments cues for hand placement and sequencing   How much difficulty does the patient currently have...   Turning over in bed (including adjusting bedclothes, sheets and blankets)? 2   Sitting down on and standing up from a chair with arms (e.g., wheelchair, bedside commode, etc.) 2   Moving from lying on back to sitting on the side of the bed? 2   How much help from another person does the patient currently need...   Moving to and from a bed to a chair (including a wheelchair)? 3   Need to walk in a hospital room? 2   Climbing 3-5 steps with a railing? 1   6 clicks Mobility Score 12   Activity Tolerance   Sitting Edge of Bed 8 min   Standing 1 min   Comments limited by fatigue and balance   Short Term Goals    Short Term Goal # 1 pt will move supine<>eob with spv in 6 tx for bed mobility.   Short Term Goal # 2 pt will complete spt with fww and spv in 6 tx for functional mobility.   Short Term Goal # 3 pt will ambulate 150 ft with fww and spv in 6 tx for household distances.   Short Term Goal # 4 pt will negotiate 1 stair with sba in 6 tx for home access.   Education Group   Education Provided Role of Physical Therapist   Role of Physical Therapist Patient Response Patient;Acceptance;Explanation;Verbal Demonstration   Additional Comments education on seated and standing balance, mildine posture, fww use, DC planning   Physical Therapy Initial Treatment Plan    Treatment Plan  Bed Mobility;Gait Training;Neuro Re-Education / Balance;Self Care / Home Evaluation;Stair Training;Therapeutic Activities;Therapeutic Exercise   Treatment Frequency 4 Times per Week   Duration Until Therapy Goals Met   Problem List    Problems Impaired Bed Mobility;Impaired Transfers;Impaired Ambulation;Functional Strength Deficit;Impaired Balance;Decreased Activity Tolerance;Safety Awareness Deficits / Cognition   Anticipated Discharge  Equipment and Recommendations   DC Equipment Recommendations Unable to determine at this time   Discharge Recommendations Recommend post-acute placement for additional physical therapy services prior to discharge home   Interdisciplinary Plan of Care Collaboration   IDT Collaboration with  Nursing   Patient Position at End of Therapy In Bed;Bed Alarm On;Call Light within Reach;Tray Table within Reach;Phone within Reach   Collaboration Comments RN updated   Session Information   Date / Session Number  2/12- 1 (1/4, 2/18)

## 2024-02-12 NOTE — PROGRESS NOTES
Trauma / Surgical Daily Progress Note    Date of Service  2/12/2024    Chief Complaint  73 y.o. female admitted 2/9/2024  as a TBI/trauma green - MGLF - non op SAH on apixaban and ASA     Interval Events  Improved blood sugar control  Room air  VSS  Potassium  3.5 - replaced     - Physiatry consult placed   - She was previously at [facility] for overall weakness s/p PE and recent COVID - she was at [facility] prior to that for behavioral issues - SNF referral placed as well   - Geodon PRN - avoid Haldol in head bleeds.     - Medically cleared for post acute placement - Discussed with CAMILLE Connor     Review of Systems  Review of Systems   Constitutional:  Positive for malaise/fatigue.   HENT: Negative.     Respiratory: Negative.     Gastrointestinal: Negative.    Genitourinary:         Voiding   Musculoskeletal:  Positive for myalgias.   Skin: Negative.    Neurological:  Positive for headaches (feels better today per pt).   All other systems reviewed and are negative.       Vital Signs  Temp:  [36.2 °C (97.1 °F)-36.4 °C (97.6 °F)] 36.4 °C (97.5 °F)  Pulse:  [75-89] 78  Resp:  [17-18] 18  BP: ()/(48-70) 130/70  SpO2:  [90 %-99 %] 99 %    Physical Exam  Physical Exam  Vitals and nursing note reviewed.   Constitutional:       Comments: More participatory today but still prefers to lay on side with eyes closed. Pleasant this am.   HENT:      Head:      Comments: Bilateral periorbital bruising   Nose bruising with slight abrasion     Right Ear: External ear normal.      Left Ear: External ear normal.      Mouth/Throat:      Mouth: Mucous membranes are moist.   Eyes:      General:         Right eye: No discharge.         Left eye: No discharge.      Extraocular Movements: Extraocular movements intact.   Pulmonary:      Effort: Pulmonary effort is normal. No respiratory distress.   Musculoskeletal:      Cervical back: Normal range of motion.   Skin:     General: Skin is warm.      Capillary Refill: Capillary  refill takes 2 to 3 seconds.      Coloration: Skin is pale.      Findings: Abrasion and bruising present.   Neurological:      Mental Status: She is oriented to person, place, and time.   Psychiatric:         Attention and Perception: Attention normal.         Mood and Affect: Mood normal. Affect is flat.         Behavior: Behavior is slowed. Behavior is cooperative.       Core Measures & Quality Metrics  Labs reviewed, Medications reviewed and Radiology images reviewed  Mcelroy catheter: No Mcelroy      DVT Prophylaxis: Contraindicated - High bleeding risk  DVT prophylaxis - mechanical: SCDs  Ulcer prophylaxis: Not indicated    Assessed for rehab: Patient was assess for and/or received rehabilitation services during this hospitalization    RAP Score Total: 10    CAGE Results: not completed Blood Alcohol>0.08: not completed       Assessment/Plan  * Trauma- (present on admission)  Assessment & Plan  Mechanical ground level fall. Intracranial hemorrhage.  TBI Alert. The patient was upgraded to a Trauma Green activation for CT imaging demonstrating intracranial hemorrhage.  Yaniv Thornton MD. Trauma Surgery.    Discharge planning issues- (present on admission)  Assessment & Plan  Recent stay at [facility] for behavioral disturbances.  Became ill - went to [facility] - Covid and PE dx.  Transferred to [facility] for deconditioning secondary to above.  Fall at [facility] and transferred to [facility] for trauma.  2/11 Physiatry and SNF referrals placed.    Current use of long term anticoagulation- (present on admission)  Assessment & Plan  Diagnosed with PE 2 weeks ago. Placed on Apixaban and Aspirin.  No need to reverse per Neurosurgery.  Hold anticoagulation and discuss with neurosurgery before resuming.  2/10 Requested imaging reads from [facility].    Seizure disorder (HCC)- (present on admission)  Assessment & Plan  Chronic condition treated with primidone (Mysoline), lamotrlgine (Lamictal) and carbamzepine (Tegretol).  Resumed  maintenance medication on admission.  Keppra 500 mg BID per neurosurgery.    Subarachnoid hemorrhage without loss of consciousness (HCC)- (present on admission)  Assessment & Plan  Isolated closed head injury.  Trauma Brain Injury Guidelines implemented.   BIG 3. Patient takes Apixaban and aspirin for PE.  Repeat head CT stable.  Non-operative management.  Speech Language Pathology cognitive evaluation.  Roldan Simental MD. Neurosurgeon. [facility].    Type 1 diabetes mellitus without complication (HCC)- (present on admission)  Assessment & Plan  Chronic condition treated with insulin glargine.  Admission glucose 682.  Beta-hydroxybutyric acid 0.61.  Initiating sliding scale and Lantus 12 units.  2/11 Dosing adjusted  Monitor closely.    Encounter for geriatric assessment- (present on admission)  Assessment & Plan  2/10 The patient is 75 years old or older and a geriatrics consult is indicated  Tae Radford MD, Geriatric Hospitalist.    Contraindication to deep vein thrombosis (DVT) prophylaxis- (present on admission)  Assessment & Plan  VTE prophylaxis initially contraindicated secondary to elevated bleeding risk.  2/11 Trauma screening bilateral lower extremity venous duplex negative for above knee DVT.    Malignant neoplasm of left female breast (HCC)- (present on admission)  Assessment & Plan  Chronic condition treated with anastrozole (Arimidex).  Holding maintenance medication during acute traumatic illness.    Dyslipidemia- (present on admission)  Assessment & Plan  Chronic condition treated with atorvastatin (Lipitor).  Resumed maintenance medication on admission.    Essential hypertension- (present on admission)  Assessment & Plan  Chronic condition treated with amlodipine (Norvasc) and losartan (Cozaar).  Resumed maintenance medication on admission.  Goal SBP < 160.    Bipolar 1 disorder (HCC)- (present on admission)  Assessment & Plan  Chronic condition treated with cariprazine (Vrylar) and  buspirone (Buspar).  Resumed maintenance medication on admission.    Other specified hypothyroidism- (present on admission)  Assessment & Plan  Chronic condition treated with levothyroxine.  Resumed maintenance medication on admission.    Discussed patient condition with RN, Patient, and Dr. Thornton .

## 2024-02-12 NOTE — DISCHARGE PLANNING
Case Management Discharge Planning      [facility] [id number]    Admission Date: 2/9/2024  GMLOS: 4  ALOS: 3    6-Clicks ADL Score: 16  6-Clicks Mobility Score: 12  PT and/or OT Eval ordered: Yes  Post-acute Referrals Ordered: Yes  Post-acute Choice Obtained: No  Has referral(s) been sent to post-acute provider:  Yes      Anticipated Discharge Dispo: Discharge Disposition: D/T to SNF with Medicare cert in anticipation of skilled care (03)    DME Needed: No    Action(s) Taken: DC Assessment Complete (See below)    Escalations Completed: None    Medically Clear: Yes    Next Steps: Follow up SNF or IPR acceptance.     Barriers to Discharge: Pending Placement and Pending PT Evaluation    Is the patient up for discharge tomorrow: No    Care Transition Team Assessment      RN CM met with Pt at bedside to obtain assessment. Pt came from [facility] prior admission. On baseline, Pt lives with the daughter in [facility].  Pt was ambulatory and independent with ADL's. Pt was admitted at [facility] last Jan 4 and was admitted at [facility] for PE and was transferred to [facility] for therapy. PT refuses to go back to [place] and wants to go home.    RN CM called Pt's daughter, Rut and was informed. Per Rut she will come and talk to her mom. Rut prefers IPR. Rut also agreed to send SNF (except [place] and [facility]) referral just incase Pt will not qualified for IPR. AMINA Ayala informed.     SNF referral sent. [facility] requesting for PT evals.     Pt's N [phone]    Information Source  Orientation Level: Oriented to situation, Oriented to place, Oriented to person, Disoriented to time  Information Given By: Patient  Who is responsible for making decisions for patient? : Patient    Readmission Evaluation  Is this a readmission?: No    Elopement Risk  Legal Hold: No  Ambulatory or Self Mobile in Wheelchair: Yes  Disoriented: Situation-At Risk for Elopement, Time-At Risk for  Elopement  Psychiatric Symptoms: None  History of Wandering: No  Elopement this Admit: No  Vocalizing Wanting to Leave: Yes-At Risk for Elopement  Elopement Risk: Not at Risk for Elopement    Interdisciplinary Discharge Planning  Lives with - Patient's Self Care Capacity: Adult Children (daughter)  Housing / Facility: [address] [facility]  Prior Services: Unable To Determine At This Time    Discharge Preparedness  What is your plan after discharge?: Skilled nursing facility  What are your discharge supports?: Child  Prior Functional Level: Ambulatory, Independent with Activities of Daily Living  Difficulity with ADLs: None  Difficulity with IADLs: None    Functional Assesment  Prior Functional Level: Ambulatory, Independent with Activities of Daily Living    Finances  Financial Barriers to Discharge: No  Prescription Coverage: Yes              Advance Directive  Advance Directive?: None    Domestic Abuse  Have you ever been the victim of abuse or violence?: No  Physical Abuse or Sexual Abuse: No  Verbal Abuse or Emotional Abuse: No  Possible Abuse/Neglect Reported to:: Not Applicable    Psychological Assessment  History of Substance Abuse: None    Discharge Risks or Barriers  Discharge risks or barriers?: Complex medical needs  Patient risk factors: Complex medical needs    Anticipated Discharge Information  Discharge Disposition: D/T to SNF with Medicare cert in anticipation of skilled care (03)

## 2024-02-12 NOTE — PROGRESS NOTES
HOSPITALIST NOC CROSS COVER    Patient blood sugar 107 this morning, much improved. Changed a.m. dose Lantus back to home dose of 10 units.  Kept Lantus 15 units at bedtime as previously ordered.

## 2024-02-12 NOTE — PROGRESS NOTES
Geriatric Medicine Daily Progress Note      Date of Service  2/12/2024    Chief Complaint  73 y.o. female admitted 2/9/2024 with GLF    Hospital Course    73 y.o. female with hx of VTE (on eliquis), on ASA, HTN, HLD, CKD 3, DM on insulin, hypothyroidism, bipolar 1, anxiety and hx of breast cancer who presented 2/9/2024 with GL[facility] Pt was evaluated by Trauma surgery - workups revealed LEFT posterior temporal and occipital subarachnoid hemorrhage. No significant mass effect or midline shift. Small chronic right frontal infarct. Pt was admitted to trauma ICU. Neurosurgery evaluated the patient, recommended aggressive BP management, q1hr neuro check and Keppra.      Repeat CT head 2/10 has been stable - transitioned to q4hrs neurocheck.     Geriatrics medicine was consulted for further management.       Interval Problem Update  2/12  Afebrile, the rest of the vitals wnl   Pain adequately controlled  No acute events overnight     At bedside, disappointed with needing to be here and wanted to go home. I explained the need for a safe DC plan and current care plan is a placement.     Blood glucose - better controlled in last 24 hours; around 100s; home regimen in placed    Anemia - H&H relatively stable     PE - based on records from [facility], Pt had a right-sided PE. Currently, satting well with stable vitals, likely reasonable to hold AC as recommended by neurosurg in the setting of SAH    Hypokalemia - supplemented    Consultants/Specialty  Trauma surgery  Neurosurgery    Code Status  FULL    Disposition  TBD - post acute care    Review of Systems  Review of Systems   Constitutional:  Negative for chills, fever and malaise/fatigue.   HENT:  Negative for sore throat.    Eyes:  Negative for blurred vision and double vision.   Respiratory:  Negative for cough, sputum production and shortness of breath.    Cardiovascular:  Negative for chest pain, palpitations, orthopnea, leg  swelling and PND.   Gastrointestinal:  Negative for abdominal pain, heartburn, nausea and vomiting.   Genitourinary:  Negative for dysuria, frequency and urgency.   Musculoskeletal:  Positive for falls. Negative for myalgias and neck pain.   Neurological:  Negative for dizziness, focal weakness and headaches.   Psychiatric/Behavioral:  Negative for depression.         Physical Exam  Temp:  [36.2 °C (97.1 °F)-36.4 °C (97.5 °F)] 36.2 °C (97.1 °F)  Pulse:  [66-78] 66  Resp:  [17-18] 18  BP: ()/(48-70) 116/70  SpO2:  [90 %-99 %] 98 %    Physical Exam  Vitals and nursing note reviewed.   Constitutional:       General: She is not in acute distress.     Appearance: Normal appearance. She is not ill-appearing.   HENT:      Head: Normocephalic.      Mouth/Throat:      Mouth: Mucous membranes are dry.      Pharynx: Oropharynx is clear.   Eyes:      General: No scleral icterus.     Conjunctiva/sclera: Conjunctivae normal.      Comments: Ecchymosis around medial eyes   Cardiovascular:      Rate and Rhythm: Normal rate and regular rhythm.      Heart sounds: No murmur heard.  Pulmonary:      Effort: Pulmonary effort is normal. No respiratory distress.      Breath sounds: Normal breath sounds.   Abdominal:      General: Bowel sounds are normal. There is no distension.      Tenderness: There is no abdominal tenderness.   Musculoskeletal:         General: No swelling or deformity. Normal range of motion.      Cervical back: Normal range of motion. No rigidity.   Skin:     General: Skin is warm.   Neurological:      Mental Status: She is alert.         CAM  Acute onset and fluctuating course   No   Inattention                                         No   Disorganized thinking                        No   Altered level of consciousness          No     Medication Review    Yes    Laboratory  Recent Labs     02/10/24  0357 02/11/24  0200 02/12/24  0326   WBC 6.1 5.9 5.0   RBC 3.42* 3.44* 3.48*   HEMOGLOBIN 9.7* 9.7* 9.8*    HEMATOCRIT 30.3* 30.6* 31.8*   MCV 88.6 89.0 91.4   MCH 28.4 28.2 28.2   MCHC 32.0* 31.7* 30.8*   RDW 47.5 48.3 51.5*   PLATELETCT 230 365 371   MPV 11.9 11.0 10.8     Recent Labs     02/10/24  0512 02/11/24  0200 02/11/24  0820 02/12/24  0326   SODIUM 140 135  --  140   POTASSIUM 3.6 4.0  --  3.5*   CHLORIDE 106 102  --  106   CO2 23 23  --  22   GLUCOSE 183* 300* 639* 116*   BUN 14 17  --  16   CREATININE 0.65 0.65  --  0.60   CALCIUM 9.2 8.7  --  8.7     Recent Labs     02/09/24  1716   APTT 28.2   INR 1.08               Imaging  US-TRAUMA VEIN SCREEN LOWER BILAT EXTREMITY   Final Result      CT-HEAD W/O   Final Result      1.  Similar appearing left temporal and occipital subarachnoid hemorrhage and/or hemorrhagic contusion.   2.  Chronic appearing right frontal lobe infarct again noted.      CT-HEAD W/O   Final Result      1.  LEFT posterior temporal and occipital subarachnoid hemorrhage.   2.  No significant mass effect or midline shift.   3.  Small chronic RIGHT frontal infarct.   4.  Diffuse atrophy.      Based solely on CT findings, the brain injury guideline category is mBIG 2.      Nondisplaced skull fx   SDH 4.1-7.9mm   IPH 4.1-7.9mm   SAH 1 hemisphere, >3 sulci, 1-3mm      The original BIG retrospective analysis found radiographic progression in 0% of BIG 1 patients and 2.6% BIG 2.      These findings were electronically conveyed to and received by HOLLAND CONNELL on 2/9/2024 5:29 PM.           Assessment/Plan  * Trauma- (present on admission)  Assessment & Plan  Trauma surgery primary  GLF    Current use of long term anticoagulation- (present on admission)  Assessment & Plan  Formal recommendation from Neurosurgery - eliquis ideally held for total of 10 days from the injury     Pt is currently not hypoxic and hemodynamically stable - ok to hold eliquis now for VTE burden  Record from [facility] reviewed - right sided PE  Given noted hx of CKD, hold off additional contrast study     PE (pulmonary  thromboembolism) (HCC)- (present on admission)  Assessment & Plan  Formal recommendation from Neurosurgery - eliquis ideally held for total of 10 days from the injury     Pt is currently not hypoxic and hemodynamically stable - ok to hold eliquis now for VTE burden  Record from [facility] requested.   Given noted hx of CKD, hold off additional contrast study     Encounter for geriatric assessment- (present on admission)  Assessment & Plan  Current Geriatrics Syndromes being addressed  -Fall  -Polypharmacy  -Advanced care planning     Seizure disorder (HCC)- (present on admission)  Assessment & Plan  C/w carbamazepaine and keppra     Subarachnoid hemorrhage without loss of consciousness (HCC)- (present on admission)  Assessment & Plan  Neurosurgery following  Repeat CT 2/10 has been stable     Other specified hypothyroidism- (present on admission)  Assessment & Plan  C/w home levothyroxine    Stage 3 chronic kidney disease (HCC)- (present on admission)  Assessment & Plan  Monitor renal function  Cr appears to be around baseline    Malignant neoplasm of left female breast (HCC)- (present on admission)  Assessment & Plan  On arimidex  Follow with Dr. Harris    Dyslipidemia- (present on admission)  Assessment & Plan  C/w statin    Essential hypertension- (present on admission)  Assessment & Plan  Aggressive BP regimen  Goal SBP<140  Resume home meds    Generalized anxiety disorder- (present on admission)  Assessment & Plan  C/w buspar    Type 1 diabetes mellitus without complication (HCC)- (present on admission)  Assessment & Plan  Better controlled today - long acting insulin similar to home regimen   ISS  Monitor coverage needs     Bipolar 1 disorder (HCC)- (present on admission)  Assessment & Plan  C/w home cariprazine, carbamatapine and lacmital          VTE prophylaxis: SCD

## 2024-02-12 NOTE — THERAPY
"Speech Language Pathology   Cognitive Evaluation     Patient Name: Molly Loera  AGE:  73 y.o., SEX:  female  Medical Record #: [id number]  Date of Service: 2/12/2024      History of Present Illness  74 y/o F admit after a GLF at [facility]. Head CT w/ SAH (left posterior temporal and occipital). She had been admitted to [facility] d/t generalized weakness s/p PE and recent COVID.     PMHx: VTE, HTN, HLD, DM, CKD III    Head CT 2/9: \"IMPRESSION:  1.  Similar appearing left temporal and occipital subarachnoid hemorrhage and/or hemorrhagic contusion.  2.  Chronic appearing right frontal lobe infarct again noted\"    General Information  Vitals  O2 Delivery Device: None - Room Air  Level of Consciousness: Alert, Awake  Patient Behaviors: Agitated (Calmed for eval w/ mod verbal cueing)  Orientation: Oriented x 4  Follows Directives: Yes    Prior Living Situation & Level of Function  Prior Services: Unable To Determine At This Time  Housing / Facility: [address] [place] [address]  Lives with - Patient's Self Care Capacity: Adult Children  Comments:  (Pt lives with her daughter Rut who assists w/ all iADLs at baseline)     Communication: Pt endorses chronic memory decline, for this reason her daughter assists her with most iADLs     Oral Mechanism Evaluation  Dentition: Natural dentition  Facial Symmetry: Equal  Facial Sensation: Equal  Labial Observations: WFL  Lingual Observations: Midline  Motor Speech: WNL  Laryngeal Function Exam  Secretion Management: Adequate  Voice Quality: WFL  Cough: Perceptually WNL    Subjective  Pt seen alert & grossly oriented. She was agitated and yelling out earlier, but calmed w/ mod verbal cueing to participate in the exam.    Communication Domain(s)  Expressive Language: WFL  Receptive Language: WFL  Cognitive-Linguistic:  (Mild-Moderate)  Reading: WFL  Social/Pragmatic: WFL    Assessment  The patient was seen this date for a Speech-Language/Cognitive " evaluation.    Cognistat  Orientation: Average  Attention: Moderate  Comprehension: Average  Repetition: Average  Naming: Average  Memory: Moderate - pt reports this is her baseline  Calculations: Average  Similarities: Average  Judgement: Average  Medication Management Tasks deferred as pt's daughter manages her medications for her.  Pt becoming increasingly irritable w/ evaluation and higher level tasks were deferred d/t same.     Clinical Impressions  Signs of mild-moderate cognitive-linguistic deficits in the areas of attention and short term memory- these appear to be chronic. Pt requires assistance w/ some iADLs, which her daughter typically provides her as this has been an ongoing area of difficulty. No acute ST services appear indicated. Of note, pt also w/ behavioral issues (e.g., yelling out, irritability, perseveration on topics) that may impact her performance on a cognitive-linguistic eval.     NOTE: It is not within the scope of practice of Speech-Language Pathologists to determine patient capacity. Please defer to the physician or psych to complete this assessment.     Recommendations  Supervision Needs Upons Discharge: Intermittent assistance with IADLs (see below)  IADLs: Medication management, Financial management, Appointment management     SLP Treatment Plan  Treatment Plan: None Indicated  SLP Frequency: N/A - Evaluation Only  Estimated Duration: N/A - Evaluation Only    Anticipated Discharge Needs  Discharge Recommendations: Anticipate that the patient will have no further speech therapy needs after discharge from the hospital  Therapy Recommendations Upon DC: Not Indicated    Peggy Pacheco, SOTO

## 2024-02-13 ENCOUNTER — APPOINTMENT (OUTPATIENT)
Dept: CARDIOLOGY | Facility: MEDICAL CENTER | Age: 74
DRG: 085 | End: 2024-02-13
Attending: NURSE PRACTITIONER
Payer: MEDICARE

## 2024-02-13 ENCOUNTER — PATIENT OUTREACH (OUTPATIENT)
Dept: SCHEDULING | Facility: IMAGING CENTER | Age: 74
End: 2024-02-13

## 2024-02-13 PROBLEM — Z86.73 HISTORY OF CVA (CEREBROVASCULAR ACCIDENT): Status: ACTIVE | Noted: 2024-02-13
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
## 2024-02-13 PROBLEM — E87.6 HYPOKALEMIA: Status: ACTIVE | Noted: 2024-02-13

## 2024-02-13 PROBLEM — D64.9 ANEMIA: Status: ACTIVE | Noted: 2024-02-13

## 2024-02-13 LAB
ALBUMIN SERPL BCP-MCNC: 3.1 G/DL (ref 3.2–4.9)
ALBUMIN/GLOB SERPL: 1.1 G/DL
ALP SERPL-CCNC: 141 U/L (ref 30–99)
ALT SERPL-CCNC: 27 U/L (ref 2–50)
ANION GAP SERPL CALC-SCNC: 8 MMOL/L (ref 7–16)
AST SERPL-CCNC: 22 U/L (ref 12–45)
BASOPHILS # BLD AUTO: 1 % (ref 0–1.8)
BASOPHILS # BLD: 0.06 K/UL (ref 0–0.12)
BILIRUB SERPL-MCNC: 0.2 MG/DL (ref 0.1–1.5)
BUN SERPL-MCNC: 12 MG/DL (ref 8–22)
CALCIUM ALBUM COR SERPL-MCNC: 9.6 MG/DL (ref 8.5–10.5)
CALCIUM SERPL-MCNC: 8.9 MG/DL (ref 8.5–10.5)
CHLORIDE SERPL-SCNC: 105 MMOL/L (ref 96–112)
CO2 SERPL-SCNC: 25 MMOL/L (ref 20–33)
CREAT SERPL-MCNC: 0.52 MG/DL (ref 0.5–1.4)
EOSINOPHIL # BLD AUTO: 0.25 K/UL (ref 0–0.51)
EOSINOPHIL NFR BLD: 4.3 % (ref 0–6.9)
ERYTHROCYTE [DISTWIDTH] IN BLOOD BY AUTOMATED COUNT: 53.3 FL (ref 35.9–50)
GFR SERPLBLD CREATININE-BSD FMLA CKD-EPI: 96 ML/MIN/1.73 M 2
GLOBULIN SER CALC-MCNC: 2.7 G/DL (ref 1.9–3.5)
GLUCOSE BLD STRIP.AUTO-MCNC: 138 MG/DL (ref 65–99)
GLUCOSE BLD STRIP.AUTO-MCNC: 264 MG/DL (ref 65–99)
GLUCOSE BLD STRIP.AUTO-MCNC: 277 MG/DL (ref 65–99)
GLUCOSE BLD STRIP.AUTO-MCNC: 295 MG/DL (ref 65–99)
GLUCOSE BLD STRIP.AUTO-MCNC: 68 MG/DL (ref 65–99)
GLUCOSE SERPL-MCNC: 141 MG/DL (ref 65–99)
HCT VFR BLD AUTO: 34.6 % (ref 37–47)
HGB BLD-MCNC: 10.4 G/DL (ref 12–16)
IMM GRANULOCYTES # BLD AUTO: 0.02 K/UL (ref 0–0.11)
IMM GRANULOCYTES NFR BLD AUTO: 0.3 % (ref 0–0.9)
LV EJECT FRACT  99904: 75
LV EJECT FRACT MOD 2C 99903: 70.45
LV EJECT FRACT MOD 4C 99902: 75.12
LYMPHOCYTES # BLD AUTO: 1.81 K/UL (ref 1–4.8)
LYMPHOCYTES NFR BLD: 31 % (ref 22–41)
MCH RBC QN AUTO: 28 PG (ref 27–33)
MCHC RBC AUTO-ENTMCNC: 30.1 G/DL (ref 32.2–35.5)
MCV RBC AUTO: 93.3 FL (ref 81.4–97.8)
MONOCYTES # BLD AUTO: 0.7 K/UL (ref 0–0.85)
MONOCYTES NFR BLD AUTO: 12 % (ref 0–13.4)
NEUTROPHILS # BLD AUTO: 2.99 K/UL (ref 1.82–7.42)
NEUTROPHILS NFR BLD: 51.4 % (ref 44–72)
NRBC # BLD AUTO: 0 K/UL
NRBC BLD-RTO: 0 /100 WBC (ref 0–0.2)
PLATELET # BLD AUTO: 369 K/UL (ref 164–446)
PMV BLD AUTO: 10.8 FL (ref 9–12.9)
POTASSIUM SERPL-SCNC: 4.3 MMOL/L (ref 3.6–5.5)
PROT SERPL-MCNC: 5.8 G/DL (ref 6–8.2)
RBC # BLD AUTO: 3.71 M/UL (ref 4.2–5.4)
SODIUM SERPL-SCNC: 138 MMOL/L (ref 135–145)
WBC # BLD AUTO: 5.8 K/UL (ref 4.8–10.8)

## 2024-02-13 PROCEDURE — 99233 SBSQ HOSP IP/OBS HIGH 50: CPT | Performed by: NURSE PRACTITIONER

## 2024-02-13 PROCEDURE — 770001 HCHG ROOM/CARE - MED/SURG/GYN PRIV*

## 2024-02-13 PROCEDURE — 85025 COMPLETE CBC W/AUTO DIFF WBC: CPT

## 2024-02-13 PROCEDURE — 80053 COMPREHEN METABOLIC PANEL: CPT

## 2024-02-13 PROCEDURE — 700102 HCHG RX REV CODE 250 W/ 637 OVERRIDE(OP): Performed by: SURGERY

## 2024-02-13 PROCEDURE — 700102 HCHG RX REV CODE 250 W/ 637 OVERRIDE(OP)

## 2024-02-13 PROCEDURE — 93306 TTE W/DOPPLER COMPLETE: CPT | Mod: 26 | Performed by: INTERNAL MEDICINE

## 2024-02-13 PROCEDURE — A9270 NON-COVERED ITEM OR SERVICE: HCPCS

## 2024-02-13 PROCEDURE — A9270 NON-COVERED ITEM OR SERVICE: HCPCS | Performed by: SURGERY

## 2024-02-13 PROCEDURE — 93306 TTE W/DOPPLER COMPLETE: CPT

## 2024-02-13 PROCEDURE — 36415 COLL VENOUS BLD VENIPUNCTURE: CPT

## 2024-02-13 PROCEDURE — 99232 SBSQ HOSP IP/OBS MODERATE 35: CPT | Performed by: FAMILY MEDICINE

## 2024-02-13 PROCEDURE — 82962 GLUCOSE BLOOD TEST: CPT | Mod: 91

## 2024-02-13 RX ORDER — ACETAMINOPHEN 500 MG
1000 TABLET ORAL EVERY 6 HOURS PRN
Status: DISCONTINUED | OUTPATIENT
Start: 2024-02-14 | End: 2024-02-14 | Stop reason: HOSPADM

## 2024-02-13 RX ORDER — INSULIN LISPRO 100 [IU]/ML
INJECTION, SOLUTION INTRAVENOUS; SUBCUTANEOUS
Status: ON HOLD | DISCHARGE
Start: 2024-02-13 | End: 2024-02-27

## 2024-02-13 RX ORDER — BISACODYL 10 MG
10 SUPPOSITORY, RECTAL RECTAL
Refills: 0 | Status: ON HOLD
Start: 2024-02-13 | End: 2024-02-27

## 2024-02-13 RX ORDER — ONDANSETRON 4 MG/1
4 TABLET, ORALLY DISINTEGRATING ORAL EVERY 4 HOURS PRN
Qty: 10 TABLET | Refills: 0 | Status: ON HOLD
Start: 2024-02-13 | End: 2024-02-27

## 2024-02-13 RX ORDER — PSEUDOEPHEDRINE HCL 30 MG
100 TABLET ORAL 2 TIMES DAILY
Qty: 60 CAPSULE | Status: ON HOLD
Start: 2024-02-13 | End: 2024-02-27

## 2024-02-13 RX ORDER — ACETAMINOPHEN 500 MG
1000 TABLET ORAL EVERY 6 HOURS
Status: DISCONTINUED | OUTPATIENT
Start: 2024-02-13 | End: 2024-02-14 | Stop reason: HOSPADM

## 2024-02-13 RX ORDER — POLYETHYLENE GLYCOL 3350 17 G/17G
17 POWDER, FOR SOLUTION ORAL 2 TIMES DAILY
Refills: 3 | Status: ON HOLD
Start: 2024-02-13 | End: 2024-02-27

## 2024-02-13 RX ORDER — LEVETIRACETAM 500 MG/1
500 TABLET ORAL EVERY 12 HOURS
Qty: 60 TABLET | Status: ON HOLD
Start: 2024-02-13 | End: 2024-02-27

## 2024-02-13 RX ORDER — INSULIN LISPRO 100 [IU]/ML
INJECTION, SOLUTION INTRAVENOUS; SUBCUTANEOUS
Status: SHIPPED | DISCHARGE
Start: 2024-02-13 | End: 2024-02-13

## 2024-02-13 RX ORDER — AMOXICILLIN 250 MG
1 CAPSULE ORAL NIGHTLY
Qty: 30 TABLET | Refills: 0 | Status: ON HOLD
Start: 2024-02-13 | End: 2024-02-27

## 2024-02-13 RX ORDER — ZIPRASIDONE MESYLATE 20 MG/ML
5 INJECTION, POWDER, LYOPHILIZED, FOR SOLUTION INTRAMUSCULAR EVERY 4 HOURS PRN
Qty: 3 EACH | Status: ON HOLD
Start: 2024-02-13 | End: 2024-02-27

## 2024-02-13 RX ORDER — OXYCODONE HYDROCHLORIDE 5 MG/1
2.5-5 TABLET ORAL EVERY 6 HOURS PRN
Status: ON HOLD
Start: 2024-02-13 | End: 2024-02-27

## 2024-02-13 RX ORDER — ACETAMINOPHEN 500 MG
1000 TABLET ORAL EVERY 6 HOURS PRN
Status: SHIPPED
Start: 2024-02-13

## 2024-02-13 RX ADMIN — CARBAMAZEPINE 200 MG: 200 TABLET ORAL at 17:52

## 2024-02-13 RX ADMIN — ACETAMINOPHEN 1000 MG: 500 TABLET ORAL at 17:52

## 2024-02-13 RX ADMIN — PRIMIDONE 100 MG: 50 TABLET ORAL at 20:28

## 2024-02-13 RX ADMIN — BUSPIRONE HYDROCHLORIDE 5 MG: 10 TABLET ORAL at 04:40

## 2024-02-13 RX ADMIN — INSULIN LISPRO 5 UNITS: 100 INJECTION, SOLUTION INTRAVENOUS; SUBCUTANEOUS at 13:48

## 2024-02-13 RX ADMIN — ATORVASTATIN CALCIUM 80 MG: 80 TABLET, FILM COATED ORAL at 20:27

## 2024-02-13 RX ADMIN — ACETAMINOPHEN 1000 MG: 500 TABLET ORAL at 13:52

## 2024-02-13 RX ADMIN — DOCUSATE SODIUM 100 MG: 100 CAPSULE, LIQUID FILLED ORAL at 17:52

## 2024-02-13 RX ADMIN — INSULIN LISPRO 5 UNITS: 100 INJECTION, SOLUTION INTRAVENOUS; SUBCUTANEOUS at 20:28

## 2024-02-13 RX ADMIN — AMLODIPINE BESYLATE 5 MG: 5 TABLET ORAL at 20:27

## 2024-02-13 RX ADMIN — DOCUSATE SODIUM 100 MG: 100 CAPSULE, LIQUID FILLED ORAL at 04:39

## 2024-02-13 RX ADMIN — CARIPRAZINE 6 MG: 3 CAPSULE, GELATIN COATED ORAL at 20:27

## 2024-02-13 RX ADMIN — ACETAMINOPHEN 1000 MG: 500 TABLET ORAL at 04:50

## 2024-02-13 RX ADMIN — LEVETIRACETAM 500 MG: 500 TABLET, FILM COATED ORAL at 17:52

## 2024-02-13 RX ADMIN — LEVOTHYROXINE SODIUM 25 MCG: 0.03 TABLET ORAL at 04:39

## 2024-02-13 RX ADMIN — CARBAMAZEPINE 200 MG: 200 TABLET ORAL at 04:40

## 2024-02-13 RX ADMIN — INSULIN LISPRO 5 UNITS: 100 INJECTION, SOLUTION INTRAVENOUS; SUBCUTANEOUS at 17:48

## 2024-02-13 RX ADMIN — BUSPIRONE HYDROCHLORIDE 5 MG: 10 TABLET ORAL at 17:52

## 2024-02-13 RX ADMIN — LAMOTRIGINE 100 MG: 100 TABLET ORAL at 04:46

## 2024-02-13 RX ADMIN — TIMOLOL MALEATE 1 DROP: 5 SOLUTION OPHTHALMIC at 20:28

## 2024-02-13 RX ADMIN — LEVETIRACETAM 500 MG: 500 TABLET, FILM COATED ORAL at 04:39

## 2024-02-13 RX ADMIN — LOSARTAN POTASSIUM 100 MG: 50 TABLET, FILM COATED ORAL at 04:40

## 2024-02-13 ASSESSMENT — ENCOUNTER SYMPTOMS
DIZZINESS: 0
RESPIRATORY NEGATIVE: 1
DIARRHEA: 0
CHILLS: 0
FLANK PAIN: 0
MYALGIAS: 1
HEARTBURN: 0
PALPITATIONS: 0
WEAKNESS: 1
SORE THROAT: 0
BLURRED VISION: 0
WHEEZING: 0
COUGH: 0
NECK PAIN: 0
VOMITING: 0
SPEECH CHANGE: 0
GASTROINTESTINAL NEGATIVE: 1
ABDOMINAL PAIN: 0
FEVER: 0
SENSORY CHANGE: 0
BACK PAIN: 0
NERVOUS/ANXIOUS: 1
FOCAL WEAKNESS: 0
ROS GI COMMENTS: BM 2/12
HEADACHES: 0
NAUSEA: 0
MYALGIAS: 0
SHORTNESS OF BREATH: 0

## 2024-02-13 ASSESSMENT — PAIN DESCRIPTION - PAIN TYPE
TYPE: ACUTE PAIN

## 2024-02-13 NOTE — CARE PLAN
The patient is Stable - Low risk of patient condition declining or worsening    Shift Goals  Clinical Goals: safety/ Q4 neuro  Patient Goals: sleep  Family Goals: augustus    Progress made toward(s) clinical / shift goals:    Problem: Knowledge Deficit - Standard  Goal: Patient and family/care givers will demonstrate understanding of plan of care, disease process/condition, diagnostic tests and medications  Description: Target End Date:  1-3 days or as soon as patient condition allows    Document in Patient Education    1.  Patient and family/caregiver oriented to unit, equipment, visitation policy and means for communicating concern  2.  Complete/review Learning Assessment  3.  Assess knowledge level of disease process/condition, treatment plan, diagnostic tests and medications  4.  Explain disease process/condition, treatment plan, diagnostic tests and medications  2/12/2024 1705 by Lavinia Marsh R.N.  Outcome: Progressing  2/12/2024 1704 by Lavinia Marsh R.N.  Outcome: Progressing     Problem: Skin Integrity  Goal: Skin integrity is maintained or improved  Description: Target End Date:  Prior to discharge or change in level of care    Document interventions on Skin Risk/Zach flowsheet groups and corresponding LDA    1.  Assess and monitor skin integrity, appearance and/or temperature  2.  Assess risk factors for impaired skin integrity and/or pressures ulcers  3.  Implement precautions to protect skin integrity in collaboration with interdisciplinary team  4.  Implement pressure ulcer prevention protocol if at risk for skin breakdown  5.  Confirm wound care consult if at risk for skin breakdown  6.  Ensure patient use of pressure relieving devices  (Low air loss bed, waffle overlay, heel protectors, ROHO cushion, etc)  2/12/2024 1705 by Lavinia Marsh R.N.  Outcome: Progressing  2/12/2024 1704 by Lavinia Marsh R.N.  Outcome: Progressing     Problem: Fall Risk  Goal: Patient will remain free from  falls  Description: Target End Date:  Prior to discharge or change in level of care    Document interventions on the Lloyd [facility] Luis Fall Risk Assessment    1.  Assess for fall risk factors  2.  Implement fall precautions  2/12/2024 1705 by Lavinia Marsh R.N.  Outcome: Progressing  2/12/2024 1704 by Lavinia Marsh R.N.  Outcome: Progressing       Patient is not progressing towards the following goals:

## 2024-02-13 NOTE — DISCHARGE SUMMARY
Trauma Discharge Summary    DATE OF ADMISSION: 2/9/2024    DATE OF DISCHARGE: 2/14/2024    LENGTH OF STAY: 5 days    ATTENDING PHYSICIAN: Yaniv Thornton M.D.    CONSULTING PHYSICIAN:   1.  Roldan Simental MD, neurosurgery  2.  Tae De MD, geriatrics    DISCHARGE DIAGNOSIS:  Principal Problem:    Trauma  Active Problems:    Type 1 diabetes mellitus without complication (HCC)    Subarachnoid hemorrhage without loss of consciousness (HCC)    Seizure disorder (HCC)    Current use of long term anticoagulation    Discharge planning issues    Bipolar 1 disorder (HCC)    Essential hypertension    Dyslipidemia    Malignant neoplasm of left female breast (HCC)      Overview: Diagnosed 06/2017,  treated with left masectomy, chemo, and radiation.     Stage 3 chronic kidney disease (HCC)    Contraindication to deep vein thrombosis (DVT) prophylaxis    Encounter for geriatric assessment    Generalized anxiety disorder    PE (pulmonary thromboembolism) (HCC)    Other specified hypothyroidism  Resolved Problems:    * No resolved hospital problems. *      PROCEDURES: None    HISTORY OF PRESENT ILLNESS: The patient is a 73 y.o. female who was reportedly injured in a mechanical ground-level fall.  She was transferred to [facility] in [place], [place].    HOSPITAL COURSE: The patient was triaged as a TBI/partial trauma activation. The patient was transported to intensive care unit.  She remained in the intensive care unit for short period of time.  She underwent serial neurologic exams.  She had a repeat CT which was stable.  She was previously on apixaban and aspirin.  This was not reversed.  We are currently holding any anticoagulation.  She apparently has a history of a pulmonary embolism post-COVID.  The CT chest abdomen pelvis completed on January 30 was obtained from [facility] and notes a right lower lobe pulmonary embolism with small right pleural effusion.  Neurosurgery would like to  "hold any anticoagulation for 10 days.  She has been on room air.  She has not had any difficulty breathing.  An echocardiogram did not reveal right heart strain.  With that said neurosurgery would like to wait 10 days before resuming any anticoagulation.    On day of discharge she is tolerating a regular diet.  Again she is on room air.  She has been cooperative and mostly pleasant.  She has not been tachycardic.  She will be transferred to [facility] rehab in current condition.      HOSPITAL PROBLEM LIST:  * Trauma- (present on admission)  Assessment & Plan  Mechanical ground level fall. Intracranial hemorrhage.  TBI Alert. The patient was upgraded to a Trauma Green activation for CT imaging demonstrating intracranial hemorrhage.  Yaniv Thornton MD. Trauma Surgery.    Discharge planning issues- (present on admission)  Assessment & Plan  Recent stay at [facility] for behavioral disturbances.  Became ill - went to [facility] - Covid and PE dx.  Transferred to [facility] for deconditioning secondary to above.  Fall at [place] and transferred to [facility] for trauma.  2/11 Physiatry and SNF referrals placed.  2/12 Medically cleared for transfer.    PE (pulmonary thromboembolism) (HCC)- (present on admission)  Assessment & Plan  2/13 CT CAP from [facility] dated 1/30 notes \"Right lower lobe pulmonary embolus with small right effusion\"  - She is room air, heart rate regular.   - Echo Normal right ventricular size and systolic function. No evidence of right heart strain.  Hold anticoag x 10 days.     Current use of long term anticoagulation- (present on admission)  Assessment & Plan  Diagnosed with PE 2 weeks ago. Placed on Apixaban and Aspirin.  No need to reverse per Neurosurgery.  Hold anticoagulation and discuss with neurosurgery before resuming.  2/13 CT CAP form [facility] dated 1/30 notes \"Right lower lobe pulmonary embolus with small right effusion\"  - She is room air, heart rate regular.  - Echo Normal right ventricular size and " systolic function. No evidence of right heart strain.  Hold anticoag x 10 days.     Subarachnoid hemorrhage without loss of consciousness (HCC)- (present on admission)  Assessment & Plan  Isolated closed head injury.  Trauma Brain Injury Guidelines implemented.   BIG 3. Patient takes Apixaban and aspirin for PE.  Repeat head CT stable.  Non-operative management.  Speech Language Pathology cognitive evaluation.  Roldan Simental MD. Neurosurgeon. [facility].    Type 1 diabetes mellitus without complication (HCC)- (present on admission)  Assessment & Plan  Chronic condition treated with insulin glargine.  Admission glucose 682.  Beta-hydroxybutyric acid 0.61.  Initiating sliding scale and Lantus 12 units.  2/11 Dosing adjusted  Monitor closely.    Encounter for geriatric assessment- (present on admission)  Assessment & Plan  2/10 The patient is 75 years old or older and a geriatrics consult is indicated  Tae Radford MD, Geriatric Hospitalist.    Contraindication to deep vein thrombosis (DVT) prophylaxis- (present on admission)  Assessment & Plan  VTE prophylaxis initially contraindicated secondary to elevated bleeding risk.  2/11 Trauma screening bilateral lower extremity venous duplex negative for above knee DVT.    Malignant neoplasm of left female breast (HCC)- (present on admission)  Assessment & Plan  Chronic condition treated with anastrozole (Arimidex).  Holding maintenance medication during acute traumatic illness.    Dyslipidemia- (present on admission)  Assessment & Plan  Chronic condition treated with atorvastatin (Lipitor).  Resumed maintenance medication on admission.    Essential hypertension- (present on admission)  Assessment & Plan  Chronic condition treated with amlodipine (Norvasc) and losartan (Cozaar).  Resumed maintenance medication on admission.  Goal SBP < 160.    Bipolar 1 disorder (HCC)- (present on admission)  Assessment & Plan  Chronic condition treated with cariprazine (Vrylar)  and buspirone (Buspar).  Resumed maintenance medication on admission.    Other specified hypothyroidism- (present on admission)  Assessment & Plan  Chronic condition treated with levothyroxine.  Resumed maintenance medication on admission.      DISPOSITION: Discharged to rehab on 2/14/2024. The patient was counseled and questions were answered. Specifically, signs and symptoms of infection, respiratory decompensation, change in condition or worsening condition and persistent or worsening pain were discussed and the patient agrees to seek medical attention if any of these develop.    DISCHARGE MEDICATIONS:  The patients controlled substance history was reviewed and a controlled substance use informed consent (if applicable) was provided by [facility] and the patient has been prescribed.     Medication List        START taking these medications        Instructions   acetaminophen 500 MG Tabs  Commonly known as: Tylenol   Take 2 Tablets by mouth every 6 hours as needed for Mild Pain or Moderate Pain.  Dose: 1,000 mg     levETIRAcetam 500 MG Tabs  Commonly known as: Keppra   Take 1 Tablet by mouth every 12 hours for 3 days.  Dose: 500 mg     magnesium hydroxide 400 MG/5ML Susp  Start taking on: February 14, 2024  Commonly known as: Milk Of Magnesia   Take 30 mL by mouth every day.  Dose: 30 mL     ondansetron 4 MG Tbdp  Commonly known as: Zofran ODT   Take 1 Tablet by mouth every four hours as needed for Nausea/Vomiting (give PO if IV route is unavailable.).  Dose: 4 mg     oxyCODONE immediate-release 5 MG Tabs  Commonly known as: Roxicodone   Take 0.5-1 Tablets by mouth every 6 hours as needed for Severe Pain for up to 7 days. Indications: Acute Pain  Dose: 2.5-5 mg     polyethylene glycol/lytes Pack  Commonly known as: Miralax   Take 1 Packet by mouth 2 times a day.  Dose: 17 g     senna-docusate 8.6-50 MG Tabs  Commonly known as: Pericolace Or Senokot S   Take 1 Tablet by mouth every  evening.  Dose: 1 Tablet     ziprasidone 20 MG Solr  Commonly known as: Geodon   Inject 5 mg into the shoulder, thigh, or buttocks every four hours as needed (aggresive or combative behavior).  Dose: 5 mg            CONTINUE taking these medications        Instructions   amLODIPine 5 MG Tabs  Commonly known as: Norvasc   Take 5 mg by mouth every evening. Hold for SBP <110  Dose: 5 mg     anastrozole 1 MG Tabs  Commonly known as: Arimidex   Take 1 Tablet by mouth every evening. Needs to establish with cancer specialist. Last refill  Dose: 1 mg     Aspirin Low Dose 81 MG EC tablet  Generic drug: aspirin   TAKE 1 TABLET BY MOUTH EVERY DAY  Dose: 81 mg     atorvastatin 80 MG tablet  Commonly known as: Lipitor   Take 80 mg by mouth every evening.  Dose: 80 mg     busPIRone 5 MG tablet  Commonly known as: Buspar   Take 5 mg by mouth 2 times a day.  Dose: 5 mg     carBAMazepine 200 MG Tabs  Commonly known as: TEGretol   Take 200 mg by mouth 2 times a day.  Dose: 200 mg     Eliquis 5mg Tabs  Generic drug: apixaban   Take 5 mg by mouth 2 times a day.  Dose: 5 mg     insulin glargine 100 UNIT/ML Soln  Commonly known as: Lantus   Inject 10-12 Units under the skin 2 times a day. 10 units = in the AM  12 units = in the PM  Dose: 10-12 Units     lamoTRIgine 100 MG Tabs  Commonly known as: LaMICtal   Take 100 mg by mouth every day.  Dose: 100 mg     levothyroxine 25 MCG Tabs  Commonly known as: Synthroid   Take 25 mcg by mouth every morning on an empty stomach.  Dose: 25 mcg     losartan 100 MG Tabs  Commonly known as: Cozaar   Take 100 mg by mouth every day. Hold for SBP < 110  Dose: 100 mg     primidone 50 MG Tabs  Commonly known as: Mysoline   Take 100 mg by mouth at bedtime. 100 mg = 2 tabs  Dose: 100 mg     timolol 0.5 % Soln  Commonly known as: Timoptic   Administer 1 Drop into both eyes at bedtime.  Dose: 1 Drop     Vraylar 6 MG Caps  Generic drug: Cariprazine HCl   Take 6 mg by mouth at bedtime.  Dose: 6 mg               ACTIVITY:  AS tolerated    WOUND CARE:  Not applicable     DIET:  Orders Placed This Encounter   Procedures    Diet Order Diet: Consistent CHO (Diabetic)     Standing Status:   Standing     Number of Occurrences:   1     Order Specific Question:   Diet:     Answer:   Consistent CHO (Diabetic) [4]       FOLLOW UP:  [facility]  [id number]  [place] [id number] [phone]  Follow up  as needed in follow up clinic    Roldan Simental M.D.  [address]  [place] [id number] [phone]    Follow up  follow up for head bleed and clearance for resumption of anitocagulation if needed      TIME SPENT ON DISCHARGE: 35 minutes      ____________________________________________  YUDY Goodman    DD: 2/13/2024 12:36 PM

## 2024-02-13 NOTE — PROGRESS NOTES
"  Trauma / Surgical Daily Progress Note    Date of Service  2/13/2024    Chief Complaint  73 y.o. female admitted 2/9/2024 as a TBI/trauma green - [facility] - non op SAH on apixaban and ASA    Interval Events  No interval events.  Labs unremarkable - further as clinically indicated.  Much improved BS control   Room air  Heart rate 67     - CT CAP 1/30 from [facility] retrieved and not revealing for \"clot burden\"  - Echo Normal right ventricular size and systolic function. No evidence of right heart strain.  - Medically cleared for post acute services  - Tentative xfer to rehab 2/14 @ 1030    Review of Systems  Review of Systems   Constitutional:  Positive for malaise/fatigue.   HENT: Negative.     Respiratory: Negative.     Gastrointestinal: Negative.         BM 2/12   Genitourinary:         Voiding   Musculoskeletal:  Positive for myalgias.   Skin: Negative.    Neurological:  Negative for headaches (denies).   All other systems reviewed and are negative.       Vital Signs  Temp:  [36.3 °C (97.3 °F)-36.7 °C (98 °F)] 36.4 °C (97.5 °F)  Pulse:  [64-75] 67  Resp:  [16-18] 17  BP: ()/(54-66) 128/66  SpO2:  [93 %-99 %] 97 %    Physical Exam  Physical Exam  Vitals and nursing note reviewed.   Constitutional:       Comments: Pleasantish, awake, asking for breakfast.   HENT:      Head:      Comments: Bilateral periorbital bruising   Nose bruising with slight abrasion     Right Ear: External ear normal.      Left Ear: External ear normal.      Mouth/Throat:      Mouth: Mucous membranes are moist.   Eyes:      General:         Right eye: No discharge.         Left eye: No discharge.      Extraocular Movements: Extraocular movements intact.   Pulmonary:      Effort: Pulmonary effort is normal. No respiratory distress.   Musculoskeletal:      Cervical back: Normal range of motion.   Skin:     General: Skin is warm.      Capillary Refill: Capillary refill takes 2 to 3 seconds.      Coloration: Skin is pale.      Findings: Abrasion " "and bruising present.   Neurological:      Comments: A&O x 2 - confused to year.    Psychiatric:         Attention and Perception: Attention normal.         Mood and Affect: Mood normal. Affect is flat.         Behavior: Behavior is slowed. Behavior is cooperative.       Core Measures & Quality Metrics  Labs reviewed and Medications reviewed  Mcelroy catheter: No Mcelroy      DVT Prophylaxis: Contraindicated - High bleeding risk  DVT prophylaxis - mechanical: SCDs  Ulcer prophylaxis: Not indicated    Assessed for rehab: Patient was assess for and/or received rehabilitation services during this hospitalization    RAP Score Total: 10    CAGE Results: not completed Blood Alcohol>0.08: not completed       Assessment/Plan  * Trauma- (present on admission)  Assessment & Plan  Mechanical ground level fall. Intracranial hemorrhage.  TBI Alert. The patient was upgraded to a Trauma Green activation for CT imaging demonstrating intracranial hemorrhage.  Yaniv Thornton MD. Trauma Surgery.    Discharge planning issues- (present on admission)  Assessment & Plan  Recent stay at [facility] for behavioral disturbances.  Became ill - went to [facility] - Covid and PE dx.  Transferred to [facility] for deconditioning secondary to above.  Fall at [place] and transferred to [facility] for trauma.  2/11 Physiatry and SNF referrals placed.  2/12 Medically cleared for transfer.    Current use of long term anticoagulation- (present on admission)  Assessment & Plan  Diagnosed with PE 2 weeks ago. Placed on Apixaban and Aspirin.  No need to reverse per Neurosurgery.  Hold anticoagulation and discuss with neurosurgery before resuming.  2/13 CT CAP form [facility] dated 1/30 notes \"Right lower lobe pulmonary embolus with small right effusion\"  - She is room air, heart rate regular.   - Echo pending  Hold anticoag x 10 days.     Seizure disorder (HCC)- (present on admission)  Assessment & Plan  Chronic condition treated with primidone (Mysoline), " lamotrlgine (Lamictal) and carbamzepine (Tegretol).  Resumed maintenance medication on admission.  Keppra 500 mg BID per neurosurgery.    Subarachnoid hemorrhage without loss of consciousness (HCC)- (present on admission)  Assessment & Plan  Isolated closed head injury.  Trauma Brain Injury Guidelines implemented.   BIG 3. Patient takes Apixaban and aspirin for PE.  Repeat head CT stable.  Non-operative management.  Speech Language Pathology cognitive evaluation.  Roldan Simental MD. Neurosurgeon. [facility].    Type 1 diabetes mellitus without complication (HCC)- (present on admission)  Assessment & Plan  Chronic condition treated with insulin glargine.  Admission glucose 682.  Beta-hydroxybutyric acid 0.61.  Initiating sliding scale and Lantus 12 units.  2/11 Dosing adjusted  Monitor closely.    Encounter for geriatric assessment- (present on admission)  Assessment & Plan  2/10 The patient is 75 years old or older and a geriatrics consult is indicated  Tae Radford MD, Geriatric Hospitalist.    Contraindication to deep vein thrombosis (DVT) prophylaxis- (present on admission)  Assessment & Plan  VTE prophylaxis initially contraindicated secondary to elevated bleeding risk.  2/11 Trauma screening bilateral lower extremity venous duplex negative for above knee DVT.    Malignant neoplasm of left female breast (HCC)- (present on admission)  Assessment & Plan  Chronic condition treated with anastrozole (Arimidex).  Holding maintenance medication during acute traumatic illness.    Dyslipidemia- (present on admission)  Assessment & Plan  Chronic condition treated with atorvastatin (Lipitor).  Resumed maintenance medication on admission.    Essential hypertension- (present on admission)  Assessment & Plan  Chronic condition treated with amlodipine (Norvasc) and losartan (Cozaar).  Resumed maintenance medication on admission.  Goal SBP < 160.    Bipolar 1 disorder (HCC)- (present on admission)  Assessment &  "Plan  Chronic condition treated with cariprazine (Vrylar) and buspirone (Buspar).  Resumed maintenance medication on admission.    PE (pulmonary thromboembolism) (HCC)- (present on admission)  Assessment & Plan  2/13 CT CAP from [facility] dated 1/30 notes \"Right lower lobe pulmonary embolus with small right effusion\"  - She is room air, heart rate regular.   - Echo pending  Hold anticoag x 10 days.     Other specified hypothyroidism- (present on admission)  Assessment & Plan  Chronic condition treated with levothyroxine.  Resumed maintenance medication on admission.    Discussed patient condition with RN, Patient, and Dr. Thornton  "

## 2024-02-13 NOTE — DISCHARGE PLANNING
Msg placed to KATARINA White-verifying medical clearance.  Spoke with Rut, daughter regarding [facility].  She is agreeable with an admission.  They live in a 1LV home with 1ST to enter.  Rut works F/T days.  She will speak with her Employer about working from home vs FMLA.  Will discuss this case with Administration.      0949-KATARINA White has medically cleared.     1213-Received a call from Rut, daughter.  Nohelia will have 24/7 physical assistance upon D/C.  Dr. Gregory has accepted.  Unfortunately, I no longer have any beds available today.  Therefore, I set transport via GMT for tomorrow between 2177-0238. Msg placed to KATARINA White, Erwin CM & Telma WHITEN.  Rut, daughter is aware. I do apologize for the delay.

## 2024-02-13 NOTE — ASSESSMENT & PLAN NOTE
"2/13 CT CAP from [facility] dated 1/30 notes \"Right lower lobe pulmonary embolus with small right effusion\"  - She is room air, heart rate regular.   - Echo Normal right ventricular size and systolic function. No evidence of right heart strain.  Hold anticoag x 10 days.   "

## 2024-02-13 NOTE — DISCHARGE PLANNING
0961  DPA inquired f/u on referral for [facility], per issa, does pt have any behavioral issues? DPA asked RN CAMILLE via teams. Will call issa back soon

## 2024-02-13 NOTE — PREADMISSION SCREENING NOTE
Pre-Admission Screening Form    Patient Information:   Name: Molly Loera     MRN: [id number]       : 1950      Age: 73 y.o.   Gender: female      Race: White [7]       Marital Status:  [5]  Family Contact: Rut Loera        Relationship: Daughter [2]  Home Phone: [phone]           Cell Phone: [phone]  Advanced Directives: None  Code Status:  FULL  Current Attending Provider: Yaniv Thornton M.D.  Referring Physician: KATARINA Dang  Physiatrist Consult: Dr. Gregory   Referral Date: 24  Primary Payor Source:  MEDICARE  Secondary Payor Source:  [facility]    Medical Information:   Date of Admission to Acute Care [place]2024  Room Number: S190/01  Rehabilitation Diagnosis: 0002.22 - Brain Dysfunction: Traumatic, Closed Injury  Immunization History   Administered Date(s) Administered    Influenza Vaccine Adult HD 2019, 10/14/2021    Influenza Vaccine Quad Inj (Pf) 10/09/2020    Influenza, Unspecified - HISTORICAL DATA 10/30/2022    MODERNA BIVALENT BOOSTER SARS-COV-2 VACCINE (6+) 2022    MODERNA SARS-COV-2 VACCINE (12+) 2021, 2021, 10/28/2021    Zoster Vaccine Recombinant (RZV) (SHINGRIX) 2022, 2022     Allergies   Allergen Reactions    Depakote [Valproic Acid] Unspecified     Per MAR     Past Medical History:   Diagnosis Date    Anxiety     Bipolar 1 disorder (HCC)     Cancer (HCC)     left breast    Depression     Diabetes (HCC)     Kidney disease      History reviewed. No pertinent surgical history.    History Leading to Admission, Conditions that Caused the Need for Rehab (CMS):     Dr. Thornton H&P:  HISTORY OF PRESENT ILLNESS: The patient is a 73 year-old White elderly woman who was injured in a fall. The patient suffered a mechanical ground-level fall. She had no loss of consciousness. The patient is chronically anticoagulated with apixaban (Eliquis). Her last dose of medication was administered yesterday.  The patient takes acetylsalicylic acid  (Aspirin) antiplatelet therapy. Her last dose of medication was taken yesterday.   She complains of a mild headache.     ASSESSMENT AND PLAN:      Subarachnoid hemorrhage without loss of consciousness (HCC)  Isolated closed head injury.  Trauma Brain Injury Guidelines implemented.   BIG 3. Patient takes Apixaban and aspirin for PE.  Interval Head CT ordered for 2300  Non-operative management.  Post traumatic pharmacologic seizure prophylaxis for 1 week.  Speech Language Pathology cognitive evaluation.  Roldan Simental MD. Neurosurgeon. [facility].     Trauma  Mechanical ground level fall. Intracranial hemorrhage.  TBI Alert. The patient was upgraded to a Trauma Green activation for CT imaging demonstrating intracranial hemorrhage.  Yaniv Thornton MD. Trauma Surgery.     Type 1 diabetes mellitus without complication (HCC)  Chronic condition treated with insulin glargine.  Holding home dose since patient is NPO..  Admission glucose 682  Beta-hydroxybutyric acid 0.61  Initiating sliding scale and Lantus 12 units.     Bipolar 1 disorder (HCC)  Chronic condition treated with cariprazine (Vrylar) and buspirone (Buspar).  Resumed maintenance medication on admission.     Essential hypertension  Chronic condition treated with amlodipine (Norvasc) and losartan (Cozaar).  Resumed maintenance medication on admission.     Dyslipidemia  Chronic condition treated with atorvastatin (Lipitor).  Resumed maintenance medication on admission.     Malignant neoplasm of left female breast (HCC)  Chronic condition treated with anastrozole (Arimidex).  Holding maintenance medication during acute traumatic illness.     Other specified hypothyroidism  Chronic condition treated with levothyroxine.  Resumed maintenance medication on admission.     Seizure disorder (HCC)  Chronic condition treated with primidone (Mysoline), lamotrlgine (Lamictal) and carbamzepine (Tegretol).  Resumed maintenance medication on admission.      DISPOSITION: Trauma ICU.  Interval Trauma tertiary survey    Dr. Simental (Surgery Neurosurgery) recommendations:  Assessment and plan  At this time the patient has a stable exam she also is a stable CT scan with no concern for expansion of the blood clots.  It is unclear her extent of PE and whether or not she needs to be on Eliquis her aspirin therapy was a preventative measure and has not required other than for heart health.  From our standpoint she should have Eliquis held for total of 10 days from the injury and the aspirin should be reevaluated as a potential medication for the patient given her falls risk and her poor health.  She is okay for every 4 hours neurochecks given the stable CT head and exam this morning  Medical management per medicine and ICU  If the patient has significant PE burden or there is concern for progressive health deterioration without the Eliquis then we can have a discussion with the medicine team as to whether or not it would be appropriate to start today versus in a more abbreviated hold time please call us if there are questions in regards to this otherwise we would have the patient restarted 10 days given that she is not requiring any oxygen does not appear to be affected by the most recent episode.    KATARINA White progress note:  Date of Service  2/12/2024     Chief Complaint  73 y.o. female admitted 2/9/2024  as a TBI/trauma green - MGLF - non op SAH on apixaban and ASA      Interval Events  Improved blood sugar control  Room air  VSS  Potassium  3.5 - replaced      - Physiatry consult placed   - She was previously at [facility] for overall weakness s/p PE and recent COVID - she was at [facility] prior to that for behavioral issues - SNF referral placed as well   - Geodon PRN - avoid Haldol in head bleeds.      - Medically cleared for post acute placement - Discussed with CAMILLE Connor      Review of Systems  Review of Systems   Constitutional:  Positive for  malaise/fatigue.   HENT: Negative.     Respiratory: Negative.     Gastrointestinal: Negative.    Genitourinary:         Voiding   Musculoskeletal:  Positive for myalgias.   Skin: Negative.    Neurological:  Positive for headaches (feels better today per pt).   All other systems reviewed and are negative.     Vital Signs  Temp:  [36.2 °C (97.1 °F)-36.4 °C (97.6 °F)] 36.4 °C (97.5 °F)  Pulse:  [75-89] 78  Resp:  [17-18] 18  BP: ()/(48-70) 130/70  SpO2:  [90 %-99 %] 99 %     Physical Exam  Physical Exam  Vitals and nursing note reviewed.   Constitutional:       Comments: More participatory today but still prefers to lay on side with eyes closed. Pleasant this am.   HENT:      Head:      Comments: Bilateral periorbital bruising   Nose bruising with slight abrasion     Right Ear: External ear normal.      Left Ear: External ear normal.      Mouth/Throat:      Mouth: Mucous membranes are moist.   Eyes:      General:         Right eye: No discharge.         Left eye: No discharge.      Extraocular Movements: Extraocular movements intact.   Pulmonary:      Effort: Pulmonary effort is normal. No respiratory distress.   Musculoskeletal:      Cervical back: Normal range of motion.   Skin:     General: Skin is warm.      Capillary Refill: Capillary refill takes 2 to 3 seconds.      Coloration: Skin is pale.      Findings: Abrasion and bruising present.   Neurological:      Mental Status: She is oriented to person, place, and time.   Psychiatric:         Attention and Perception: Attention normal.         Mood and Affect: Mood normal. Affect is flat.         Behavior: Behavior is slowed. Behavior is cooperative.      Core Measures & Quality Metrics  Labs reviewed, Medications reviewed and Radiology images reviewed  Mcelroy catheter: No Mcelroy     DVT Prophylaxis: Contraindicated - High bleeding risk  DVT prophylaxis - mechanical: SCDs  Ulcer prophylaxis: Not indicated     Assessed for rehab: Patient was assess for and/or  received rehabilitation services during this hospitalization     RAP Score Total: 10     CAGE Results: not completed Blood Alcohol>0.08: not completed      Assessment/Plan  * Trauma- (present on admission)  Assessment & Plan  Mechanical ground level fall. Intracranial hemorrhage.  TBI Alert. The patient was upgraded to a Trauma Green activation for CT imaging demonstrating intracranial hemorrhage.  Yaniv Thornton MD. Trauma Surgery.     Discharge planning issues- (present on admission)  Assessment & Plan  Recent stay at [facility] for behavioral disturbances.  Became ill - went to [facility] - Covid and PE dx.  Transferred to [facility] for deconditioning secondary to above.  Fall at [place] and transferred to [facility] for trauma.  2/11 Physiatry and SNF referrals placed.     Current use of long term anticoagulation- (present on admission)  Assessment & Plan  Diagnosed with PE 2 weeks ago. Placed on Apixaban and Aspirin.  No need to reverse per Neurosurgery.  Hold anticoagulation and discuss with neurosurgery before resuming.  2/10 Requested imaging reads from [facility].     Seizure disorder (HCC)- (present on admission)  Assessment & Plan  Chronic condition treated with primidone (Mysoline), lamotrlgine (Lamictal) and carbamzepine (Tegretol).  Resumed maintenance medication on admission.  Keppra 500 mg BID per neurosurgery.     Subarachnoid hemorrhage without loss of consciousness (HCC)- (present on admission)  Assessment & Plan  Isolated closed head injury.  Trauma Brain Injury Guidelines implemented.   BIG 3. Patient takes Apixaban and aspirin for PE.  Repeat head CT stable.  Non-operative management.  Speech Language Pathology cognitive evaluation.  Roldan Simental MD. Neurosurgeon. [facility].     Type 1 diabetes mellitus without complication (HCC)- (present on admission)  Assessment & Plan  Chronic condition treated with insulin glargine.  Admission glucose 682.  Beta-hydroxybutyric acid  0.61.  Initiating sliding scale and Lantus 12 units.  2/11 Dosing adjusted  Monitor closely.     Encounter for geriatric assessment- (present on admission)  Assessment & Plan  2/10 The patient is 75 years old or older and a geriatrics consult is indicated  Tae Radford MD, Geriatric Hospitalist.     Contraindication to deep vein thrombosis (DVT) prophylaxis- (present on admission)  Assessment & Plan  VTE prophylaxis initially contraindicated secondary to elevated bleeding risk.  2/11 Trauma screening bilateral lower extremity venous duplex negative for above knee DVT.     Malignant neoplasm of left female breast (HCC)- (present on admission)  Assessment & Plan  Chronic condition treated with anastrozole (Arimidex).  Holding maintenance medication during acute traumatic illness.     Dyslipidemia- (present on admission)  Assessment & Plan  Chronic condition treated with atorvastatin (Lipitor).  Resumed maintenance medication on admission.     Essential hypertension- (present on admission)  Assessment & Plan  Chronic condition treated with amlodipine (Norvasc) and losartan (Cozaar).  Resumed maintenance medication on admission.  Goal SBP < 160.     Bipolar 1 disorder (HCC)- (present on admission)  Assessment & Plan  Chronic condition treated with cariprazine (Vrylar) and buspirone (Buspar).  Resumed maintenance medication on admission.     Other specified hypothyroidism- (present on admission)  Assessment & Plan  Chronic condition treated with levothyroxine.  Resumed maintenance medication on admission.    2/9/2024 5:12 PM     HISTORY/REASON FOR EXAM:  Ground level fall + anticoagulants or bleeding disorder.  Multiple recent falls.  Facial injury.     TECHNIQUE/EXAM DESCRIPTION AND NUMBER OF VIEWS:  CT of the head without contrast.     The study was performed on a helical multidetector CT scanner. Contiguous 2.5 mm axial sections were obtained from the skull base through the vertex.     Up to date radiation dose  reduction adjustments have been utilized to meet ALARA standards for radiation dose reduction.     COMPARISON:  None available     FINDINGS:  The lateral ventricles are enlarged.  Cortical sulci are enlarged.  No significant mass effect or midline shift.  Basal cisterns are patent.  Focal RIGHT frontal encephalomalacia.  LEFT posterior temporal and occipital subarachnoid hemorrhage.  Calvaria are intact.  Visualized orbits are unremarkable.  Visualized mastoid air cells are clear.  Visualized paranasal sinuses are unremarkable.  LEFT forehead scalp swelling.     IMPRESSION:     1.  LEFT posterior temporal and occipital subarachnoid hemorrhage.  2.  No significant mass effect or midline shift.  3.  Small chronic RIGHT frontal infarct.  4.  Diffuse atrophy.    Co-morbidities:  See PMH  Potential Risk - Complications: Aphasia, Cognitive Impairment, Contractures, Deep Vein Thrombosis, Dysphagia, Incontinence, Malnutrition, Pain, Perceptual Impairment, Pneumonia, Pressure Ulcer, Seizures, and Urinary Tract Infection  Level of Risk: High    Ongoing Medical Management Needed (Medical/Nursing Needs):   Patient Active Problem List    Diagnosis Date Noted    Discharge planning issues 02/11/2024    Current use of long term anticoagulation 02/10/2024    Contraindication to deep vein thrombosis (DVT) prophylaxis 02/10/2024    Encounter for geriatric assessment 02/10/2024    PE (pulmonary thromboembolism) (HCC) 02/10/2024    Subarachnoid hemorrhage without loss of consciousness (HCC) 02/09/2024    Trauma 02/09/2024    Seizure disorder (HCC) 02/09/2024    Other specified hypothyroidism 11/15/2022    Osteopenia of left thigh 11/15/2022    Elevated alkaline phosphatase level 11/15/2022    Malignant neoplasm of left female breast (HCC) 02/27/2020    Stage 3 chronic kidney disease (HCC) 02/27/2020    Bipolar 1 disorder (HCC) 09/23/2019    Type 1 diabetes mellitus without complication (HCC) 09/23/2019    Generalized anxiety disorder  "2019    Essential hypertension 2019    Dyslipidemia 2019     Alert with cognitive impairment.    Current Vital Signs:   Temperature: 36.4 °C (97.5 °F) Pulse: 67 Respiration: 17 Blood Pressure : 128/66  Weight: 58.4 kg (128 lb 12 oz) Height: 162.6 cm (5' 4\")  Pulse Oximetry: 97 % O2 (LPM): 0      Completed Laboratory Reports:  Recent Labs     24  0200 24  0820 24  0326 24  0721   WBC 5.9  --  5.0 5.8   HEMOGLOBIN 9.7*  --  9.8* 10.4*   HEMATOCRIT 30.6*  --  31.8* 34.6*   PLATELETCT 365  --  371 369   SODIUM 135  --  140 138   POTASSIUM 4.0  --  3.5* 4.3   BUN 17  --  16 12   CREATININE 0.65  --  0.60 0.52   ALBUMIN 2.9*  --  2.9* 3.1*   GLUCOSE 300* 639* 116* 141*     Additional Labs: Not Applicable    Prior Living Situation:   Housing / Facility: 1 Story House  Steps Into Home: 1  Lives with - Patient's Self Care Capacity: Adult Children  Equipment Owned: Front-Wheel Walker    Prior Level of Function / Living Situation:   Physical Therapy: Prior Services: Unable To Determine At This Time  Housing / Facility: 1 Story House  Steps Into Home: 1  Equipment Owned: Front-Wheel Walker  Lives with - Patient's Self Care Capacity: Adult Children  Bed Mobility: Independent  Transfer Status: Independent  Ambulation: Independent  Assistive Devices Used: Front-Wheel Walker  Stairs: Independent  Current Level of Function:   Gait Level Of Assist: Minimal Assist  Assistive Device: Front Wheel Walker  Distance (Feet): 3  Deviation: Bradykinetic, Shuffled Gait  # of Stairs Climbed: 0  Weight Bearing Status: no restrictions  Supine to Sit: Minimal Assist  Sit to Supine: Standby Assist  Scooting: Standby Assist  Rolling: Standby Assist  Sit to Stand: Minimal Assist  Bed, Chair, Wheelchair Transfer: Minimal Assist  Toilet Transfers: Minimal Assist  Transfer Method: Stand Step  Sitting in Chair: 5  Sitting Edge of Bed: 8 min  Standin min  Occupational Therapy:   Self Feeding: " Independent  Grooming / Hygiene: Independent  Bathing: Independent  Dressing: Independent  Toileting: Independent  Medication Management: Unable To Determine At This Time  Prior Services: Unable To Determine At This Time  Housing / Facility: [address]  Current Level of Function:   Eating: Supervision  Upper Body Dressing: Minimal Assist  Lower Body Dressing: Maximal Assist  Toileting: Moderate Assist  Speech Language Pathology:      Rehabilitation Prognosis/Potential: Good  Estimated Length of Stay: 10-12 days    Nursing:      Incontinent    Scope/Intensity of Services Recommended:  Physical Therapy: 1 hr / day  5 days / week. Therapeutic Interventions Required: Maximize Endurance, Mobility, Strength, and Safety  Occupational Therapy: 1 hr / day 5 days / week. Therapeutic Interventions Required: Maximize Self Care, ADLs, IADLs, and Energy Conservation  Speech & Language Pathology: 1 hr / day 5 days / week. Therapeutic Interventions Required: Maximize Cognition and Safety  Rehabilitation Nursin/7. Therapeutic Interventions Required: Monitor Pain, Skin, Vital Signs, Intake and Output, Labs, Safety, and Family Training  Rehabilitation Physician: 3 - 5 days / week. Therapeutic Interventions Required: Medical Management    She requires 24-hour rehabilitation nursing to manage bowel and bladder function, skin care, nutrition and fluid intake, pain control, safety, medication management, and patient/family goals. In addition, rehabilitation nursing will reiterate and reinforce therapy skills and equipment use, including ADLs, as well as provide education to the patient and family. Molly Loera is willing to participate in and is able to tolerate the proposed plan of care.    Rehabilitation Goals and Plan (Expected frequency & duration of treatment in the IRF):   Return to the Community, Supervised Level of Care, and Family Able to Provide 24/7 Assistance  Anticipated Date of Rehabilitation Admission:  02-14-24  Patient/Family oriented IRF level of care/facility/plan: Yes  Patient/Family willing to participate in IRF care/facility/plan: Yes  Patient able to tolerate IRF level of care proposed: Yes  Patient has potential to benefit IRF level of care proposed: Yes  Comments: Not Applicable    Special Needs or Precautions - Medical Necessity:  Safety Concerns/Precautions:  Fall Risk / High Risk for Falls, Balance, Cognition, and Bed / Chair Alarm  Pain Management  IV Site: Peripheral  Current Medications:    Current Facility-Administered Medications Ordered in Epic   Medication Dose Route Frequency Provider Last Rate Last Admin    acetaminophen (Tylenol) tablet 1,000 mg  1,000 mg Oral Q6HRS Yaniv Thornton M.D.        Followed by    [START ON 2/14/2024] acetaminophen (Tylenol) tablet 1,000 mg  1,000 mg Oral Q6HRS PRN Yaniv Thornton M.D.        insulin lispro (HumaLOG,AdmeLOG) injection  2-9 Units Subcutaneous 4X/DAY ACHS Malaika Black, A.P.R.N.   3 Units at 02/12/24 2019    And    dextrose 10 % BOLUS 25 g  25 g Intravenous Q15 MIN PRN Malaika Black, A.P.R.N.        insulin GLARGINE (Lantus,Semglee) injection  10 Units Subcutaneous QAM INSULIN Malaiak Black, A.P.R.N.   10 Units at 02/12/24 0835    oxyCODONE immediate-release (Roxicodone) tablet 2.5 mg  2.5 mg Oral Q3HRS PRN Elisa Gallegorman, A.P.N.   2.5 mg at 02/11/24 1232    Or    oxyCODONE immediate-release (Roxicodone) tablet 5 mg  5 mg Oral Q3HRS PRN Elisa Gallegorman, A.P.N.   5 mg at 02/11/24 1950    ziprasidone (Geodon) injection 5 mg  5 mg Intramuscular Q4HRS PRN Elisa White, A.P.N.        Respiratory Therapy Consult   Nebulization Continuous RT Yaniv Thornton M.D.        ondansetron (Zofran) syringe/vial injection 4 mg  4 mg Intravenous Q4HRS PRN Yaniv Thornton M.D.        ondansetron (Zofran ODT) dispertab 4 mg  4 mg Oral Q4HRS PRN Yaniv Thornton M.D.        docusate sodium (Colace) capsule 100 mg  100 mg Oral BID Yaniv Thornton M.D.   100 mg at  02/13/24 0439    senna-docusate (Pericolace Or Senokot S) 8.6-50 MG per tablet 1 Tablet  1 Tablet Oral Nightly Yaniv Thornton M.D.   1 Tablet at 02/12/24 2013    senna-docusate (Pericolace Or Senokot S) 8.6-50 MG per tablet 1 Tablet  1 Tablet Oral Q24HRS PRN Yaniv Thornton M.D.        polyethylene glycol/lytes (Miralax) Packet 1 Packet  1 Packet Oral BID Yaniv Thornton M.D.   1 Packet at 02/12/24 1846    magnesium hydroxide (Milk Of Magnesia) suspension 30 mL  30 mL Oral DAILY Yaniv Thornton M.D.        bisacodyl (Dulcolax) suppository 10 mg  10 mg Rectal Q24HRS PRN Yaniv Thornton M.D.        sodium phosphate (Fleet) enema 133 mL  1 Each Rectal Once PRN Yaniv Thornton M.D.        labetalol (Normodyne/Trandate) injection 10 mg  10 mg Intravenous Q4HRS PRN Yaniv Thornton M.D.   10 mg at 02/09/24 2011    levETIRAcetam (Keppra) tablet 500 mg  500 mg Oral Q12HRS Yaniv Thornton M.D.   500 mg at 02/13/24 0439    Or    levETIRAcetam (Keppra) injection 500 mg  500 mg Intravenous Q12HRS Yaniv Thornton M.D.   500 mg at 02/10/24 0519    amLODIPine (Norvasc) tablet 5 mg  5 mg Oral Nightly JADON UlloaN.P.   5 mg at 02/09/24 2102    atorvastatin (Lipitor) tablet 80 mg  80 mg Oral Nightly STANTON Ulloa.N.P.   80 mg at 02/12/24 2010    busPIRone (Buspar) tablet 5 mg  5 mg Oral BID JADON UlloaN.P.   5 mg at 02/13/24 0440    carBAMazepine (TEGretol) tablet 200 mg  200 mg Oral BID JADON UlloaN.P.   200 mg at 02/13/24 0440    cariprazine (Vraylar) capsule 6 mg  6 mg Oral QHS JADON UlloaN.P.   6 mg at 02/12/24 2010    lamoTRIgine (LaMICtal) tablet 100 mg  100 mg Oral DAILY JADON UlloaN.P.   100 mg at 02/13/24 0446    levothyroxine (Synthroid) tablet 25 mcg  25 mcg Oral AM ES JADON UlloaN.P.   25 mcg at 02/13/24 0439    losartan (Cozaar) tablet 100 mg  100 mg Oral DAILY JADON UlloaN.P.   100 mg at 02/13/24 0440    primidone (Mysoline) tablet 100 mg  100  mg Oral QHS AIDA UlloaP.   100 mg at 02/12/24 2100    timolol (Timoptic) 0.5 % ophthalmic solution 1 Drop  1 Drop Both Eyes QHS AIDA UlloaP.   1 Drop at 02/12/24 2010     No current Epic-ordered outpatient medications on file.     Diet:   DIET ORDERS (From admission to next 24h)       Start     Ordered    02/10/24 0941  Diet Order Diet: Consistent CHO (Diabetic)  ALL MEALS        Question:  Diet:  Answer:  Consistent CHO (Diabetic)    02/10/24 0940                    Anticipated Discharge Destination / Patient/Family Goal:  Destination: Home with Assistance Support System: Family   Anticipated home health services: OT and PT  Previously used  service/ provider: Not Applicable  Anticipated DME Needs: [employer]  Outpatient Services: OT and PT  Alternative resources to address additional identified needs:   Future Appointments   Date Time Provider Department Center   5/22/2024  8:00 AM VASCULAR NURSE PRACTITIONER [facility] None      Pre-Screen Completed: 2/13/2024 12:19 PM Randy Shaw L.P.N.

## 2024-02-13 NOTE — CARE PLAN
The patient is Stable - Low risk of patient condition declining or worsening    Shift Goals  Clinical Goals: safety/stable neuro  Patient Goals: sleep  Family Goals: augustus    Progress made toward(s) clinical / shift goals:    Problem: Knowledge Deficit - Standard  Goal: Patient and family/care givers will demonstrate understanding of plan of care, disease process/condition, diagnostic tests and medications  Outcome: Progressing     Problem: Skin Integrity  Goal: Skin integrity is maintained or improved  Outcome: Progressing     Problem: Fall Risk  Goal: Patient will remain free from falls  Outcome: Progressing     Problem: Pain - Standard  Goal: Alleviation of pain or a reduction in pain to the patient’s comfort goal  Outcome: Progressing       Patient is not progressing towards the following goals:

## 2024-02-13 NOTE — PROGRESS NOTES
Geriatric Medicine Daily Progress Note      Date of Service  2/13/2024    Chief Complaint  73 y.o. female admitted 2/9/2024 with SAH    Hospital Course  Admitted with subarachnoid hemorrhage after a fall.  Patient was on anticoagulation with Eliquis for recent pulmonary embolism.  Trauma surgery admitted the patient to ICU.  Neurosurgery was consulted on the case.    Interval Problem Update  SAH -denies headache  Hypertension - sbp   PE -denies chest pain or shortness of breath  Diabetes - BS     Consultants/Specialty  Trauma surgery  Neurosurgery    Code Status  Full     Disposition  Postacute placement    Review of Systems  Review of Systems   Constitutional:  Positive for malaise/fatigue. Negative for chills and fever.   HENT:  Negative for congestion, hearing loss and sore throat.    Eyes:  Negative for blurred vision.   Respiratory:  Negative for cough, shortness of breath and wheezing.    Cardiovascular:  Negative for chest pain, palpitations and leg swelling.   Gastrointestinal:  Negative for abdominal pain, diarrhea, heartburn, nausea and vomiting.   Genitourinary:  Negative for dysuria, flank pain and hematuria.   Musculoskeletal:  Negative for back pain, joint pain, myalgias and neck pain.   Skin:  Negative for rash.   Neurological:  Positive for weakness. Negative for dizziness, sensory change, speech change, focal weakness and headaches.   Psychiatric/Behavioral:  The patient is nervous/anxious.         Physical Exam  Temp:  [36.3 °C (97.3 °F)-36.7 °C (98 °F)] 36.4 °C (97.5 °F)  Pulse:  [64-75] 67  Resp:  [16-18] 17  BP: ()/(54-66) 128/66  SpO2:  [93 %-99 %] 97 %    Physical Exam  Vitals and nursing note reviewed.   HENT:      Head: Normocephalic.      Nose: No congestion.      Mouth/Throat:      Mouth: Mucous membranes are moist.   Eyes:      Extraocular Movements: Extraocular movements intact.      Conjunctiva/sclera: Conjunctivae normal.      Comments: Periorbital ecchymosis    Cardiovascular:      Rate and Rhythm: Normal rate and regular rhythm.      Heart sounds: Murmur heard.   Pulmonary:      Effort: Pulmonary effort is normal.      Breath sounds: Normal breath sounds.   Abdominal:      General: There is no distension.      Tenderness: There is no abdominal tenderness. There is no guarding or rebound.   Musculoskeletal:      Cervical back: No tenderness.      Right lower leg: Edema present.      Left lower leg: Edema present.   Skin:     General: Skin is warm and dry.   Neurological:      General: No focal deficit present.      Mental Status: She is alert and oriented to person, place, and time.      Cranial Nerves: No cranial nerve deficit.   Psychiatric:         Mood and Affect: Mood is anxious.         Speech: Speech is delayed.         Cognition and Memory: She exhibits impaired recent memory.         CAM  Acute onset and fluctuating course   No   Inattention                                         No   Disorganized thinking                        No   Altered level of consciousness          No     Medication Review    Yes    Laboratory  Recent Labs     02/11/24  0200 02/12/24  0326 02/13/24  0721   WBC 5.9 5.0 5.8   RBC 3.44* 3.48* 3.71*   HEMOGLOBIN 9.7* 9.8* 10.4*   HEMATOCRIT 30.6* 31.8* 34.6*   MCV 89.0 91.4 93.3   MCH 28.2 28.2 28.0   MCHC 31.7* 30.8* 30.1*   RDW 48.3 51.5* 53.3*   PLATELETCT 365 371 369   MPV 11.0 10.8 10.8     Recent Labs     02/11/24  0200 02/11/24  0820 02/12/24  0326 02/13/24  0721   SODIUM 135  --  140 138   POTASSIUM 4.0  --  3.5* 4.3   CHLORIDE 102  --  106 105   CO2 23  --  22 25   GLUCOSE 300* 639* 116* 141*   BUN 17  --  16 12   CREATININE 0.65  --  0.60 0.52   CALCIUM 8.7  --  8.7 8.9                   Imaging  EC-ECHOCARDIOGRAM COMPLETE W/O CONT         US-TRAUMA VEIN SCREEN LOWER BILAT EXTREMITY   Final Result      CT-HEAD W/O   Final Result      1.  Similar appearing left temporal and occipital subarachnoid hemorrhage and/or hemorrhagic  contusion.   2.  Chronic appearing right frontal lobe infarct again noted.      CT-HEAD W/O   Final Result      1.  LEFT posterior temporal and occipital subarachnoid hemorrhage.   2.  No significant mass effect or midline shift.   3.  Small chronic RIGHT frontal infarct.   4.  Diffuse atrophy.      Based solely on CT findings, the brain injury guideline category is mBIG 2.      Nondisplaced skull fx   SDH 4.1-7.9mm   IPH 4.1-7.9mm   SAH 1 hemisphere, >3 sulci, 1-3mm      The original BIG retrospective analysis found radiographic progression in 0% of BIG 1 patients and 2.6% BIG 2.      These findings were electronically conveyed to and received by HOLLAND CONNELL on 2/9/2024 5:29 PM.           Assessment/Plan  * Trauma- (present on admission)  Assessment & Plan  Trauma surgery -primary service    Fall- (present on admission)  Assessment & Plan  PT and OT  Check TSH and Vit b12 level    PE (pulmonary thromboembolism) (HCC)- (present on admission)  Assessment & Plan  Resume Eliquis when cleared by neurosurgery  Echocardiogram pending    Subarachnoid hemorrhage without loss of consciousness (HCC)- (present on admission)  Assessment & Plan  Neurochecks  Keppra for seizure prophylaxis    Hypokalemia- (present on admission)  Assessment & Plan  Follow bmp, Mg, Ph    History of CVA (cerebrovascular accident)- (present on admission)  Assessment & Plan  Lipitor  Resume ASA when cleared by neurosurgery    Anemia- (present on admission)  Assessment & Plan  Follow cbc  Check iron/tibc, vit b12    Encounter for geriatric assessment- (present on admission)  Assessment & Plan  Geriatric medicine following    Current use of long term anticoagulation- (present on admission)  Assessment & Plan  Secondary to pulm embolism    Other specified hypothyroidism- (present on admission)  Assessment & Plan  Levothyroxine  Check TSH    Malignant neoplasm of left female breast (HCC)- (present on admission)  Assessment & Plan  History of  Was on  Arimidex    Essential hypertension- (present on admission)  Assessment & Plan  Losartan, Norvasc    Type 1 diabetes mellitus without complication (HCC)- (present on admission)  Assessment & Plan  Adjust Lantus  Continue SSI    Bipolar 1 disorder (HCC)- (present on admission)  Assessment & Plan  Cariprazine, Lamictal, Tegretol, BuSpar    Dyslipidemia- (present on admission)  Assessment & Plan  Lipitor         VTE prophylaxis: SCD

## 2024-02-14 ENCOUNTER — HOSPITAL ENCOUNTER (INPATIENT)
Facility: REHABILITATION | Age: 74
LOS: 14 days | DRG: 950 | End: 2024-02-28
Attending: PHYSICAL MEDICINE & REHABILITATION | Admitting: PHYSICAL MEDICINE & REHABILITATION
Payer: MEDICARE

## 2024-02-14 VITALS
OXYGEN SATURATION: 98 % | SYSTOLIC BLOOD PRESSURE: 152 MMHG | WEIGHT: 128.97 LBS | BODY MASS INDEX: 22.02 KG/M2 | RESPIRATION RATE: 18 BRPM | TEMPERATURE: 97.9 F | DIASTOLIC BLOOD PRESSURE: 79 MMHG | HEART RATE: 80 BPM | HEIGHT: 64 IN

## 2024-02-14 DIAGNOSIS — R29.818 TRAUMATIC BRAIN INJURY WITH NEW NEUROCOGNITIVE DEFICIT (HCC): ICD-10-CM

## 2024-02-14 DIAGNOSIS — I26.99 PE (PULMONARY THROMBOEMBOLISM) (HCC): ICD-10-CM

## 2024-02-14 DIAGNOSIS — R41.89 TRAUMATIC BRAIN INJURY WITH NEW NEUROCOGNITIVE DEFICIT (HCC): ICD-10-CM

## 2024-02-14 DIAGNOSIS — D64.9 ANEMIA, UNSPECIFIED TYPE: ICD-10-CM

## 2024-02-14 DIAGNOSIS — C50.912 MALIGNANT NEOPLASM OF LEFT FEMALE BREAST, UNSPECIFIED ESTROGEN RECEPTOR STATUS, UNSPECIFIED SITE OF BREAST (HCC): ICD-10-CM

## 2024-02-14 DIAGNOSIS — T14.90XA TRAUMA: ICD-10-CM

## 2024-02-14 DIAGNOSIS — E10.9 TYPE 1 DIABETES MELLITUS WITHOUT COMPLICATION (HCC): ICD-10-CM

## 2024-02-14 DIAGNOSIS — F31.9 BIPOLAR 1 DISORDER (HCC): ICD-10-CM

## 2024-02-14 DIAGNOSIS — H40.9 GLAUCOMA, UNSPECIFIED GLAUCOMA TYPE, UNSPECIFIED LATERALITY: ICD-10-CM

## 2024-02-14 DIAGNOSIS — S06.9XAA TRAUMATIC BRAIN INJURY WITH NEW NEUROCOGNITIVE DEFICIT (HCC): ICD-10-CM

## 2024-02-14 DIAGNOSIS — E78.5 DYSLIPIDEMIA: ICD-10-CM

## 2024-02-14 PROBLEM — E83.42 HYPOMAGNESEMIA: Status: ACTIVE | Noted: 2024-02-14

## 2024-02-14 PROBLEM — E83.39 HYPOPHOSPHATEMIA: Status: ACTIVE | Noted: 2024-02-14

## 2024-02-14 LAB
ALBUMIN SERPL BCP-MCNC: 3.3 G/DL (ref 3.2–4.9)
ALBUMIN/GLOB SERPL: 1.3 G/DL
ALP SERPL-CCNC: 158 U/L (ref 30–99)
ALT SERPL-CCNC: 26 U/L (ref 2–50)
ANION GAP SERPL CALC-SCNC: 9 MMOL/L (ref 7–16)
AST SERPL-CCNC: 25 U/L (ref 12–45)
BASOPHILS # BLD AUTO: 1.3 % (ref 0–1.8)
BASOPHILS # BLD: 0.05 K/UL (ref 0–0.12)
BILIRUB SERPL-MCNC: 0.2 MG/DL (ref 0.1–1.5)
BUN SERPL-MCNC: 14 MG/DL (ref 8–22)
CALCIUM ALBUM COR SERPL-MCNC: 9.5 MG/DL (ref 8.5–10.5)
CALCIUM SERPL-MCNC: 8.9 MG/DL (ref 8.5–10.5)
CHLORIDE SERPL-SCNC: 102 MMOL/L (ref 96–112)
CO2 SERPL-SCNC: 26 MMOL/L (ref 20–33)
CREAT SERPL-MCNC: 0.58 MG/DL (ref 0.5–1.4)
EOSINOPHIL # BLD AUTO: 0.2 K/UL (ref 0–0.51)
EOSINOPHIL NFR BLD: 5.1 % (ref 0–6.9)
ERYTHROCYTE [DISTWIDTH] IN BLOOD BY AUTOMATED COUNT: 51.1 FL (ref 35.9–50)
GFR SERPLBLD CREATININE-BSD FMLA CKD-EPI: 94 ML/MIN/1.73 M 2
GLOBULIN SER CALC-MCNC: 2.6 G/DL (ref 1.9–3.5)
GLUCOSE BLD STRIP.AUTO-MCNC: 218 MG/DL (ref 65–99)
GLUCOSE BLD STRIP.AUTO-MCNC: 255 MG/DL (ref 65–99)
GLUCOSE BLD STRIP.AUTO-MCNC: 257 MG/DL (ref 65–99)
GLUCOSE BLD STRIP.AUTO-MCNC: 388 MG/DL (ref 65–99)
GLUCOSE BLD STRIP.AUTO-MCNC: 409 MG/DL (ref 65–99)
GLUCOSE SERPL-MCNC: 439 MG/DL (ref 65–99)
HCT VFR BLD AUTO: 33.7 % (ref 37–47)
HGB BLD-MCNC: 10.6 G/DL (ref 12–16)
IMM GRANULOCYTES # BLD AUTO: 0.02 K/UL (ref 0–0.11)
IMM GRANULOCYTES NFR BLD AUTO: 0.5 % (ref 0–0.9)
IRON SATN MFR SERPL: 29 % (ref 15–55)
IRON SERPL-MCNC: 77 UG/DL (ref 40–170)
LYMPHOCYTES # BLD AUTO: 1.38 K/UL (ref 1–4.8)
LYMPHOCYTES NFR BLD: 35.4 % (ref 22–41)
MAGNESIUM SERPL-MCNC: 1.8 MG/DL (ref 1.5–2.5)
MCH RBC QN AUTO: 28.3 PG (ref 27–33)
MCHC RBC AUTO-ENTMCNC: 31.5 G/DL (ref 32.2–35.5)
MCV RBC AUTO: 89.9 FL (ref 81.4–97.8)
MONOCYTES # BLD AUTO: 0.4 K/UL (ref 0–0.85)
MONOCYTES NFR BLD AUTO: 10.3 % (ref 0–13.4)
NEUTROPHILS # BLD AUTO: 1.85 K/UL (ref 1.82–7.42)
NEUTROPHILS NFR BLD: 47.4 % (ref 44–72)
NRBC # BLD AUTO: 0 K/UL
NRBC BLD-RTO: 0 /100 WBC (ref 0–0.2)
PHOSPHATE SERPL-MCNC: 2.2 MG/DL (ref 2.5–4.5)
PLATELET # BLD AUTO: 369 K/UL (ref 164–446)
PMV BLD AUTO: 10.7 FL (ref 9–12.9)
POTASSIUM SERPL-SCNC: 4.5 MMOL/L (ref 3.6–5.5)
PROT SERPL-MCNC: 5.9 G/DL (ref 6–8.2)
RBC # BLD AUTO: 3.75 M/UL (ref 4.2–5.4)
SODIUM SERPL-SCNC: 137 MMOL/L (ref 135–145)
TIBC SERPL-MCNC: 267 UG/DL (ref 250–450)
TSH SERPL DL<=0.005 MIU/L-ACNC: 2.1 UIU/ML (ref 0.38–5.33)
UIBC SERPL-MCNC: 190 UG/DL (ref 110–370)
VIT B12 SERPL-MCNC: 685 PG/ML (ref 211–911)
WBC # BLD AUTO: 3.9 K/UL (ref 4.8–10.8)

## 2024-02-14 PROCEDURE — 700102 HCHG RX REV CODE 250 W/ 637 OVERRIDE(OP): Performed by: SURGERY

## 2024-02-14 PROCEDURE — 94760 N-INVAS EAR/PLS OXIMETRY 1: CPT

## 2024-02-14 PROCEDURE — 99232 SBSQ HOSP IP/OBS MODERATE 35: CPT | Performed by: FAMILY MEDICINE

## 2024-02-14 PROCEDURE — 83550 IRON BINDING TEST: CPT

## 2024-02-14 PROCEDURE — 83735 ASSAY OF MAGNESIUM: CPT

## 2024-02-14 PROCEDURE — 80053 COMPREHEN METABOLIC PANEL: CPT

## 2024-02-14 PROCEDURE — A9270 NON-COVERED ITEM OR SERVICE: HCPCS

## 2024-02-14 PROCEDURE — 36415 COLL VENOUS BLD VENIPUNCTURE: CPT

## 2024-02-14 PROCEDURE — 84443 ASSAY THYROID STIM HORMONE: CPT

## 2024-02-14 PROCEDURE — 82962 GLUCOSE BLOOD TEST: CPT | Mod: 91

## 2024-02-14 PROCEDURE — 700101 HCHG RX REV CODE 250: Performed by: PHYSICAL MEDICINE & REHABILITATION

## 2024-02-14 PROCEDURE — 700102 HCHG RX REV CODE 250 W/ 637 OVERRIDE(OP)

## 2024-02-14 PROCEDURE — A9270 NON-COVERED ITEM OR SERVICE: HCPCS | Performed by: SURGERY

## 2024-02-14 PROCEDURE — A9270 NON-COVERED ITEM OR SERVICE: HCPCS | Performed by: PHYSICAL MEDICINE & REHABILITATION

## 2024-02-14 PROCEDURE — 83540 ASSAY OF IRON: CPT

## 2024-02-14 PROCEDURE — 82607 VITAMIN B-12: CPT

## 2024-02-14 PROCEDURE — 85025 COMPLETE CBC W/AUTO DIFF WBC: CPT

## 2024-02-14 PROCEDURE — 770010 HCHG ROOM/CARE - REHAB SEMI PRIVAT*

## 2024-02-14 PROCEDURE — 700102 HCHG RX REV CODE 250 W/ 637 OVERRIDE(OP): Performed by: PHYSICAL MEDICINE & REHABILITATION

## 2024-02-14 PROCEDURE — 99223 1ST HOSP IP/OBS HIGH 75: CPT | Performed by: PHYSICAL MEDICINE & REHABILITATION

## 2024-02-14 PROCEDURE — 84100 ASSAY OF PHOSPHORUS: CPT

## 2024-02-14 RX ORDER — OXYCODONE HYDROCHLORIDE 10 MG/1
10 TABLET ORAL
Status: DISCONTINUED | OUTPATIENT
Start: 2024-02-14 | End: 2024-02-28 | Stop reason: HOSPADM

## 2024-02-14 RX ORDER — LAMOTRIGINE 100 MG/1
100 TABLET ORAL DAILY
Status: CANCELLED | OUTPATIENT
Start: 2024-02-15

## 2024-02-14 RX ORDER — MIDAZOLAM HYDROCHLORIDE 5 MG/ML
5 INJECTION INTRAMUSCULAR; INTRAVENOUS PRN
Status: DISCONTINUED | OUTPATIENT
Start: 2024-02-14 | End: 2024-02-28 | Stop reason: HOSPADM

## 2024-02-14 RX ORDER — LAMOTRIGINE 100 MG/1
100 TABLET ORAL DAILY
Status: DISCONTINUED | OUTPATIENT
Start: 2024-02-15 | End: 2024-02-28 | Stop reason: HOSPADM

## 2024-02-14 RX ORDER — ZIPRASIDONE MESYLATE 20 MG/ML
5 INJECTION, POWDER, LYOPHILIZED, FOR SOLUTION INTRAMUSCULAR EVERY 4 HOURS PRN
Status: CANCELLED | OUTPATIENT
Start: 2024-02-14

## 2024-02-14 RX ORDER — ATORVASTATIN CALCIUM 80 MG/1
80 TABLET, FILM COATED ORAL NIGHTLY
Status: CANCELLED | OUTPATIENT
Start: 2024-02-14

## 2024-02-14 RX ORDER — ONDANSETRON 4 MG/1
4 TABLET, ORALLY DISINTEGRATING ORAL 4 TIMES DAILY PRN
Status: DISCONTINUED | OUTPATIENT
Start: 2024-02-14 | End: 2024-02-28 | Stop reason: HOSPADM

## 2024-02-14 RX ORDER — INSULIN LISPRO 100 [IU]/ML
2-9 INJECTION, SOLUTION INTRAVENOUS; SUBCUTANEOUS
Status: DISCONTINUED | OUTPATIENT
Start: 2024-02-14 | End: 2024-02-15

## 2024-02-14 RX ORDER — HYDRALAZINE HYDROCHLORIDE 25 MG/1
25 TABLET, FILM COATED ORAL EVERY 8 HOURS PRN
Status: DISCONTINUED | OUTPATIENT
Start: 2024-02-14 | End: 2024-02-28 | Stop reason: HOSPADM

## 2024-02-14 RX ORDER — LOSARTAN POTASSIUM 50 MG/1
100 TABLET ORAL DAILY
Status: DISCONTINUED | OUTPATIENT
Start: 2024-02-15 | End: 2024-02-28 | Stop reason: HOSPADM

## 2024-02-14 RX ORDER — LEVETIRACETAM 500 MG/1
500 TABLET ORAL EVERY 12 HOURS
Status: CANCELLED | OUTPATIENT
Start: 2024-02-14 | End: 2024-02-16

## 2024-02-14 RX ORDER — LEVOTHYROXINE SODIUM 0.03 MG/1
25 TABLET ORAL
Status: DISCONTINUED | OUTPATIENT
Start: 2024-02-15 | End: 2024-02-28 | Stop reason: HOSPADM

## 2024-02-14 RX ORDER — ATORVASTATIN CALCIUM 40 MG/1
80 TABLET, FILM COATED ORAL NIGHTLY
Status: DISCONTINUED | OUTPATIENT
Start: 2024-02-14 | End: 2024-02-28 | Stop reason: HOSPADM

## 2024-02-14 RX ORDER — ACETAMINOPHEN 325 MG/1
650 TABLET ORAL EVERY 4 HOURS PRN
Status: DISCONTINUED | OUTPATIENT
Start: 2024-02-14 | End: 2024-02-28 | Stop reason: HOSPADM

## 2024-02-14 RX ORDER — ONDANSETRON 2 MG/ML
4 INJECTION INTRAMUSCULAR; INTRAVENOUS 4 TIMES DAILY PRN
Status: DISCONTINUED | OUTPATIENT
Start: 2024-02-14 | End: 2024-02-28 | Stop reason: HOSPADM

## 2024-02-14 RX ORDER — ACETAMINOPHEN 500 MG
1000 TABLET ORAL EVERY 6 HOURS PRN
Status: CANCELLED | OUTPATIENT
Start: 2024-02-14

## 2024-02-14 RX ORDER — TIMOLOL MALEATE 5 MG/ML
1 SOLUTION/ DROPS OPHTHALMIC
Status: DISCONTINUED | OUTPATIENT
Start: 2024-02-14 | End: 2024-02-28 | Stop reason: HOSPADM

## 2024-02-14 RX ORDER — TRAZODONE HYDROCHLORIDE 50 MG/1
50 TABLET ORAL
Status: DISCONTINUED | OUTPATIENT
Start: 2024-02-14 | End: 2024-02-28 | Stop reason: HOSPADM

## 2024-02-14 RX ORDER — ACETAMINOPHEN 500 MG
1000 TABLET ORAL EVERY 6 HOURS PRN
Status: DISCONTINUED | OUTPATIENT
Start: 2024-02-14 | End: 2024-02-28 | Stop reason: HOSPADM

## 2024-02-14 RX ORDER — ECHINACEA PURPUREA EXTRACT 125 MG
2 TABLET ORAL PRN
Status: DISCONTINUED | OUTPATIENT
Start: 2024-02-14 | End: 2024-02-28 | Stop reason: HOSPADM

## 2024-02-14 RX ORDER — LEVOTHYROXINE SODIUM 0.03 MG/1
25 TABLET ORAL
Status: CANCELLED | OUTPATIENT
Start: 2024-02-15

## 2024-02-14 RX ORDER — LEVETIRACETAM 500 MG/5ML
500 INJECTION, SOLUTION, CONCENTRATE INTRAVENOUS EVERY 12 HOURS
Qty: 20 ML | Refills: 0 | Status: COMPLETED | OUTPATIENT
Start: 2024-02-14 | End: 2024-02-16

## 2024-02-14 RX ORDER — PRIMIDONE 50 MG/1
100 TABLET ORAL
Status: DISCONTINUED | OUTPATIENT
Start: 2024-02-14 | End: 2024-02-28 | Stop reason: HOSPADM

## 2024-02-14 RX ORDER — BUSPIRONE HYDROCHLORIDE 5 MG/1
5 TABLET ORAL 2 TIMES DAILY
Status: DISCONTINUED | OUTPATIENT
Start: 2024-02-14 | End: 2024-02-28 | Stop reason: HOSPADM

## 2024-02-14 RX ORDER — ALUMINA, MAGNESIA, AND SIMETHICONE 2400; 2400; 240 MG/30ML; MG/30ML; MG/30ML
20 SUSPENSION ORAL
Status: DISCONTINUED | OUTPATIENT
Start: 2024-02-14 | End: 2024-02-28 | Stop reason: HOSPADM

## 2024-02-14 RX ORDER — LOSARTAN POTASSIUM 50 MG/1
100 TABLET ORAL DAILY
Status: CANCELLED | OUTPATIENT
Start: 2024-02-15

## 2024-02-14 RX ORDER — DEXTROSE MONOHYDRATE 25 G/50ML
25 INJECTION, SOLUTION INTRAVENOUS
Status: DISCONTINUED | OUTPATIENT
Start: 2024-02-14 | End: 2024-02-15

## 2024-02-14 RX ORDER — CARBAMAZEPINE 200 MG/1
200 TABLET ORAL 2 TIMES DAILY
Status: CANCELLED | OUTPATIENT
Start: 2024-02-14

## 2024-02-14 RX ORDER — TIMOLOL MALEATE 5 MG/ML
1 SOLUTION/ DROPS OPHTHALMIC
Status: CANCELLED | OUTPATIENT
Start: 2024-02-14

## 2024-02-14 RX ORDER — POLYETHYLENE GLYCOL 3350 17 G/17G
1 POWDER, FOR SOLUTION ORAL
Status: DISCONTINUED | OUTPATIENT
Start: 2024-02-14 | End: 2024-02-28 | Stop reason: HOSPADM

## 2024-02-14 RX ORDER — OXYCODONE HYDROCHLORIDE 5 MG/1
5 TABLET ORAL
Status: DISCONTINUED | OUTPATIENT
Start: 2024-02-14 | End: 2024-02-28 | Stop reason: HOSPADM

## 2024-02-14 RX ORDER — DEXTROSE MONOHYDRATE 25 G/50ML
25 INJECTION, SOLUTION INTRAVENOUS
Status: CANCELLED | OUTPATIENT
Start: 2024-02-14

## 2024-02-14 RX ORDER — ZIPRASIDONE MESYLATE 20 MG/ML
5 INJECTION, POWDER, LYOPHILIZED, FOR SOLUTION INTRAMUSCULAR EVERY 4 HOURS PRN
Status: DISCONTINUED | OUTPATIENT
Start: 2024-02-14 | End: 2024-02-28 | Stop reason: HOSPADM

## 2024-02-14 RX ORDER — ACETAMINOPHEN 500 MG
1000 TABLET ORAL EVERY 6 HOURS
Status: CANCELLED | OUTPATIENT
Start: 2024-02-14 | End: 2024-02-14

## 2024-02-14 RX ORDER — LEVETIRACETAM 500 MG/5ML
500 INJECTION, SOLUTION, CONCENTRATE INTRAVENOUS EVERY 12 HOURS
Status: CANCELLED | OUTPATIENT
Start: 2024-02-14 | End: 2024-02-16

## 2024-02-14 RX ORDER — ACETAMINOPHEN 500 MG
1000 TABLET ORAL EVERY 6 HOURS
Status: COMPLETED | OUTPATIENT
Start: 2024-02-14 | End: 2024-02-14

## 2024-02-14 RX ORDER — BUSPIRONE HYDROCHLORIDE 10 MG/1
5 TABLET ORAL 2 TIMES DAILY
Status: CANCELLED | OUTPATIENT
Start: 2024-02-14

## 2024-02-14 RX ORDER — AMLODIPINE BESYLATE 5 MG/1
5 TABLET ORAL NIGHTLY
Status: DISCONTINUED | OUTPATIENT
Start: 2024-02-14 | End: 2024-02-19

## 2024-02-14 RX ORDER — PRIMIDONE 50 MG/1
100 TABLET ORAL
Status: CANCELLED | OUTPATIENT
Start: 2024-02-14

## 2024-02-14 RX ORDER — CARBAMAZEPINE 100 MG/1
200 TABLET, CHEWABLE ORAL 2 TIMES DAILY
Status: DISCONTINUED | OUTPATIENT
Start: 2024-02-14 | End: 2024-02-28 | Stop reason: HOSPADM

## 2024-02-14 RX ORDER — AMLODIPINE BESYLATE 5 MG/1
5 TABLET ORAL NIGHTLY
Status: CANCELLED | OUTPATIENT
Start: 2024-02-14

## 2024-02-14 RX ORDER — INSULIN LISPRO 100 [IU]/ML
2-9 INJECTION, SOLUTION INTRAVENOUS; SUBCUTANEOUS
Status: CANCELLED | OUTPATIENT
Start: 2024-02-14

## 2024-02-14 RX ORDER — CARBAMAZEPINE 200 MG/1
200 TABLET ORAL 2 TIMES DAILY
Status: DISCONTINUED | OUTPATIENT
Start: 2024-02-14 | End: 2024-02-14

## 2024-02-14 RX ORDER — OMEPRAZOLE 20 MG/1
20 CAPSULE, DELAYED RELEASE ORAL DAILY
Status: DISCONTINUED | OUTPATIENT
Start: 2024-02-14 | End: 2024-02-28 | Stop reason: HOSPADM

## 2024-02-14 RX ORDER — AMOXICILLIN 250 MG
2 CAPSULE ORAL 2 TIMES DAILY
Status: DISCONTINUED | OUTPATIENT
Start: 2024-02-14 | End: 2024-02-28 | Stop reason: HOSPADM

## 2024-02-14 RX ORDER — LEVETIRACETAM 500 MG/1
500 TABLET ORAL EVERY 12 HOURS
Qty: 4 TABLET | Refills: 0 | Status: COMPLETED | OUTPATIENT
Start: 2024-02-14 | End: 2024-02-16

## 2024-02-14 RX ADMIN — CARIPRAZINE 6 MG: 3 CAPSULE, GELATIN COATED ORAL at 22:09

## 2024-02-14 RX ADMIN — LEVOTHYROXINE SODIUM 25 MCG: 0.03 TABLET ORAL at 04:22

## 2024-02-14 RX ADMIN — AMLODIPINE BESYLATE 5 MG: 5 TABLET ORAL at 20:24

## 2024-02-14 RX ADMIN — ACETAMINOPHEN 1000 MG: 500 TABLET ORAL at 13:26

## 2024-02-14 RX ADMIN — TIMOLOL MALEATE 1 DROP: 5 SOLUTION OPHTHALMIC at 20:30

## 2024-02-14 RX ADMIN — LOSARTAN POTASSIUM 100 MG: 50 TABLET, FILM COATED ORAL at 04:22

## 2024-02-14 RX ADMIN — LAMOTRIGINE 100 MG: 100 TABLET ORAL at 04:23

## 2024-02-14 RX ADMIN — BUSPIRONE HYDROCHLORIDE 5 MG: 5 TABLET ORAL at 20:26

## 2024-02-14 RX ADMIN — TRAZODONE HYDROCHLORIDE 50 MG: 50 TABLET ORAL at 22:24

## 2024-02-14 RX ADMIN — LEVETIRACETAM 500 MG: 500 TABLET, FILM COATED ORAL at 20:26

## 2024-02-14 RX ADMIN — CARBAMAZEPINE 200 MG: 200 TABLET ORAL at 04:22

## 2024-02-14 RX ADMIN — LEVETIRACETAM 500 MG: 500 TABLET, FILM COATED ORAL at 04:22

## 2024-02-14 RX ADMIN — BUSPIRONE HYDROCHLORIDE 5 MG: 10 TABLET ORAL at 04:23

## 2024-02-14 RX ADMIN — INSULIN LISPRO 8 UNITS: 100 INJECTION, SOLUTION INTRAVENOUS; SUBCUTANEOUS at 09:11

## 2024-02-14 RX ADMIN — INSULIN LISPRO 3 UNITS: 100 INJECTION, SOLUTION INTRAVENOUS; SUBCUTANEOUS at 22:01

## 2024-02-14 RX ADMIN — DOCUSATE SODIUM 50MG AND SENNOSIDES 8.6MG 2 TABLET: 8.6; 5 TABLET, FILM COATED ORAL at 20:25

## 2024-02-14 RX ADMIN — PRIMIDONE 100 MG: 50 TABLET ORAL at 20:26

## 2024-02-14 RX ADMIN — INSULIN LISPRO 5 UNITS: 100 INJECTION, SOLUTION INTRAVENOUS; SUBCUTANEOUS at 17:10

## 2024-02-14 RX ADMIN — ACETAMINOPHEN 1000 MG: 500 TABLET ORAL at 04:22

## 2024-02-14 RX ADMIN — INSULIN LISPRO 5 UNITS: 100 INJECTION, SOLUTION INTRAVENOUS; SUBCUTANEOUS at 13:28

## 2024-02-14 RX ADMIN — CARBAMAZEPINE 200 MG: 100 TABLET, CHEWABLE ORAL at 20:25

## 2024-02-14 RX ADMIN — ATORVASTATIN CALCIUM 80 MG: 40 TABLET, FILM COATED ORAL at 20:26

## 2024-02-14 ASSESSMENT — ENCOUNTER SYMPTOMS
WHEEZING: 0
NECK PAIN: 0
ABDOMINAL PAIN: 0
HEADACHES: 0
SPEECH CHANGE: 0
NAUSEA: 0
VOMITING: 0
BACK PAIN: 0
SHORTNESS OF BREATH: 0
SORE THROAT: 0
MYALGIAS: 0
WEAKNESS: 1
SENSORY CHANGE: 0
CHILLS: 0
FEVER: 0
FLANK PAIN: 0
HEARTBURN: 0
DIZZINESS: 0
BLURRED VISION: 0
NERVOUS/ANXIOUS: 1
FOCAL WEAKNESS: 0
DIARRHEA: 0
COUGH: 0
PALPITATIONS: 0

## 2024-02-14 ASSESSMENT — PATIENT HEALTH QUESTIONNAIRE - PHQ9
1. LITTLE INTEREST OR PLEASURE IN DOING THINGS: NOT AT ALL
SUM OF ALL RESPONSES TO PHQ9 QUESTIONS 1 AND 2: 0
1. LITTLE INTEREST OR PLEASURE IN DOING THINGS: NOT AT ALL
SUM OF ALL RESPONSES TO PHQ9 QUESTIONS 1 AND 2: 0
SUM OF ALL RESPONSES TO PHQ9 QUESTIONS 1 AND 2: 0
2. FEELING DOWN, DEPRESSED, IRRITABLE, OR HOPELESS: NOT AT ALL
2. FEELING DOWN, DEPRESSED, IRRITABLE, OR HOPELESS: NOT AT ALL
1. LITTLE INTEREST OR PLEASURE IN DOING THINGS: NOT AT ALL
2. FEELING DOWN, DEPRESSED, IRRITABLE, OR HOPELESS: NOT AT ALL

## 2024-02-14 ASSESSMENT — LIFESTYLE VARIABLES
ON A TYPICAL DAY WHEN YOU DRINK ALCOHOL HOW MANY DRINKS DO YOU HAVE: 0
HAVE YOU EVER FELT YOU SHOULD CUT DOWN ON YOUR DRINKING: NO
ALCOHOL_USE: NO
AVERAGE NUMBER OF DAYS PER WEEK YOU HAVE A DRINK CONTAINING ALCOHOL: 0
CONSUMPTION TOTAL: NEGATIVE
TOTAL SCORE: 0
HOW MANY TIMES IN THE PAST YEAR HAVE YOU HAD 5 OR MORE DRINKS IN A DAY: 0
EVER FELT BAD OR GUILTY ABOUT YOUR DRINKING: NO
EVER_SMOKED: NEVER
TOTAL SCORE: 0
EVER HAD A DRINK FIRST THING IN THE MORNING TO STEADY YOUR NERVES TO GET RID OF A HANGOVER: NO
TOTAL SCORE: 0
HAVE PEOPLE ANNOYED YOU BY CRITICIZING YOUR DRINKING: NO

## 2024-02-14 ASSESSMENT — FIBROSIS 4 INDEX
FIB4 SCORE: 1
FIB4 SCORE: 1

## 2024-02-14 NOTE — FLOWSHEET NOTE
02/14/24 1040   Events/Summary/Plan   Events/Summary/Plan RT Consult   Vital Signs   Pulse 77   Respiration 16   Pulse Oximetry 98 %   $ Pulse Oximetry (Spot Check) Yes   Respiratory Assessment   Level of Consciousness Alert   Chest Exam   Work Of Breathing / Effort Within Normal Limits   Breath Sounds   RUL Breath Sounds Clear   RML Breath Sounds Clear   RLL Breath Sounds Diminished   SHAWNA Breath Sounds Clear   LLL Breath Sounds Diminished   Oxygen   O2 Delivery Device Room air w/o2 available   Smoking History   Have you ever smoked Never

## 2024-02-14 NOTE — PROGRESS NOTES
Report given to TONE Gomez. Will leave PIV in place. Pt dressed, belongings gathered. Pt waiting for transport.

## 2024-02-14 NOTE — H&P
Physical Medicine & Rehabilitation  History and Physical (H&P)  &     Post Admission Physician Evaluation (ELICEO)       Date of Admission: 2/14/2024  Date of Service: 2/14/2024   Molly Loera    [facility] Code to Support Admission: 0002.22 - Brain Dysfunction: Traumatic, Closed Injury  Etiologic Diagnosis: Traumatic brain injury with new neurocognitive deficit (HCC)    _______________________________________________    Chief Complaint: Decreased mobility, weakness    History of Present Illness:  Patient is a 73 y.o. female with a PMH of BPD1, depression, DM1, and CKD who presented on 2/9/24 with a GLF. Patient reportedly had a mechanical fall at outside facility while walking to restroom and had left sided head strike. Patient was evaluated in ED and found on CT to have left sided subarachnoid hemorrhage. NSG was consulted and recommended conservative management with serial CTs. Repeat CT showed stable bleed. Stay complicated by confusion and hyperglycemia.     Patient tolerated transfer to [facility]. She reports she had a previously fall with SDH but cannot remember when it occurred. She reports word finding difficulty and confusion. Denies weakness but reports poor balance.     Review of Systems:     Comprehensive 14 point ROS was reviewed and all were negative except as noted elsewhere in this document.     Past Medical History:  Past Medical History:   Diagnosis Date    Anxiety     Bipolar 1 disorder (HCC)     Cancer (HCC)     left breast    Depression     Diabetes (HCC)     Kidney disease        Past Surgical History:  No past surgical history on file.    Family History:  Family History   Problem Relation Age of Onset    Diabetes Daughter        Medications:  Current Facility-Administered Medications   Medication Dose    Respiratory Therapy Consult      Pharmacy Consult Request ...Pain Management Review 1 Each  1 Each    hydrALAZINE (Apresoline) tablet 25 mg  25 mg    acetaminophen (Tylenol) tablet 650 mg  650 mg     senna-docusate (Pericolace Or Senokot S) 8.6-50 MG per tablet 2 Tablet  2 Tablet    And    polyethylene glycol/lytes (Miralax) Packet 1 Packet  1 Packet    omeprazole (PriLOSEC) capsule 20 mg  20 mg    mag hydrox-al hydrox-simeth (Maalox Plus Es Or Mylanta Ds) suspension 20 mL  20 mL    ondansetron (Zofran ODT) dispertab 4 mg  4 mg    Or    ondansetron (Zofran) syringe/vial injection 4 mg  4 mg    traZODone (Desyrel) tablet 50 mg  50 mg    sodium chloride (Ocean) 0.65 % nasal spray 2 Spray  2 Spray    oxyCODONE immediate-release (Roxicodone) tablet 5 mg  5 mg    Or    oxyCODONE immediate release (Roxicodone) tablet 10 mg  10 mg    midazolam (VERSED) 5 mg/mL (1 mL vial)  5 mg    acetaminophen (Tylenol) tablet 1,000 mg  1,000 mg    Followed by    acetaminophen (Tylenol) tablet 1,000 mg  1,000 mg    amLODIPine (Norvasc) tablet 5 mg  5 mg    atorvastatin (Lipitor) tablet 80 mg  80 mg    busPIRone (Buspar) tablet 5 mg  5 mg    cariprazine (Vraylar) capsule 6 mg  6 mg    [START ON 2/15/2024] insulin GLARGINE (Lantus,Semglee) injection  15 Units    insulin lispro (HumaLOG,AdmeLOG) injection  2-9 Units    And    dextrose 50% (D50W) injection 25 g  25 g    [START ON 2/15/2024] lamoTRIgine (LaMICtal) tablet 100 mg  100 mg    levETIRAcetam (Keppra) tablet 500 mg  500 mg    Or    levETIRAcetam (Keppra) injection 500 mg  500 mg    [START ON 2/15/2024] levothyroxine (Synthroid) tablet 25 mcg  25 mcg    [START ON 2/15/2024] losartan (Cozaar) tablet 100 mg  100 mg    primidone (Mysoline) tablet 100 mg  100 mg    timolol (Timoptic) 0.5 % ophthalmic solution 1 Drop  1 Drop    ziprasidone (Geodon) injection 5 mg  5 mg    carBAMazepine (TEGretol) chewable tab 200 mg  200 mg       Allergies:  Depakote [valproic acid]    Psychosocial History:  Housing / Facility: 1 Story House  Steps Into Home: 1  Lives with - Patient's Self Care Capacity: Adult Children  Equipment Owned: Front-Wheel Walker     Prior Level of Function / Living  "Situation:   Physical Therapy: Prior Services: Unable To Determine At This Time  Housing / Facility: [facility]  Steps Into Home: 1  Equipment Owned: Front-Wheel Walker  Lives with - Patient's Self Care Capacity: Adult Children  Bed Mobility: Independent  Transfer Status: Independent  Ambulation: Independent  Assistive Devices Used: Front-Wheel Walker  Stairs: Independent  Current Level of Function:   Gait Level Of Assist: Minimal Assist  Assistive Device: Front Wheel Walker  Distance (Feet): 3  Deviation: Bradykinetic, Shuffled Gait  # of Stairs Climbed: 0  Weight Bearing Status: no restrictions  Supine to Sit: Minimal Assist  Sit to Supine: Standby Assist  Scooting: Standby Assist  Rolling: Standby Assist  Sit to Stand: Minimal Assist  Bed, Chair, Wheelchair Transfer: Minimal Assist  Toilet Transfers: Minimal Assist  Transfer Method: Stand Step  Sitting in Chair: 5  Sitting Edge of Bed: 8 min  Standin min  Occupational Therapy:   Self Feeding: Independent  Grooming / Hygiene: Independent  Bathing: Independent  Dressing: Independent  Toileting: Independent  Medication Management: Unable To Determine At This Time  Prior Services: Unable To Determine At This Time  Housing / Facility: [address] [place] [address]  Current Level of Function:   Eating: Supervision  Upper Body Dressing: Minimal Assist  Lower Body Dressing: Maximal Assist  Toileting: Moderate Assist    CURRENT LEVEL OF FUNCTION:   Same as level of function prior to admission to [facility]    Physical Examination:     VITAL SIGNS:   height is 1.646 m (5' 4.8\") and weight is 60.3 kg (133 lb). Her oral temperature is 36.7 °C (98 °F). Her blood pressure is 135/68 and her pulse is 77. Her respiration is 16 and oxygen saturation is 97%.   GENERAL: No apparent distress  HEENT: EOMI and PERRL; bruising left orbit  CARDIAC: Regular rate and rhythm, normal S1, S2   LUNGS: Clear to auscultation   ABDOMINAL: bowel sounds present, soft, and nontender  "   EXTREMITIES: no contractures, spasticity, or edema.  NEURO:  Mental status: answers questions appropriately follows commands  Speech: fluent, no aphasia or dysarthria  CRANIAL NERVES: face symmetric    Motor:    Bradykinetic  5/5 BUE  5/5 BLE  Sensory: intact to light touch through out  DTRs: 2+ in bilateral biceps and patellar tendons      Radiology:  CT Head 2/9/24     IMPRESSION:     1.  Similar appearing left temporal and occipital subarachnoid hemorrhage and/or hemorrhagic contusion.  2.  Chronic appearing right frontal lobe infarct again noted.    Laboratory Values:  Recent Labs     02/12/24 0326 02/13/24  0721 02/14/24  0148   SODIUM 140 138 137   POTASSIUM 3.5* 4.3 4.5   CHLORIDE 106 105 102   CO2 22 25 26   GLUCOSE 116* 141* 439*   BUN 16 12 14   CREATININE 0.60 0.52 0.58   CALCIUM 8.7 8.9 8.9     Recent Labs     02/12/24 0326 02/13/24 0721 02/14/24 0148   WBC 5.0 5.8 3.9*   RBC 3.48* 3.71* 3.75*   HEMOGLOBIN 9.8* 10.4* 10.6*   HEMATOCRIT 31.8* 34.6* 33.7*   MCV 91.4 93.3 89.9   MCH 28.2 28.0 28.3   MCHC 30.8* 30.1* 31.5*   RDW 51.5* 53.3* 51.1*   PLATELETCT 371 369 369   MPV 10.8 10.8 10.7           Primary Rehabilitation Diagnosis:    This patient is a 73 y.o. female admitted for acute inpatient rehabilitation with Traumatic brain injury with new neurocognitive deficit (HCC).    Impairments:   Cognitive  ADLs/IADLs  Mobility  Speech    Secondary Diagnosis/Medical Co-morbidities Affecting Function  HTN  HLD  Anemia  Leukopenia  DM1 with hyperglycemia  Hypothyroidism  Hypophosphatemia  BPD1  Glaucoma    Relevant Changes Since Preadmission Evaluation:    Status unchanged    The patient's rehabilitation potential is Good  The patient's medical prognosis is fair    Rehabilitation Plan:   Discussion and Recommendations:   1. The patient requires an acute inpatient rehabilitation program with a coordinated program of care at an intensity and frequency not available at a lower level of care. This  recommendation is substantiated by the patient's medical physicians who recommend that the patient's intervention and assessment of medical issues needs to be done at an acute level of care for patient's safety and maximum outcome.   2. A coordinated program of care will be supplied by an interdisciplinary team of physical therapy, occupational therapy, rehab physician, rehab nursing, and, if needed, speech therapy and rehab psychology. Rehab team presents a patient-specific rehabilitation and education program concentrating on prevention of future problems related to accessibility, mobility, skin, bowel, bladder, sexuality, and psychosocial and medical/surgical problems.   3. Need for Rehabilitation Physician: The rehab physician will be evaluating the patient on a multi-weekly basis to help coordinate the program of care. The rehab physician communicates between medical physicians, therapists, and nurses to maximize the patient's potential outcome. Specific areas in which the rehab physician will be providing daily assessment include the following:   A. Assessing the patient's heart rate and blood pressure response (vitals monitoring) to activity and making adjustments in medications or conservative measures as needed.   B. The rehab physician will be assessing the frequency at which the program can be increased to allow the patient to reach optimal functional outcome.   C. The rehab physician will also provide assessments in daily skin care, especially in light of patient's impairments in mobility.   D. The rehab physician will provide special expertise in understanding how to work with functional impairment and recommend appropriate interventions, compensatory techniques, and education that will facilitate the patient's outcome.   4. Rehab R.N.   The rehab RN will be working with patient to carry over in room mobility and activities of daily living when the patient is not in 3 hours of skilled therapy. Rehab  nursing will be working in conjunction with rehab physician to address all the medical issues above and continue to assess laboratory work and discuss abnormalities with the treating physicians, assess vitals, and response to activity, and discuss and report abnormalities with the rehab physician. Rehab RN will also continue daily skin care, supervise bladder/bowel program, instruct in medication administration, and ensure patient safety.   5. Rehab Therapy: Therapies to treat at intensity and frequency of (may change after completion of evaluation by all therapeutic disciplines):       PT:  Physical therapy to address mobility, transfer, gait training and evaluation for adaptive equipment needs 1 hour/day at least 5 days/week for the duration of the ELOS (see below)       OT:  Occupational therapy to address ADLs, self-care, home management training, functional mobility/transfers and assistive device evaluation, and community re-integration 1  hour/day at least 5 days/week for the duration of the ELOS (see below).        ST/Dysphagia:  Speech therapy to address speech, language, and cognitive deficits as well as swallowing difficulties with retraining/dysphagia management and community re-integration with comprehension, expression, cognitive training 1  hour/day at least 5 days/week for the duration of the ELOS (see below).     Medical management / Rehabilitation Issues/ Adverse Potential as part of rehabilitation plan     Rehabilitation Issues/Adverse Potential  1. TBI (Traumatic Brain Injury): GLF on 2/9/24 with left sided subarachnoid hemorrhage s/p monitoring with serial CTs. Patient demonstrates functional deficits in cognition, behavior, strength, balance, coordination, and ADL's. The patient requires therapy to correct these deficits prior to discharge. Patient is admitted to [facility] for comprehensive rehabilitation therapy, including physical, occupational and speech therapy.      Rehabilitation nursing monitors bowel and bladder control, educates on medication administration, co-morbidities and monitors patient safety.    2.  Neurostimulants: None at this time but continue to assess daily for need to initiate should status change.    3.  DVT prophylaxis:  Patient is on hold for anticoagulation upon transfer. Encourage OOB. Monitor daily for signs and symptoms of DVT including but not limited to swelling and pain to prevent the development of DVT that may interfere with therapies.    4.  GI prophylaxis:  On prilosec to prevent gastritis/dyspepsia which may interfere with therapies.    5.  Pain: No issues with pain currently / Controlled with APAP/Oxycodone    6.  Nutrition/Dysphagia: Dietician monitors nutrient intake, recommend supplements prn and provide nutrition education to pt/family to promote optimal nutrition for wound healing/recovery.     7.  Bladder/bowel:  Start bowel and bladder program as described below, to prevent constipation, urinary retention (which may lead to UTI), and urinary incontinence (which will impact upon pt's functional independence).   - Post void bladder scans, I&O cath for PVRs >400  - up to commode after meal     8.  Skin/dermal ulcer prophylaxis: Monitor for new skin conditions with q.2 h. turns as required to prevent the development of skin breakdown.     9.  Cognition/Behavior: As needed psychologist provides adjustment counseling to illness and psychosocial barriers that may be potential barriers to rehabilitation.     10. Respiratory therapy: RT performs O2 management prn, breathing retraining, pulmonary hygiene and bronchospasm management prn to optimize participation in therapies.     Medical Co-Morbidities/Adverse Potential Affecting Function:  TBI (Traumatic Brain Injury): GLF on 2/9/24 with left sided subarachnoid hemorrhage s/p monitoring with serial CTs.   -PT and OT for mobility and ADLs. Per guidelines, 15 hours per week between PT, OT and/or  SLP.  -Follow-up NSG. On 7 days of Keppra for seizure prophylaxis. Hold AC until 2/19    HTN- Patient on Amlodipine 5 mg and Losartan 100 mg daily    Hx of PE - Previously on Eliquis. On hold for 10 days.     HLD - Patient on Atorvastatin 80 mg QHS    Anemia - Check AM CBC    Leukopenia - Check AM CBC    DM1 with hyperglycemia - Patient on Lantus 15 U and SSI on transfer    Hypothyroidism - Patient on Levothyroxine 25 mcg daily    Hypophosphatemia - Check AM level    BPD1 - Patient on Lamictal 100 mg daily, Primidone 100 mg QHS, Tegretol 200 mg BID, Buspar 5 mg BID, Vraylar 6 mg QHS, and Lamictal 100 mg daily    Glaucoma - Patient on Timolol QHS    Pain - Patient on PRN Tylenol and Oxycodone    Skin - Patient at risk for skin breakdown due to debility in areas including sacrum, achilles, elbows and head in addition to other sites. Nursing to assess skin daily.     GI Ppx - Patient on Prilosec for GERD prophylaxis. Patient on Senna-docusate for constipation prophylaxis.        DVT Ppx - Patient Eliquis is on hold on transfer. To restart 2/19    I personally performed a complete drug regimen review and no potential clinically significant medication issues were identified.     Goals/Expected Level of Function Based on Current Medical and Functional Status:  (may change based on patient's medical status and rate of impairment recovery)  Transfers:   Supervision  Mobility/Gait:   Supervision  ADL's:   Supervision  Cognition:  supervs    DISPOSITION: Discharge to pre-morbid independent living setting with the supportive care of patient's family.    ELOS: 10-17 days  ____________________________________    T. Deven Gregory MD/PhD  [facility] - Physical Medicine & Rehabilitation   [facility] - Brain Injury Medicine   ____________________________________    Pt was seen today for 75 min, and entire time spent in face-to-face contact was >50% in counseling and coordination of care as detailed in A/P above.

## 2024-02-14 NOTE — CARE PLAN
The patient is Stable - Low risk of patient condition declining or worsening    Shift Goals  Clinical Goals: Monitor neuro status, safety, mobilization  Patient Goals: Rest  Family Goals: BINTA    Progress made toward(s) clinical / shift goals:  Pt AAOx4 throughout shift. Ambulates with x1 assist with FFW. Denies pain throughout shift. Up to chair for meals. POC discussed with pt, plan for [facility] tomorrow morning.     Patient is not progressing towards the following goals:

## 2024-02-14 NOTE — CARE PLAN
The patient is Stable - Low risk of patient condition declining or worsening    Shift Goals  Clinical Goals: Stable neuro status, safety  Patient Goals: Sleep  Family Goals: BINTA    Progress made toward(s) clinical / shift goals:      Problem: Neuro Status  Goal: Neuro status will remain stable or improve  Outcome: Progressing  Note: Q4h neuro checks.      Problem: Fall Risk  Goal: Patient will remain free from falls  Outcome: Progressing     Problem: Knowledge Deficit - Standard  Goal: Patient and family/care givers will demonstrate understanding of plan of care, disease process/condition, diagnostic tests and medications  Outcome: Progressing       Patient is not progressing towards the following goals:

## 2024-02-14 NOTE — PROGRESS NOTES
Geriatric Medicine Daily Progress Note      Date of Service  2/14/2024    Chief Complaint  73 y.o. female admitted 2/9/2024 with SAH    Hospital Course  Admitted with subarachnoid hemorrhage after a fall.  Patient was on anticoagulation with Eliquis for recent pulmonary embolism.  Trauma surgery admitted the patient to ICU.  Neurosurgery was consulted on the case.    Interval Problem Update  SAH - denies headache  Hypertension - sbp 116-152  PE -denies chest pain or shortness of breath  Diabetes -     Consultants/Specialty  Trauma surgery  Neurosurgery    Code Status  Full     Disposition  Postacute placement - Rehab    Review of Systems  Review of Systems   Constitutional:  Positive for malaise/fatigue. Negative for chills and fever.   HENT:  Negative for congestion, hearing loss and sore throat.    Eyes:  Negative for blurred vision.   Respiratory:  Negative for cough, shortness of breath and wheezing.    Cardiovascular:  Negative for chest pain, palpitations and leg swelling.   Gastrointestinal:  Negative for abdominal pain, diarrhea, heartburn, nausea and vomiting.   Genitourinary:  Negative for dysuria, flank pain and hematuria.   Musculoskeletal:  Negative for back pain, joint pain, myalgias and neck pain.   Skin:  Negative for rash.   Neurological:  Positive for weakness. Negative for dizziness, sensory change, speech change, focal weakness and headaches.   Psychiatric/Behavioral:  The patient is nervous/anxious.         Physical Exam  Temp:  [36.1 °C (97 °F)-36.7 °C (98 °F)] 36.7 °C (98 °F)  Pulse:  [67-92] 77  Resp:  [16-18] 16  BP: (116-152)/(51-79) 135/68  SpO2:  [92 %-98 %] 97 %    Physical Exam  Vitals and nursing note reviewed.   HENT:      Head: Normocephalic.      Nose: No congestion.      Mouth/Throat:      Mouth: Mucous membranes are moist.   Eyes:      Extraocular Movements: Extraocular movements intact.      Conjunctiva/sclera: Conjunctivae normal.      Comments: Periorbital ecchymosis    Cardiovascular:      Rate and Rhythm: Normal rate and regular rhythm.      Heart sounds: Murmur heard.   Pulmonary:      Effort: Pulmonary effort is normal.      Breath sounds: Normal breath sounds.   Abdominal:      General: There is no distension.      Tenderness: There is no abdominal tenderness. There is no guarding or rebound.   Musculoskeletal:      Cervical back: No tenderness.      Right lower leg: Edema present.      Left lower leg: Edema present.   Skin:     General: Skin is warm and dry.   Neurological:      General: No focal deficit present.      Mental Status: She is alert and oriented to person, place, and time.      Cranial Nerves: No cranial nerve deficit.   Psychiatric:         Mood and Affect: Mood is anxious.         Speech: Speech is delayed.         Cognition and Memory: She exhibits impaired recent memory.         CAM  Acute onset and fluctuating course   No   Inattention                                         No   Disorganized thinking                        No   Altered level of consciousness          No     Medication Review    Yes    Laboratory  Recent Labs     02/12/24  0326 02/13/24  0721 02/14/24  0148   WBC 5.0 5.8 3.9*   RBC 3.48* 3.71* 3.75*   HEMOGLOBIN 9.8* 10.4* 10.6*   HEMATOCRIT 31.8* 34.6* 33.7*   MCV 91.4 93.3 89.9   MCH 28.2 28.0 28.3   MCHC 30.8* 30.1* 31.5*   RDW 51.5* 53.3* 51.1*   PLATELETCT 371 369 369   MPV 10.8 10.8 10.7     Recent Labs     02/12/24  0326 02/13/24  0721 02/14/24  0148   SODIUM 140 138 137   POTASSIUM 3.5* 4.3 4.5   CHLORIDE 106 105 102   CO2 22 25 26   GLUCOSE 116* 141* 439*   BUN 16 12 14   CREATININE 0.60 0.52 0.58   CALCIUM 8.7 8.9 8.9                   Imaging  EC-ECHOCARDIOGRAM COMPLETE W/O CONT   Final Result      US-TRAUMA VEIN SCREEN LOWER BILAT EXTREMITY   Final Result      CT-HEAD W/O   Final Result      1.  Similar appearing left temporal and occipital subarachnoid hemorrhage and/or hemorrhagic contusion.   2.  Chronic appearing right  frontal lobe infarct again noted.      CT-HEAD W/O   Final Result      1.  LEFT posterior temporal and occipital subarachnoid hemorrhage.   2.  No significant mass effect or midline shift.   3.  Small chronic RIGHT frontal infarct.   4.  Diffuse atrophy.      Based solely on CT findings, the brain injury guideline category is mBIG 2.      Nondisplaced skull fx   SDH 4.1-7.9mm   IPH 4.1-7.9mm   SAH 1 hemisphere, >3 sulci, 1-3mm      The original BIG retrospective analysis found radiographic progression in 0% of BIG 1 patients and 2.6% BIG 2.      These findings were electronically conveyed to and received by HOLLAND CONNELL on 2/9/2024 5:29 PM.           Assessment/Plan  * Trauma- (present on admission)  Assessment & Plan  Trauma surgery -primary service    Fall- (present on admission)  Assessment & Plan  PT and OT    PE (pulmonary thromboembolism) (HCC)- (present on admission)  Assessment & Plan  Resume Eliquis when cleared by neurosurgery  Echocardiogram reviewed    Subarachnoid hemorrhage without loss of consciousness (HCC)- (present on admission)  Assessment & Plan  Neurochecks  Keppra for seizure prophylaxis    Hypophosphatemia- (present on admission)  Assessment & Plan  Recommend Naphos    Hypomagnesemia- (present on admission)  Assessment & Plan  Recommend oral Mg    Hypokalemia- (present on admission)  Assessment & Plan  Follow bmp    History of CVA (cerebrovascular accident)- (present on admission)  Assessment & Plan  Lipitor  Resume ASA when cleared by neurosurgery    Anemia- (present on admission)  Assessment & Plan  Follow cbc    Encounter for geriatric assessment- (present on admission)  Assessment & Plan  Geriatric medicine following    Current use of long term anticoagulation- (present on admission)  Assessment & Plan  Secondary to pulm embolism    Other specified hypothyroidism- (present on admission)  Assessment & Plan  Levothyroxine    Malignant neoplasm of left female breast (HCC)- (present on  admission)  Assessment & Plan  History of  Was on Arimidex    Essential hypertension- (present on admission)  Assessment & Plan  Losartan, Norvasc    Type 1 diabetes mellitus without complication (HCC)- (present on admission)  Assessment & Plan  Lantus, SSI    Bipolar 1 disorder (HCC)- (present on admission)  Assessment & Plan  Cariprazine, Lamictal, Tegretol, BuSpar    Dyslipidemia- (present on admission)  Assessment & Plan  Lipitor         VTE prophylaxis: SCD

## 2024-02-15 ENCOUNTER — APPOINTMENT (OUTPATIENT)
Dept: OCCUPATIONAL THERAPY | Facility: REHABILITATION | Age: 74
DRG: 950 | End: 2024-02-15
Attending: PHYSICAL MEDICINE & REHABILITATION
Payer: MEDICARE

## 2024-02-15 ENCOUNTER — APPOINTMENT (OUTPATIENT)
Dept: SPEECH THERAPY | Facility: REHABILITATION | Age: 74
DRG: 950 | End: 2024-02-15
Attending: PHYSICAL MEDICINE & REHABILITATION
Payer: MEDICARE

## 2024-02-15 ENCOUNTER — APPOINTMENT (OUTPATIENT)
Dept: PHYSICAL THERAPY | Facility: REHABILITATION | Age: 74
DRG: 950 | End: 2024-02-15
Attending: PHYSICAL MEDICINE & REHABILITATION
Payer: MEDICARE

## 2024-02-15 LAB
25(OH)D3 SERPL-MCNC: 17 NG/ML (ref 30–100)
ALBUMIN SERPL BCP-MCNC: 3.6 G/DL (ref 3.2–4.9)
ALBUMIN/GLOB SERPL: 1.2 G/DL
ALP SERPL-CCNC: 172 U/L (ref 30–99)
ALT SERPL-CCNC: 31 U/L (ref 2–50)
ANION GAP SERPL CALC-SCNC: 14 MMOL/L (ref 7–16)
AST SERPL-CCNC: 31 U/L (ref 12–45)
BASOPHILS # BLD AUTO: 1.4 % (ref 0–1.8)
BASOPHILS # BLD: 0.08 K/UL (ref 0–0.12)
BILIRUB SERPL-MCNC: 0.2 MG/DL (ref 0.1–1.5)
BUN SERPL-MCNC: 14 MG/DL (ref 8–22)
CALCIUM ALBUM COR SERPL-MCNC: 9.8 MG/DL (ref 8.5–10.5)
CALCIUM SERPL-MCNC: 9.5 MG/DL (ref 8.5–10.5)
CHLORIDE SERPL-SCNC: 99 MMOL/L (ref 96–112)
CO2 SERPL-SCNC: 24 MMOL/L (ref 20–33)
CREAT SERPL-MCNC: 0.72 MG/DL (ref 0.5–1.4)
EOSINOPHIL # BLD AUTO: 0.2 K/UL (ref 0–0.51)
EOSINOPHIL NFR BLD: 3.5 % (ref 0–6.9)
ERYTHROCYTE [DISTWIDTH] IN BLOOD BY AUTOMATED COUNT: 50.4 FL (ref 35.9–50)
EST. AVERAGE GLUCOSE BLD GHB EST-MCNC: 212 MG/DL
GFR SERPLBLD CREATININE-BSD FMLA CKD-EPI: 87 ML/MIN/1.73 M 2
GLOBULIN SER CALC-MCNC: 2.9 G/DL (ref 1.9–3.5)
GLUCOSE BLD STRIP.AUTO-MCNC: 153 MG/DL (ref 65–99)
GLUCOSE BLD STRIP.AUTO-MCNC: 185 MG/DL (ref 65–99)
GLUCOSE BLD STRIP.AUTO-MCNC: 258 MG/DL (ref 65–99)
GLUCOSE BLD STRIP.AUTO-MCNC: 529 MG/DL (ref 65–99)
GLUCOSE BLD STRIP.AUTO-MCNC: 542 MG/DL (ref 65–99)
GLUCOSE BLD STRIP.AUTO-MCNC: 580 MG/DL (ref 65–99)
GLUCOSE SERPL-MCNC: 427 MG/DL (ref 65–99)
HBA1C MFR BLD: 9 % (ref 4–5.6)
HCT VFR BLD AUTO: 36.4 % (ref 37–47)
HGB BLD-MCNC: 11.4 G/DL (ref 12–16)
IMM GRANULOCYTES # BLD AUTO: 0.01 K/UL (ref 0–0.11)
IMM GRANULOCYTES NFR BLD AUTO: 0.2 % (ref 0–0.9)
LYMPHOCYTES # BLD AUTO: 2.22 K/UL (ref 1–4.8)
LYMPHOCYTES NFR BLD: 38.6 % (ref 22–41)
MCH RBC QN AUTO: 28.1 PG (ref 27–33)
MCHC RBC AUTO-ENTMCNC: 31.3 G/DL (ref 32.2–35.5)
MCV RBC AUTO: 89.7 FL (ref 81.4–97.8)
MONOCYTES # BLD AUTO: 0.51 K/UL (ref 0–0.85)
MONOCYTES NFR BLD AUTO: 8.9 % (ref 0–13.4)
NEUTROPHILS # BLD AUTO: 2.73 K/UL (ref 1.82–7.42)
NEUTROPHILS NFR BLD: 47.4 % (ref 44–72)
NRBC # BLD AUTO: 0 K/UL
NRBC BLD-RTO: 0 /100 WBC (ref 0–0.2)
PHOSPHATE SERPL-MCNC: 2.4 MG/DL (ref 2.5–4.5)
PLATELET # BLD AUTO: 400 K/UL (ref 164–446)
PMV BLD AUTO: 11 FL (ref 9–12.9)
POTASSIUM SERPL-SCNC: 4 MMOL/L (ref 3.6–5.5)
PROT SERPL-MCNC: 6.5 G/DL (ref 6–8.2)
RBC # BLD AUTO: 4.06 M/UL (ref 4.2–5.4)
SODIUM SERPL-SCNC: 137 MMOL/L (ref 135–145)
WBC # BLD AUTO: 5.8 K/UL (ref 4.8–10.8)

## 2024-02-15 PROCEDURE — 36415 COLL VENOUS BLD VENIPUNCTURE: CPT

## 2024-02-15 PROCEDURE — 97535 SELF CARE MNGMENT TRAINING: CPT

## 2024-02-15 PROCEDURE — 97530 THERAPEUTIC ACTIVITIES: CPT

## 2024-02-15 PROCEDURE — A9270 NON-COVERED ITEM OR SERVICE: HCPCS | Performed by: HOSPITALIST

## 2024-02-15 PROCEDURE — 82306 VITAMIN D 25 HYDROXY: CPT

## 2024-02-15 PROCEDURE — 84100 ASSAY OF PHOSPHORUS: CPT

## 2024-02-15 PROCEDURE — 700102 HCHG RX REV CODE 250 W/ 637 OVERRIDE(OP): Performed by: HOSPITALIST

## 2024-02-15 PROCEDURE — 92523 SPEECH SOUND LANG COMPREHEN: CPT

## 2024-02-15 PROCEDURE — 99233 SBSQ HOSP IP/OBS HIGH 50: CPT | Performed by: PHYSICAL MEDICINE & REHABILITATION

## 2024-02-15 PROCEDURE — 700102 HCHG RX REV CODE 250 W/ 637 OVERRIDE(OP): Performed by: PHYSICAL MEDICINE & REHABILITATION

## 2024-02-15 PROCEDURE — 85025 COMPLETE CBC W/AUTO DIFF WBC: CPT

## 2024-02-15 PROCEDURE — 770010 HCHG ROOM/CARE - REHAB SEMI PRIVAT*

## 2024-02-15 PROCEDURE — A9270 NON-COVERED ITEM OR SERVICE: HCPCS | Performed by: PHYSICAL MEDICINE & REHABILITATION

## 2024-02-15 PROCEDURE — 97165 OT EVAL LOW COMPLEX 30 MIN: CPT

## 2024-02-15 PROCEDURE — 82962 GLUCOSE BLOOD TEST: CPT | Mod: 91

## 2024-02-15 PROCEDURE — 97162 PT EVAL MOD COMPLEX 30 MIN: CPT

## 2024-02-15 PROCEDURE — 83036 HEMOGLOBIN GLYCOSYLATED A1C: CPT

## 2024-02-15 PROCEDURE — 99222 1ST HOSP IP/OBS MODERATE 55: CPT | Performed by: HOSPITALIST

## 2024-02-15 PROCEDURE — 80053 COMPREHEN METABOLIC PANEL: CPT

## 2024-02-15 PROCEDURE — 97116 GAIT TRAINING THERAPY: CPT

## 2024-02-15 RX ORDER — VITAMIN B COMPLEX
1000 TABLET ORAL DAILY
Status: DISCONTINUED | OUTPATIENT
Start: 2024-02-15 | End: 2024-02-28 | Stop reason: HOSPADM

## 2024-02-15 RX ORDER — INSULIN LISPRO 100 [IU]/ML
2-12 INJECTION, SOLUTION INTRAVENOUS; SUBCUTANEOUS
Status: DISCONTINUED | OUTPATIENT
Start: 2024-02-15 | End: 2024-02-28 | Stop reason: HOSPADM

## 2024-02-15 RX ORDER — DEXTROSE MONOHYDRATE 25 G/50ML
25 INJECTION, SOLUTION INTRAVENOUS
Status: DISCONTINUED | OUTPATIENT
Start: 2024-02-15 | End: 2024-02-28 | Stop reason: HOSPADM

## 2024-02-15 RX ADMIN — CARBAMAZEPINE 200 MG: 100 TABLET, CHEWABLE ORAL at 20:38

## 2024-02-15 RX ADMIN — BUSPIRONE HYDROCHLORIDE 5 MG: 5 TABLET ORAL at 08:07

## 2024-02-15 RX ADMIN — PRIMIDONE 100 MG: 50 TABLET ORAL at 20:38

## 2024-02-15 RX ADMIN — BUSPIRONE HYDROCHLORIDE 5 MG: 5 TABLET ORAL at 20:39

## 2024-02-15 RX ADMIN — LEVOTHYROXINE SODIUM 25 MCG: 0.03 TABLET ORAL at 05:46

## 2024-02-15 RX ADMIN — DIBASIC SODIUM PHOSPHATE, MONOBASIC POTASSIUM PHOSPHATE AND MONOBASIC SODIUM PHOSPHATE 500 MG: 852; 155; 130 TABLET ORAL at 20:36

## 2024-02-15 RX ADMIN — DIBASIC SODIUM PHOSPHATE, MONOBASIC POTASSIUM PHOSPHATE AND MONOBASIC SODIUM PHOSPHATE 500 MG: 852; 155; 130 TABLET ORAL at 16:39

## 2024-02-15 RX ADMIN — INSULIN LISPRO 9 UNITS: 100 INJECTION, SOLUTION INTRAVENOUS; SUBCUTANEOUS at 11:44

## 2024-02-15 RX ADMIN — INSULIN LISPRO 9 UNITS: 100 INJECTION, SOLUTION INTRAVENOUS; SUBCUTANEOUS at 07:50

## 2024-02-15 RX ADMIN — TIMOLOL MALEATE 1 DROP: 5 SOLUTION OPHTHALMIC at 20:44

## 2024-02-15 RX ADMIN — INSULIN LISPRO 2 UNITS: 100 INJECTION, SOLUTION INTRAVENOUS; SUBCUTANEOUS at 17:13

## 2024-02-15 RX ADMIN — LAMOTRIGINE 100 MG: 100 TABLET ORAL at 08:44

## 2024-02-15 RX ADMIN — INSULIN LISPRO 2 UNITS: 100 INJECTION, SOLUTION INTRAVENOUS; SUBCUTANEOUS at 20:25

## 2024-02-15 RX ADMIN — LEVETIRACETAM 500 MG: 500 TABLET, FILM COATED ORAL at 08:08

## 2024-02-15 RX ADMIN — AMLODIPINE BESYLATE 5 MG: 5 TABLET ORAL at 20:37

## 2024-02-15 RX ADMIN — CARBAMAZEPINE 200 MG: 100 TABLET, CHEWABLE ORAL at 08:08

## 2024-02-15 RX ADMIN — INSULIN GLARGINE-YFGN 15 UNITS: 100 INJECTION, SOLUTION SUBCUTANEOUS at 08:03

## 2024-02-15 RX ADMIN — CARIPRAZINE 6 MG: 3 CAPSULE, GELATIN COATED ORAL at 20:37

## 2024-02-15 RX ADMIN — LEVETIRACETAM 500 MG: 500 TABLET, FILM COATED ORAL at 20:36

## 2024-02-15 RX ADMIN — ATORVASTATIN CALCIUM 80 MG: 40 TABLET, FILM COATED ORAL at 20:36

## 2024-02-15 RX ADMIN — Medication 1000 UNITS: at 16:39

## 2024-02-15 RX ADMIN — LOSARTAN POTASSIUM 100 MG: 50 TABLET, FILM COATED ORAL at 08:44

## 2024-02-15 RX ADMIN — OMEPRAZOLE 20 MG: 20 CAPSULE, DELAYED RELEASE ORAL at 08:45

## 2024-02-15 RX ADMIN — ACETAMINOPHEN 1000 MG: 500 TABLET ORAL at 20:38

## 2024-02-15 RX ADMIN — DOCUSATE SODIUM 50MG AND SENNOSIDES 8.6MG 2 TABLET: 8.6; 5 TABLET, FILM COATED ORAL at 08:07

## 2024-02-15 SDOH — ECONOMIC STABILITY: TRANSPORTATION INSECURITY
IN THE PAST 12 MONTHS, HAS LACK OF RELIABLE TRANSPORTATION KEPT YOU FROM MEDICAL APPOINTMENTS, MEETINGS, WORK OR FROM GETTING THINGS NEEDED FOR DAILY LIVING?: NO

## 2024-02-15 SDOH — ECONOMIC STABILITY: TRANSPORTATION INSECURITY
IN THE PAST 12 MONTHS, HAS THE LACK OF TRANSPORTATION KEPT YOU FROM MEDICAL APPOINTMENTS OR FROM GETTING MEDICATIONS?: NO

## 2024-02-15 ASSESSMENT — BRIEF INTERVIEW FOR MENTAL STATUS (BIMS)
ASKED TO RECALL BED: YES, NO CUE REQUIRED
BIMS SUMMARY SCORE: 12
INITIAL REPETITION OF BED BLUE SOCK - FIRST ATTEMPT: 2
WHAT DAY OF THE WEEK IS IT: CORRECT
WHAT YEAR IS IT: CORRECT
WHAT DAY OF THE WEEK IS IT: INCORRECT
WHAT YEAR IS IT: CORRECT
ASKED TO RECALL SOCK: YES, NO CUE REQUIRED
BIMS SUMMARY SCORE: 11
WHAT MONTH IS IT: ACCURATE WITHIN 5 DAYS
ASKED TO RECALL BLUE: NO, COULD NOT RECALL
ASKED TO RECALL BLUE: YES, AFTER CUEING (A COLOR")"
INITIAL REPETITION OF BED BLUE SOCK - FIRST ATTEMPT: 3
WHAT MONTH IS IT: ACCURATE WITHIN 5 DAYS
ASKED TO RECALL BED: NO, COULD NOT RECALL
ASKED TO RECALL SOCK: YES, NO CUE REQUIRED

## 2024-02-15 ASSESSMENT — ACTIVITIES OF DAILY LIVING (ADL)
BED_CHAIR_WHEELCHAIR_TRANSFER_DESCRIPTION: INCREASED TIME;VERBAL CUEING;SUPERVISION FOR SAFETY
BED_CHAIR_WHEELCHAIR_TRANSFER_DESCRIPTION: INCREASED TIME;SUPERVISION FOR SAFETY
TOILETING: INDEPENDENT
TOILETING_LEVEL_OF_ASSIST_DESCRIPTION: ASSIST FOR STANDING BALANCE;GRAB BAR;INCREASED TIME;SUPERVISION FOR SAFETY
TOILET_TRANSFER_DESCRIPTION: GRAB BAR;SUPERVISION FOR SAFETY;VERBAL CUEING;SET-UP OF EQUIPMENT
TUB_SHOWER_TRANSFER_DESCRIPTION: GRAB BAR;SHOWER BENCH;SUPERVISION FOR SAFETY;VERBAL CUEING

## 2024-02-15 ASSESSMENT — GAIT ASSESSMENTS
DISTANCE (FEET): 20
ASSISTIVE DEVICE: FRONT WHEEL WALKER
GAIT LEVEL OF ASSIST: CONTACT GUARD ASSIST
DEVIATION: BRADYKINETIC;DECREASED HEEL STRIKE;DECREASED TOE OFF;DECREASED BASE OF SUPPORT

## 2024-02-15 ASSESSMENT — PATIENT HEALTH QUESTIONNAIRE - PHQ9
SUM OF ALL RESPONSES TO PHQ9 QUESTIONS 1 AND 2: 0
2. FEELING DOWN, DEPRESSED, IRRITABLE, OR HOPELESS: NOT AT ALL
1. LITTLE INTEREST OR PLEASURE IN DOING THINGS: NOT AT ALL

## 2024-02-15 NOTE — PROGRESS NOTES
NURSING DAILY NOTE    Name: Molly Loera   Date of Admission: 2/14/2024   Admitting Diagnosis: Traumatic brain injury with new neurocognitive deficit (HCC)  Attending Physician: Mazin Gregory M.d.  Allergies: Depakote [valproic acid]    Safety  Patient Assist     Patient Precautions     Precaution Comments     Bed Transfer Status     Toilet Transfer Status      Assistive Devices  Wheelchair  Oxygen  None - Room Air  Diet/Therapeutic Dining  Current Diet Order   Procedures    Diet Order Diet: Consistent CHO (Diabetic)     Pill Administration  whole and one at a time   Agitated Behavioral Scale     ABS Level of Severity       Fall Risk  Has the patient had a fall this admission?   No  Prema Amaya Fall Risk Scoring  13, MODERATE RISK  Fall Risk Safety Measures  bed alarm, chair alarm, poor balance, and low vision/ hearing    Vitals  Temperature: 36.6 °C (97.9 °F)  Temp src: Oral  Pulse: 83  Respiration: 18  Blood Pressure : (!) 141/69  Blood Pressure MAP (Calculated): 93 MM HG  BP Location: Right, Upper Arm  Patient BP Position: Supine     Oxygen  Pulse Oximetry: 91 %  O2 Delivery Device: None - Room Air    Bowel and Bladder  Last Bowel Movement  02/14/24  Stool Type  Type 5: Soft blob with clear cut edges (passed easily)  Bowel Device     Continent  Bladder: Stress incontinence   Bowel: Incontinent movement  Bladder Function  Urine Void (mL):  (moderate)  Number of Times Voided: 1  Urine Color: Yellow  Genitourinary Assessment   Bladder Assessment (WDL):  Within Defined Limits  Urine Color: Yellow  Bladder Device: Bathroom  Bladder Scan: Post Void  $ Bladder Scan Results (mL): 1 (0 scan)    Skin  Zach Score   16  Sensory Interventions   Bed Types: Standard/Trauma Mattress with Overlay  Skin Preventative Measures: Pillows in Use for Support / Positioning  Moisture Interventions         Pain  Pain Rating Scale  0 - No Pain  Pain Location      Pain Location Orientation     Pain Interventions        ADLs    Bathing      Linen Change      Personal Hygiene     Chlorhexidine Bath      Oral Care     Teeth/Dentures     Shave     Nutrition Percentage Eaten     Environmental Precautions     Patient Turns/Positioning  Patient Turns Self from Side to Side  Patient Turns Assistance/Tolerance     Bed Positions  Bed Controls On, Bed Locked  Head of Bed Elevated  Self regulated      Psychosocial/Neurologic Assessment  Psychosocial Assessment     Neurologic Assessment  Neuro (WDL): Exceptions to WDL  Level of Consciousness: Alert  Pupil Assesment: Yes  R Pupil Size (mm): 2  R Pupil Shape / Description: Round  R Pupil Reaction: Brisk  L Pupil Size (mm): 2  L Pupil Shape / Description: Round  L Pupil Reaction: Brisk  Motor Function/Sensation Assessment: Sensation (Full sensation in all limbs)  Muscle Strength Right Arm: Good Strength Against Gravity and Moderate Resistance  Muscle Strength Left Arm: Good Strength Against Gravity and Moderate Resistance  Muscle Strength Right Leg: Good Strength Against Gravity and Moderate Resistance  Muscle Strength Left Leg: Good Strength Against Gravity and Moderate Resistance  EENT (WDL):  Within Defined Limits    Cardio/Pulmonary Assessment  Edema      Respiratory Breath Sounds  RUL Breath Sounds: Clear  RML Breath Sounds: Clear  RLL Breath Sounds: Diminished  SHAWNA Breath Sounds: Clear  LLL Breath Sounds: Diminished  Cardiac Assessment   Cardiac (WDL):  WDL Except (HTN)

## 2024-02-15 NOTE — CONSULTS
HOSPITAL MEDICINE CONSULTATION    Requesting Physician:  Dr. Gregory    Reason for Consult:  Hypertension, Diabetes    History of Present Illness:  The patient is a 73-year-old  female with past medical history significant for pulmonary embolism on Eliquis, hypertension, insulin dependent diabetes mellitus, dyslipidemia, hypothyroidism, glaucoma, and bipolar disorder.  She was admitted to [facility] on 2/9/24 following a mechanical ground level fall with head trauma.  She was diagnosed with subarachnoid hemorrhage and conservatively managed with seizure prophylaxis and holding of anticoagulation.  Due to her ongoing functional debility, the patient was transferred to [facility] on 2/14/24.  Hospital Medicine consultation is requested to assist in the management of this patient's HTN and IDDM.  She is also noted to have hypophosphatemia and vitamin D deficiency.    Review of Systems:  Review of Systems   Constitutional:  Negative for chills and fever.   HENT: Negative.     Eyes: Negative.    Respiratory:  Negative for cough and shortness of breath.    Cardiovascular:  Negative for chest pain and palpitations.   Gastrointestinal:  Negative for abdominal pain, nausea and vomiting.   Skin:  Negative for itching and rash.   Endo/Heme/Allergies:  Negative for polydipsia. Does not bruise/bleed easily.   All other systems reviewed and are negative.      Allergies:  Allergies   Allergen Reactions    Depakote [Valproic Acid] Unspecified     Per MAR       Medications:    Current Facility-Administered Medications:     [START ON 2/19/2024] apixaban (Eliquis) tablet 5 mg, 5 mg, Oral, BID, Mazin Gregory M.D.    insulin lispro (HumaLOG,AdmeLOG) injection, 2-12 Units, Subcutaneous, 4X/DAY ACHS, 6 Units at 02/16/24 1134 **AND** POC blood glucose manual result, , , Q AC AND BEDTIME(S) **AND** NOTIFY MD and PharmD, , , Once **AND** Administer 20 grams of glucose  (approximately 8 ounces of fruit juice) every 15 minutes PRN FSBG less than 70 mg/dL, , , PRN **AND** dextrose 50% (D50W) injection 25 g, 25 g, Intravenous, Q15 MIN PRN, Chiqui Bowens M.D.    insulin GLARGINE (Lantus,Semglee) injection, 10 Units, Subcutaneous, BID, Chiqui Bowens M.D., 10 Units at 02/16/24 0820    vitamin D3 (Cholecalciferol) tablet 1,000 Units, 1,000 Units, Oral, DAILY, Chiqui Bowens M.D., 1,000 Units at 02/16/24 0841    phosphorus (K-Phos-Neutral) per tablet 500 mg, 500 mg, Oral, TID, Chiqui Bowens M.D., 500 mg at 02/16/24 0842    Respiratory Therapy Consult, , Nebulization, Continuous RT, Mazin Gregory M.D.    hydrALAZINE (Apresoline) tablet 25 mg, 25 mg, Oral, Q8HRS PRN, Mazin Gregory M.D.    acetaminophen (Tylenol) tablet 650 mg, 650 mg, Oral, Q4HRS PRN, Mazin Gregory M.D.    senna-docusate (Pericolace Or Senokot S) 8.6-50 MG per tablet 2 Tablet, 2 Tablet, Oral, BID, 2 Tablet at 02/15/24 0807 **AND** polyethylene glycol/lytes (Miralax) Packet 1 Packet, 1 Packet, Oral, QDAY PRN, Mazin Gregory M.D.    omeprazole (PriLOSEC) capsule 20 mg, 20 mg, Oral, DAILY, Mazin Gregory M.D., 20 mg at 02/16/24 0842    mag hydrox-al hydrox-simeth (Maalox Plus Es Or Mylanta Ds) suspension 20 mL, 20 mL, Oral, Q2HRS PRN, Mazin Gregory M.D.    ondansetron (Zofran ODT) dispertab 4 mg, 4 mg, Oral, 4X/DAY PRN **OR** ondansetron (Zofran) syringe/vial injection 4 mg, 4 mg, Intramuscular, 4X/DAY PRN, Mazin Gregory M.D.    traZODone (Desyrel) tablet 50 mg, 50 mg, Oral, QHS PRN, Mazin Gregory M.D., 50 mg at 02/14/24 2224    sodium chloride (Ocean) 0.65 % nasal spray 2 Spray, 2 Spray, Nasal, PRN, Mazin Gregory M.D.    oxyCODONE immediate-release (Roxicodone) tablet 5 mg, 5 mg, Oral, Q3HRS PRN **OR** oxyCODONE immediate release (Roxicodone) tablet 10 mg, 10 mg, Oral, Q3HRS PRN, Mazin Gregory M.D.    midazolam (VERSED) 5 mg/mL (1 mL vial),  5 mg, Nasal, PRN, Mazin Gregory M.D.    [COMPLETED] acetaminophen (Tylenol) tablet 1,000 mg, 1,000 mg, Oral, Q6HRS, 1,000 mg at 02/14/24 1326 **FOLLOWED BY** acetaminophen (Tylenol) tablet 1,000 mg, 1,000 mg, Oral, Q6HRS PRN, Mazin Gregory M.D., 1,000 mg at 02/15/24 2038    amLODIPine (Norvasc) tablet 5 mg, 5 mg, Oral, Nightly, Mazin Gregory M.D., 5 mg at 02/15/24 2037    atorvastatin (Lipitor) tablet 80 mg, 80 mg, Oral, Nightly, Mazin Gregory M.D., 80 mg at 02/15/24 2036    busPIRone (Buspar) tablet 5 mg, 5 mg, Oral, BID, Mazin Gregory M.D., 5 mg at 02/16/24 0842    cariprazine (Vraylar) capsule 6 mg, 6 mg, Oral, QHS, Mazin Gregory M.D., 6 mg at 02/15/24 2037    lamoTRIgine (LaMICtal) tablet 100 mg, 100 mg, Oral, DAILY, Mazin Gregory M.D., 100 mg at 02/16/24 0845    levothyroxine (Synthroid) tablet 25 mcg, 25 mcg, Oral, AM ES, Mazin Gregory M.D., 25 mcg at 02/16/24 0537    losartan (Cozaar) tablet 100 mg, 100 mg, Oral, DAILY, Mazin Gregory M.D., 100 mg at 02/16/24 0845    primidone (Mysoline) tablet 100 mg, 100 mg, Oral, QHS, Mazin Gregory M.D., 100 mg at 02/15/24 2038    timolol (Timoptic) 0.5 % ophthalmic solution 1 Drop, 1 Drop, Both Eyes, QHS, Mazin Gregory M.D., 1 Drop at 02/15/24 2044    ziprasidone (Geodon) injection 5 mg, 5 mg, Intramuscular, Q4HRS PRN, Mazin Gregory M.D.    carBAMazepine (TEGretol) chewable tab 200 mg, 200 mg, Oral, BID, Mazin Gregory M.D., 200 mg at 02/16/24 0841    Past Medical/Surgical History:  Past Medical History:   Diagnosis Date    Anxiety     Bipolar 1 disorder (HCC)     Cancer (HCC)     left breast    Depression     Diabetes (HCC)     Kidney disease      No past surgical history on file.    Social History:  Social History     Socioeconomic History    Marital status:      Spouse name: Not on file    Number of children: Not on file     Years of education: Not on file    Highest education level: Not on file   Occupational History    Not on file   Tobacco Use    Smoking status: Never    Smokeless tobacco: Never   Vaping Use    Vaping Use: Never used   Substance and Sexual Activity    Alcohol use: Never    Drug use: Never    Sexual activity: Not Currently   Other Topics Concern    Not on file   Social History Narrative    Not on file     Social Determinants of Health     Financial Resource Strain: Not on file   Food Insecurity: Not on file   Transportation Needs: No Transportation Needs (2/15/2024)    PRAPARE - Transportation     Lack of Transportation (Medical): No     Lack of Transportation (Non-Medical): No   Physical Activity: Not on file   Stress: Not on file   Social Connections: Not on file   Intimate Partner Violence: Not on file   Housing Stability: Not on file       Family History:  Family History   Problem Relation Age of Onset    Diabetes Daughter        Physical Examination:   Vitals:    02/15/24 1648 02/15/24 2037 02/16/24 0538 02/16/24 1058   BP: 104/60 127/69 (!) 140/65 101/58   Pulse: 74  77 88   Resp: 18  18 18   Temp: 36.4 °C (97.6 °F)  36.7 °C (98 °F) 36.9 °C (98.4 °F)   TempSrc: Oral  Oral Oral   SpO2: 95%  94% 98%   Weight:       Height:           Physical Exam  Vitals reviewed.   Constitutional:       General: She is not in acute distress.     Appearance: Normal appearance. She is not ill-appearing.   HENT:      Head:      Comments: Facial ecchymoses under both eyes and across nose bridge     Right Ear: External ear normal.      Left Ear: External ear normal.      Mouth/Throat:      Pharynx: Oropharynx is clear.   Eyes:      General:         Right eye: No discharge.         Left eye: No discharge.      Extraocular Movements: Extraocular movements intact.      Conjunctiva/sclera: Conjunctivae normal.   Cardiovascular:      Rate and Rhythm: Normal rate and regular rhythm.   Pulmonary:      Effort: Pulmonary effort is normal. No  respiratory distress.      Breath sounds: Normal breath sounds. No wheezing.   Abdominal:      General: Bowel sounds are normal. There is no distension.      Palpations: Abdomen is soft.      Tenderness: There is no abdominal tenderness.   Musculoskeletal:      Cervical back: Normal range of motion and neck supple.      Right lower leg: No edema.      Left lower leg: No edema.   Skin:     General: Skin is warm and dry.   Neurological:      Mental Status: She is alert and oriented to person, place, and time.         Laboratory Data:  Recent Labs     02/14/24  0148 02/15/24  0521   WBC 3.9* 5.8   RBC 3.75* 4.06*   HEMOGLOBIN 10.6* 11.4*   HEMATOCRIT 33.7* 36.4*   MCV 89.9 89.7   MCH 28.3 28.1   MCHC 31.5* 31.3*   RDW 51.1* 50.4*   PLATELETCT 369 400   MPV 10.7 11.0     Recent Labs     02/14/24  0148 02/15/24  0521   SODIUM 137 137   POTASSIUM 4.5 4.0   CHLORIDE 102 99   CO2 26 24   GLUCOSE 439* 427*   BUN 14 14   CREATININE 0.58 0.72   CALCIUM 8.9 9.5           Impressions/Recommendations:  Other specified hypothyroidism  Euthyroid on Synthroid    PE (pulmonary thromboembolism) (HCC)  Chronically anticoagulated on Eliquis   AC presently on hold d/t SAH    Essential hypertension  Observe blood pressure trends on Norvasc and Losartan    Dyslipidemia  On Lipitor    Bipolar 1 disorder (HCC)  On Buspar, Vraylar, Tegretol, Lamictal, and Primidone  Needs Psychiatry F/U    Hypophosphatemia  Start supplement  Check F/U labs in a few days    Vitamin D deficiency  Vit D level 17  Start supplement    Glaucoma  On Timolol    Type 1 diabetes mellitus without complication (HCC)  HbA1c 9.0  Increase Lantus to bid dosing  Also increase SSI  Outpt meds include Levemir 10 u bid and Novolog SSI    Traumatic brain injury with new neurocognitive deficit (HCC)  S/P MGLF w/ left sided head trauma  Neuroimaging +SAH  ASA and Eiquis on hold  On Keppra for SZ proph    Full Code    Thank you for the opportunity to assist in this patient's care.   We will continue to follow along with you.

## 2024-02-15 NOTE — IDT DISCHARGE PLANNING
CASE MANAGEMENT INITIAL ASSESSMENT    Admit Date:  2/14/2024     CM reviewed the medical chart and will meet with the patient to discuss role of case management / discharge planning / team conference.       Diagnosis: Trauma [T14.90XA]  Traumatic brain injury with new neurocognitive deficit (HCC) [S06.9XAA, R29.818, R41.89]    Co-morbidities:   Patient Active Problem List    Diagnosis Date Noted    Traumatic brain injury with new neurocognitive deficit (HCC) 02/14/2024    Hypomagnesemia 02/14/2024    Hypophosphatemia 02/14/2024    Anemia 02/13/2024    History of CVA (cerebrovascular accident) 02/13/2024    Fall 02/13/2024    Hypokalemia 02/13/2024    Discharge planning issues 02/11/2024    Current use of long term anticoagulation 02/10/2024    Contraindication to deep vein thrombosis (DVT) prophylaxis 02/10/2024    Encounter for geriatric assessment 02/10/2024    PE (pulmonary thromboembolism) (HCC) 02/10/2024    Subarachnoid hemorrhage without loss of consciousness (HCC) 02/09/2024    Trauma 02/09/2024    Other specified hypothyroidism 11/15/2022    Osteopenia of left thigh 11/15/2022    Elevated alkaline phosphatase level 11/15/2022    Malignant neoplasm of left female breast (HCC) 02/27/2020    Bipolar 1 disorder (HCC) 09/23/2019    Type 1 diabetes mellitus without complication (HCC) 09/23/2019    Essential hypertension 09/23/2019    Dyslipidemia 09/23/2019     Prior Living Situation:  Housing / Facility: [address]  Lives with - Patient's Self Care Capacity: Adult Children (lives with daughter)    Prior Level of Function:  Medication Management: Requires Assist  Finances: Dependent  Home Management: Requires Assist  Shopping: Requires Assist  Prior Level Of Mobility: Independent Without Device in Community, Independent With Steps in Community, Independent Without Device in Home, Independent With Steps in Home  Driving / Transportation: Relatives / Others Provide Transportation    Support Systems:  Primary  : Rut Loera         Previous Services Utilized:   Equipment Owned: 4-Wheel Walker, Tub / Shower Seat, Grab Bar(s) In Tub / Shower  Prior Services: Other (Comments) (Pt reports doing therapy at [facility])    Other Information:  Occupation (Pre-Hospital Vocational): Retired Due To Age     Primary Payor Source: Medicare A, Medicare B  Primary Care Practitioner : Josefa Vora    Patient / Family Goal:  Patient / Family Goal: Return home.    Plan:  1. Continue to follow patient through hospitalization and provide discharge planning in collaboration with patient, family, physicians and ancillary services.     2. Utilize community resources to ensure a safe discharge.

## 2024-02-15 NOTE — THERAPY
"Occupational Therapy   Initial Evaluation     Patient Name: Molly Loera  Age:  73 y.o., Sex:  female  Medical Record #: [id number]  Today's Date: 2/15/2024     Subjective    \"I think I had edema. And I had Covid 3 times. Then they sent me to some facility and I fell and hit my head.\" Pt unable to recall reason for initial hospitalization. Pt agreeable to OT eval and tx.     Objective       02/15/24 0701   OT Charge Group   Charges Yes   OT Self Care / ADL (Units) 1   OT Evaluation OT Evaluation Low   OT Total Time Spent   OT Individual Total Time Spent (Mins) 60   Prior Living Situation   Prior Services Home-Independent  (Pt was previously at another facility (SNF) prior to admission to [facility])   Housing / Facility 1 Story House   Steps Into Home 1   Steps In Home 0   Rail Right Rail  (Steps in Home)   Bathroom Set up Walk In Shower;Shower Glass Doors;Grab Bars;Shower Chair   Equipment Owned 4-Wheel Walker;Tub / Shower Seat;Grab Bar(s) In Tub / Shower   Lives with - Patient's Self Care Capacity Adult Children   Comments Lives in [place] with dtr Rut who works full time.   Prior Level of ADL Function   Self Feeding Independent   Grooming / Hygiene Independent   Bathing Independent   Dressing Independent   Toileting Independent   Prior Level of IADL Function   Medication Management Requires Assist   Laundry Independent   Kitchen Mobility Independent   Finances Dependent   Home Management Requires Assist   Shopping Requires Assist   Prior Level Of Mobility Independent Without Device in Community;Independent With Steps in Community;Independent Without Device in Home;Independent With Steps in Home   Driving / Transportation Relatives / Others Provide Transportation   Occupation (Pre-Hospital Vocational) Retired Due To Age   Prior Functioning: Everyday Activities   Self Care Independent   Indoor Mobility (Ambulation) Independent   Stairs Independent   Functional Cognition Independent   Prior Device Use None of the " "given options   Pain   Intervention Declines   Cognition    Cognition / Consciousness X   Speech/ Communication Word Finding Impairment   Orientation Level Not Oriented to Day   Level of Consciousness Alert   Safety Awareness Impulsive   Comments Mild impulsivity with standing.   Cognitive Pattern Assessment   Cognitive Pattern Assessment Used BIMS   Brief Interview for Mental Status (BIMS)   Repetition of Three Words (First Attempt) 2   Temporal Orientation: Year Correct   Temporal Orientation: Month Accurate within 5 days   Temporal Orientation: Day Incorrect   Recall: \"Sock\" Yes, no cue required   Recall: \"Blue\" Yes, after cueing (\"a color\")   Recall: \"Bed\" Yes, no cue required   BIMS Summary Score 12   Confusion Assessment Method (CAM)   Is there evidence of an acute change in mental status from the patient's baseline? Yes   Inattention Behavior present, fluctuates (comes and goes, changes in severity)   Disorganized thinking Behavior present, fluctuates (comes and goes, changes in severity)   Altered level of consciousness Behavior not present   Vision Screen   Vision Not tested  (Briefly assessed for field cut with none detected. pt reported some L-eye blurred vision during session.)   Passive ROM Upper Body   Passive ROM Upper Body X   Comments RUE WFL, L-shoulder flexion limited to 3/4 ROM with noted brusing and tightness. All remaining planes WFL.   Active ROM Upper Body   Active ROM Upper Body  X   Dominant Hand Right   Comments RUE WFL, L-shoulder flexion limited to 3/4 ROM with noted brusing and tightness. All remaining planes WFL.   Strength Upper Body   Upper Body Strength  X   Comments Grossly 4-/5 bilaterally in all planes.   Sensation Upper Body   Upper Extremity Sensation  WDL   Comments Pt reported no changes in sensation, no overt deficits noted in function   Upper Body Muscle Tone   Upper Body Muscle Tone  WDL   Comments BUES   Bed Mobility    Supine to Sit Contact Guard Assist   Sit to Stand " Contact Guard Assist   Scooting Standby Assist   Coordination Upper Body   Coordination X   Fine Motor Coordination Pt limited by mild hand weakness and unable to open toothbrush. Further assessment needed.   Eating   Assistance Needed Set-up / clean-up   CARE Score - Eating 5   Eating Discharge Goal   Discharge Goal 6   Oral Hygiene   Assistance Needed Set-up / clean-up;Supervision   CARE Score - Oral Hygiene 4   Oral Hygiene Discharge Goal   Discharge Goal 6   Shower/Bathe Self   Assistance Needed Set-up / clean-up;Supervision   CARE Score - Shower/Bathe Self 4   Shower/Bathe Self Discharge Goal   Discharge Goal 6   Upper Body Dressing   Assistance Needed Set-up / clean-up;Supervision   CARE Score - Upper Body Dressing 4   Upper Body Dressing Discharge Goal   Discharge Goal 6   Lower Body Dressing   Assistance Needed Incidental touching   CARE Score - Lower Body Dressing 4   Lower Body Dressing Discharge Goal   Discharge Goal 6   Putting On/Taking Off Footwear   Assistance Needed Supervision;Set-up / clean-up   CARE Score - Putting On/Taking Off Footwear 4   Putting On/Taking Off Footwear Discharge Goal   Discharge Goal 6   Toileting Hygiene   Assistance Needed Incidental touching   CARE Score - Toileting Hygiene 4   Toileting Hygiene Discharge Goal   Discharge Goal 6   Toilet Transfer   Assistance Needed Incidental touching   CARE Score - Toilet Transfer 4   Toilet Transfer Discharge Goal   Discharge Goal 6   Hearing, Speech, and Vision   Ability to Hear Adequate   Ability to See in Adequate Light Adequate   Expression of Ideas and Wants Without difficulty   Understanding Verbal and Non-Verbal Content Understands   Functional Level of Assist   Eating Independent   Grooming Supervision;Seated   Grooming Description Seated in wheelchair at sink;Set-up of equipment;Supervision for safety;Increased time   Bathing Standby Assist   Bathing Description Grab bar;Hand held shower;Tub bench;Assit with back;Increased  time;Set up for shower sleeve;Supervision for safety;Verbal cueing;Set-up of equipment   Upper Body Dressing Supervision   Upper Body Dressing Description Set-up of equipment;Supervision for safety   Lower Body Dressing Contact Guard Assist   Lower Body Dressing Description Grab bar;Increased time;Supervision for safety;Set-up of equipment;Verbal cueing   Toileting Contact Guard Assist   Toileting Description Assist for standing balance;Grab bar;Increased time;Supervision for safety   Bed, Chair, Wheelchair Transfer Contact Guard Assist   Bed Chair Wheelchair Transfer Description Increased time;Supervision for safety  (stand step with HHA)   Toilet Transfers Contact Guard Assist   Toilet Transfer Description Grab bar;Supervision for safety;Verbal cueing;Set-up of equipment   Tub / Shower Transfers Contact Guard Assist   Tub Shower Transfer Description Grab bar;Shower bench;Supervision for safety;Verbal cueing   Comprehension Supervision   Comprehension Description Verbal cues   Expression Supervision   Expression Description Verbal cueing   Problem Solving Moderate Assist   Problem Solving Description Verbal cueing;Supervision;Increased time;Bed/chair alarm;Seat belt   Memory Moderate Assist   Memory Description Increased time;Supervision;Verbal cueing   Problem List   Problem List Decreased Active Daily Living Skills;Decreased Upper Extremity Strength Right;Decreased Homemaking Skills;Decreased Upper Extremity Strength Left;Decreased Upper Extremity AROM Left;Decreased Upper Extremity PROM Left;Decreased Functional Mobility;Decreased Activity Tolerance;Impaired Coordination Right Upper Extremity;Impaired Coordination Left Upper Extremity;Impaired Cognitive Function;Impaired Vision;Impaired Postural Control / Balance   Precautions   Precautions Fall Risk   Current Discharge Plan   Current Discharge Plan Return to Prior Living Situation   Benefit    Therapy Benefit Patient Would Benefit from Inpatient Rehab  Occupational Therapy to Maximize [place] with ADLs, IADLs and Functional Mobility.   Interdisciplinary Plan of Care Collaboration   IDT Collaboration with  Physical Therapist;Nursing   Patient Position at End of Therapy Seated;Chair Alarm On;Self Releasing Lap Belt Applied;Call Light within Reach;Tray Table within Reach   Collaboration Comments RN checking blood sugar during session with reading over 500. PT re: CLOF, POC   Strengths & Barriers   Strengths Able to follow instructions;Alert and oriented;Independent prior level of function;Motivated for self care and independence;Pleasant and cooperative;Supportive family;Willingly participates in therapeutic activities   Barriers Decreased endurance;Fatigue;Generalized weakness;Impaired balance;Impaired activity tolerance;Confused;Impaired functional cognition;Impulsive;Limited mobility;Visual impairment   Occupational Therapist Assigned   Assigned OT / Treatment Time / Comments KT 30/60       Patient education: reviewed OT plan of care, rehab expectations, goal setting, ADL needs, orientation to schedule, role of OT in recovery, fall risk and use of call light.     Assessment  Patient is 73 y.o. Female with a diagnosis of Traumatic brain injury with new neurocognitive deficit following GLF.  Additional factors influencing patient status / progress (ie: cognitive factors, co-morbidities, social support, etc): Per Dr. Gregory H&P - Pt with a PMH of BPD1, depression, DM1, and CKD who presented on 2/9/24 with a GLF. Patient reportedly had a mechanical fall at outside facility while walking to restroom and had left sided head strike. Patient was evaluated in ED and found on CT to have left sided subarachnoid hemorrhage. NSG was consulted and recommended conservative management with serial CTs. Repeat CT showed stable bleed. Stay complicated by confusion and hyperglycemia.      Patient tolerated transfer to [facility]. She reports she had a previously fall with SDH but  cannot remember when it occurred. She reports word finding difficulty and confusion. Denies weakness but reports poor balance..     OT evaluation performed today; functional performance at today's assessment is as above. During evaluation pt was limited by problem solving, word finding deficits, and high blood sugar at end of session, noted poor standing balance as well.. Pt presents to rehab below her normal baseline ind level, currently functioning between SPV-CGA  for ADLs, and CGA for functional transfers. Pt is limited by decreased strength, decreased balance, decreased endurance, cognitive deficits, problem solving, limited PROM, limited AROM, visual deficits, and decreased LB strength. Pt lives in a HOUSING FACILITY: 1 Story House with 1STE and adult daughter who can assist intermittently. Pt will benefit from ongoing care from this skilled interdisciplinary high intensity rehabilitation program to address the aforementioned deficits in order to maximize ind with ADLs, IADLs, and household/community mobility while reducing burden of care, supporting a safe return home upon DC.      Plan  Recommend Occupational Therapy  minutes per day 5-7 days per week for 10-12 days for the following treatments:  OT E Stim Attended, OT Group Therapy, OT Self Care/ADL, OT Cognitive Skill Dev, OT Community Reintegration, OT Neuro Re-Ed/Balance, OT Therapeutic Activity, OT Evaluation, and OT Therapeutic Exercise.    Passport items to be completed:  Perform bathroom transfers, complete dressing, complete feeding, get ready for the day, prepare a simple meal, participate in household tasks, adapt home for safety needs, demonstrate home exercise program, complete caregiver training     Goals:  Long term and short term goals have been discussed with patient and they are in agreement.    Occupational Therapy Goals (Active)       Problem: Bathing       Dates: Start:  02/15/24         Goal: STG-Within one week, patient will  bathe at SPV level.       Dates: Start:  02/15/24               Problem: Dressing       Dates: Start:  02/15/24         Goal: STG-Within one week, patient will dress LB at SBA level using DME PRN.       Dates: Start:  02/15/24               Problem: Functional Transfers       Dates: Start:  02/15/24         Goal: STG-Within one week, patient will transfer to toilet at SBA level.       Dates: Start:  02/15/24            Goal: STG-Within one week, patient will transfer to step in shower at SBA level.       Dates: Start:  02/15/24               Problem: OT Long Term Goals       Dates: Start:  02/15/24         Goal: LTG-By discharge, patient will complete basic self care tasks at SPV-Mod I level.       Dates: Start:  02/15/24            Goal: LTG-By discharge, patient will perform bathroom transfers at SPV-Mod I level.       Dates: Start:  02/15/24            Goal: LTG-By discharge, patient will complete basic home management at SPV-Mod I level.       Dates: Start:  02/15/24               Problem: Toileting       Dates: Start:  02/15/24         Goal: STG-Within one week, patient will complete toileting tasks at SBA level.       Dates: Start:  02/15/24

## 2024-02-15 NOTE — THERAPY
"Speech Language Pathology   Initial Assessment     Patient Name: Molly Loera  AGE:  73 y.o., SEX:  female  Medical Record #: [id number]  Today's Date: 2/15/2024     Subjective    Per Dr. Gregory HPI: \"Patient is a 73 y.o. female with a PMH of BPD1, depression, DM1, and CKD who presented on 2/9/24 with a GLF. Patient reportedly had a mechanical fall at outside facility while walking to restroom and had left sided head strike. Patient was evaluated in ED and found on CT to have left sided subarachnoid hemorrhage. NSG was consulted and recommended conservative management with serial CTs. Repeat CT showed stable bleed. Stay complicated by confusion and hyperglycemia.      Patient tolerated transfer to [facility]. She reports she had a previously fall with SDH but cannot remember when it occurred. She reports word finding difficulty and confusion. Denies weakness but reports poor balance.\"     CT Head 2/9/24  IMPRESSION:  1.  Similar appearing left temporal and occipital subarachnoid hemorrhage and/or hemorrhagic contusion.  2.  Chronic appearing right frontal lobe infarct again noted.    Pt reports she lives with daughter, who works during the day. Pt is home alone a large portion of the day, independent with cooking and cleaning prior. Dtr handles driving, meds/financial mngt and shopping. Pt reports she does wear glasses \"occasionally\" did not have present for eval and reading glasses provided by this SLP were not helpful per patient report.     Objective       02/15/24 0934   Evaluation Charges   Charges Yes   SLP Speech Language Evaluation Speech Sound Language Comprehension   SLP Total Time Spent   SLP Individual Total Time Spent (Mins) 60   Prior Living Situation   Prior Services Other (Comments)  (Pt reports doing therapy at [facility])   Housing / Facility 1 [facility]   Lives with - Patient's Self Care Capacity Adult Children  (lives with daughter)   Prior Level Of Function   Communication Within Functional " Limits   Swallow Within Functional Limits   Dentition Intact   Dentures None   Hearing Within Functional Limits for Evaluation   Hearing Aid None   Vision Reading ;Distance  (not present at time of eval)   Patient's Primary Language English   Occupation (Pre-Hospital Vocational) Retired Due To Age   Comments 2/9/24 GLF - left sided subarachnoid hemorrhage. NSG was consulted and recommended conservative management with serial CTs. Repeat CT showed stable bleed.   Receptive Language / Auditory Comprehension   Receptive Language / Auditory Comprehension X   Identifies Objects Within Functional Limits (6-7)   Identifies Pictures Moderate (3)   Identifies Body Parts Supervision (5)   Answers Yes / No Personal Questions Supervision (5)   Answers Yes / No Simple / Contextual Questions Minimal (4)   Follows One Unit Commands Supervision (5)   Follows Two Unit Commands Minimal (4)   Follows Three Unit Commands Moderate (3)   Understands Simple, Structured Conversation  Minimal (4)   Expressive Language   Expressive Language (WDL) X   Naming Minimal (4)   Repeats Speech Accurately Minimal (4)   Sustain Dialogue Within Given Topic Supervision (5)   Word Finding Deficits Minimal (4)   Reading Comprehension    Reading Comprehension (WDL) X   Reading Words Within Functional Limits (6-7)   Reading Sentences Supervision (5)   Reading Short Paragraphs  Moderate (3)   Following Written Direction Moderate (3)   Functional Reading Materials Severe (2)   Written Language Expression   Written Language Expression (WDL) X   To Dictation Within Functional Limits (6-7)   Spontaneous Sentence Level Minimal (4)   Spelling Accuracy Within Functional Limits (6-7)   Legibility Moderate (3)   Cognition   Cognitive-Linguistic (WDL) X   Attention to Task Minimal (4)   Simple Attention Moderate (3)   Moderate Attention Severe (2)   Complex Attention Severe (2)   Orientation  Within Functional Limits (6-7)   Verbal Short Term Memory 30 Minutes  "  Visual Short Term Memory Severe (2)   Prospective Memory Moderate (3)   Functional Memory Activities Moderate (3)   Functional Problem Solving Moderate (3)   Written Sequencing Severe (2)   Functional Math / Financial Management Severe (2)   ABS (Agitated Behavior Scale)   Agitated Behavior Scale Performed No   Cognitive Pattern Assessment   Cognitive Pattern Assessment Used BIMS   Brief Interview for Mental Status (BIMS)   Repetition of Three Words (First Attempt) 3   Temporal Orientation: Year Correct   Temporal Orientation: Month Accurate within 5 days   Temporal Orientation: Day Correct   Recall: \"Sock\" Yes, no cue required   Recall: \"Blue\" No, could not recall   Recall: \"Bed\" No, could not recall   BIMS Summary Score 11   Confusion Assessment Method (CAM)   Is there evidence of an acute change in mental status from the patient's baseline? Yes   Inattention Behavior present, fluctuates (comes and goes, changes in severity)   Disorganized thinking Behavior present, fluctuates (comes and goes, changes in severity)   Altered level of consciousness Behavior not present   Social / Pragmatic Communication   Social / Pragmatic Communication WDL   Tracheostomy   Tracheostomy No   Functional Level of Assist   Comprehension Supervision   Comprehension Description Verbal cues;Glasses   Expression Supervision   Expression Description Verbal cueing   Social Interaction Modified Independent   Social Interaction Description Medication   Problem Solving Maximal Assist   Problem Solving Description Verbal cueing;Therapy schedule;Seat belt;Increased time;Bed/chair alarm   Memory Maximal Assist   Memory Description Verbal cueing;Therapy schedule;Seat belt;Increased time;Bed/chair alarm   Outcome Measures   Outcome Measures Utilized SCCAN   SCCAN (Scales of Cognitive and Communicative Ability for Neurorehabilitation)   Oral Expression - Raw Score 16   Oral Expression - Scale Performance Score 84   Orientation - Raw Score 12 "   Orientation - Scale Performance Score 100   Memory - Raw Score 9   Memory - Scale Performance Score 47   Speech Comprehension - Raw Score 11   Speech Comprehension - Scale Performance Score 85   Reading Comprehension - Raw Score 7   Reading Comprehension - Scale Performance Score 58   Writing - Raw Score 6   Writing - Scale Performance Score 86   Attention - Raw Score 5   Attention - Scale Performance Score 31   Problem Solving - Raw Score 9   Problem Solving - Scale Performance Score 39   SCCAN Total Raw Score 61   SCCAN Degree of Severity Moderate Impairment   Problem List   Problem List Cognitive-Linguistic Deficits;Attention Deficit;Memory Deficit   Current Discharge Plan   Current Discharge Plan Return to Prior Living Situation   Benefit   Therapy Benefit Patient would benefit from Inpatient Rehab Speech-Language Pathology to address above identified deficits.   Interdisciplinary Plan of Care Collaboration   IDT Collaboration with  Nursing;Certified Nursing Assistant   Patient Position at End of Therapy Seated;Chair Alarm On;Self Releasing Lap Belt Applied;Call Light within Reach   Collaboration Comments nursing checking FSBG, informed physician >500 FSBG, transfer of care to A   Strengths & Barriers   Strengths Able to follow instructions;Alert and oriented;Independent prior level of function;Supportive family;Willingly participates in therapeutic activities;Pleasant and cooperative;Motivated for self care and independence   Barriers Impaired activity tolerance;Impaired functional cognition   Speech Language Pathologist Assigned   Assigned SLP / Treatment Time / Comments EA/60 cog only       Assessment    Patient is 73 y.o. female with a diagnosis of left sided subarachnoid hemorrhage 2/2 GLF.  Additional factors influencing patient status/progress (ie: cognitive factors, co-morbidities, social support, etc): BPD1, depression, DM1, and CKD.     Pt's FSBG rechecked by nursing during eval and continue to  "read >500. Physician informed by nursing. Pt fatigues quickly, asking several times during eval \"have we had enough?\" With encouragement pt able to complete remainder of assessment. The The Scales of Cognitive and Communicative Ability for Neurorehabilitation (SCCAN) was administered which assesses cognitive-communicative deficits and functional ability in patients in rehabilitation hospitals, clinics, and skilled nursing facilities. The SCCAN is appropriate for a broad range of neurological patients, provides a measure of both impairment and functional ability.     Pt performed as follows:  Oral Expression - Raw Score: 16  Oral Expression - Scale Performance Score: 84  Orientation - Raw Score: 12  Orientation - Scale Performance Score: 100  Memory - Raw Score: 9  Memory - Scale Performance Score: 47  Speech Comprehension - Raw Score: 11  Speech Comprehension - Scale Performance Score: 85  Reading Comprehension - Raw Score: 7  Reading Comprehension - Scale Performance Score: 58  Writing - Raw Score: 6  Writing - Scale Performance Score: 86  Attention - Raw Score: 5  Attention - Scale Performance Score: 31  Problem Solving - Raw Score: 9  Problem Solving - Scale Performance Score: 39  SCCAN Total Raw Score: 61  SCCAN Degree of Severity: Moderate Impairment    Pt reaching the ceiling early on several subtests impacting ability to assess performance further due to additional items not being administered. Reading comprehension score likely impacted by patient's attention deficits and missing key details of question vs not comprehending the written text. Pt would benefit from skilled ST services to address memory, attention, problem solving in an effort to d/c home with intermittent SPV.        Plan  Recommend Speech Therapy 30-60 minutes per day 5-7 days per week for 2 weeks for the following treatments:  SLP Cognitive Skill Development and SLP Group Treatment.    Passport items to be completed:  Express basic needs, " understand food/liquid recommendations, consistently follow swallow precautions, manage finances, manage medications, arrive to therapy appointments on time, complete daily memory log entries, solve problems related to safety situations, review education related to hospitalization, complete caregiver training     Goals:  Long term and short term goals have been discussed with patient and they are in agreement.    Speech Therapy Problems (Active)       Problem: Memory STGs       Dates: Start:  02/15/24         Goal: STG-Within one week, patient will complete mental manipulation tasks up to 4 units with no more than 1 repetition in 8/10 trials       Dates: Start:  02/15/24               Problem: Problem Solving STGs       Dates: Start:  02/15/24         Goal: STG-Within one week, patient will independently provide appropriate answers to problem solving scenarios involving IADLs and safety, with 90% accuracy.       Dates: Start:  02/15/24            Goal: STG-Within one week, patient will complete simple attention tasks with >80% accuracy provided MIN cues.       Dates: Start:  02/15/24               Problem: Speech/Swallowing LTGs       Dates: Start:  02/15/24         Goal: LTG-By discharge, patient will solve basic problems by utilizing compensatory intervention for memory and problem-solving to allow for safe completion of daily activities with SPV in order to prepare for safe d/c         Dates: Start:  02/15/24

## 2024-02-15 NOTE — PROGRESS NOTES
4 Eyes Skin Assessment Completed by TONE Mon and TONE Au.    Head Bruising  Ears WDL  Nose WDL  Mouth WDL  Neck WDL  Breast/Chest WDL  Shoulder Blades WDL  Spine WDL  (R) Arm/Elbow/Hand WDL  (L) Arm/Elbow/Hand WDL  Abdomen WDL  Groin WDL  Scrotum/Coccyx/Buttocks WDL  (R) Leg WDL  (L) Leg WDL  (R) Heel/Foot/Toe WDL  (L) Heel/Foot/Toe WDL          Devices In Places Na      Interventions In Place Pillows    Possible Skin Injury No    Pictures Uploaded Into Epic Yes  Wound Consult Placed N/A  RN Wound Prevention Protocol Ordered Yes

## 2024-02-15 NOTE — CARE PLAN
"  Problem: Skin Integrity  Goal: Skin integrity is maintained or improved  Outcome: Progressing  Note: Pt with des orbital ecchymosis , resolving , No bp on the left arm , with hx of left mastectomy..      Problem: Fall Risk - Rehab  Goal: Patient will remain free from falls  Outcome: Progressing  Note: Prema Amaya Fall risk Assessment Score: 13    Moderate fall risk Interventions  - Bed and strip alarm   - Yellow sign by the door   - Yellow wrist band \"Fall risk\"  - Room near to the nurse station  - Do not leave patient unattended in the bathroom  - Fall risk education provided        The patient is Stable - Low risk of patient condition declining or worsening    Shift Goals  Clinical Goals: Safety, pain control  Patient Goals: Safety  Family Goals: Education    Progress made toward(s) clinical / shift goals:  Pt free from fall and injury .    "

## 2024-02-15 NOTE — THERAPY
Physical Therapy   Initial Evaluation     Patient Name: Molly Loera  Age:  73 y.o., Sex:  female  Medical Record #: [id number]  Today's Date: 2/15/2024     Subjective    Pt is in her bed and agreeable to participate in therapy evaluation. She denies pain and headache.      Objective       02/15/24 1301   PT Charge Group   Charges Yes   PT Gait Training (Units) 1   PT Therapeutic Activities (Units) 1   PT Evaluation PT Evaluation Mod   PT Total Time Spent   PT Individual Total Time Spent (Mins) 60   Prior Living Situation   Prior Services Skilled Home Health Services   Housing / Facility 1 Story House   Steps Into Home 1   Rail Unable To Determine At This Time  (1 rail)   Equipment Owned 4-Wheel Walker   Lives with - Patient's Self Care Capacity Adult Children   Comments lives in with dtr who works full time during the day. But pt says that she sometimes work from home.   Prior Level of Functional Mobility   Bed Mobility Independent   Transfer Status Independent   Ambulation Independent   Distance Ambulation (Feet)   (household ambulation)   Assistive Devices Used 4-Wheel Walker   Stairs Independent   Prior Functioning: Everyday Activities   Self Care Independent   Indoor Mobility (Ambulation) Independent   Stairs Independent   Functional Cognition Independent   Prior Device Use Walker   Vitals   Pulse 68   Patient BP Position Sitting   Blood Pressure  120/70   Respiration 18   Pulse Oximetry 94 %   O2 Delivery Device None - Room Air   Pain   Intervention Declines   Cognition    Cognition / Consciousness X   Orientation Level Not Oriented to Day   Level of Consciousness Alert   ABS (Agitated Behavior Scale)   Agitated Behavior Scale Performed No   Active ROM Lower Body    Active ROM Lower Body  WDL   Strength Lower Body   Gross Strength Generalized Weakness, Equal Bilaterally   Sensation Lower Body   Lower Extremity Sensation   WDL   Lower Body Muscle Tone   Lower Body Muscle Tone  WDL   Balance Assessment    Standing Balance (Static) Fair +   Standing Balance (Dynamic) Fair +   Bed Mobility    Supine to Sit Contact Guard Assist   Sit to Supine Contact Guard Assist   Sit to Stand Contact Guard Assist   Scooting Standby Assist   Rolling Standby Assist   Roll Left and Right   Assistance Needed Supervision   CARE Score - Roll Left and Right 4   Roll Left and Right Discharge Goal   Discharge Goal 6   Sit to Lying   Assistance Needed Verbal cues;Supervision;Physical assistance   Physical Assistance Level 25% or less   CARE Score - Sit to Lying 3   Sit to Lying Discharge Goal   Discharge Goal 6   Lying to Sitting on Side of Bed   Assistance Needed Verbal cues;Supervision;Physical assistance   Physical Assistance Level 25% or less   CARE Score - Lying to Sitting on Side of Bed 3   Lying to Sitting on Side of Bed Discharge Goal   Discharge Goal 6   Sit to Stand   Assistance Needed Supervision;Verbal cues;Physical assistance   Physical Assistance Level 25% or less   CARE Score - Sit to Stand 3   Sit to Stand Discharge Goal   Discharge Goal 6   Chair/Bed-to-Chair Transfer   Assistance Needed Physical assistance;Verbal cues;Supervision   Physical Assistance Level 25% or less   CARE Score - Chair/Bed-to-Chair Transfer 3   Chair/Bed-to-Chair Transfer Discharge Goal   Discharge Goal 6   Car Transfer   Reason if not Attempted Safety concerns   CARE Score - Car Transfer 88   Car Transfer Discharge Goal   Discharge Goal 4   Walk 10 Feet   Assistance Needed Incidental touching;Verbal cues;Supervision   CARE Score - Walk 10 Feet 4   Walk 10 Feet Discharge Goal   Discharge Goal 6   Walk 50 Feet with Two Turns   Reason if not Attempted Safety concerns   CARE Score - Walk 50 Feet with Two Turns 88   Walk 50 Feet with Two Turns Discharge Goal   Discharge Goal 6   Walk 150 Feet   Reason if not Attempted Safety concerns   CARE Score - Walk 150 Feet 88   Walk 150 Feet Discharge Goal   Discharge Goal 6   Walking 10 Feet on Uneven Surfaces    Reason if not Attempted Safety concerns   CARE Score - Walking 10 Feet on Uneven Surfaces 88   Walking 10 Feet on Uneven Surfaces Discharge Goal   Discharge Goal 4   1 Step (Curb)   Reason if not Attempted Safety concerns   CARE Score - 1 Step (Curb) 88   1 Step (Curb) Discharge Goal   Discharge Goal 6   4 Steps   Reason if not Attempted Activity not applicable   CARE Score - 4 Steps 9   4 Steps Discharge Goal   Discharge Goal 9   12 Steps   Reason if not Attempted Activity not applicable   CARE Score - 12 Steps 9   12 Steps Discharge Goal   Discharge Goal 9   Picking Up Object   Reason if not Attempted Safety concerns   CARE Score - Picking Up Object 88   Picking Up Object Discharge Goal   Discharge Goal 88   Wheel 50 Feet with Two Turns   Reason if not Attempted Activity not applicable   CARE Score - Wheel 50 Feet with Two Turns 9   Type of Wheelchair/Scooter Manual   Wheel 50 Feet with Two Turns Discharge Goal   Discharge Goal 9   Wheel 150 Feet   Reason if not Attempted Activity not applicable   CARE Score - Wheel 150 Feet 9   Type of Wheelchair/Scooter Manual   Wheel 150 Feet Discharge Goal   Discharge Goal 9   Gait Functional Level of Assist    Gait Level Of Assist Contact Guard Assist   Assistive Device Front Wheel Walker   Distance (Feet) 20   # of Times Distance was Traveled 1   Deviation Bradykinetic;Decreased Heel Strike;Decreased Toe Off;Decreased Base Of Support   Transfer Functional Level of Assist   Bed, Chair, Wheelchair Transfer Contact Guard Assist   Bed Chair Wheelchair Transfer Description Increased time;Verbal cueing;Supervision for safety   Problem List    Problems Impaired Transfers;Impaired Ambulation;Functional Strength Deficit;Impaired Balance;Decreased Activity Tolerance;Safety Awareness Deficits / Cognition   Precautions   Precautions Fall Risk   Current Discharge Plan   Current Discharge Plan Return to Prior Living Situation   Interdisciplinary Plan of Care Collaboration   IDT  Collaboration with  Occupational Therapist;Nursing;Certified Nursing Assistant   Patient Position at End of Therapy In Bed;Bed Alarm On;Call Light within Reach;Tray Table within Reach;Phone within Reach   Collaboration Comments CLOF; checking CBG   Physical Therapist Assigned   Assigned PT / Treatment Time / Comments 30/60   Benefit   Therapy Benefit Patient Would Benefit from Inpatient Rehabilitation Physical Therapy to Maximize Functional [place] with ADLs, IADLs and Mobility.   Strengths & Barriers   Strengths Able to follow instructions;Alert and oriented;Independent prior level of function;Pleasant and cooperative   Barriers Generalized weakness;Fatigue;Impaired activity tolerance;Impaired balance  (high CBG)       Assessment  Patient is a 73 y.o. female with a PMH of BPD1, depression, DM1, and CKD who presented on 2/9/24 with a GLF. Patient reportedly had a mechanical fall at outside facility while walking to restroom and had left sided head strike. Patient was evaluated in ED and found on CT to have left sided subarachnoid hemorrhage. NSG was consulted and recommended conservative management with serial CTs. Repeat CT showed stable bleed. Stay complicated by confusion and hyperglycemia.      Patient tolerated transfer to [facility]. She reports she had a previously fall with SDH but cannot remember when it occurred. She reports word finding difficulty and confusion. Denies weakness but reports poor balance. .  Additional factors influencing patient status / progress (ie: cognitive factors, co-morbidities, social support, etc): She lives with her daughter who assist her in medical appts and iadl's.      Plan  Recommend Physical Therapy 30-60 minutes per day 5-7 days per week for 10-17 days for the following treatments:  PT Gait Training, PT Therapeutic Exercises, PT Neuro Re-Ed/Balance, PT Therapeutic Activity, and PT Evaluation.    Passport items to be completed:  Get in/out of bed safely, in/out of a vehicle,  safely use mobility device, walk or wheel around home/community, navigate up and down stairs, show how to get up/down from the ground, ensure home is accessible, demonstrate HEP, complete caregiver training    Goals:  Long term and short term goals have been discussed with patient and they are in agreement.    Physical Therapy Problems (Active)       Problem: Balance       Dates: Start:  02/15/24         Goal: STG-Within one week, patient will maintain dynamic standing x 5 mins       Dates: Start:  02/15/24               Problem: Mobility       Dates: Start:  02/15/24         Goal: STG-Within one week, patient will ambulate household distance using LRAD x 150 feet, SBA       Dates: Start:  02/15/24            Goal: STG-Within one week, patient will ambulate up/down a curb using LRAD, SBA       Dates: Start:  02/15/24               Problem: Mobility Transfers       Dates: Start:  02/15/24         Goal: STG-Within one week, patient will transfer bed to chair SBA       Dates: Start:  02/15/24               Problem: PT-Long Term Goals       Dates: Start:  02/15/24         Goal: LTG-By discharge, patient will tolerate standing x10 mins       Dates: Start:  02/15/24            Goal: LTG-By discharge, patient will ambulate using LRAD x 300 feet, Mod I       Dates: Start:  02/15/24            Goal: LTG-By discharge, patient will perform home exercise program independently       Dates: Start:  02/15/24            Goal: LTG-By discharge, patient will transfer in/out of a car, Mod I       Dates: Start:  02/15/24

## 2024-02-15 NOTE — PROGRESS NOTES
1030 Pt arrived at [facility] from [facility] via transport. Dr. Gregory to follow. Initial assessment initiated. Pt oriented to room and facility routine and safety measures; pt education binder provided and discussed. Pt A/O x 4, continent/incontinent of bowel and bladder. Min assist for transfers. All wounds photographed and documented; photos uploaded to . Pt denies pain or discomfort at this time. Pt positioned for comfort in bed. Call light within reach, safety measures in place. Will continue to monitor.

## 2024-02-15 NOTE — PROGRESS NOTES
"  Physical Medicine & Rehabilitation Progress Note    Encounter Date: 2/15/2024    Chief Complaint: Decreased mobility, weakness    Interval Events (Subjective):  Patient sitting up in room. She reports she slept OK last night. She reports ongoing word finding difficulty. Reviewed AM labs and blood sugars into 500s. Discussed would consult hospitalist.     Objective:  VITAL SIGNS: /72   Pulse 66   Temp 36.6 °C (97.9 °F) (Oral)   Resp 18   Ht 1.646 m (5' 4.8\")   Wt 60.3 kg (133 lb)   SpO2 93%   BMI 22.27 kg/m²   Gen: NAD  Psych: Mood and affect appropriate  CV: RRR, 0 edema  Resp: CTAB, no upper airway sounds  Abd: NTND  Neuro: AOx3, following commands    Laboratory Values:  Recent Results (from the past 72 hour(s))   POCT glucose device results    Collection Time: 02/12/24 12:51 PM   Result Value Ref Range    POC Glucose, Blood 85 65 - 99 mg/dL   POCT glucose device results    Collection Time: 02/12/24  3:15 PM   Result Value Ref Range    POC Glucose, Blood 103 (H) 65 - 99 mg/dL   POCT glucose device results    Collection Time: 02/12/24  6:42 PM   Result Value Ref Range    POC Glucose, Blood 145 (H) 65 - 99 mg/dL   POCT glucose device results    Collection Time: 02/12/24  8:16 PM   Result Value Ref Range    POC Glucose, Blood 213 (H) 65 - 99 mg/dL   POCT glucose device results    Collection Time: 02/13/24  4:52 AM   Result Value Ref Range    POC Glucose, Blood 68 65 - 99 mg/dL   POCT glucose device results    Collection Time: 02/13/24  7:20 AM   Result Value Ref Range    POC Glucose, Blood 138 (H) 65 - 99 mg/dL   CBC with Differential: Tomorrow AM    Collection Time: 02/13/24  7:21 AM   Result Value Ref Range    WBC 5.8 4.8 - 10.8 K/uL    RBC 3.71 (L) 4.20 - 5.40 M/uL    Hemoglobin 10.4 (L) 12.0 - 16.0 g/dL    Hematocrit 34.6 (L) 37.0 - 47.0 %    MCV 93.3 81.4 - 97.8 fL    MCH 28.0 27.0 - 33.0 pg    MCHC 30.1 (L) 32.2 - 35.5 g/dL    RDW 53.3 (H) 35.9 - 50.0 fL    Platelet Count 369 164 - 446 K/uL    MPV " 10.8 9.0 - 12.9 fL    Neutrophils-Polys 51.40 44.00 - 72.00 %    Lymphocytes 31.00 22.00 - 41.00 %    Monocytes 12.00 0.00 - 13.40 %    Eosinophils 4.30 0.00 - 6.90 %    Basophils 1.00 0.00 - 1.80 %    Immature Granulocytes 0.30 0.00 - 0.90 %    Nucleated RBC 0.00 0.00 - 0.20 /100 WBC    Neutrophils (Absolute) 2.99 1.82 - 7.42 K/uL    Lymphs (Absolute) 1.81 1.00 - 4.80 K/uL    Monos (Absolute) 0.70 0.00 - 0.85 K/uL    Eos (Absolute) 0.25 0.00 - 0.51 K/uL    Baso (Absolute) 0.06 0.00 - 0.12 K/uL    Immature Granulocytes (abs) 0.02 0.00 - 0.11 K/uL    NRBC (Absolute) 0.00 K/uL   Comp Metabolic Panel (CMP): Tomorrow AM    Collection Time: 02/13/24  7:21 AM   Result Value Ref Range    Sodium 138 135 - 145 mmol/L    Potassium 4.3 3.6 - 5.5 mmol/L    Chloride 105 96 - 112 mmol/L    Co2 25 20 - 33 mmol/L    Anion Gap 8.0 7.0 - 16.0    Glucose 141 (H) 65 - 99 mg/dL    Bun 12 8 - 22 mg/dL    Creatinine 0.52 0.50 - 1.40 mg/dL    Calcium 8.9 8.5 - 10.5 mg/dL    Correct Calcium 9.6 8.5 - 10.5 mg/dL    AST(SGOT) 22 12 - 45 U/L    ALT(SGPT) 27 2 - 50 U/L    Alkaline Phosphatase 141 (H) 30 - 99 U/L    Total Bilirubin 0.2 0.1 - 1.5 mg/dL    Albumin 3.1 (L) 3.2 - 4.9 g/dL    Total Protein 5.8 (L) 6.0 - 8.2 g/dL    Globulin 2.7 1.9 - 3.5 g/dL    A-G Ratio 1.1 g/dL   ESTIMATED GFR    Collection Time: 02/13/24  7:21 AM   Result Value Ref Range    GFR (CKD-EPI) 96 >60 mL/min/1.73 m 2   POCT glucose device results    Collection Time: 02/13/24  1:46 PM   Result Value Ref Range    POC Glucose, Blood 277 (H) 65 - 99 mg/dL   EC-ECHOCARDIOGRAM COMPLETE W/O CONT    Collection Time: 02/13/24  2:59 PM   Result Value Ref Range    Eject.Frac. MOD 4C 75.12     Eject.Frac. MOD 2C 70.45     Left Ventrical Ejection Fraction 75    POCT glucose device results    Collection Time: 02/13/24  5:43 PM   Result Value Ref Range    POC Glucose, Blood 295 (H) 65 - 99 mg/dL   POCT glucose device results    Collection Time: 02/13/24  8:26 PM   Result Value Ref  Range    POC Glucose, Blood 264 (H) 65 - 99 mg/dL   CBC with Differential: Tomorrow AM    Collection Time: 02/14/24  1:48 AM   Result Value Ref Range    WBC 3.9 (L) 4.8 - 10.8 K/uL    RBC 3.75 (L) 4.20 - 5.40 M/uL    Hemoglobin 10.6 (L) 12.0 - 16.0 g/dL    Hematocrit 33.7 (L) 37.0 - 47.0 %    MCV 89.9 81.4 - 97.8 fL    MCH 28.3 27.0 - 33.0 pg    MCHC 31.5 (L) 32.2 - 35.5 g/dL    RDW 51.1 (H) 35.9 - 50.0 fL    Platelet Count 369 164 - 446 K/uL    MPV 10.7 9.0 - 12.9 fL    Neutrophils-Polys 47.40 44.00 - 72.00 %    Lymphocytes 35.40 22.00 - 41.00 %    Monocytes 10.30 0.00 - 13.40 %    Eosinophils 5.10 0.00 - 6.90 %    Basophils 1.30 0.00 - 1.80 %    Immature Granulocytes 0.50 0.00 - 0.90 %    Nucleated RBC 0.00 0.00 - 0.20 /100 WBC    Neutrophils (Absolute) 1.85 1.82 - 7.42 K/uL    Lymphs (Absolute) 1.38 1.00 - 4.80 K/uL    Monos (Absolute) 0.40 0.00 - 0.85 K/uL    Eos (Absolute) 0.20 0.00 - 0.51 K/uL    Baso (Absolute) 0.05 0.00 - 0.12 K/uL    Immature Granulocytes (abs) 0.02 0.00 - 0.11 K/uL    NRBC (Absolute) 0.00 K/uL   Comp Metabolic Panel (CMP): Tomorrow AM    Collection Time: 02/14/24  1:48 AM   Result Value Ref Range    Sodium 137 135 - 145 mmol/L    Potassium 4.5 3.6 - 5.5 mmol/L    Chloride 102 96 - 112 mmol/L    Co2 26 20 - 33 mmol/L    Anion Gap 9.0 7.0 - 16.0    Glucose 439 (H) 65 - 99 mg/dL    Bun 14 8 - 22 mg/dL    Creatinine 0.58 0.50 - 1.40 mg/dL    Calcium 8.9 8.5 - 10.5 mg/dL    Correct Calcium 9.5 8.5 - 10.5 mg/dL    AST(SGOT) 25 12 - 45 U/L    ALT(SGPT) 26 2 - 50 U/L    Alkaline Phosphatase 158 (H) 30 - 99 U/L    Total Bilirubin 0.2 0.1 - 1.5 mg/dL    Albumin 3.3 3.2 - 4.9 g/dL    Total Protein 5.9 (L) 6.0 - 8.2 g/dL    Globulin 2.6 1.9 - 3.5 g/dL    A-G Ratio 1.3 g/dL   TSH    Collection Time: 02/14/24  1:48 AM   Result Value Ref Range    TSH 2.100 0.380 - 5.330 uIU/mL   IRON/TOTAL IRON BIND    Collection Time: 02/14/24  1:48 AM   Result Value Ref Range    Iron 77 40 - 170 ug/dL    Total Iron  Binding 267 250 - 450 ug/dL    Unsat Iron Binding 190 110 - 370 ug/dL    % Saturation 29 15 - 55 %   VITAMIN B12    Collection Time: 02/14/24  1:48 AM   Result Value Ref Range    Vitamin B12 -True Cobalamin 685 211 - 911 pg/mL   MAGNESIUM    Collection Time: 02/14/24  1:48 AM   Result Value Ref Range    Magnesium 1.8 1.5 - 2.5 mg/dL   PHOSPHORUS    Collection Time: 02/14/24  1:48 AM   Result Value Ref Range    Phosphorus 2.2 (L) 2.5 - 4.5 mg/dL   ESTIMATED GFR    Collection Time: 02/14/24  1:48 AM   Result Value Ref Range    GFR (CKD-EPI) 94 >60 mL/min/1.73 m 2   POCT glucose device results    Collection Time: 02/14/24  5:25 AM   Result Value Ref Range    POC Glucose, Blood 409 (HH) 65 - 99 mg/dL   POCT glucose device results    Collection Time: 02/14/24  9:07 AM   Result Value Ref Range    POC Glucose, Blood 388 (H) 65 - 99 mg/dL   POCT glucose device results    Collection Time: 02/14/24 12:52 PM   Result Value Ref Range    POC Glucose, Blood 257 (H) 65 - 99 mg/dL   POCT glucose device results    Collection Time: 02/14/24  5:09 PM   Result Value Ref Range    POC Glucose, Blood 255 (H) 65 - 99 mg/dL   POCT glucose device results    Collection Time: 02/14/24  8:23 PM   Result Value Ref Range    POC Glucose, Blood 218 (H) 65 - 99 mg/dL   CBC with Differential    Collection Time: 02/15/24  5:21 AM   Result Value Ref Range    WBC 5.8 4.8 - 10.8 K/uL    RBC 4.06 (L) 4.20 - 5.40 M/uL    Hemoglobin 11.4 (L) 12.0 - 16.0 g/dL    Hematocrit 36.4 (L) 37.0 - 47.0 %    MCV 89.7 81.4 - 97.8 fL    MCH 28.1 27.0 - 33.0 pg    MCHC 31.3 (L) 32.2 - 35.5 g/dL    RDW 50.4 (H) 35.9 - 50.0 fL    Platelet Count 400 164 - 446 K/uL    MPV 11.0 9.0 - 12.9 fL    Neutrophils-Polys 47.40 44.00 - 72.00 %    Lymphocytes 38.60 22.00 - 41.00 %    Monocytes 8.90 0.00 - 13.40 %    Eosinophils 3.50 0.00 - 6.90 %    Basophils 1.40 0.00 - 1.80 %    Immature Granulocytes 0.20 0.00 - 0.90 %    Nucleated RBC 0.00 0.00 - 0.20 /100 WBC    Neutrophils  (Absolute) 2.73 1.82 - 7.42 K/uL    Lymphs (Absolute) 2.22 1.00 - 4.80 K/uL    Monos (Absolute) 0.51 0.00 - 0.85 K/uL    Eos (Absolute) 0.20 0.00 - 0.51 K/uL    Baso (Absolute) 0.08 0.00 - 0.12 K/uL    Immature Granulocytes (abs) 0.01 0.00 - 0.11 K/uL    NRBC (Absolute) 0.00 K/uL   Comp Metabolic Panel (CMP)    Collection Time: 02/15/24  5:21 AM   Result Value Ref Range    Sodium 137 135 - 145 mmol/L    Potassium 4.0 3.6 - 5.5 mmol/L    Chloride 99 96 - 112 mmol/L    Co2 24 20 - 33 mmol/L    Anion Gap 14.0 7.0 - 16.0    Glucose 427 (H) 65 - 99 mg/dL    Bun 14 8 - 22 mg/dL    Creatinine 0.72 0.50 - 1.40 mg/dL    Calcium 9.5 8.5 - 10.5 mg/dL    Correct Calcium 9.8 8.5 - 10.5 mg/dL    AST(SGOT) 31 12 - 45 U/L    ALT(SGPT) 31 2 - 50 U/L    Alkaline Phosphatase 172 (H) 30 - 99 U/L    Total Bilirubin 0.2 0.1 - 1.5 mg/dL    Albumin 3.6 3.2 - 4.9 g/dL    Total Protein 6.5 6.0 - 8.2 g/dL    Globulin 2.9 1.9 - 3.5 g/dL    A-G Ratio 1.2 g/dL   HEMOGLOBIN A1C    Collection Time: 02/15/24  5:21 AM   Result Value Ref Range    Glycohemoglobin 9.0 (H) 4.0 - 5.6 %    Est Avg Glucose 212 mg/dL   Vitamin D, 25-hydroxy (blood)    Collection Time: 02/15/24  5:21 AM   Result Value Ref Range    25-Hydroxy   Vitamin D 25 17 (L) 30 - 100 ng/mL   PHOSPHORUS    Collection Time: 02/15/24  5:21 AM   Result Value Ref Range    Phosphorus 2.4 (L) 2.5 - 4.5 mg/dL   ESTIMATED GFR    Collection Time: 02/15/24  5:21 AM   Result Value Ref Range    GFR (CKD-EPI) 87 >60 mL/min/1.73 m 2   POCT glucose device results    Collection Time: 02/15/24  7:54 AM   Result Value Ref Range    POC Glucose, Blood 580 (HH) 65 - 99 mg/dL   POCT glucose device results    Collection Time: 02/15/24  8:49 AM   Result Value Ref Range    POC Glucose, Blood 529 (HH) 65 - 99 mg/dL       Medications:  Scheduled Medications   Medication Dose Frequency    Pharmacy Consult Request  1 Each PHARMACY TO DOSE    senna-docusate  2 Tablet BID    omeprazole  20 mg DAILY    amLODIPine  5 mg  Nightly    atorvastatin  80 mg Nightly    busPIRone  5 mg BID    cariprazine  6 mg QHS    insulin GLARGINE  15 Units QAM INSULIN    insulin lispro  2-9 Units 4X/DAY ACHS    lamoTRIgine  100 mg DAILY    levETIRAcetam  500 mg Q12HRS    Or    levETIRAcetam (Keppra) IV  500 mg Q12HRS    levothyroxine  25 mcg AM ES    losartan  100 mg DAILY    primidone  100 mg QHS    timolol  1 Drop QHS    carBAMazepine  200 mg BID     PRN medications: Respiratory Therapy Consult, hydrALAZINE, acetaminophen, senna-docusate **AND** polyethylene glycol/lytes, mag hydrox-al hydrox-simeth, ondansetron **OR** ondansetron, traZODone, sodium chloride, oxyCODONE immediate-release **OR** oxyCODONE immediate-release, midazolam, [COMPLETED] acetaminophen **FOLLOWED BY** acetaminophen, insulin lispro **AND** POC blood glucose manual result **AND** NOTIFY MD and PharmD **AND** Administer 20 grams of glucose (approximately 8 ounces of fruit juice) every 15 minutes PRN FSBG less than 70 mg/dL **AND** dextrose bolus, ziprasidone    Diet:  Current Diet Order   Procedures    Diet Order Diet: Consistent CHO (Diabetic)       Medical Decision Making and Plan:  TBI (Traumatic Brain Injury): GLF on 2/9/24 with left sided subarachnoid hemorrhage s/p monitoring with serial CTs.   -PT and OT for mobility and ADLs. Per guidelines, 15 hours per week between PT, OT and/or SLP.  -Follow-up NSG. On 7 days of Keppra for seizure prophylaxis. Hold AC until 2/19     HTN- Patient on Amlodipine 5 mg and Losartan 100 mg daily. Into 140s, consult hospitalist     Hx of PE - Previously on Eliquis. On hold for 10 days.      HLD - Patient on Atorvastatin 80 mg QHS     Anemia - Check AM CBC - 11.4 on admission.      Leukopenia - Check AM CBC - 5.8 on admission, resolved     DM1 with hyperglycemia - Patient on Lantus 15 U and SSI on transfer. Blood sugars into 500s, give extra dose and consult hospitalist     Hypothyroidism - Patient on Levothyroxine 25 mcg daily      Hypophosphatemia - Check AM level     BPD1 - Patient on Lamictal 100 mg daily, Primidone 100 mg QHS, Tegretol 200 mg BID, Buspar 5 mg BID, Vraylar 6 mg QHS, and Lamictal 100 mg daily     Glaucoma - Patient on Timolol QHS    Vitamin D deficiency - 17 on admission, start 2000 U      Pain - Patient on PRN Tylenol and Oxycodone     Skin - Patient at risk for skin breakdown due to debility in areas including sacrum, achilles, elbows and head in addition to other sites. Nursing to assess skin daily.      GI Ppx - Patient on Prilosec for GERD prophylaxis. Patient on Senna-docusate for constipation prophylaxis.      DVT Ppx - Patient Marjorie is on hold on transfer. To restart 2/19    ____________________________________    T. Deven Gregory MD/PhD  [facility] - Physical Medicine & Rehabilitation   [facility] - Brain Injury Medicine   ____________________________________    Total time:  50 minutes. Time spent included pre-rounding review of vitals and tests, unit/floor time, face-to-face time with the patient including physical examination, care coordination, counseling of patient and/or family, ordering medications/procedures/tests, discussion with CM, PT, OT, SLP and/or other healthcare providers, and documentation in the electronic medical record. Topics discussed included admission labs, severe hyperglycemia, low vitamin D, ongoing anemia, and consult hospitalist. Patient's case was discussed face to face with Hospitalist on [facility] floor.

## 2024-02-16 ENCOUNTER — APPOINTMENT (OUTPATIENT)
Dept: SPEECH THERAPY | Facility: REHABILITATION | Age: 74
DRG: 950 | End: 2024-02-16
Attending: PHYSICAL MEDICINE & REHABILITATION
Payer: MEDICARE

## 2024-02-16 ENCOUNTER — APPOINTMENT (OUTPATIENT)
Dept: PHYSICAL THERAPY | Facility: REHABILITATION | Age: 74
DRG: 950 | End: 2024-02-16
Attending: PHYSICAL MEDICINE & REHABILITATION
Payer: MEDICARE

## 2024-02-16 ENCOUNTER — APPOINTMENT (OUTPATIENT)
Dept: OCCUPATIONAL THERAPY | Facility: REHABILITATION | Age: 74
DRG: 950 | End: 2024-02-16
Attending: PHYSICAL MEDICINE & REHABILITATION
Payer: MEDICARE

## 2024-02-16 PROBLEM — E55.9 VITAMIN D DEFICIENCY: Status: ACTIVE | Noted: 2024-02-16

## 2024-02-16 PROBLEM — H40.9 GLAUCOMA: Status: ACTIVE | Noted: 2024-02-16

## 2024-02-16 LAB
GLUCOSE BLD STRIP.AUTO-MCNC: 163 MG/DL (ref 65–99)
GLUCOSE BLD STRIP.AUTO-MCNC: 194 MG/DL (ref 65–99)
GLUCOSE BLD STRIP.AUTO-MCNC: 229 MG/DL (ref 65–99)
GLUCOSE BLD STRIP.AUTO-MCNC: 279 MG/DL (ref 65–99)

## 2024-02-16 PROCEDURE — 99232 SBSQ HOSP IP/OBS MODERATE 35: CPT | Performed by: PHYSICAL MEDICINE & REHABILITATION

## 2024-02-16 PROCEDURE — 97110 THERAPEUTIC EXERCISES: CPT

## 2024-02-16 PROCEDURE — 97535 SELF CARE MNGMENT TRAINING: CPT

## 2024-02-16 PROCEDURE — A9270 NON-COVERED ITEM OR SERVICE: HCPCS | Performed by: PHYSICAL MEDICINE & REHABILITATION

## 2024-02-16 PROCEDURE — 97530 THERAPEUTIC ACTIVITIES: CPT

## 2024-02-16 PROCEDURE — 700102 HCHG RX REV CODE 250 W/ 637 OVERRIDE(OP): Performed by: PHYSICAL MEDICINE & REHABILITATION

## 2024-02-16 PROCEDURE — 770010 HCHG ROOM/CARE - REHAB SEMI PRIVAT*

## 2024-02-16 PROCEDURE — 97116 GAIT TRAINING THERAPY: CPT

## 2024-02-16 PROCEDURE — 97130 THER IVNTJ EA ADDL 15 MIN: CPT

## 2024-02-16 PROCEDURE — 99232 SBSQ HOSP IP/OBS MODERATE 35: CPT | Performed by: HOSPITALIST

## 2024-02-16 PROCEDURE — 82962 GLUCOSE BLOOD TEST: CPT | Mod: 91

## 2024-02-16 PROCEDURE — 700102 HCHG RX REV CODE 250 W/ 637 OVERRIDE(OP): Performed by: HOSPITALIST

## 2024-02-16 PROCEDURE — A9270 NON-COVERED ITEM OR SERVICE: HCPCS | Performed by: HOSPITALIST

## 2024-02-16 PROCEDURE — 97129 THER IVNTJ 1ST 15 MIN: CPT

## 2024-02-16 RX ADMIN — BUSPIRONE HYDROCHLORIDE 5 MG: 5 TABLET ORAL at 21:16

## 2024-02-16 RX ADMIN — TIMOLOL MALEATE 1 DROP: 5 SOLUTION OPHTHALMIC at 21:27

## 2024-02-16 RX ADMIN — DIBASIC SODIUM PHOSPHATE, MONOBASIC POTASSIUM PHOSPHATE AND MONOBASIC SODIUM PHOSPHATE 500 MG: 852; 155; 130 TABLET ORAL at 14:59

## 2024-02-16 RX ADMIN — INSULIN LISPRO 4 UNITS: 100 INJECTION, SOLUTION INTRAVENOUS; SUBCUTANEOUS at 17:48

## 2024-02-16 RX ADMIN — Medication 1000 UNITS: at 08:41

## 2024-02-16 RX ADMIN — CARIPRAZINE 6 MG: 3 CAPSULE, GELATIN COATED ORAL at 21:25

## 2024-02-16 RX ADMIN — BUSPIRONE HYDROCHLORIDE 5 MG: 5 TABLET ORAL at 08:42

## 2024-02-16 RX ADMIN — INSULIN LISPRO 2 UNITS: 100 INJECTION, SOLUTION INTRAVENOUS; SUBCUTANEOUS at 21:19

## 2024-02-16 RX ADMIN — INSULIN LISPRO 6 UNITS: 100 INJECTION, SOLUTION INTRAVENOUS; SUBCUTANEOUS at 11:34

## 2024-02-16 RX ADMIN — OMEPRAZOLE 20 MG: 20 CAPSULE, DELAYED RELEASE ORAL at 08:42

## 2024-02-16 RX ADMIN — DIBASIC SODIUM PHOSPHATE, MONOBASIC POTASSIUM PHOSPHATE AND MONOBASIC SODIUM PHOSPHATE 500 MG: 852; 155; 130 TABLET ORAL at 08:42

## 2024-02-16 RX ADMIN — DIBASIC SODIUM PHOSPHATE, MONOBASIC POTASSIUM PHOSPHATE AND MONOBASIC SODIUM PHOSPHATE 500 MG: 852; 155; 130 TABLET ORAL at 21:15

## 2024-02-16 RX ADMIN — CARBAMAZEPINE 200 MG: 100 TABLET, CHEWABLE ORAL at 08:41

## 2024-02-16 RX ADMIN — ATORVASTATIN CALCIUM 80 MG: 40 TABLET, FILM COATED ORAL at 21:15

## 2024-02-16 RX ADMIN — LOSARTAN POTASSIUM 100 MG: 50 TABLET, FILM COATED ORAL at 08:45

## 2024-02-16 RX ADMIN — CARBAMAZEPINE 200 MG: 100 TABLET, CHEWABLE ORAL at 21:15

## 2024-02-16 RX ADMIN — INSULIN LISPRO 2 UNITS: 100 INJECTION, SOLUTION INTRAVENOUS; SUBCUTANEOUS at 08:19

## 2024-02-16 RX ADMIN — AMLODIPINE BESYLATE 5 MG: 5 TABLET ORAL at 21:15

## 2024-02-16 RX ADMIN — LAMOTRIGINE 100 MG: 100 TABLET ORAL at 08:45

## 2024-02-16 RX ADMIN — PRIMIDONE 100 MG: 50 TABLET ORAL at 21:15

## 2024-02-16 RX ADMIN — LEVOTHYROXINE SODIUM 25 MCG: 0.03 TABLET ORAL at 05:37

## 2024-02-16 RX ADMIN — LEVETIRACETAM 500 MG: 500 TABLET, FILM COATED ORAL at 08:42

## 2024-02-16 ASSESSMENT — GAIT ASSESSMENTS
DISTANCE (FEET): 85
ASSISTIVE DEVICE: FRONT WHEEL WALKER
GAIT LEVEL OF ASSIST: CONTACT GUARD ASSIST
DEVIATION: BRADYKINETIC;DECREASED BASE OF SUPPORT;DECREASED HEEL STRIKE;DECREASED TOE OFF

## 2024-02-16 ASSESSMENT — ENCOUNTER SYMPTOMS
FEVER: 0
BRUISES/BLEEDS EASILY: 0
ABDOMINAL PAIN: 0
NAUSEA: 0
BLURRED VISION: 0
POLYDIPSIA: 0
VOMITING: 0
COUGH: 0
CHILLS: 0
PALPITATIONS: 0
SPUTUM PRODUCTION: 0
DOUBLE VISION: 0

## 2024-02-16 ASSESSMENT — ACTIVITIES OF DAILY LIVING (ADL)
TOILET_TRANSFER_DESCRIPTION: ADAPTIVE EQUIPMENT;SUPERVISION FOR SAFETY;VERBAL CUEING
TOILETING_LEVEL_OF_ASSIST_DESCRIPTION: ASSIST FOR STANDING BALANCE;GRAB BAR;INCREASED TIME;SUPERVISION FOR SAFETY
BED_CHAIR_WHEELCHAIR_TRANSFER_DESCRIPTION: INCREASED TIME;SUPERVISION FOR SAFETY;VERBAL CUEING;ADAPTIVE EQUIPMENT
BED_CHAIR_WHEELCHAIR_TRANSFER_DESCRIPTION: ADAPTIVE EQUIPMENT;SET-UP OF EQUIPMENT;SUPERVISION FOR SAFETY;VERBAL CUEING

## 2024-02-16 NOTE — THERAPY
"Speech Language Pathology  Daily Treatment     Patient Name: Molly Loera  Age:  73 y.o., Sex:  female  Medical Record #: [id number]  Today's Date: 2/16/2024     Precautions  Precautions: Fall Risk  Comments: poor safety awareness/impulsive    Subjective    Pt seen at 0830 and 1104 - waiting for SLP at 2nd session, pleasant and cooperative.     Objective       02/16/24 1104   Treatment Charges   Charges Yes   SLP Cognitive Skill Development First 15 Minutes 1   SLP Cognitive Skill Development Additional 15 Minutes 3   SLP Total Time Spent   SLP Individual Total Time Spent (Mins) 60   Cognition   Moderate Attention Severe (2)   Verbal Short Term Memory Other (Comments)  (immediate recall/mental manipulation)   Interdisciplinary Plan of Care Collaboration   IDT Collaboration with  Nursing   Patient Position at End of Therapy Seated;Self Releasing Lap Belt Applied;Chair Alarm On   Collaboration Comments CLOF with nursing         Assessment    0834: Therapy conducted at bedside due to pt receiving a.m. meds. Mental manipulation using simple +/-, 2 units: accuracy ranged from %, for those targets missed, pt able to answer given 1 repetition.  3 units: 70%, 90% - again with 1 repetition, accuracy increased to 100%.    1104: Attempted selective visual attention task (find \"the\") - initial attempt pt located 0 targets. Given MAX cues pt able to locate 2/15. SLP requested that pt ask her daughter to bring in her glasses as pt reports having difficulty reading print, however, glasses attempted (+1.00-2.50) were not helpful.     Strengths: Able to follow instructions, Alert and oriented, Independent prior level of function, Supportive family, Willingly participates in therapeutic activities, Pleasant and cooperative, Motivated for self care and independence  Barriers: Impaired activity tolerance, Impaired functional cognition    Plan    Continue to target simple attention, memory, PS    Passport items to be " completed:  Express basic needs, understand food/liquid recommendations, consistently follow swallow precautions, manage finances, manage medications, arrive to therapy appointments on time, complete daily memory log entries, solve problems related to safety situations, review education related to hospitalization, complete caregiver training     Speech Therapy Problems (Active)       Problem: Memory STGs       Dates: Start:  02/15/24         Goal: STG-Within one week, patient will complete mental manipulation tasks up to 4 units with no more than 1 repetition in 8/10 trials       Dates: Start:  02/15/24               Problem: Problem Solving STGs       Dates: Start:  02/15/24         Goal: STG-Within one week, patient will independently provide appropriate answers to problem solving scenarios involving IADLs and safety, with 90% accuracy.       Dates: Start:  02/15/24            Goal: STG-Within one week, patient will complete simple attention tasks with >80% accuracy provided MIN cues.       Dates: Start:  02/15/24               Problem: Speech/Swallowing LTGs       Dates: Start:  02/15/24         Goal: LTG-By discharge, patient will solve basic problems by utilizing compensatory intervention for memory and problem-solving to allow for safe completion of daily activities with SPV in order to prepare for safe d/c         Dates: Start:  02/15/24

## 2024-02-16 NOTE — CARE PLAN
"The patient is Stable - Low risk of patient condition declining or worsening    Shift Goals  Clinical Goals: Safety, pain control  Patient Goals: Safety  Family Goals: Education    Progress made toward(s) clinical / shift goals:      Problem: Fall Risk - Rehab  Goal: Patient will remain free from falls  Outcome: Progressing  Note: Prema Amaya Fall risk Assessment Score: 12    Moderate fall risk Interventions  - Bed and strip alarm   - Yellow sign by the door   - Yellow wrist band \"Fall risk\"  - Room near to the nurse station  - Do not leave patient unattended in the bathroom  - Fall risk education provided    Pt calls appropriately when in need of assistance.     Problem: Pain - Standard  Goal: Alleviation of pain or a reduction in pain to the patient’s comfort goal  Outcome: Progressing  Note: Tylenol given PRN per MAR for c/o buttock pain with adequate relief. Pt sleeps good. Will continue to monitor.       Patient is not progressing towards the following goals:      "

## 2024-02-16 NOTE — THERAPY
Occupational Therapy  Daily Treatment     Patient Name: Molly Loera  Age:  73 y.o., Sex:  female  Medical Record #: [id number]  Today's Date: 2/16/2024     Precautions  Precautions: Fall Risk  Comments: poor safety awareness/impulsive         Subjective    Pt supine in bed and agreeable to OT tx.     Objective       02/16/24 0901   OT Charge Group   OT Self Care / ADL (Units) 2   OT Therapy Activity (Units) 1   OT Therapeutic Exercise (Units) 1   OT Total Time Spent   OT Individual Total Time Spent (Mins) 60   Pain   Intervention Repositioned;Rest   Pain 0 - 10 Group   Location Shoulder   Location Orientation Left   Pain Rating Scale (NPRS) 1   Description Sore   Comfort Goal Comfort with Movement;Perform Activity   Therapist Pain Assessment During Activity  (resolved with rest)   Cognition    Level of Consciousness Alert   Safety Awareness Impulsive   Functional Level of Assist   Grooming Supervision;Standing   Grooming Description Standing at sink;Supervision for safety;Verbal cueing  (v/c for FWW approximation to sink during hand washing and oral care.)   Lower Body Dressing Contact Guard Assist   Lower Body Dressing Description Grab bar;Increased time;Set-up of equipment;Supervision for safety;Verbal cueing   Toileting Contact Guard Assist   Toileting Description Assist for standing balance;Grab bar;Increased time;Supervision for safety   Bed, Chair, Wheelchair Transfer Contact Guard Assist   Bed Chair Wheelchair Transfer Description Adaptive equipment;Set-up of equipment;Supervision for safety;Verbal cueing  (v/c for FWW approximation with stand step.)   Toilet Transfers Contact Guard Assist   Toilet Transfer Description Adaptive equipment;Supervision for safety;Verbal cueing  (FWW)   Sitting Upper Body Exercises   Sitting Upper Body Exercises Yes   Upper Extremity Bike Level 2 Resistance  (Motomed for 2 bouts of 5 min with 2 min break to increase BUE strength and endurance for ADLs.)   Balance   Comments  Pt participated in household distance functional mobility using FWW at CGA level with cues for way finding.   Interdisciplinary Plan of Care Collaboration   Patient Position at End of Therapy Seated;Chair Alarm On;Self Releasing Lap Belt Applied;Tray Table within Reach;Call Light within Reach         Assessment    Pt tolerated session fairly well with focus on ADLs, functional transfers, and UE therex. OTR completed some hair care while pt was using Motomed, due to knotting on back of head, in effort to preserve skin integrity. Unable to complete during session. Pt mildly distractible and required occasional cues to return to task. Noted L-shoulder pain following Motomed use, but resolved with rest. Strengths: Able to follow instructions, Alert and oriented, Independent prior level of function, Motivated for self care and independence, Pleasant and cooperative, Supportive family, Willingly participates in therapeutic activities  Barriers: Decreased endurance, Fatigue, Generalized weakness, Impaired balance, Impaired activity tolerance, Confused, Impaired functional cognition, Impulsive, Limited mobility, Visual impairment    Plan    ADLs, functional mobility, functional cognitive tasks, standing balance, overall strengthening. Determine DME    DME       Passport items to be completed:  Perform bathroom transfers, complete dressing, complete feeding, get ready for the day, prepare a simple meal, participate in household tasks, adapt home for safety needs, demonstrate home exercise program, complete caregiver training     Occupational Therapy Goals (Active)       Problem: Bathing       Dates: Start:  02/15/24         Goal: STG-Within one week, patient will bathe at SPV level.       Dates: Start:  02/15/24               Problem: Dressing       Dates: Start:  02/15/24         Goal: STG-Within one week, patient will dress LB at SBA level using DME PRN.       Dates: Start:  02/15/24               Problem: Functional  Transfers       Dates: Start:  02/15/24         Goal: STG-Within one week, patient will transfer to toilet at SBA level.       Dates: Start:  02/15/24            Goal: STG-Within one week, patient will transfer to step in shower at SBA level.       Dates: Start:  02/15/24               Problem: OT Long Term Goals       Dates: Start:  02/15/24         Goal: LTG-By discharge, patient will complete basic self care tasks at SPV-Mod I level.       Dates: Start:  02/15/24            Goal: LTG-By discharge, patient will perform bathroom transfers at SPV-Mod I level.       Dates: Start:  02/15/24            Goal: LTG-By discharge, patient will complete basic home management at SPV-Mod I level.       Dates: Start:  02/15/24               Problem: Toileting       Dates: Start:  02/15/24         Goal: STG-Within one week, patient will complete toileting tasks at SBA level.       Dates: Start:  02/15/24

## 2024-02-16 NOTE — THERAPY
Physical Therapy   Daily Treatment     Patient Name: Molly Loera  Age:  73 y.o., Sex:  female  Medical Record #: [id number]  Today's Date: 2/16/2024     Precautions  Precautions: (P) Fall Risk  Comments: (P) poor safety awareness/impulsive    Subjective    Pt is in her wc at the hallway waiting for this PT. She is agreeable to participate in therapy.     Objective       02/16/24 1301   PT Charge Group   PT Gait Training (Units) 2   PT Therapeutic Exercise (Units) 1   PT Therapeutic Activities (Units) 1   PT Total Time Spent   PT Individual Total Time Spent (Mins) 60   Precautions   Precautions Fall Risk   Comments poor safety awareness/impulsive   Gait Functional Level of Assist    Gait Level Of Assist Contact Guard Assist   Assistive Device Front Wheel Walker   Distance (Feet) 85   # of Times Distance was Traveled 3   Deviation Bradykinetic;Decreased Base Of Support;Decreased Heel Strike;Decreased Toe Off   Transfer Functional Level of Assist   Bed, Chair, Wheelchair Transfer Contact Guard Assist   Bed Chair Wheelchair Transfer Description Increased time;Supervision for safety;Verbal cueing;Adaptive equipment   Sitting Lower Body Exercises   Ankle Pumps 2 sets of 10;Bilateral   Hip Abduction 2 sets of 10;Bilateral;Light Resistance Theraband   Hip Adduction 2 sets of 10;Bilateral  (leg squeeze using a rubber ball)   Long Arc Quad 2 sets of 10;Bilateral;Weight (See Comments for lbs)  (2lbs AW)   Marching 2 sets of 10  (2lbs AW)   Hamstring Curl 2 sets of 10;Bilateral;Light Resistance Theraband   Sit to Stand 1 set of 10  (from exercise mat)   Interdisciplinary Plan of Care Collaboration   IDT Collaboration with  Nursing;Physician   Patient Position at End of Therapy Seated;Chair Alarm On;Self Releasing Lap Belt Applied;Tray Table within Reach;Call Light within Reach;Phone within Reach   Collaboration Comments collaborated glucose level, check with MD gluose level to exercise.         Assessment    Pt completed  gait training using FWW, CGA with cueing for staying close to walker, walker management when turning and increasing her step length. She performed seated resistance exercises with rest break due to fatigue. She has poor safety awareness bowen during stand pivot transfer needing cueing for sequence and safety.  Strengths: Able to follow instructions, Alert and oriented, Independent prior level of function, Pleasant and cooperative  Barriers: Generalized weakness, Fatigue, Impaired activity tolerance, Impaired balance (high CBG)    Plan    Strengthening, Transfer, Gait and Balance Training  Fall recovery techniques    DME   TBD    Passport items to be completed:  Get in/out of bed safely, in/out of a vehicle, safely use mobility device, walk or wheel around home/community, navigate up and down stairs, show how to get up/down from the ground, ensure home is accessible, demonstrate HEP, complete caregiver training    Physical Therapy Problems (Active)       Problem: Balance       Dates: Start:  02/15/24         Goal: STG-Within one week, patient will maintain dynamic standing x 5 mins       Dates: Start:  02/15/24               Problem: Mobility       Dates: Start:  02/15/24         Goal: STG-Within one week, patient will ambulate household distance using LRAD x 150 feet, SBA       Dates: Start:  02/15/24            Goal: STG-Within one week, patient will ambulate up/down a curb using LRAD, SBA       Dates: Start:  02/15/24               Problem: Mobility Transfers       Dates: Start:  02/15/24         Goal: STG-Within one week, patient will transfer bed to chair SBA       Dates: Start:  02/15/24               Problem: PT-Long Term Goals       Dates: Start:  02/15/24         Goal: LTG-By discharge, patient will tolerate standing x10 mins       Dates: Start:  02/15/24            Goal: LTG-By discharge, patient will ambulate using LRAD x 300 feet, Mod I       Dates: Start:  02/15/24            Goal: LTG-By discharge,  patient will perform home exercise program independently       Dates: Start:  02/15/24            Goal: LTG-By discharge, patient will transfer in/out of a car, Mod I       Dates: Start:  02/15/24

## 2024-02-16 NOTE — ASSESSMENT & PLAN NOTE
HbA1c 9.0  Presently on bid Lantus and SSI  Outpt meds include Levemir 10-12 u bid and Novolog SSI  May resume outpt regimen upon discharge  Endocrine F/U

## 2024-02-16 NOTE — PROGRESS NOTES
NURSING DAILY NOTE    Name: Molly Loera   Date of Admission: 2/14/2024   Admitting Diagnosis: Traumatic brain injury with new neurocognitive deficit (HCC)  Attending Physician: Mazin Gregory M.d.  Allergies: Depakote [valproic acid]    Safety  Patient Assist  cga  Patient Precautions  Fall Risk  Precaution Comments     Bed Transfer Status  Contact Guard Assist  Toilet Transfer Status   Contact Guard Assist  Assistive Devices  Wheelchair, Rails  Oxygen  None - Room Air  Diet/Therapeutic Dining  Current Diet Order   Procedures    Diet Order Diet: Consistent CHO (Diabetic)     Pill Administration  whole  Agitated Behavioral Scale     ABS Level of Severity       Fall Risk  Has the patient had a fall this admission?   No  Prema Amaya Fall Risk Scoring  13, MODERATE RISK  Fall Risk Safety Measures  bed alarm, chair alarm, and seatbelt alarm    Vitals  Temperature: 36.6 °C (97.9 °F)  Temp src: Oral  Pulse: 68  Respiration: 18  Blood Pressure : 120/70  Blood Pressure MAP (Calculated): 87 MM HG  BP Location: Right, Upper Arm  Patient BP Position: Sitting     Oxygen  Pulse Oximetry: 94 %  O2 Delivery Device: None - Room Air    Bowel and Bladder  Last Bowel Movement  02/15/24  Stool Type  Type 5: Soft blob with clear cut edges (passed easily)  Bowel Device     Continent  Bladder: Stress incontinence   Bowel: Incontinent movement  Bladder Function  Urine Void (mL):  (moderate)  Number of Times Voided: 1  Urine Color: Yellow  Genitourinary Assessment   Bladder Assessment (WDL):  Within Defined Limits  Urine Color: Yellow  Bladder Device: Bathroom  Time Void: No  Bladder Scan: Post Void  $ Bladder Scan Results (mL): 16    Skin  Zach Score   16  Sensory Interventions   Bed Types: Standard/Trauma Mattress  Skin Preventative Measures: Pillows in Use for Support / Positioning  Moisture Interventions         Pain  Pain Rating Scale  0 - No Pain  Pain  Location     Pain Location Orientation     Pain Interventions   Declines    ADLs    Bathing      Linen Change      Personal Hygiene     Chlorhexidine Bath      Oral Care     Teeth/Dentures     Shave     Nutrition Percentage Eaten  Lunch, Between 50-75% Consumed  Environmental Precautions     Patient Turns/Positioning  Patient Turns Self from Side to Side  Patient Turns Assistance/Tolerance     Bed Positions  Bed Controls On, Bed Locked  Head of Bed Elevated  Self regulated      Psychosocial/Neurologic Assessment  Psychosocial Assessment     Neurologic Assessment  Neuro (WDL): Exceptions to WDL  Level of Consciousness: Alert  Pupil Assesment: Yes  R Pupil Size (mm): 2  R Pupil Shape / Description: Round  R Pupil Reaction: Brisk  L Pupil Size (mm): 2  L Pupil Shape / Description: Round  L Pupil Reaction: Brisk  Motor Function/Sensation Assessment: Sensation  Muscle Strength Right Arm: Good Strength Against Gravity and Moderate Resistance  Muscle Strength Left Arm: Good Strength Against Gravity and Moderate Resistance  Muscle Strength Right Leg: Good Strength Against Gravity and Moderate Resistance  Muscle Strength Left Leg: Good Strength Against Gravity and Moderate Resistance  EENT (WDL):  Within Defined Limits    Cardio/Pulmonary Assessment  Edema      Respiratory Breath Sounds  RUL Breath Sounds: Clear  RML Breath Sounds: Clear  RLL Breath Sounds: Diminished  SHAWNA Breath Sounds: Clear  LLL Breath Sounds: Diminished  Cardiac Assessment   Cardiac (WDL):  WDL Except (HTN)

## 2024-02-16 NOTE — PROGRESS NOTES
"  Physical Medicine & Rehabilitation Progress Note    Encounter Date: 2/16/2024    Chief Complaint: Decreased mobility, weakness    Interval Events (Subjective):  Patient sitting up in room. She reports some confusion at times. Denies NVD. Denies SOB.     Objective:  VITAL SIGNS: /58   Pulse 88   Temp 36.9 °C (98.4 °F) (Oral)   Resp 18   Ht 1.646 m (5' 4.8\")   Wt 60.3 kg (133 lb)   SpO2 98%   BMI 22.27 kg/m²   Gen: NAD  Psych: Mood and affect appropriate  CV: RRR, 0 edema  Resp: CTAB, no upper airway sounds  Abd: NTND  Neuro: AOx3, following commands  Unchanged from 2/15/24    Laboratory Values:  Recent Results (from the past 72 hour(s))   POCT glucose device results    Collection Time: 02/13/24  1:46 PM   Result Value Ref Range    POC Glucose, Blood 277 (H) 65 - 99 mg/dL   EC-ECHOCARDIOGRAM COMPLETE W/O CONT    Collection Time: 02/13/24  2:59 PM   Result Value Ref Range    Eject.Frac. MOD 4C 75.12     Eject.Frac. MOD 2C 70.45     Left Ventrical Ejection Fraction 75    POCT glucose device results    Collection Time: 02/13/24  5:43 PM   Result Value Ref Range    POC Glucose, Blood 295 (H) 65 - 99 mg/dL   POCT glucose device results    Collection Time: 02/13/24  8:26 PM   Result Value Ref Range    POC Glucose, Blood 264 (H) 65 - 99 mg/dL   CBC with Differential: Tomorrow AM    Collection Time: 02/14/24  1:48 AM   Result Value Ref Range    WBC 3.9 (L) 4.8 - 10.8 K/uL    RBC 3.75 (L) 4.20 - 5.40 M/uL    Hemoglobin 10.6 (L) 12.0 - 16.0 g/dL    Hematocrit 33.7 (L) 37.0 - 47.0 %    MCV 89.9 81.4 - 97.8 fL    MCH 28.3 27.0 - 33.0 pg    MCHC 31.5 (L) 32.2 - 35.5 g/dL    RDW 51.1 (H) 35.9 - 50.0 fL    Platelet Count 369 164 - 446 K/uL    MPV 10.7 9.0 - 12.9 fL    Neutrophils-Polys 47.40 44.00 - 72.00 %    Lymphocytes 35.40 22.00 - 41.00 %    Monocytes 10.30 0.00 - 13.40 %    Eosinophils 5.10 0.00 - 6.90 %    Basophils 1.30 0.00 - 1.80 %    Immature Granulocytes 0.50 0.00 - 0.90 %    Nucleated RBC 0.00 0.00 - 0.20 " /100 WBC    Neutrophils (Absolute) 1.85 1.82 - 7.42 K/uL    Lymphs (Absolute) 1.38 1.00 - 4.80 K/uL    Monos (Absolute) 0.40 0.00 - 0.85 K/uL    Eos (Absolute) 0.20 0.00 - 0.51 K/uL    Baso (Absolute) 0.05 0.00 - 0.12 K/uL    Immature Granulocytes (abs) 0.02 0.00 - 0.11 K/uL    NRBC (Absolute) 0.00 K/uL   Comp Metabolic Panel (CMP): Tomorrow AM    Collection Time: 02/14/24  1:48 AM   Result Value Ref Range    Sodium 137 135 - 145 mmol/L    Potassium 4.5 3.6 - 5.5 mmol/L    Chloride 102 96 - 112 mmol/L    Co2 26 20 - 33 mmol/L    Anion Gap 9.0 7.0 - 16.0    Glucose 439 (H) 65 - 99 mg/dL    Bun 14 8 - 22 mg/dL    Creatinine 0.58 0.50 - 1.40 mg/dL    Calcium 8.9 8.5 - 10.5 mg/dL    Correct Calcium 9.5 8.5 - 10.5 mg/dL    AST(SGOT) 25 12 - 45 U/L    ALT(SGPT) 26 2 - 50 U/L    Alkaline Phosphatase 158 (H) 30 - 99 U/L    Total Bilirubin 0.2 0.1 - 1.5 mg/dL    Albumin 3.3 3.2 - 4.9 g/dL    Total Protein 5.9 (L) 6.0 - 8.2 g/dL    Globulin 2.6 1.9 - 3.5 g/dL    A-G Ratio 1.3 g/dL   TSH    Collection Time: 02/14/24  1:48 AM   Result Value Ref Range    TSH 2.100 0.380 - 5.330 uIU/mL   IRON/TOTAL IRON BIND    Collection Time: 02/14/24  1:48 AM   Result Value Ref Range    Iron 77 40 - 170 ug/dL    Total Iron Binding 267 250 - 450 ug/dL    Unsat Iron Binding 190 110 - 370 ug/dL    % Saturation 29 15 - 55 %   VITAMIN B12    Collection Time: 02/14/24  1:48 AM   Result Value Ref Range    Vitamin B12 -True Cobalamin 685 211 - 911 pg/mL   MAGNESIUM    Collection Time: 02/14/24  1:48 AM   Result Value Ref Range    Magnesium 1.8 1.5 - 2.5 mg/dL   PHOSPHORUS    Collection Time: 02/14/24  1:48 AM   Result Value Ref Range    Phosphorus 2.2 (L) 2.5 - 4.5 mg/dL   ESTIMATED GFR    Collection Time: 02/14/24  1:48 AM   Result Value Ref Range    GFR (CKD-EPI) 94 >60 mL/min/1.73 m 2   POCT glucose device results    Collection Time: 02/14/24  5:25 AM   Result Value Ref Range    POC Glucose, Blood 409 (HH) 65 - 99 mg/dL   POCT glucose device  results    Collection Time: 02/14/24  9:07 AM   Result Value Ref Range    POC Glucose, Blood 388 (H) 65 - 99 mg/dL   POCT glucose device results    Collection Time: 02/14/24 12:52 PM   Result Value Ref Range    POC Glucose, Blood 257 (H) 65 - 99 mg/dL   POCT glucose device results    Collection Time: 02/14/24  5:09 PM   Result Value Ref Range    POC Glucose, Blood 255 (H) 65 - 99 mg/dL   POCT glucose device results    Collection Time: 02/14/24  8:23 PM   Result Value Ref Range    POC Glucose, Blood 218 (H) 65 - 99 mg/dL   CBC with Differential    Collection Time: 02/15/24  5:21 AM   Result Value Ref Range    WBC 5.8 4.8 - 10.8 K/uL    RBC 4.06 (L) 4.20 - 5.40 M/uL    Hemoglobin 11.4 (L) 12.0 - 16.0 g/dL    Hematocrit 36.4 (L) 37.0 - 47.0 %    MCV 89.7 81.4 - 97.8 fL    MCH 28.1 27.0 - 33.0 pg    MCHC 31.3 (L) 32.2 - 35.5 g/dL    RDW 50.4 (H) 35.9 - 50.0 fL    Platelet Count 400 164 - 446 K/uL    MPV 11.0 9.0 - 12.9 fL    Neutrophils-Polys 47.40 44.00 - 72.00 %    Lymphocytes 38.60 22.00 - 41.00 %    Monocytes 8.90 0.00 - 13.40 %    Eosinophils 3.50 0.00 - 6.90 %    Basophils 1.40 0.00 - 1.80 %    Immature Granulocytes 0.20 0.00 - 0.90 %    Nucleated RBC 0.00 0.00 - 0.20 /100 WBC    Neutrophils (Absolute) 2.73 1.82 - 7.42 K/uL    Lymphs (Absolute) 2.22 1.00 - 4.80 K/uL    Monos (Absolute) 0.51 0.00 - 0.85 K/uL    Eos (Absolute) 0.20 0.00 - 0.51 K/uL    Baso (Absolute) 0.08 0.00 - 0.12 K/uL    Immature Granulocytes (abs) 0.01 0.00 - 0.11 K/uL    NRBC (Absolute) 0.00 K/uL   Comp Metabolic Panel (CMP)    Collection Time: 02/15/24  5:21 AM   Result Value Ref Range    Sodium 137 135 - 145 mmol/L    Potassium 4.0 3.6 - 5.5 mmol/L    Chloride 99 96 - 112 mmol/L    Co2 24 20 - 33 mmol/L    Anion Gap 14.0 7.0 - 16.0    Glucose 427 (H) 65 - 99 mg/dL    Bun 14 8 - 22 mg/dL    Creatinine 0.72 0.50 - 1.40 mg/dL    Calcium 9.5 8.5 - 10.5 mg/dL    Correct Calcium 9.8 8.5 - 10.5 mg/dL    AST(SGOT) 31 12 - 45 U/L    ALT(SGPT) 31 2 -  50 U/L    Alkaline Phosphatase 172 (H) 30 - 99 U/L    Total Bilirubin 0.2 0.1 - 1.5 mg/dL    Albumin 3.6 3.2 - 4.9 g/dL    Total Protein 6.5 6.0 - 8.2 g/dL    Globulin 2.9 1.9 - 3.5 g/dL    A-G Ratio 1.2 g/dL   HEMOGLOBIN A1C    Collection Time: 02/15/24  5:21 AM   Result Value Ref Range    Glycohemoglobin 9.0 (H) 4.0 - 5.6 %    Est Avg Glucose 212 mg/dL   Vitamin D, 25-hydroxy (blood)    Collection Time: 02/15/24  5:21 AM   Result Value Ref Range    25-Hydroxy   Vitamin D 25 17 (L) 30 - 100 ng/mL   PHOSPHORUS    Collection Time: 02/15/24  5:21 AM   Result Value Ref Range    Phosphorus 2.4 (L) 2.5 - 4.5 mg/dL   ESTIMATED GFR    Collection Time: 02/15/24  5:21 AM   Result Value Ref Range    GFR (CKD-EPI) 87 >60 mL/min/1.73 m 2   POCT glucose device results    Collection Time: 02/15/24  7:54 AM   Result Value Ref Range    POC Glucose, Blood 580 (HH) 65 - 99 mg/dL   POCT glucose device results    Collection Time: 02/15/24  8:49 AM   Result Value Ref Range    POC Glucose, Blood 529 (HH) 65 - 99 mg/dL   POCT glucose device results    Collection Time: 02/15/24 10:13 AM   Result Value Ref Range    POC Glucose, Blood 542 (HH) 65 - 99 mg/dL   POCT glucose device results    Collection Time: 02/15/24  1:45 PM   Result Value Ref Range    POC Glucose, Blood 258 (H) 65 - 99 mg/dL   POCT glucose device results    Collection Time: 02/15/24  5:13 PM   Result Value Ref Range    POC Glucose, Blood 185 (H) 65 - 99 mg/dL   POCT glucose device results    Collection Time: 02/15/24  8:21 PM   Result Value Ref Range    POC Glucose, Blood 153 (H) 65 - 99 mg/dL   POCT glucose device results    Collection Time: 02/16/24  8:13 AM   Result Value Ref Range    POC Glucose, Blood 194 (H) 65 - 99 mg/dL   POCT glucose device results    Collection Time: 02/16/24 11:33 AM   Result Value Ref Range    POC Glucose, Blood 279 (H) 65 - 99 mg/dL       Medications:  Scheduled Medications   Medication Dose Frequency    insulin lispro  2-12 Units 4X/DAY ACHS     insulin GLARGINE  10 Units BID    vitamin D3  1,000 Units DAILY    phosphorus  500 mg TID    Pharmacy Consult Request  1 Each PHARMACY TO DOSE    senna-docusate  2 Tablet BID    omeprazole  20 mg DAILY    amLODIPine  5 mg Nightly    atorvastatin  80 mg Nightly    busPIRone  5 mg BID    cariprazine  6 mg QHS    lamoTRIgine  100 mg DAILY    levothyroxine  25 mcg AM ES    losartan  100 mg DAILY    primidone  100 mg QHS    timolol  1 Drop QHS    carBAMazepine  200 mg BID     PRN medications: insulin lispro **AND** POC blood glucose manual result **AND** NOTIFY MD and PharmD **AND** Administer 20 grams of glucose (approximately 8 ounces of fruit juice) every 15 minutes PRN FSBG less than 70 mg/dL **AND** dextrose bolus, Respiratory Therapy Consult, hydrALAZINE, acetaminophen, senna-docusate **AND** polyethylene glycol/lytes, mag hydrox-al hydrox-simeth, ondansetron **OR** ondansetron, traZODone, sodium chloride, oxyCODONE immediate-release **OR** oxyCODONE immediate-release, midazolam, [COMPLETED] acetaminophen **FOLLOWED BY** acetaminophen, ziprasidone    Diet:  Current Diet Order   Procedures    Diet Order Diet: Consistent CHO (Diabetic)       Medical Decision Making and Plan:  TBI (Traumatic Brain Injury): GLF on 2/9/24 with left sided subarachnoid hemorrhage s/p monitoring with serial CTs.   -PT and OT for mobility and ADLs. Per guidelines, 15 hours per week between PT, OT and/or SLP.  -Follow-up NSG. On 7 days of Keppra for seizure prophylaxis. Completed Keppra Hold AC until 2/19     HTN- Patient on Amlodipine 5 mg and Losartan 100 mg daily. Into 140s, consult hospitalist. Continue Amlodipine 5 mg and Losartan 100 mg     Hx of PE - Previously on Eliquis. On hold for 10 days.      HLD - Patient on Atorvastatin 80 mg QHS     Anemia - Check AM CBC - 11.4 on admission.      Leukopenia - Check AM CBC - 5.8 on admission, resolved     DM1 with hyperglycemia - Patient on Lantus 15 U and SSI on transfer. Blood sugars into  500s, give extra dose and consult hospitalist     Hypothyroidism - Patient on Levothyroxine 25 mcg daily     Hypophosphatemia - Check AM level - low 2.4. Consult hospitalist. Continue K-Phos      BPD1 - Patient on Lamictal 100 mg daily, Primidone 100 mg QHS, Tegretol 200 mg BID, Buspar 5 mg BID, Vraylar 6 mg QHS, and Lamictal 100 mg daily     Glaucoma - Patient on Timolol QHS    Vitamin D deficiency - 17 on admission, start 2000 U      Pain - Patient on PRN Tylenol and Oxycodone     Skin - Patient at risk for skin breakdown due to debility in areas including sacrum, achilles, elbows and head in addition to other sites. Nursing to assess skin daily.      GI Ppx - Patient on Prilosec for GERD prophylaxis. Patient on Senna-docusate for constipation prophylaxis.      DVT Ppx - Patient Marjorie is on hold on transfer. To restart 2/19, order placed  ____________________________________    T. Deven Gregory MD/PhD  [facility] - Physical Medicine & Rehabilitation   [facility] - Brain Injury Medicine   ____________________________________

## 2024-02-16 NOTE — PROGRESS NOTES
Hospital Medicine Daily Progress Note      Chief Complaint  Hypertension  Diabetes    Interval Problem Update  Pt denies new complaints.  FSBS trends better.    Review of Systems  Review of Systems   Constitutional:  Negative for chills and fever.   HENT: Negative.     Eyes:  Negative for blurred vision and double vision.   Respiratory:  Negative for cough and sputum production.    Cardiovascular:  Negative for chest pain and palpitations.   Gastrointestinal:  Negative for abdominal pain, nausea and vomiting.   Skin:  Negative for itching and rash.   Endo/Heme/Allergies:  Negative for polydipsia. Does not bruise/bleed easily.        Physical Exam  Temp:  [36.4 °C (97.6 °F)-36.9 °C (98.4 °F)] 36.9 °C (98.4 °F)  Pulse:  [74-88] 88  Resp:  [18] 18  BP: (101-140)/(58-69) 101/58  SpO2:  [94 %-98 %] 98 %    Physical Exam  Vitals reviewed.   Constitutional:       General: She is not in acute distress.     Appearance: Normal appearance. She is not ill-appearing.   HENT:      Head: Normocephalic and atraumatic.      Right Ear: External ear normal.      Left Ear: External ear normal.      Nose:      Comments: Facial ecchymoses under eyes and across nasal bridge     Mouth/Throat:      Pharynx: Oropharynx is clear.   Eyes:      General:         Right eye: No discharge.         Left eye: No discharge.      Extraocular Movements: Extraocular movements intact.      Conjunctiva/sclera: Conjunctivae normal.   Cardiovascular:      Rate and Rhythm: Normal rate and regular rhythm.   Pulmonary:      Effort: No respiratory distress.      Breath sounds: No wheezing.      Comments: Decreased BS  Abdominal:      General: Bowel sounds are normal. There is no distension.      Palpations: Abdomen is soft.      Tenderness: There is no abdominal tenderness.   Musculoskeletal:      Cervical back: Normal range of motion and neck supple.      Right lower leg: No edema.      Left lower leg: No edema.   Skin:     General: Skin is warm and dry.    Neurological:      Mental Status: She is alert and oriented to person, place, and time.         Fluids    Intake/Output Summary (Last 24 hours) at 2/16/2024 1454  Last data filed at 2/16/2024 0900  Gross per 24 hour   Intake 1040 ml   Output --   Net 1040 ml       Laboratory  Recent Labs     02/14/24  0148 02/15/24  0521   WBC 3.9* 5.8   RBC 3.75* 4.06*   HEMOGLOBIN 10.6* 11.4*   HEMATOCRIT 33.7* 36.4*   MCV 89.9 89.7   MCH 28.3 28.1   MCHC 31.5* 31.3*   RDW 51.1* 50.4*   PLATELETCT 369 400   MPV 10.7 11.0     Recent Labs     02/14/24  0148 02/15/24  0521   SODIUM 137 137   POTASSIUM 4.5 4.0   CHLORIDE 102 99   CO2 26 24   GLUCOSE 439* 427*   BUN 14 14   CREATININE 0.58 0.72   CALCIUM 8.9 9.5                 Assessment/Plan  * Traumatic brain injury with new neurocognitive deficit (HCC)- (present on admission)  Assessment & Plan  S/P MGLF w/ left sided head trauma  Neuroimaging +SAH  ASA and Eiquis on hold  Completed Keppra for SZ proph    Glaucoma  Assessment & Plan  On Timolol    Vitamin D deficiency  Assessment & Plan  Vit D level 17  On supplement    Hypophosphatemia  Assessment & Plan  Continue supplement  Check F/U labs in AM    PE (pulmonary thromboembolism) (HCC)- (present on admission)  Assessment & Plan  Chronically anticoagulated on Eliquis   AC presently on hold d/t SAH    Other specified hypothyroidism- (present on admission)  Assessment & Plan  Euthyroid on Synthroid    Dyslipidemia- (present on admission)  Assessment & Plan  On Lipitor    Essential hypertension- (present on admission)  Assessment & Plan  Observe blood pressure trends on Norvasc and Losartan    Type 1 diabetes mellitus without complication (HCC)- (present on admission)  Assessment & Plan  HbA1c 9.0  Serum glucose improving w/ increased insulin  Continue Lantus bid and SSI  Outpt meds include Levemir 10 u bid and Novolog SSI    Bipolar 1 disorder (HCC)- (present on admission)  Assessment & Plan  On Buspar, Vraylar, Tegretol, Lamictal,  and Primidone  Needs Psychiatry F/U       Full Code

## 2024-02-16 NOTE — CARE PLAN
Problem: Skin Integrity  Goal: Skin integrity is maintained or improved  Outcome: Progressing  Patient encouraged to turn to sides to prevent pressure areas.     Problem: Fall Risk - Rehab  Goal: Patient will remain free from falls  Outcome: Progressing  Patient assisted with needs/transfers to prevent falls.   The patient is Stable - Low risk of patient condition declining or worsening    Shift Goals  Clinical Goals: Safety, pain control  Patient Goals: Safety  Family Goals: Education    Progress made toward(s) clinical / shift goals:      Patient is not progressing towards the following goals:

## 2024-02-16 NOTE — PROGRESS NOTES
NURSING DAILY NOTE    Name: Molly Loera   Date of Admission: 2/14/2024   Admitting Diagnosis: Traumatic brain injury with new neurocognitive deficit (HCC)  Attending Physician: Mazin Gregory M.d.  Allergies: Depakote [valproic acid]    Safety  Patient Assist  cga  Patient Precautions  Fall Risk  Precaution Comments     Bed Transfer Status  Contact Guard Assist  Toilet Transfer Status   Contact Guard Assist  Assistive Devices  Wheelchair, Rails  Oxygen  None - Room Air  Diet/Therapeutic Dining  Current Diet Order   Procedures    Diet Order Diet: Consistent CHO (Diabetic)     Pill Administration  whole  Agitated Behavioral Scale  18  ABS Level of Severity  No Agitation    Fall Risk  Has the patient had a fall this admission?   No  Prema Amaya Fall Risk Scoring  12, MODERATE RISK  Fall Risk Safety Measures  bed alarm, chair alarm, seatbelt alarm, poor balance, and low vision/ hearing    Vitals  Temperature: 36.4 °C (97.6 °F)  Temp src: Oral  Pulse: 74  Respiration: 18  Blood Pressure : 127/69  Blood Pressure MAP (Calculated): 88 MM HG  BP Location: Upper Arm, Left  Patient BP Position: Supine     Oxygen  Pulse Oximetry: 95 %  O2 Delivery Device: None - Room Air    Bowel and Bladder  Last Bowel Movement  02/15/24  Stool Type  Type 7: Watery, no solid pieces-entirely liquid  Bowel Device     Continent  Bladder: Stress incontinence   Bowel: Incontinent movement  Bladder Function  Urine Void (mL):  (moderate)  Number of Times Voided: 1  Urine Color: Yellow  Genitourinary Assessment   Bladder Assessment (WDL):  Within Defined Limits  Urine Color: Yellow  Bladder Device: Bathroom  Time Void: No  Bladder Scan: Post Void  $ Bladder Scan Results (mL): 16    Skin  Zach Score   17  Sensory Interventions   Bed Types: Standard/Trauma Mattress  Skin Preventative Measures: Pillows in Use for Support / Positioning  Moisture  Interventions  Moisturizers/Barriers: Barrier Wipes      Pain  Pain Rating Scale  1 - Hardly Notice Pain  Pain Location  Buttock  Pain Location Orientation     Pain Interventions   Rest    ADLs    Bathing      Linen Change      Personal Hygiene     Chlorhexidine Bath      Oral Care     Teeth/Dentures     Shave     Nutrition Percentage Eaten  Dinner, Between 50-75% Consumed  Environmental Precautions     Patient Turns/Positioning  Patient Turns Self from Side to Side  Patient Turns Assistance/Tolerance     Bed Positions  Bed Controls On, Bed Locked  Head of Bed Elevated  Self regulated      Psychosocial/Neurologic Assessment  Psychosocial Assessment     Neurologic Assessment  Neuro (WDL): Exceptions to WDL  Level of Consciousness: Alert  Pupil Assesment: Yes  R Pupil Size (mm): 2  R Pupil Shape / Description: Round  R Pupil Reaction: Brisk  L Pupil Size (mm): 2  L Pupil Shape / Description: Round  L Pupil Reaction: Brisk  Motor Function/Sensation Assessment: Sensation  Muscle Strength Right Arm: Good Strength Against Gravity and Moderate Resistance  Muscle Strength Left Arm: Good Strength Against Gravity and Moderate Resistance  Muscle Strength Right Leg: Good Strength Against Gravity and Moderate Resistance  Muscle Strength Left Leg: Good Strength Against Gravity and Moderate Resistance  EENT (WDL):  Within Defined Limits    Cardio/Pulmonary Assessment  Edema      Respiratory Breath Sounds  RUL Breath Sounds: Clear  RML Breath Sounds: Clear  RLL Breath Sounds: Clear  SHAWNA Breath Sounds: Clear  LLL Breath Sounds: Clear  Cardiac Assessment   Cardiac (WDL):  WDL Except (HTN)

## 2024-02-17 ENCOUNTER — APPOINTMENT (OUTPATIENT)
Dept: SPEECH THERAPY | Facility: REHABILITATION | Age: 74
DRG: 950 | End: 2024-02-17
Attending: PHYSICAL MEDICINE & REHABILITATION
Payer: MEDICARE

## 2024-02-17 ENCOUNTER — APPOINTMENT (OUTPATIENT)
Dept: OCCUPATIONAL THERAPY | Facility: REHABILITATION | Age: 74
DRG: 950 | End: 2024-02-17
Attending: PHYSICAL MEDICINE & REHABILITATION
Payer: MEDICARE

## 2024-02-17 LAB
ANION GAP SERPL CALC-SCNC: 13 MMOL/L (ref 7–16)
BUN SERPL-MCNC: 13 MG/DL (ref 8–22)
CALCIUM SERPL-MCNC: 9.2 MG/DL (ref 8.5–10.5)
CHLORIDE SERPL-SCNC: 103 MMOL/L (ref 96–112)
CO2 SERPL-SCNC: 24 MMOL/L (ref 20–33)
CREAT SERPL-MCNC: 0.52 MG/DL (ref 0.5–1.4)
ERYTHROCYTE [DISTWIDTH] IN BLOOD BY AUTOMATED COUNT: 52 FL (ref 35.9–50)
GFR SERPLBLD CREATININE-BSD FMLA CKD-EPI: 96 ML/MIN/1.73 M 2
GLUCOSE BLD STRIP.AUTO-MCNC: 207 MG/DL (ref 65–99)
GLUCOSE BLD STRIP.AUTO-MCNC: 224 MG/DL (ref 65–99)
GLUCOSE BLD STRIP.AUTO-MCNC: 246 MG/DL (ref 65–99)
GLUCOSE BLD STRIP.AUTO-MCNC: 267 MG/DL (ref 65–99)
GLUCOSE SERPL-MCNC: 181 MG/DL (ref 65–99)
HCT VFR BLD AUTO: 34.1 % (ref 37–47)
HGB BLD-MCNC: 10.6 G/DL (ref 12–16)
MCH RBC QN AUTO: 28.2 PG (ref 27–33)
MCHC RBC AUTO-ENTMCNC: 31.1 G/DL (ref 32.2–35.5)
MCV RBC AUTO: 90.7 FL (ref 81.4–97.8)
PHOSPHATE SERPL-MCNC: 3.2 MG/DL (ref 2.5–4.5)
PLATELET # BLD AUTO: 326 K/UL (ref 164–446)
PMV BLD AUTO: 10.2 FL (ref 9–12.9)
POTASSIUM SERPL-SCNC: 3.2 MMOL/L (ref 3.6–5.5)
RBC # BLD AUTO: 3.76 M/UL (ref 4.2–5.4)
SODIUM SERPL-SCNC: 140 MMOL/L (ref 135–145)
WBC # BLD AUTO: 4.9 K/UL (ref 4.8–10.8)

## 2024-02-17 PROCEDURE — 700102 HCHG RX REV CODE 250 W/ 637 OVERRIDE(OP): Performed by: HOSPITALIST

## 2024-02-17 PROCEDURE — 36415 COLL VENOUS BLD VENIPUNCTURE: CPT

## 2024-02-17 PROCEDURE — 99232 SBSQ HOSP IP/OBS MODERATE 35: CPT | Performed by: HOSPITALIST

## 2024-02-17 PROCEDURE — 97129 THER IVNTJ 1ST 15 MIN: CPT

## 2024-02-17 PROCEDURE — 770010 HCHG ROOM/CARE - REHAB SEMI PRIVAT*

## 2024-02-17 PROCEDURE — 97130 THER IVNTJ EA ADDL 15 MIN: CPT

## 2024-02-17 PROCEDURE — 84100 ASSAY OF PHOSPHORUS: CPT

## 2024-02-17 PROCEDURE — 97530 THERAPEUTIC ACTIVITIES: CPT

## 2024-02-17 PROCEDURE — 700102 HCHG RX REV CODE 250 W/ 637 OVERRIDE(OP): Performed by: PHYSICAL MEDICINE & REHABILITATION

## 2024-02-17 PROCEDURE — A9270 NON-COVERED ITEM OR SERVICE: HCPCS | Performed by: HOSPITALIST

## 2024-02-17 PROCEDURE — 85027 COMPLETE CBC AUTOMATED: CPT

## 2024-02-17 PROCEDURE — A9270 NON-COVERED ITEM OR SERVICE: HCPCS | Performed by: PHYSICAL MEDICINE & REHABILITATION

## 2024-02-17 PROCEDURE — 80048 BASIC METABOLIC PNL TOTAL CA: CPT

## 2024-02-17 PROCEDURE — 82962 GLUCOSE BLOOD TEST: CPT

## 2024-02-17 PROCEDURE — 97110 THERAPEUTIC EXERCISES: CPT

## 2024-02-17 RX ORDER — POTASSIUM CHLORIDE 20 MEQ/1
40 TABLET, EXTENDED RELEASE ORAL 2 TIMES DAILY
Status: COMPLETED | OUTPATIENT
Start: 2024-02-17 | End: 2024-02-17

## 2024-02-17 RX ADMIN — AMLODIPINE BESYLATE 5 MG: 5 TABLET ORAL at 20:50

## 2024-02-17 RX ADMIN — ATORVASTATIN CALCIUM 80 MG: 40 TABLET, FILM COATED ORAL at 20:50

## 2024-02-17 RX ADMIN — INSULIN GLARGINE-YFGN 12 UNITS: 100 INJECTION, SOLUTION SUBCUTANEOUS at 20:57

## 2024-02-17 RX ADMIN — TIMOLOL MALEATE 1 DROP: 5 SOLUTION OPHTHALMIC at 20:52

## 2024-02-17 RX ADMIN — CARBAMAZEPINE 200 MG: 100 TABLET, CHEWABLE ORAL at 08:13

## 2024-02-17 RX ADMIN — CARIPRAZINE 6 MG: 3 CAPSULE, GELATIN COATED ORAL at 20:50

## 2024-02-17 RX ADMIN — INSULIN LISPRO 4 UNITS: 100 INJECTION, SOLUTION INTRAVENOUS; SUBCUTANEOUS at 08:14

## 2024-02-17 RX ADMIN — DIBASIC SODIUM PHOSPHATE, MONOBASIC POTASSIUM PHOSPHATE AND MONOBASIC SODIUM PHOSPHATE 500 MG: 852; 155; 130 TABLET ORAL at 08:13

## 2024-02-17 RX ADMIN — POTASSIUM CHLORIDE 40 MEQ: 1500 TABLET, EXTENDED RELEASE ORAL at 20:50

## 2024-02-17 RX ADMIN — INSULIN LISPRO 6 UNITS: 100 INJECTION, SOLUTION INTRAVENOUS; SUBCUTANEOUS at 17:12

## 2024-02-17 RX ADMIN — CARBAMAZEPINE 200 MG: 100 TABLET, CHEWABLE ORAL at 20:50

## 2024-02-17 RX ADMIN — INSULIN LISPRO 4 UNITS: 100 INJECTION, SOLUTION INTRAVENOUS; SUBCUTANEOUS at 20:58

## 2024-02-17 RX ADMIN — LOSARTAN POTASSIUM 100 MG: 50 TABLET, FILM COATED ORAL at 08:13

## 2024-02-17 RX ADMIN — Medication 1000 UNITS: at 08:13

## 2024-02-17 RX ADMIN — INSULIN LISPRO 4 UNITS: 100 INJECTION, SOLUTION INTRAVENOUS; SUBCUTANEOUS at 11:37

## 2024-02-17 RX ADMIN — LEVOTHYROXINE SODIUM 25 MCG: 0.03 TABLET ORAL at 06:03

## 2024-02-17 RX ADMIN — BUSPIRONE HYDROCHLORIDE 5 MG: 5 TABLET ORAL at 20:50

## 2024-02-17 RX ADMIN — LAMOTRIGINE 100 MG: 100 TABLET ORAL at 08:13

## 2024-02-17 RX ADMIN — BUSPIRONE HYDROCHLORIDE 5 MG: 5 TABLET ORAL at 08:13

## 2024-02-17 RX ADMIN — PRIMIDONE 100 MG: 50 TABLET ORAL at 20:50

## 2024-02-17 RX ADMIN — POTASSIUM CHLORIDE 40 MEQ: 1500 TABLET, EXTENDED RELEASE ORAL at 08:26

## 2024-02-17 ASSESSMENT — ENCOUNTER SYMPTOMS
NAUSEA: 0
VOMITING: 0
SPUTUM PRODUCTION: 0
DOUBLE VISION: 0
ABDOMINAL PAIN: 0
CHILLS: 0
POLYDIPSIA: 0
FEVER: 0
PALPITATIONS: 0
COUGH: 0
BLURRED VISION: 0
BRUISES/BLEEDS EASILY: 0

## 2024-02-17 ASSESSMENT — PAIN DESCRIPTION - PAIN TYPE
TYPE: ACUTE PAIN
TYPE: ACUTE PAIN

## 2024-02-17 ASSESSMENT — ACTIVITIES OF DAILY LIVING (ADL): BED_CHAIR_WHEELCHAIR_TRANSFER_DESCRIPTION: ADAPTIVE EQUIPMENT;SET-UP OF EQUIPMENT;SUPERVISION FOR SAFETY

## 2024-02-17 NOTE — CARE PLAN
The patient is Stable - Low risk of patient condition declining or worsening    Shift Goals  Clinical Goals: Safety  Patient Goals: Safety  Family Goals: Education    Progress made toward(s) clinical / shift goals:  Labs monitored      Problem: Hemodynamics  Goal: Patient's hemodynamics, fluid balance and neurologic status will be stable or improve  Outcome: Progressing  Note:    Latest Reference Range & Units 02/17/24 05:38   WBC 4.8 - 10.8 K/uL 4.9   RBC 4.20 - 5.40 M/uL 3.76 (L)   Hemoglobin 12.0 - 16.0 g/dL 10.6 (L)   Hematocrit 37.0 - 47.0 % 34.1 (L)   MCV 81.4 - 97.8 fL 90.7   MCH 27.0 - 33.0 pg 28.2   MCHC 32.2 - 35.5 g/dL 31.1 (L)   RDW 35.9 - 50.0 fL 52.0 (H)   Platelet Count 164 - 446 K/uL 326   MPV 9.0 - 12.9 fL 10.2   Sodium 135 - 145 mmol/L 140   Potassium 3.6 - 5.5 mmol/L 3.2 (L)   Chloride 96 - 112 mmol/L 103   Co2 20 - 33 mmol/L 24   Anion Gap 7.0 - 16.0  13.0   Glucose 65 - 99 mg/dL 181 (H)   Bun 8 - 22 mg/dL 13   Creatinine 0.50 - 1.40 mg/dL 0.52   GFR (CKD-EPI) >60 mL/min/1.73 m 2 96   Calcium 8.5 - 10.5 mg/dL 9.2   Phosphorus 2.5 - 4.5 mg/dL 3.2

## 2024-02-17 NOTE — CARE PLAN
Problem: Knowledge Deficit - Standard  Goal: Patient and family/care givers will demonstrate understanding of plan of care, disease process/condition, diagnostic tests and medications  Outcome: Progressing   Pt education given regarding plan of care with emphasis on adequate hydration, pt shows moderate understanding, will continue to reinforce education and continue to monitor.         Problem: Fall Risk - Rehab  Goal: Patient will remain free from falls  Outcome: Progressing   Pt education given regarding fall precautions AND safety measures, pt shows good understanding, has not attempted to self transfer this shift, will continue to reinforce education and continue to monitor.

## 2024-02-17 NOTE — PROGRESS NOTES
Hospital Medicine Daily Progress Note      Chief Complaint  Hypertension  Diabetes    Interval Problem Update  No overnight issues.  Last 24 hours FSBS range:  163-279.  Labs reviewed and discussed.    Review of Systems  Review of Systems   Constitutional:  Negative for chills and fever.   HENT: Negative.     Eyes:  Negative for blurred vision and double vision.   Respiratory:  Negative for cough and sputum production.    Cardiovascular:  Negative for chest pain and palpitations.   Gastrointestinal:  Negative for abdominal pain, nausea and vomiting.   Skin:  Negative for itching and rash.   Endo/Heme/Allergies:  Negative for polydipsia. Does not bruise/bleed easily.        Physical Exam  Temp:  [36.4 °C (97.6 °F)-36.9 °C (98.4 °F)] 36.4 °C (97.6 °F)  Pulse:  [65-88] 65  Resp:  [18] 18  BP: (101-131)/(58-63) 129/59  SpO2:  [94 %-98 %] 96 %    Physical Exam  Vitals reviewed.   Constitutional:       General: She is not in acute distress.     Appearance: Normal appearance. She is not ill-appearing.   HENT:      Head: Normocephalic and atraumatic.      Right Ear: External ear normal.      Left Ear: External ear normal.      Nose:      Comments: Facial ecchymoses under eyes and across nasal bridge     Mouth/Throat:      Pharynx: Oropharynx is clear.   Eyes:      General:         Right eye: No discharge.         Left eye: No discharge.      Extraocular Movements: Extraocular movements intact.      Conjunctiva/sclera: Conjunctivae normal.   Cardiovascular:      Rate and Rhythm: Normal rate and regular rhythm.   Pulmonary:      Effort: No respiratory distress.      Breath sounds: No wheezing.      Comments: Decreased BS  Abdominal:      General: Bowel sounds are normal. There is no distension.      Palpations: Abdomen is soft.      Tenderness: There is no abdominal tenderness.   Musculoskeletal:      Cervical back: Normal range of motion and neck supple.      Right lower leg: No edema.      Left lower leg: No edema.    Skin:     General: Skin is warm and dry.   Neurological:      Mental Status: She is alert and oriented to person, place, and time.         Fluids    Intake/Output Summary (Last 24 hours) at 2/17/2024 0828  Last data filed at 2/17/2024 0606  Gross per 24 hour   Intake 600 ml   Output --   Net 600 ml       Laboratory  Recent Labs     02/15/24  0521 02/17/24  0538   WBC 5.8 4.9   RBC 4.06* 3.76*   HEMOGLOBIN 11.4* 10.6*   HEMATOCRIT 36.4* 34.1*   MCV 89.7 90.7   MCH 28.1 28.2   MCHC 31.3* 31.1*   RDW 50.4* 52.0*   PLATELETCT 400 326   MPV 11.0 10.2     Recent Labs     02/15/24  0521 02/17/24  0538   SODIUM 137 140   POTASSIUM 4.0 3.2*   CHLORIDE 99 103   CO2 24 24   GLUCOSE 427* 181*   BUN 14 13   CREATININE 0.72 0.52   CALCIUM 9.5 9.2                 Assessment/Plan  * Traumatic brain injury with new neurocognitive deficit (HCC)- (present on admission)  Assessment & Plan  S/P MGLF w/ left sided head trauma  Neuroimaging +SAH  ASA and Eiquis on hold  Completed Keppra for SZ proph    Glaucoma  Assessment & Plan  On Timolol    Vitamin D deficiency  Assessment & Plan  Vit D level 17  On supplement    Hypophosphatemia  Assessment & Plan  Phosphorus levels corrected  Completed supplement    Hypokalemia- (present on admission)  Assessment & Plan  Replete K+  Mg++ normal    PE (pulmonary thromboembolism) (HCC)- (present on admission)  Assessment & Plan  Chronically anticoagulated on Eliquis   AC presently on hold d/t SAH    Other specified hypothyroidism- (present on admission)  Assessment & Plan  Euthyroid on Synthroid    Dyslipidemia- (present on admission)  Assessment & Plan  On Lipitor    Essential hypertension- (present on admission)  Assessment & Plan  Observe blood pressure trends on Norvasc and Losartan    Type 1 diabetes mellitus without complication (HCC)- (present on admission)  Assessment & Plan  HbA1c 9.0  Continue to titrate up Lantus to effect  Also on SSI  Outpt meds include Levemir 10 u bid and Novolog  SSI    Bipolar 1 disorder (HCC)- (present on admission)  Assessment & Plan  On Buspar, Vraylar, Tegretol, Lamictal, and Primidone  Needs Psychiatry F/U       Full Code

## 2024-02-17 NOTE — THERAPY
Speech Language Pathology  Daily Treatment     Patient Name: Molly Loera  Age:  73 y.o., Sex:  female  Medical Record #: [id number]  Today's Date: 2/17/2024     Precautions  Precautions: Fall Risk  Comments: poor safety awareness/impulsive    Subjective    Pt requesting to dress prior to going to ST office. Reports she will ask daughter to bring glasses from home.     Objective       02/17/24 0904   Treatment Charges   SLP Cognitive Skill Development First 15 Minutes 1   SLP Cognitive Skill Development Additional 15 Minutes 3   SLP Total Time Spent   SLP Individual Total Time Spent (Mins) 60   Cognition   Verbal Short Term Memory Other (Comments)   Interdisciplinary Plan of Care Collaboration   Patient Position at End of Therapy Seated;Chair Alarm On;Call Light within Reach         Assessment    Mental manipulation, working memory ordering 3 words: 90% IND, with 1 rep increased to 100%. Ordering digits (smallest to largest) 3 units:100%, 4 units: 40%, with 1 rep increased to 70%.     Fxl PS questions, pt providing appropriate solutions or persons to aid in solving with 100% accuracy. Discussed getting Life Alert for home as pt is alone a portion of day while daughter works. She is in agreement.    Strengths: Able to follow instructions, Alert and oriented, Independent prior level of function, Supportive family, Willingly participates in therapeutic activities, Pleasant and cooperative, Motivated for self care and independence  Barriers: Impaired activity tolerance, Impaired functional cognition    Plan    Continue to target fxl PS and memory    Passport items to be completed:  Express basic needs, understand food/liquid recommendations, consistently follow swallow precautions, manage finances, manage medications, arrive to therapy appointments on time, complete daily memory log entries, solve problems related to safety situations, review education related to hospitalization, complete caregiver training      Speech Therapy Problems (Active)       Problem: Memory STGs       Dates: Start:  02/15/24         Goal: STG-Within one week, patient will complete mental manipulation tasks up to 4 units with no more than 1 repetition in 8/10 trials       Dates: Start:  02/15/24               Problem: Problem Solving STGs       Dates: Start:  02/15/24         Goal: STG-Within one week, patient will independently provide appropriate answers to problem solving scenarios involving IADLs and safety, with 90% accuracy.       Dates: Start:  02/15/24            Goal: STG-Within one week, patient will complete simple attention tasks with >80% accuracy provided MIN cues.       Dates: Start:  02/15/24               Problem: Speech/Swallowing LTGs       Dates: Start:  02/15/24         Goal: LTG-By discharge, patient will solve basic problems by utilizing compensatory intervention for memory and problem-solving to allow for safe completion of daily activities with SPV in order to prepare for safe d/c         Dates: Start:  02/15/24

## 2024-02-17 NOTE — PROGRESS NOTES
NURSING DAILY NOTE    Name: Molly Loera   Date of Admission: 2/14/2024   Admitting Diagnosis: Traumatic brain injury with new neurocognitive deficit (HCC)  Attending Physician: Mazin Gregory M.d.  Allergies: Depakote [valproic acid]    Safety  Patient Assist  CGA  Patient Precautions  Fall Risk  Precaution Comments  poor safety awareness/impulsive  Bed Transfer Status  Contact Guard Assist  Toilet Transfer Status   Contact Guard Assist  Assistive Devices  Wheelchair  Oxygen  None - Room Air  Diet/Therapeutic Dining  Current Diet Order   Procedures    Diet Order Diet: Consistent CHO (Diabetic)     Pill Administration  whole  Agitated Behavioral Scale  18  ABS Level of Severity  No Agitation    Fall Risk  Has the patient had a fall this admission?   No  Prema Amaya Fall Risk Scoring  12, MODERATE RISK  Fall Risk Safety Measures  bed alarm, chair alarm, poor balance, and low vision/ hearing    Vitals  Temperature: 36.8 °C (98.2 °F)  Temp src: Oral  Pulse: 80  Respiration: 18  Blood Pressure : 123/63  Blood Pressure MAP (Calculated): 83 MM HG  BP Location: Right, Upper Arm  Patient BP Position: Supine     Oxygen  Pulse Oximetry: 94 %  O2 (LPM): 0  O2 Delivery Device: None - Room Air    Bowel and Bladder  Last Bowel Movement  02/16/24  Stool Type  Type 5: Soft blob with clear cut edges (passed easily)  Bowel Device     Continent  Bladder: Stress incontinence   Bowel: Incontinent movement  Bladder Function  Urine Void (mL):  (moderate)  Number of Times Voided: 1  Urine Color: Unable To Evaluate  Genitourinary Assessment   Bladder Assessment (WDL):  Within Defined Limits  Urine Color: Unable To Evaluate  Bladder Device: Bathroom  Time Void: No  Bladder Scan: Post Void  $ Bladder Scan Results (mL): 10    Skin  Zach Score   17  Sensory Interventions   Bed Types: Standard/Trauma Mattress  Skin Preventative Measures: Pillows in Use for Support /  Positioning  Moisture Interventions  Moisturizers/Barriers: Barrier Wipes      Pain  Pain Rating Scale  0 - No Pain  Pain Location  Shoulder  Pain Location Orientation  Left  Pain Interventions   Declines    ADLs    Bathing      Linen Change      Personal Hygiene     Chlorhexidine Bath      Oral Care     Teeth/Dentures     Shave     Nutrition Percentage Eaten  *  * Meal *  *, Between 25-50% Consumed  Environmental Precautions     Patient Turns/Positioning  Patient Turns Self from Side to Side  Patient Turns Assistance/Tolerance     Bed Positions  Bed Controls On, Bed Locked  Head of Bed Elevated  Self regulated      Psychosocial/Neurologic Assessment  Psychosocial Assessment     Neurologic Assessment  Neuro (WDL): Exceptions to WDL  Level of Consciousness: Alert  Pupil Assesment: Yes  R Pupil Size (mm): 2  R Pupil Shape / Description: Round  R Pupil Reaction: Brisk  L Pupil Size (mm): 2  L Pupil Shape / Description: Round  L Pupil Reaction: Brisk  Motor Function/Sensation Assessment: Sensation  Muscle Strength Right Arm: Good Strength Against Gravity and Moderate Resistance  Muscle Strength Left Arm: Good Strength Against Gravity and Moderate Resistance  Muscle Strength Right Leg: Good Strength Against Gravity and Moderate Resistance  Muscle Strength Left Leg: Good Strength Against Gravity and Moderate Resistance  EENT (WDL):  Within Defined Limits    Cardio/Pulmonary Assessment  Edema      Respiratory Breath Sounds  RUL Breath Sounds: Clear  RML Breath Sounds: Clear  RLL Breath Sounds: Clear  SHAWNA Breath Sounds: Clear  LLL Breath Sounds: Clear  Cardiac Assessment   Cardiac (WDL):  WDL Except (HTN)

## 2024-02-17 NOTE — PROGRESS NOTES
Dr. Bowens verbally ordered 12 units (current order 10 units) Lantus with morning dose. Nurse administered and overrode the dose administration. Dual sign off completed.

## 2024-02-17 NOTE — PROGRESS NOTES
NURSING DAILY NOTE    Name: Molly Loera   Date of Admission: 2/14/2024   Admitting Diagnosis: Traumatic brain injury with new neurocognitive deficit (HCC)  Attending Physician: Mazin Gregory M.d.  Allergies: Depakote [valproic acid]    Safety  Patient Assist  cga  Patient Precautions  Fall Risk  Precaution Comments  poor safety awareness/impulsive  Bed Transfer Status  Contact Guard Assist  Toilet Transfer Status   Contact Guard Assist  Assistive Devices  Wheelchair, Rails  Oxygen  None - Room Air  Diet/Therapeutic Dining  Current Diet Order   Procedures    Diet Order Diet: Consistent CHO (Diabetic)     Pill Administration  whole  Agitated Behavioral Scale  18  ABS Level of Severity  No Agitation    Fall Risk  Has the patient had a fall this admission?   No  Prema Amaya Fall Risk Scoring  12, MODERATE RISK  Fall Risk Safety Measures  bed alarm, chair alarm, seatbelt alarm, poor balance, and low vision/ hearing    Vitals  Temperature: 36.8 °C (98.2 °F)  Temp src: Oral  Pulse: 80  Respiration: 18  Blood Pressure : 131/58  Blood Pressure MAP (Calculated): 82 MM HG  BP Location: Right, Upper Arm  Patient BP Position: Supine     Oxygen  Pulse Oximetry: 94 %  O2 (LPM): 0  O2 Delivery Device: None - Room Air    Bowel and Bladder  Last Bowel Movement  02/16/24  Stool Type  Type 5: Soft blob with clear cut edges (passed easily)  Bowel Device     Continent  Bladder: Stress incontinence   Bowel: Incontinent movement  Bladder Function  Urine Void (mL):  (moderate)  Number of Times Voided: 1  Urine Color: Che  Genitourinary Assessment   Bladder Assessment (WDL):  Within Defined Limits  Urine Color: Che  Bladder Device: Bathroom  Time Void: No  Bladder Scan: Post Void  $ Bladder Scan Results (mL): 10    Skin  Zach Score   17  Sensory Interventions   Bed Types: Standard/Trauma Mattress  Skin Preventative Measures: Pillows in Use for Support /  Positioning  Moisture Interventions  Moisturizers/Barriers: Barrier Wipes      Pain  Pain Rating Scale  1 - Hardly Notice Pain  Pain Location  Shoulder  Pain Location Orientation  Left  Pain Interventions   Repositioned, Rest    ADLs    Bathing      Linen Change      Personal Hygiene     Chlorhexidine Bath      Oral Care     Teeth/Dentures     Shave     Nutrition Percentage Eaten  *  * Meal *  *, Between 25-50% Consumed  Environmental Precautions     Patient Turns/Positioning  Patient Turns Self from Side to Side  Patient Turns Assistance/Tolerance     Bed Positions  Bed Controls On, Bed Locked  Head of Bed Elevated  Self regulated      Psychosocial/Neurologic Assessment  Psychosocial Assessment     Neurologic Assessment  Neuro (WDL): Exceptions to WDL  Level of Consciousness: Alert  Pupil Assesment: Yes  R Pupil Size (mm): 2  R Pupil Shape / Description: Round  R Pupil Reaction: Brisk  L Pupil Size (mm): 2  L Pupil Shape / Description: Round  L Pupil Reaction: Brisk  Motor Function/Sensation Assessment: Sensation  Muscle Strength Right Arm: Good Strength Against Gravity and Moderate Resistance  Muscle Strength Left Arm: Good Strength Against Gravity and Moderate Resistance  Muscle Strength Right Leg: Good Strength Against Gravity and Moderate Resistance  Muscle Strength Left Leg: Good Strength Against Gravity and Moderate Resistance  EENT (WDL):  Within Defined Limits    Cardio/Pulmonary Assessment  Edema      Respiratory Breath Sounds  RUL Breath Sounds: Clear  RML Breath Sounds: Clear  RLL Breath Sounds: Clear  SHAWNA Breath Sounds: Clear  LLL Breath Sounds: Clear  Cardiac Assessment   Cardiac (WDL):  WDL Except (HTN)

## 2024-02-17 NOTE — CARE PLAN
The patient is Stable - Low risk of patient condition declining or worsening    Shift Goals  Clinical Goals: Safety, pain control  Patient Goals: Safety  Family Goals: Education    Progress made toward(s) clinical / shift goals:      Problem: Bladder / Voiding  Goal: Patient will establish and maintain regular urinary output  Outcome: Progressing  Note: Pt continent of bladder. Voiding adequately in the BR. She denies dysuria.     Problem: Diabetes Management  Goal: Patient's ability to maintain appropriate glucose levels will be maintained or improve  Outcome: Progressing  Note: HS [id number]. Humalog 2units + lantus 10units given per MAR. HS snacks provided but refused for now. Will continue to monitor.       Patient is not progressing towards the following goals:

## 2024-02-18 ENCOUNTER — APPOINTMENT (OUTPATIENT)
Dept: SPEECH THERAPY | Facility: REHABILITATION | Age: 74
DRG: 950 | End: 2024-02-18
Attending: PHYSICAL MEDICINE & REHABILITATION
Payer: MEDICARE

## 2024-02-18 LAB
GLUCOSE BLD STRIP.AUTO-MCNC: 189 MG/DL (ref 65–99)
GLUCOSE BLD STRIP.AUTO-MCNC: 190 MG/DL (ref 65–99)
GLUCOSE BLD STRIP.AUTO-MCNC: 327 MG/DL (ref 65–99)
GLUCOSE BLD STRIP.AUTO-MCNC: 96 MG/DL (ref 65–99)

## 2024-02-18 PROCEDURE — 700102 HCHG RX REV CODE 250 W/ 637 OVERRIDE(OP): Performed by: PHYSICAL MEDICINE & REHABILITATION

## 2024-02-18 PROCEDURE — 700102 HCHG RX REV CODE 250 W/ 637 OVERRIDE(OP): Performed by: HOSPITALIST

## 2024-02-18 PROCEDURE — 99232 SBSQ HOSP IP/OBS MODERATE 35: CPT | Performed by: HOSPITALIST

## 2024-02-18 PROCEDURE — 97130 THER IVNTJ EA ADDL 15 MIN: CPT

## 2024-02-18 PROCEDURE — A9270 NON-COVERED ITEM OR SERVICE: HCPCS | Performed by: HOSPITALIST

## 2024-02-18 PROCEDURE — 770010 HCHG ROOM/CARE - REHAB SEMI PRIVAT*

## 2024-02-18 PROCEDURE — A9270 NON-COVERED ITEM OR SERVICE: HCPCS | Performed by: PHYSICAL MEDICINE & REHABILITATION

## 2024-02-18 PROCEDURE — 97129 THER IVNTJ 1ST 15 MIN: CPT

## 2024-02-18 PROCEDURE — 82962 GLUCOSE BLOOD TEST: CPT

## 2024-02-18 RX ADMIN — LOSARTAN POTASSIUM 100 MG: 50 TABLET, FILM COATED ORAL at 08:04

## 2024-02-18 RX ADMIN — INSULIN LISPRO 2 UNITS: 100 INJECTION, SOLUTION INTRAVENOUS; SUBCUTANEOUS at 20:41

## 2024-02-18 RX ADMIN — INSULIN LISPRO 2 UNITS: 100 INJECTION, SOLUTION INTRAVENOUS; SUBCUTANEOUS at 16:59

## 2024-02-18 RX ADMIN — DOCUSATE SODIUM 50MG AND SENNOSIDES 8.6MG 2 TABLET: 8.6; 5 TABLET, FILM COATED ORAL at 20:35

## 2024-02-18 RX ADMIN — CARBAMAZEPINE 200 MG: 100 TABLET, CHEWABLE ORAL at 20:36

## 2024-02-18 RX ADMIN — INSULIN GLARGINE-YFGN 12 UNITS: 100 INJECTION, SOLUTION SUBCUTANEOUS at 08:05

## 2024-02-18 RX ADMIN — Medication 1000 UNITS: at 08:05

## 2024-02-18 RX ADMIN — TIMOLOL MALEATE 1 DROP: 5 SOLUTION OPHTHALMIC at 20:37

## 2024-02-18 RX ADMIN — BUSPIRONE HYDROCHLORIDE 5 MG: 5 TABLET ORAL at 20:36

## 2024-02-18 RX ADMIN — INSULIN LISPRO 8 UNITS: 100 INJECTION, SOLUTION INTRAVENOUS; SUBCUTANEOUS at 11:33

## 2024-02-18 RX ADMIN — CARIPRAZINE 6 MG: 3 CAPSULE, GELATIN COATED ORAL at 20:40

## 2024-02-18 RX ADMIN — PRIMIDONE 100 MG: 50 TABLET ORAL at 20:36

## 2024-02-18 RX ADMIN — LAMOTRIGINE 100 MG: 100 TABLET ORAL at 08:05

## 2024-02-18 RX ADMIN — ATORVASTATIN CALCIUM 80 MG: 40 TABLET, FILM COATED ORAL at 20:35

## 2024-02-18 RX ADMIN — BUSPIRONE HYDROCHLORIDE 5 MG: 5 TABLET ORAL at 08:05

## 2024-02-18 RX ADMIN — LEVOTHYROXINE SODIUM 25 MCG: 0.03 TABLET ORAL at 05:26

## 2024-02-18 RX ADMIN — AMLODIPINE BESYLATE 5 MG: 5 TABLET ORAL at 20:36

## 2024-02-18 RX ADMIN — DOCUSATE SODIUM 50MG AND SENNOSIDES 8.6MG 2 TABLET: 8.6; 5 TABLET, FILM COATED ORAL at 08:05

## 2024-02-18 RX ADMIN — CARBAMAZEPINE 200 MG: 100 TABLET, CHEWABLE ORAL at 08:05

## 2024-02-18 ASSESSMENT — ENCOUNTER SYMPTOMS
EYES NEGATIVE: 1
VOMITING: 0
POLYDIPSIA: 0
DOUBLE VISION: 0
CHILLS: 0
FEVER: 0
NAUSEA: 0
ABDOMINAL PAIN: 0
SHORTNESS OF BREATH: 0
PALPITATIONS: 0
COUGH: 0
SPUTUM PRODUCTION: 0
BRUISES/BLEEDS EASILY: 0
BLURRED VISION: 0

## 2024-02-18 ASSESSMENT — PAIN DESCRIPTION - PAIN TYPE: TYPE: ACUTE PAIN

## 2024-02-18 NOTE — THERAPY
Speech Language Pathology  Daily Treatment     Patient Name: Molly Loera  Age:  73 y.o., Sex:  female  Medical Record #: [id number]  Today's Date: 2/18/2024     Precautions  Precautions: Fall Risk  Comments: poor safety awareness/impulsive    Subjective    Pt pleasant and cooperative, family did bring in reading glasses, used throughout therapy session.      Objective       02/18/24 1033   Treatment Charges   SLP Cognitive Skill Development First 15 Minutes 1   SLP Cognitive Skill Development Additional 15 Minutes 3   SLP Total Time Spent   SLP Individual Total Time Spent (Mins) 60         Assessment    Pt presented with single target attention task, located 100% of targets across 3 trials. Pt presented with 2 target task, pt with one omission on 1st trial and 2 omissions on second trial. Pt presented with 5 step sequencing, MIN A required to achieve 100% accuracy. Reattempted THE task now that pt has her glasses. Pt with noted rapid rate of completion, pt indep located 4/15 targets, increased to 9/15 with additional review and use of limiting the field, required MOD A from SLP to locate remaining targets to achieve 100% accuracy.     Strengths: Able to follow instructions, Alert and oriented, Independent prior level of function, Supportive family, Willingly participates in therapeutic activities, Pleasant and cooperative, Motivated for self care and independence  Barriers: Impaired activity tolerance, Impaired functional cognition    Plan    Attention, recall and problem solving    Speech Therapy Problems (Active)       Problem: Memory STGs       Dates: Start:  02/15/24         Goal: STG-Within one week, patient will complete mental manipulation tasks up to 4 units with no more than 1 repetition in 8/10 trials       Dates: Start:  02/15/24               Problem: Problem Solving STGs       Dates: Start:  02/15/24         Goal: STG-Within one week, patient will independently provide appropriate answers to  problem solving scenarios involving IADLs and safety, with 90% accuracy.       Dates: Start:  02/15/24            Goal: STG-Within one week, patient will complete simple attention tasks with >80% accuracy provided MIN cues.       Dates: Start:  02/15/24               Problem: Speech/Swallowing LTGs       Dates: Start:  02/15/24         Goal: LTG-By discharge, patient will solve basic problems by utilizing compensatory intervention for memory and problem-solving to allow for safe completion of daily activities with SPV in order to prepare for safe d/c         Dates: Start:  02/15/24

## 2024-02-18 NOTE — PROGRESS NOTES
Hospital Medicine Daily Progress Note      Chief Complaint  Hypertension  Diabetes    Interval Problem Update  Has occasional 's.  Last 24 hours FSBS range:  207-267 but 96 this morning.    Review of Systems  Review of Systems   Constitutional:  Negative for chills and fever.   HENT: Negative.     Eyes:  Negative for blurred vision and double vision.   Respiratory:  Negative for cough and sputum production.    Cardiovascular:  Negative for chest pain and palpitations.   Gastrointestinal:  Negative for abdominal pain, nausea and vomiting.   Skin:  Negative for itching and rash.   Endo/Heme/Allergies:  Negative for polydipsia. Does not bruise/bleed easily.        Physical Exam  Temp:  [36.9 °C (98.5 °F)-37.3 °C (99.1 °F)] 36.9 °C (98.5 °F)  Pulse:  [68-80] 68  Resp:  [17-18] 18  BP: (109-147)/(67-76) 141/72  SpO2:  [96 %-98 %] 96 %    Physical Exam  Vitals reviewed.   Constitutional:       General: She is not in acute distress.     Appearance: Normal appearance. She is not ill-appearing.   HENT:      Head: Normocephalic and atraumatic.      Right Ear: External ear normal.      Left Ear: External ear normal.      Nose:      Comments: Resolving facial ecchymoses under eyes and across nasal bridge     Mouth/Throat:      Pharynx: Oropharynx is clear.   Eyes:      General:         Right eye: No discharge.         Left eye: No discharge.      Extraocular Movements: Extraocular movements intact.      Conjunctiva/sclera: Conjunctivae normal.   Cardiovascular:      Rate and Rhythm: Normal rate and regular rhythm.   Pulmonary:      Effort: No respiratory distress.      Breath sounds: No wheezing.      Comments: Decreased BS  Abdominal:      General: Bowel sounds are normal. There is no distension.      Palpations: Abdomen is soft.      Tenderness: There is no abdominal tenderness.   Musculoskeletal:      Cervical back: Normal range of motion and neck supple.      Right lower leg: No edema.      Left lower leg: No edema.    Skin:     General: Skin is warm and dry.   Neurological:      Mental Status: She is alert and oriented to person, place, and time.         Fluids    Intake/Output Summary (Last 24 hours) at 2/18/2024 1050  Last data filed at 2/18/2024 0545  Gross per 24 hour   Intake 1040 ml   Output --   Net 1040 ml       Laboratory  Recent Labs     02/17/24  0538   WBC 4.9   RBC 3.76*   HEMOGLOBIN 10.6*   HEMATOCRIT 34.1*   MCV 90.7   MCH 28.2   MCHC 31.1*   RDW 52.0*   PLATELETCT 326   MPV 10.2     Recent Labs     02/17/24  0538   SODIUM 140   POTASSIUM 3.2*   CHLORIDE 103   CO2 24   GLUCOSE 181*   BUN 13   CREATININE 0.52   CALCIUM 9.2                 Assessment/Plan  * Traumatic brain injury with new neurocognitive deficit (HCC)- (present on admission)  Assessment & Plan  S/P MGLF w/ left sided head trauma  Neuroimaging +SAH  ASA and Eiquis on hold  Completed Keppra for SZ proph    Glaucoma  Assessment & Plan  On Timolol    Vitamin D deficiency  Assessment & Plan  Vit D level 17  On supplement    Hypokalemia- (present on admission)  Assessment & Plan  K+ repleted 2/17/24  Mg++ normal  Check F/U labs in AM    PE (pulmonary thromboembolism) (HCC)- (present on admission)  Assessment & Plan  Chronically anticoagulated on Eliquis   AC presently on hold d/t SAH    Other specified hypothyroidism- (present on admission)  Assessment & Plan  Euthyroid on Synthroid    Dyslipidemia- (present on admission)  Assessment & Plan  On Lipitor    Essential hypertension- (present on admission)  Assessment & Plan  On Norvasc and Losartan  Will increase CCB if blood pressure trends remain elevated    Type 1 diabetes mellitus without complication (HCC)- (present on admission)  Assessment & Plan  HbA1c 9.0  Will decrease qhs Lantus d/t morning hypoglycemia  Increase qam Lantus for diurnal hyperglycemia  Continue SSI  Outpt meds include Levemir 10 u bid and Novolog SSI    Bipolar 1 disorder (HCC)- (present on admission)  Assessment & Plan  On Buspar,  Vraylar, Tegretol, Lamictal, and Primidone  Needs Psychiatry F/U       Full Code

## 2024-02-18 NOTE — CARE PLAN
The patient is Stable - Low risk of patient condition declining or worsening    Shift Goals  Clinical Goals: Safety  Patient Goals: Safety  Family Goals: Education    Progress made toward(s) clinical / shift goals:    Problem: Knowledge Deficit - Standard  Goal: Patient and family/care givers will demonstrate understanding of plan of care, disease process/condition, diagnostic tests and medications  Outcome: Progressing   Patient educated on the POC and medications administered. All questions and concerns addressed at this time  Problem: Fall Risk - Rehab  Goal: Patient will remain free from falls  Outcome: Progressing   Bed is locked and in low position. Call light and belongings within reach.   Problem: Mobility  Goal: Patient's capacity to carry out activities will improve  Outcome: Progressing   Patient has been transferring and ambulating to the restroom safely and with assistance. She calls appropriately for assistance  Problem: Pain - Standard  Goal: Alleviation of pain or a reduction in pain to the patient’s comfort goal  Outcome: Progressing   Use of 0-10 pain scale. Patient is able to verbalize pain and discomfort.     Patient is not progressing towards the following goals:

## 2024-02-18 NOTE — PROGRESS NOTES
NURSING DAILY NOTE    Name: Molly Loera   Date of Admission: 2/14/2024   Admitting Diagnosis: Traumatic brain injury with new neurocognitive deficit (HCC)  Attending Physician: Mazin Gregory M.d.  Allergies: Depakote [valproic acid]    Safety  Patient Assist  sba  Patient Precautions  Fall Risk  Precaution Comments  poor safety awareness/impulsive  Bed Transfer Status  Contact Guard Assist  Toilet Transfer Status   Contact Guard Assist  Assistive Devices  Wheelchair, Rails  Oxygen  None - Room Air  Diet/Therapeutic Dining  Current Diet Order   Procedures    Diet Order Diet: Consistent CHO (Diabetic)     Pill Administration  whole  Agitated Behavioral Scale  15  ABS Level of Severity  No Agitation    Fall Risk  Has the patient had a fall this admission?   No  Prema Amaya Fall Risk Scoring  16, HIGH RISK  Fall Risk Safety Measures  bed alarm, chair alarm, and poor balance    Vitals  Temperature: 36.9 °C (98.4 °F)  Temp src: Temporal  Pulse: 81  Respiration: 20  Blood Pressure : 134/73  Blood Pressure MAP (Calculated): 93 MM HG  BP Location: Right, Upper Arm  Patient BP Position: Supine     Oxygen  Pulse Oximetry: 98 %  O2 (LPM): 0  O2 Delivery Device: None - Room Air    Bowel and Bladder  Last Bowel Movement  02/16/24  Stool Type  Type 5: Soft blob with clear cut edges (passed easily)  Bowel Device     Continent  Bladder: Stress incontinence   Bowel: Incontinent movement  Bladder Function  Urine Void (mL):  (moderate)  Number of Times Voided: 1  Urine Color: Unable To Evaluate  Genitourinary Assessment   Bladder Assessment (WDL):  Within Defined Limits  Urine Color: Unable To Evaluate  Bladder Device: Bathroom  Time Void: No  Bladder Scan: Post Void  $ Bladder Scan Results (mL): 10    Skin  Zach Score   17  Sensory Interventions   Bed Types: Standard/Trauma Mattress  Skin Preventative Measures: Pillows in Use to Float Heels  Moisture  Interventions  Moisturizers/Barriers: Barrier Wipes      Pain  Pain Rating Scale  0 - No Pain  Pain Location  Leg, Knee  Pain Location Orientation  Right  Pain Interventions   Declines    ADLs    Bathing      Linen Change      Personal Hygiene  Moist Violeta Wipes  Chlorhexidine Bath      Oral Care     Teeth/Dentures     Shave     Nutrition Percentage Eaten  *  * Meal *  *, Dinner, Between 50-75% Consumed  Environmental Precautions  Treaded Slipper Socks on Patient  Patient Turns/Positioning  Patient Turns Self from Side to Side  Patient Turns Assistance/Tolerance     Bed Positions  Bed Controls On  Head of Bed Elevated  Self regulated      Psychosocial/Neurologic Assessment  Psychosocial Assessment  Psychosocial (WDL):  Within Defined Limits  Neurologic Assessment  Neuro (WDL): Exceptions to WDL  Level of Consciousness: Alert  Orientation Level: Oriented X4  Cognition: Appropriate judgement, Appropriate safety awareness  Speech: Clear  Pupil Assesment: No  R Pupil Size (mm): 2  R Pupil Shape / Description: Round  R Pupil Reaction: Brisk  L Pupil Size (mm): 2  L Pupil Shape / Description: Round  L Pupil Reaction: Brisk  Motor Function/Sensation Assessment: Motor strength  Muscle Strength Right Arm: Good Strength Against Gravity and Moderate Resistance  Muscle Strength Left Arm: Good Strength Against Gravity and Moderate Resistance  Muscle Strength Right Leg: Good Strength Against Gravity and Moderate Resistance  Muscle Strength Left Leg: Good Strength Against Gravity and Moderate Resistance  EENT (WDL):  Within Defined Limits    Cardio/Pulmonary Assessment  Edema      Respiratory Breath Sounds  RUL Breath Sounds: Clear  RML Breath Sounds: Clear  RLL Breath Sounds: Clear  SHAWNA Breath Sounds: Clear  LLL Breath Sounds: Clear  Cardiac Assessment   Cardiac (WDL):  WDL Except (HX: HTN)

## 2024-02-18 NOTE — PROGRESS NOTES
NURSING DAILY NOTE    Name: Molly Loera   Date of Admission: 2/14/2024   Admitting Diagnosis: Traumatic brain injury with new neurocognitive deficit (HCC)  Attending Physician: Mazin Gregory M.d.  Allergies: Depakote [valproic acid]    Safety  Patient Assist  sba  Patient Precautions  Fall Risk  Precaution Comments  poor safety awareness/impulsive  Bed Transfer Status  Contact Guard Assist  Toilet Transfer Status   Contact Guard Assist  Assistive Devices  Walker - front wheel  Oxygen  None - Room Air  Diet/Therapeutic Dining  Current Diet Order   Procedures    Diet Order Diet: Consistent CHO (Diabetic)     Pill Administration  whole  Agitated Behavioral Scale  15  ABS Level of Severity  No Agitation    Fall Risk  Has the patient had a fall this admission?   No  Prema Amaya Fall Risk Scoring  16, HIGH RISK  Fall Risk Safety Measures  bed alarm and chair alarm    Vitals  Temperature: 36.9 °C (98.5 °F)  Temp src: Oral  Pulse: 68  Respiration: 18  Blood Pressure : (!) 141/72  Blood Pressure MAP (Calculated): 95 MM HG  BP Location: Right, Upper Arm  Patient BP Position: Supine     Oxygen  Pulse Oximetry: 96 %  O2 (LPM): 0  O2 Delivery Device: None - Room Air    Bowel and Bladder  Last Bowel Movement  02/16/24  Stool Type  Type 5: Soft blob with clear cut edges (passed easily)  Bowel Device     Continent  Bladder: Stress incontinence   Bowel: Incontinent movement  Bladder Function  Urine Void (mL):  (moderate)  Number of Times Voided: 1  Urine Color: Unable To Evaluate  Genitourinary Assessment   Bladder Assessment (WDL):  Within Defined Limits  Urine Color: Unable To Evaluate  Bladder Device: Bathroom  Time Void: No  Bladder Scan: Post Void  $ Bladder Scan Results (mL): 10    Skin  Zach Score   17  Sensory Interventions   Bed Types: Standard/Trauma Mattress  Skin Preventative Measures: Pillows in Use for Support / Positioning  Moisture  Interventions  Moisturizers/Barriers: Barrier Wipes      Pain  Pain Rating Scale  0 - No Pain  Pain Location  Leg, Knee  Pain Location Orientation  Right  Pain Interventions   Declines    ADLs    Bathing      Linen Change      Personal Hygiene  Moist Violeta Wipes  Chlorhexidine Bath      Oral Care     Teeth/Dentures     Shave     Nutrition Percentage Eaten  *  * Meal *  *, Dinner, Between 50-75% Consumed  Environmental Precautions  Bed in Low Position, Treaded Slipper Socks on Patient  Patient Turns/Positioning  Patient Turns Self from Side to Side  Patient Turns Assistance/Tolerance     Bed Positions  Bed Controls On  Head of Bed Elevated  Self regulated      Psychosocial/Neurologic Assessment  Psychosocial Assessment  Psychosocial (WDL):  Within Defined Limits  Neurologic Assessment  Neuro (WDL): Exceptions to WDL  Level of Consciousness: Alert  Orientation Level: Oriented X4  Cognition: Appropriate judgement, Appropriate safety awareness  Speech: Clear  Pupil Assesment: No  R Pupil Size (mm): 2  R Pupil Shape / Description: Round  R Pupil Reaction: Brisk  L Pupil Size (mm): 2  L Pupil Shape / Description: Round  L Pupil Reaction: Brisk  Motor Function/Sensation Assessment: Motor strength  Muscle Strength Right Arm: Good Strength Against Gravity and Moderate Resistance  Muscle Strength Left Arm: Good Strength Against Gravity and Moderate Resistance  Muscle Strength Right Leg: Good Strength Against Gravity and Moderate Resistance  Muscle Strength Left Leg: Good Strength Against Gravity and Moderate Resistance  EENT (WDL):  Within Defined Limits    Cardio/Pulmonary Assessment  Edema      Respiratory Breath Sounds  RUL Breath Sounds: Clear  RML Breath Sounds: Clear  RLL Breath Sounds: Clear  SHAWNA Breath Sounds: Clear  LLL Breath Sounds: Clear  Cardiac Assessment   Cardiac (WDL):  WDL Except (HTN)

## 2024-02-18 NOTE — CARE PLAN
The patient is Stable - Low risk of patient condition declining or worsening    Shift Goals  Clinical Goals: Safety  Patient Goals: Safety  Family Goals: Education    Progress made toward(s) clinical / shift goals:  Safe transfers completed, wound photos updated in the chart.      Problem: Skin Integrity  Goal: Skin integrity is maintained or improved  Outcome: Progressing  Note: Bruising to face diminishing, scab has fallen off nose. New photo updated in the cart. Sacrum is clean, dry, intact, with redness. Off loading in process, patient turns/repositions Q2 hr.

## 2024-02-19 ENCOUNTER — APPOINTMENT (OUTPATIENT)
Dept: PHYSICAL THERAPY | Facility: REHABILITATION | Age: 74
DRG: 950 | End: 2024-02-19
Attending: PHYSICAL MEDICINE & REHABILITATION
Payer: MEDICARE

## 2024-02-19 ENCOUNTER — APPOINTMENT (OUTPATIENT)
Dept: SPEECH THERAPY | Facility: REHABILITATION | Age: 74
DRG: 950 | End: 2024-02-19
Attending: PHYSICAL MEDICINE & REHABILITATION
Payer: MEDICARE

## 2024-02-19 ENCOUNTER — APPOINTMENT (OUTPATIENT)
Dept: OCCUPATIONAL THERAPY | Facility: REHABILITATION | Age: 74
DRG: 950 | End: 2024-02-19
Attending: PHYSICAL MEDICINE & REHABILITATION
Payer: MEDICARE

## 2024-02-19 LAB
GLUCOSE BLD STRIP.AUTO-MCNC: 238 MG/DL (ref 65–99)
GLUCOSE BLD STRIP.AUTO-MCNC: 254 MG/DL (ref 65–99)
GLUCOSE BLD STRIP.AUTO-MCNC: 270 MG/DL (ref 65–99)
GLUCOSE BLD STRIP.AUTO-MCNC: 417 MG/DL (ref 65–99)
POTASSIUM SERPL-SCNC: 4.4 MMOL/L (ref 3.6–5.5)

## 2024-02-19 PROCEDURE — 36415 COLL VENOUS BLD VENIPUNCTURE: CPT

## 2024-02-19 PROCEDURE — 84132 ASSAY OF SERUM POTASSIUM: CPT

## 2024-02-19 PROCEDURE — 82962 GLUCOSE BLOOD TEST: CPT

## 2024-02-19 PROCEDURE — 97130 THER IVNTJ EA ADDL 15 MIN: CPT

## 2024-02-19 PROCEDURE — A9270 NON-COVERED ITEM OR SERVICE: HCPCS | Performed by: HOSPITALIST

## 2024-02-19 PROCEDURE — 99232 SBSQ HOSP IP/OBS MODERATE 35: CPT | Performed by: HOSPITALIST

## 2024-02-19 PROCEDURE — 97530 THERAPEUTIC ACTIVITIES: CPT

## 2024-02-19 PROCEDURE — 97530 THERAPEUTIC ACTIVITIES: CPT | Mod: CQ

## 2024-02-19 PROCEDURE — A9270 NON-COVERED ITEM OR SERVICE: HCPCS | Performed by: PHYSICAL MEDICINE & REHABILITATION

## 2024-02-19 PROCEDURE — 99233 SBSQ HOSP IP/OBS HIGH 50: CPT | Performed by: PHYSICAL MEDICINE & REHABILITATION

## 2024-02-19 PROCEDURE — 97535 SELF CARE MNGMENT TRAINING: CPT

## 2024-02-19 PROCEDURE — 770010 HCHG ROOM/CARE - REHAB SEMI PRIVAT*

## 2024-02-19 PROCEDURE — 700102 HCHG RX REV CODE 250 W/ 637 OVERRIDE(OP): Performed by: HOSPITALIST

## 2024-02-19 PROCEDURE — 97129 THER IVNTJ 1ST 15 MIN: CPT

## 2024-02-19 PROCEDURE — 97112 NEUROMUSCULAR REEDUCATION: CPT | Mod: CQ

## 2024-02-19 PROCEDURE — 97116 GAIT TRAINING THERAPY: CPT | Mod: CQ

## 2024-02-19 PROCEDURE — 700102 HCHG RX REV CODE 250 W/ 637 OVERRIDE(OP): Performed by: PHYSICAL MEDICINE & REHABILITATION

## 2024-02-19 PROCEDURE — 97110 THERAPEUTIC EXERCISES: CPT | Mod: CQ

## 2024-02-19 PROCEDURE — 97110 THERAPEUTIC EXERCISES: CPT

## 2024-02-19 RX ORDER — AMLODIPINE BESYLATE 5 MG/1
10 TABLET ORAL NIGHTLY
Status: DISCONTINUED | OUTPATIENT
Start: 2024-02-19 | End: 2024-02-28 | Stop reason: HOSPADM

## 2024-02-19 RX ADMIN — ACETAMINOPHEN 1000 MG: 500 TABLET ORAL at 20:40

## 2024-02-19 RX ADMIN — INSULIN LISPRO 12 UNITS: 100 INJECTION, SOLUTION INTRAVENOUS; SUBCUTANEOUS at 08:19

## 2024-02-19 RX ADMIN — CARIPRAZINE 6 MG: 3 CAPSULE, GELATIN COATED ORAL at 20:37

## 2024-02-19 RX ADMIN — PRIMIDONE 100 MG: 50 TABLET ORAL at 20:37

## 2024-02-19 RX ADMIN — LOSARTAN POTASSIUM 100 MG: 50 TABLET, FILM COATED ORAL at 08:28

## 2024-02-19 RX ADMIN — INSULIN LISPRO 6 UNITS: 100 INJECTION, SOLUTION INTRAVENOUS; SUBCUTANEOUS at 20:43

## 2024-02-19 RX ADMIN — DOCUSATE SODIUM 50MG AND SENNOSIDES 8.6MG 2 TABLET: 8.6; 5 TABLET, FILM COATED ORAL at 08:28

## 2024-02-19 RX ADMIN — APIXABAN 5 MG: 5 TABLET, FILM COATED ORAL at 08:29

## 2024-02-19 RX ADMIN — BUSPIRONE HYDROCHLORIDE 5 MG: 5 TABLET ORAL at 20:37

## 2024-02-19 RX ADMIN — APIXABAN 5 MG: 5 TABLET, FILM COATED ORAL at 20:37

## 2024-02-19 RX ADMIN — ATORVASTATIN CALCIUM 80 MG: 40 TABLET, FILM COATED ORAL at 20:37

## 2024-02-19 RX ADMIN — LAMOTRIGINE 100 MG: 100 TABLET ORAL at 08:29

## 2024-02-19 RX ADMIN — BUSPIRONE HYDROCHLORIDE 5 MG: 5 TABLET ORAL at 08:29

## 2024-02-19 RX ADMIN — DOCUSATE SODIUM 50MG AND SENNOSIDES 8.6MG 2 TABLET: 8.6; 5 TABLET, FILM COATED ORAL at 20:37

## 2024-02-19 RX ADMIN — OMEPRAZOLE 20 MG: 20 CAPSULE, DELAYED RELEASE ORAL at 08:29

## 2024-02-19 RX ADMIN — INSULIN LISPRO 6 UNITS: 100 INJECTION, SOLUTION INTRAVENOUS; SUBCUTANEOUS at 11:31

## 2024-02-19 RX ADMIN — Medication 1000 UNITS: at 08:29

## 2024-02-19 RX ADMIN — LEVOTHYROXINE SODIUM 25 MCG: 0.03 TABLET ORAL at 05:47

## 2024-02-19 RX ADMIN — CARBAMAZEPINE 200 MG: 100 TABLET, CHEWABLE ORAL at 08:29

## 2024-02-19 RX ADMIN — INSULIN LISPRO 4 UNITS: 100 INJECTION, SOLUTION INTRAVENOUS; SUBCUTANEOUS at 17:20

## 2024-02-19 RX ADMIN — CARBAMAZEPINE 200 MG: 100 TABLET, CHEWABLE ORAL at 20:37

## 2024-02-19 RX ADMIN — AMLODIPINE BESYLATE 10 MG: 5 TABLET ORAL at 20:40

## 2024-02-19 RX ADMIN — TIMOLOL MALEATE 1 DROP: 5 SOLUTION OPHTHALMIC at 20:48

## 2024-02-19 RX ADMIN — ACETAMINOPHEN 1000 MG: 500 TABLET ORAL at 13:45

## 2024-02-19 ASSESSMENT — BALANCE ASSESSMENTS
STANDING TO SITTING: 3
LOOK OVER LEFT AND RIGHT SHOULDERS WHILE STANDING: 1
STANDING UNSUPPORTED: 2
MEDICARE IMPAIRMENT PERCENTAGE: 48
STANDING UNSUPPORTED WITH EYES CLOSED: 3
SITTING TO STANDING: 3
TURN 360 DEGREES: 2
LONG VERSION TOTAL SCORE (MAX 56): 29
PLACE ALTERNATE FOOT ON STEP OR STOOL WHILE STANDING UNSUPPORTED: 1
STANDING ON ONE LEG: 1
REACHING FORWARD WITH OUTSTRETCHED ARM WHILE STANDING: 1
STANDING UNSUPPORTED WITH FEET TOGETHER: 2
PICK UP OBJECT FROM THE FLOOR FROM A STANDING POSITION: 3
SITTING UNSUPPORTED: 4
TRANSFERS: 1
LONG VERSION TOTAL SCORE (MAX 56): 29
STANDING UNSUPPORTED ONE FOOT IN FRONT: 2

## 2024-02-19 ASSESSMENT — ENCOUNTER SYMPTOMS
CHILLS: 0
NAUSEA: 0
PALPITATIONS: 0
DOUBLE VISION: 0
BRUISES/BLEEDS EASILY: 0
BLURRED VISION: 0
FEVER: 0
ABDOMINAL PAIN: 0
VOMITING: 0
POLYDIPSIA: 0
SPUTUM PRODUCTION: 0
COUGH: 0

## 2024-02-19 ASSESSMENT — GAIT ASSESSMENTS
ASSISTIVE DEVICE: FRONT WHEEL WALKER
GAIT LEVEL OF ASSIST: CONTACT GUARD ASSIST
DEVIATION: BRADYKINETIC;DECREASED HEEL STRIKE;DECREASED TOE OFF;DECREASED BASE OF SUPPORT
DISTANCE (FEET): 110

## 2024-02-19 ASSESSMENT — ACTIVITIES OF DAILY LIVING (ADL)
BED_CHAIR_WHEELCHAIR_TRANSFER_DESCRIPTION: ADAPTIVE EQUIPMENT;INCREASED TIME;SUPERVISION FOR SAFETY;VERBAL CUEING;SET-UP OF EQUIPMENT

## 2024-02-19 NOTE — PROGRESS NOTES
"  Physical Medicine & Rehabilitation Progress Note    Encounter Date: 2/19/2024    Chief Complaint: Decreased mobility, weakness    Interval Events (Subjective):  Patient sitting up in therapy gym. She reports therapy is going well. She reports large meal and 2 bananas contributed to her high blood sugars this morning. Discussed monitoring. Her K has improved to 4.4. Denies NVD.     Objective:  VITAL SIGNS: /84   Pulse 62   Temp 36.4 °C (97.5 °F) (Oral)   Resp 18   Ht 1.646 m (5' 4.8\")   Wt 60.3 kg (133 lb)   SpO2 96%   BMI 22.27 kg/m²   Gen: NAD  Psych: Mood and affect appropriate  CV: RRR, 0 edema  Resp: CTAB, no upper airway sounds  Abd: NTND  Neuro: AOx3, following commands    Laboratory Values:  Recent Results (from the past 72 hour(s))   POCT glucose device results    Collection Time: 02/16/24  5:36 PM   Result Value Ref Range    POC Glucose, Blood 229 (H) 65 - 99 mg/dL   POCT glucose device results    Collection Time: 02/16/24  9:18 PM   Result Value Ref Range    POC Glucose, Blood 163 (H) 65 - 99 mg/dL   PHOSPHORUS    Collection Time: 02/17/24  5:38 AM   Result Value Ref Range    Phosphorus 3.2 2.5 - 4.5 mg/dL   CBC WITHOUT DIFFERENTIAL    Collection Time: 02/17/24  5:38 AM   Result Value Ref Range    WBC 4.9 4.8 - 10.8 K/uL    RBC 3.76 (L) 4.20 - 5.40 M/uL    Hemoglobin 10.6 (L) 12.0 - 16.0 g/dL    Hematocrit 34.1 (L) 37.0 - 47.0 %    MCV 90.7 81.4 - 97.8 fL    MCH 28.2 27.0 - 33.0 pg    MCHC 31.1 (L) 32.2 - 35.5 g/dL    RDW 52.0 (H) 35.9 - 50.0 fL    Platelet Count 326 164 - 446 K/uL    MPV 10.2 9.0 - 12.9 fL   Basic Metabolic Panel    Collection Time: 02/17/24  5:38 AM   Result Value Ref Range    Sodium 140 135 - 145 mmol/L    Potassium 3.2 (L) 3.6 - 5.5 mmol/L    Chloride 103 96 - 112 mmol/L    Co2 24 20 - 33 mmol/L    Glucose 181 (H) 65 - 99 mg/dL    Bun 13 8 - 22 mg/dL    Creatinine 0.52 0.50 - 1.40 mg/dL    Calcium 9.2 8.5 - 10.5 mg/dL    Anion Gap 13.0 7.0 - 16.0   ESTIMATED GFR    " Collection Time: 02/17/24  5:38 AM   Result Value Ref Range    GFR (CKD-EPI) 96 >60 mL/min/1.73 m 2   POCT glucose device results    Collection Time: 02/17/24  8:08 AM   Result Value Ref Range    POC Glucose, Blood 224 (H) 65 - 99 mg/dL   POCT glucose device results    Collection Time: 02/17/24 11:36 AM   Result Value Ref Range    POC Glucose, Blood 246 (H) 65 - 99 mg/dL   POCT glucose device results    Collection Time: 02/17/24  5:11 PM   Result Value Ref Range    POC Glucose, Blood 267 (H) 65 - 99 mg/dL   POCT glucose device results    Collection Time: 02/17/24  8:55 PM   Result Value Ref Range    POC Glucose, Blood 207 (H) 65 - 99 mg/dL   POCT glucose device results    Collection Time: 02/18/24  7:09 AM   Result Value Ref Range    POC Glucose, Blood 96 65 - 99 mg/dL   POCT glucose device results    Collection Time: 02/18/24 11:27 AM   Result Value Ref Range    POC Glucose, Blood 327 (H) 65 - 99 mg/dL   POCT glucose device results    Collection Time: 02/18/24  4:57 PM   Result Value Ref Range    POC Glucose, Blood 189 (H) 65 - 99 mg/dL   POCT glucose device results    Collection Time: 02/18/24  8:31 PM   Result Value Ref Range    POC Glucose, Blood 190 (H) 65 - 99 mg/dL   POTASSIUM SERUM (K)    Collection Time: 02/19/24  5:36 AM   Result Value Ref Range    Potassium 4.4 3.6 - 5.5 mmol/L   POCT glucose device results    Collection Time: 02/19/24  7:58 AM   Result Value Ref Range    POC Glucose, Blood 417 (HH) 65 - 99 mg/dL   POCT glucose device results    Collection Time: 02/19/24 11:29 AM   Result Value Ref Range    POC Glucose, Blood 270 (H) 65 - 99 mg/dL       Medications:  Scheduled Medications   Medication Dose Frequency    amLODIPine  10 mg Nightly    insulin GLARGINE  10 Units Q EVENING    insulin GLARGINE  14 Units QAM INSULIN    apixaban  5 mg BID    insulin lispro  2-12 Units 4X/DAY ACHS    vitamin D3  1,000 Units DAILY    senna-docusate  2 Tablet BID    omeprazole  20 mg DAILY    atorvastatin  80 mg  Nightly    busPIRone  5 mg BID    cariprazine  6 mg QHS    lamoTRIgine  100 mg DAILY    levothyroxine  25 mcg AM ES    losartan  100 mg DAILY    primidone  100 mg QHS    timolol  1 Drop QHS    carBAMazepine  200 mg BID     PRN medications: insulin lispro **AND** POC blood glucose manual result **AND** NOTIFY MD and PharmD **AND** Administer 20 grams of glucose (approximately 8 ounces of fruit juice) every 15 minutes PRN FSBG less than 70 mg/dL **AND** dextrose bolus, Respiratory Therapy Consult, hydrALAZINE, acetaminophen, senna-docusate **AND** polyethylene glycol/lytes, mag hydrox-al hydrox-simeth, ondansetron **OR** ondansetron, traZODone, sodium chloride, oxyCODONE immediate-release **OR** oxyCODONE immediate-release, midazolam, [COMPLETED] acetaminophen **FOLLOWED BY** acetaminophen, ziprasidone    Diet:  Current Diet Order   Procedures    Diet Order Diet: Consistent CHO (Diabetic)       Medical Decision Making and Plan:  TBI (Traumatic Brain Injury): GLF on 2/9/24 with left sided subarachnoid hemorrhage s/p monitoring with serial CTs.   -PT and OT for mobility and ADLs. Per guidelines, 15 hours per week between PT, OT and/or SLP.  -Follow-up NSG. On 7 days of Keppra for seizure prophylaxis. Completed Keppra Hold AC until 2/19. Restart Eliquis, discussed with patient     HTN- Patient on Amlodipine 5 mg and Losartan 100 mg daily. Into 140s, consult hospitalist. Amlodipine increased to 10 mg QHS     Hx of PE - Previously on Eliquis. On hold for 10 days.      HLD - Patient on Atorvastatin 80 mg QHS     Anemia - Check AM CBC - 11.4 on admission.      Leukopenia - Check AM CBC - 5.8 on admission, resolved     DM1 with hyperglycemia - Patient on Lantus 15 U and SSI on transfer. Blood sugars into 500s, give extra dose and consult hospitalist  -Severe hyperglycemia - Glargine changed to 10 QPM and 14U QPM. Continue Glargine and SSI     Hypothyroidism - Patient on Levothyroxine 25 mcg daily     Hypophosphatemia - Check  AM level - low 2.4. Consult hospitalist. Continue K-Phos. K level stable. K-phos discontinue per Hospitalist     BPD1 - Patient on Lamictal 100 mg daily, Primidone 100 mg QHS, Tegretol 200 mg BID, Buspar 5 mg BID, Vraylar 6 mg QHS, and Lamictal 100 mg daily     Glaucoma - Patient on Timolol QHS    Vitamin D deficiency - 17 on admission, start 2000 U      Pain - Patient on PRN Tylenol and Oxycodone     Skin - Patient at risk for skin breakdown due to debility in areas including sacrum, achilles, elbows and head in addition to other sites. Nursing to assess skin daily.      GI Ppx - Patient on Prilosec for GERD prophylaxis. Patient on Senna-docusate for constipation prophylaxis.      DVT Ppx - Patient Eliquis is on hold on transfer. To restart 2/19, order placed  ____________________________________    T. Deven Gregory MD/PhD  [facility] - Physical Medicine & Rehabilitation   [facility] - Brain Injury Medicine   ____________________________________    Total time:  50 minutes. Time spent included pre-rounding review of vitals and tests, unit/floor time, face-to-face time with the patient including physical examination, care coordination, counseling of patient and/or family, ordering medications/procedures/tests, discussion with CM, PT, OT, SLP and/or other healthcare providers, and documentation in the electronic medical record. Topics discussed included hyperglycemia, increase Lantus, 10 days post injury, discussed restart Eliquis, and discussed risks and benefits.

## 2024-02-19 NOTE — THERAPY
"Occupational Therapy  Daily Treatment     Patient Name: Molly Loera  Age:  73 y.o., Sex:  female  Medical Record #: [id number]  Today's Date: 2/19/2024     Precautions  Precautions: Fall Risk  Comments: poor safety awareness/impulsive    Safety   ADL Safety : Requires Supervision for Safety    Subjective    \"I did a 3-D puzzle last time, and it was hard!\" Pt pleasant and agreeable to working with therapy this morning.      Objective       02/19/24 0701   OT Charge Group   Charges Yes   OT Self Care / ADL (Units) 1   OT Therapy Activity (Units) 2   OT Therapeutic Exercise (Units) 1   OT Total Time Spent   OT Individual Total Time Spent (Mins) 60   Precautions   Precautions Fall Risk   Safety    ADL Safety  Requires Supervision for Safety   Vitals   Pulse (!) 121  (after static standing exercise)   Pulse Oximetry 96 %   O2 Delivery Device None - Room Air   Cognition    Level of Consciousness Alert   Safety Awareness Impaired;Impulsive   New Learning Impaired   Initiation Impaired   Functional Level of Assist   Grooming Supervision;Standing   Grooming Description Standing at sink;Supervision for safety   Upper Body Dressing Stand by Assist   Upper Body Dressing Description Set-up of equipment;Supervision for safety   Lower Body Dressing   (max A to don socks while seated on commode in bathroom; SBA for pants in same position)   Sitting Upper Body Exercises   Sitting Upper Body Exercises Yes   Upper Extremity Bike Level 2 Resistance  (7 minutes UE bike with 1 rest)   Standing Upper Body Exercises   Standing Upper Body Exercises Yes   Sitting Lower Body Exercises   Comments lower extremity bike for 5 mins with rest break between doing arms and legs     Pt completed ADLs in bathroom (dressing, grooming) then walked to therapy where she did recumbent bike for 12 minutes (5 mins arms, rest, 5 mins legs, rest, then 2 mins arms). Pt then stood and completed clothes pin tree activity in standing twice (with one rest break " in between) using one arm at a time to put clips on poll and take them off.    Assessment    Pt motivated to work with therapy but has limited activity tolerance. Pt HR increases with standing activities and stays in the 100s after 3 minutes in sitting. Pt is also limited by cognitive impairment.   Strengths: Able to follow instructions, Alert and oriented, Independent prior level of function, Motivated for self care and independence, Pleasant and cooperative, Supportive family, Willingly participates in therapeutic activities  Barriers: Decreased endurance, Fatigue, Generalized weakness, Impaired balance, Impaired activity tolerance, Confused, Impaired functional cognition, Impulsive, Limited mobility, Visual impairment    Plan    [place] with ADLs, increased activity tolerance    DME       Passport items to be completed:  Perform bathroom transfers, complete dressing, complete feeding, get ready for the day, prepare a simple meal, participate in household tasks, adapt home for safety needs, demonstrate home exercise program, complete caregiver training     Occupational Therapy Goals (Active)       Problem: Bathing       Dates: Start:  02/15/24         Goal: STG-Within one week, patient will bathe at SPV level.       Dates: Start:  02/15/24               Problem: Dressing       Dates: Start:  02/15/24         Goal: STG-Within one week, patient will dress LB at SBA level using DME PRN.       Dates: Start:  02/15/24               Problem: Functional Transfers       Dates: Start:  02/15/24         Goal: STG-Within one week, patient will transfer to toilet at SBA level.       Dates: Start:  02/15/24            Goal: STG-Within one week, patient will transfer to step in shower at SBA level.       Dates: Start:  02/15/24               Problem: OT Long Term Goals       Dates: Start:  02/15/24         Goal: LTG-By discharge, patient will complete basic self care tasks at SPV-Mod I level.       Dates: Start:  02/15/24             Goal: LTG-By discharge, patient will perform bathroom transfers at SPV-Mod I level.       Dates: Start:  02/15/24            Goal: LTG-By discharge, patient will complete basic home management at SPV-Mod I level.       Dates: Start:  02/15/24               Problem: Toileting       Dates: Start:  02/15/24         Goal: STG-Within one week, patient will complete toileting tasks at SBA level.       Dates: Start:  02/15/24

## 2024-02-19 NOTE — PROGRESS NOTES
"Hospital Medicine Daily Progress Note      Chief Complaint  Hypertension  Diabetes    Interval Problem Update  Pt reports that \"I ate a lot last night.\"  FSBS 417 this morning.  Blood pressures also trending up.    Review of Systems  Review of Systems   Constitutional:  Negative for chills and fever.   HENT: Negative.     Eyes:  Negative for blurred vision and double vision.   Respiratory:  Negative for cough and sputum production.    Cardiovascular:  Negative for chest pain and palpitations.   Gastrointestinal:  Negative for abdominal pain, nausea and vomiting.   Skin:  Negative for itching and rash.   Endo/Heme/Allergies:  Negative for polydipsia. Does not bruise/bleed easily.        Physical Exam  Temp:  [36 °C (96.8 °F)-36.9 °C (98.4 °F)] 36.4 °C (97.5 °F)  Pulse:  [62-81] 62  Resp:  [14-20] 18  BP: (132-159)/(73-98) 132/84  SpO2:  [95 %-98 %] 96 %    Physical Exam  Vitals reviewed.   Constitutional:       General: She is not in acute distress.     Appearance: Normal appearance. She is not ill-appearing.   HENT:      Head: Normocephalic and atraumatic.      Right Ear: External ear normal.      Left Ear: External ear normal.      Nose:      Comments: Resolving facial ecchymoses under eyes and across nasal bridge     Mouth/Throat:      Pharynx: Oropharynx is clear.   Eyes:      General:         Right eye: No discharge.         Left eye: No discharge.      Extraocular Movements: Extraocular movements intact.      Conjunctiva/sclera: Conjunctivae normal.   Cardiovascular:      Rate and Rhythm: Normal rate and regular rhythm.   Pulmonary:      Effort: No respiratory distress.      Breath sounds: No wheezing.      Comments: Decreased BS  Abdominal:      General: Bowel sounds are normal. There is no distension.      Palpations: Abdomen is soft.      Tenderness: There is no abdominal tenderness.   Musculoskeletal:      Cervical back: Normal range of motion and neck supple.      Right lower leg: No edema.      Left lower " leg: No edema.   Skin:     General: Skin is warm and dry.   Neurological:      Mental Status: She is alert and oriented to person, place, and time.         Fluids    Intake/Output Summary (Last 24 hours) at 2/19/2024 1139  Last data filed at 2/19/2024 0800  Gross per 24 hour   Intake 600 ml   Output --   Net 600 ml       Laboratory  Recent Labs     02/17/24  0538   WBC 4.9   RBC 3.76*   HEMOGLOBIN 10.6*   HEMATOCRIT 34.1*   MCV 90.7   MCH 28.2   MCHC 31.1*   RDW 52.0*   PLATELETCT 326   MPV 10.2     Recent Labs     02/17/24  0538 02/19/24  0536   SODIUM 140  --    POTASSIUM 3.2* 4.4   CHLORIDE 103  --    CO2 24  --    GLUCOSE 181*  --    BUN 13  --    CREATININE 0.52  --    CALCIUM 9.2  --                  Assessment/Plan  * Traumatic brain injury with new neurocognitive deficit (HCC)- (present on admission)  Assessment & Plan  S/P MGLF w/ left sided head trauma  Neuroimaging +SAH  ASA and Eliquis was held  Completed [facility] for SZ proph    Glaucoma  Assessment & Plan  On Timolol    Vitamin D deficiency  Assessment & Plan  Vit D level 17  On supplement    PE (pulmonary thromboembolism) (HCC)- (present on admission)  Assessment & Plan  Chronically anticoagulated on Eliquis   AC was on hold d/t SAH  Now back on Eliquis today per Physiatry    Other specified hypothyroidism- (present on admission)  Assessment & Plan  Euthyroid on Synthroid    Dyslipidemia- (present on admission)  Assessment & Plan  On Lipitor    Essential hypertension- (present on admission)  Assessment & Plan  On Norvasc and Losartan  Will increase Norvasc to optimize blood pressure control    Type 1 diabetes mellitus without complication (HCC)- (present on admission)  Assessment & Plan  HbA1c 9.0  Observe serum glucose trends on bid Lantus and SSI  Outpt meds include Levemir 10 u bid and Novolog SSI    Bipolar 1 disorder (HCC)- (present on admission)  Assessment & Plan  On Buspar, Vraylar, Tegretol, Lamictal, and Primidone  Needs Psychiatry F/U        Full Code    Discussed w/ pt and RN (Les)

## 2024-02-19 NOTE — THERAPY
Speech Language Pathology  Daily Treatment     Patient Name: Molly Loera  Age:  73 y.o., Sex:  female  Medical Record #: [id number]  Today's Date: 2/19/2024     Precautions  Precautions: Fall Risk  Comments: poor safety awareness/impulsive    Subjective    Pt pleasant and cooperative. Agreeable to therapy.     Objective       02/19/24 0904   Treatment Charges   SLP Cognitive Skill Development First 15 Minutes 1   SLP Cognitive Skill Development Additional 15 Minutes 3   SLP Total Time Spent   SLP Individual Total Time Spent (Mins) 60   Cognition   Simple Attention Supervision (5)   Moderate Attention Minimal (4)   Complex Attention Moderate (3)   Visual Short Term Memory Moderate (3)   Interdisciplinary Plan of Care Collaboration   Patient Position at End of Therapy Seated;Self Releasing Lap Belt Applied;Chair Alarm On;Call Light within Reach         Assessment    Trail making tasks: Initially required MOD A, fading to MIN with last trial pt completing with 100% accuracy provided SPV. Pt using self talk to aid in placekeeping as trials progressed.    Visual memory task - pt given list of 10-20 words to memorize, then had to ID same words in FO20-30. Pt scoring 70%-100% across 4 trials. Pt using association as a strategy to aid in recall.    Strengths: Able to follow instructions, Alert and oriented, Independent prior level of function, Supportive family, Willingly participates in therapeutic activities, Pleasant and cooperative, Motivated for self care and independence  Barriers: Impaired activity tolerance, Impaired functional cognition    Plan    Continue to target attention, memory. Fxl PS    Passport items to be completed:  arrive to therapy appointments on time, complete daily memory log entries, solve problems related to safety situations, review education related to hospitalization, complete caregiver training     Speech Therapy Problems (Active)       Problem: Memory STGs       Dates: Start:  02/15/24          Goal: STG-Within one week, patient will complete mental manipulation tasks up to 4 units with no more than 1 repetition in 8/10 trials       Dates: Start:  02/15/24               Problem: Problem Solving STGs       Dates: Start:  02/15/24         Goal: STG-Within one week, patient will independently provide appropriate answers to problem solving scenarios involving IADLs and safety, with 90% accuracy.       Dates: Start:  02/15/24            Goal: STG-Within one week, patient will complete simple attention tasks with >80% accuracy provided MIN cues.       Dates: Start:  02/15/24               Problem: Speech/Swallowing LTGs       Dates: Start:  02/15/24         Goal: LTG-By discharge, patient will solve basic problems by utilizing compensatory intervention for memory and problem-solving to allow for safe completion of daily activities with SPV in order to prepare for safe d/c         Dates: Start:  02/15/24

## 2024-02-19 NOTE — PROGRESS NOTES
NURSING DAILY NOTE    Name: Molly Loera   Date of Admission: 2/14/2024   Admitting Diagnosis: Traumatic brain injury with new neurocognitive deficit (HCC)  Attending Physician: Mazin Gregory M.d.  Allergies: Depakote [valproic acid]    Safety  Patient Assist  sba  Patient Precautions  Fall Risk  Precaution Comments  poor safety awareness/impulsive  Bed Transfer Status  Contact Guard Assist  Toilet Transfer Status   Contact Guard Assist  Assistive Devices  Wheelchair, Rails  Oxygen  None - Room Air  Diet/Therapeutic Dining  Current Diet Order   Procedures    Diet Order Diet: Consistent CHO (Diabetic)     Pill Administration  whole  Agitated Behavioral Scale  15  ABS Level of Severity  No Agitation    Fall Risk  Has the patient had a fall this admission?   No  Prema Amaya Fall Risk Scoring  16, HIGH RISK  Fall Risk Safety Measures  bed alarm, chair alarm, poor balance, and low vision/ hearing    Vitals  Temperature: 36 °C (96.8 °F)  Temp src: Temporal  Pulse: 71  Respiration: 14  Blood Pressure : (!) 140/85  Blood Pressure MAP (Calculated): 103 MM HG  BP Location: Right, Upper Arm  Patient BP Position: Supine     Oxygen  Pulse Oximetry: 95 %  O2 (LPM): 0  O2 Delivery Device: None - Room Air    Bowel and Bladder  Last Bowel Movement  02/16/24  Stool Type  Type 5: Soft blob with clear cut edges (passed easily)  Bowel Device     Continent  Bladder: Stress incontinence   Bowel: Incontinent movement  Bladder Function  Urine Void (mL):  (moderate)  Number of Times Voided: 1  Urine Color: Unable To Evaluate  Genitourinary Assessment   Bladder Assessment (WDL):  Within Defined Limits  Urine Color: Unable To Evaluate  Bladder Device: Bathroom  Time Void: No  Bladder Scan: Post Void  $ Bladder Scan Results (mL): 10    Skin  Zach Score   17  Sensory Interventions   Bed Types: Standard/Trauma Mattress  Skin Preventative Measures: Pillows in Use to  Float Heels  Moisture Interventions  Moisturizers/Barriers: Barrier Wipes      Pain  Pain Rating Scale  0 - No Pain  Pain Location  Leg, Knee  Pain Location Orientation  Right  Pain Interventions   Declines    ADLs    Bathing      Linen Change      Personal Hygiene  Moist Violeta Wipes  Chlorhexidine Bath      Oral Care     Teeth/Dentures     Shave     Nutrition Percentage Eaten  *  * Meal *  *, Dinner, Between 50-75% Consumed  Environmental Precautions  Treaded Slipper Socks on Patient  Patient Turns/Positioning  Patient Turns Self from Side to Side  Patient Turns Assistance/Tolerance     Bed Positions  Bed Controls On  Head of Bed Elevated  Self regulated      Psychosocial/Neurologic Assessment  Psychosocial Assessment  Psychosocial (WDL):  Within Defined Limits  Neurologic Assessment  Neuro (WDL): Exceptions to WDL  Level of Consciousness: Alert  Orientation Level: Oriented X4  Cognition: Appropriate judgement, Appropriate safety awareness  Speech: Clear  Pupil Assesment: No  R Pupil Size (mm): 2  R Pupil Shape / Description: Round  R Pupil Reaction: Brisk  L Pupil Size (mm): 2  L Pupil Shape / Description: Round  L Pupil Reaction: Brisk  Motor Function/Sensation Assessment: Motor strength  Muscle Strength Right Arm: Good Strength Against Gravity and Moderate Resistance  Muscle Strength Left Arm: Good Strength Against Gravity and Moderate Resistance  Muscle Strength Right Leg: Good Strength Against Gravity and Moderate Resistance  Muscle Strength Left Leg: Good Strength Against Gravity and Moderate Resistance  EENT (WDL):  Within Defined Limits    Cardio/Pulmonary Assessment  Edema      Respiratory Breath Sounds  RUL Breath Sounds: Clear  RML Breath Sounds: Clear  RLL Breath Sounds: Clear  SHAWNA Breath Sounds: Clear  LLL Breath Sounds: Clear  Cardiac Assessment   Cardiac (WDL):  WDL Except (HX: HTN)

## 2024-02-19 NOTE — THERAPY
"Physical Therapy   Daily Treatment     Patient Name: Molly Loera  Age:  73 y.o., Sex:  female  Medical Record #: [id number]  Today's Date: 2/19/2024     Precautions  Precautions: Fall Risk  Comments: poor safety awareness/impulsive    Subjective    Pt seated in wc in the hallway, agreeable to PT      Objective       02/19/24 1031   PT Charge Group   PT Gait Training (Units) 1   PT Therapeutic Exercise (Units) 1   PT Neuromuscular Re-Education / Balance (Units) 1   PT Therapeutic Activities (Units) 1   Supervising Physical Therapist Ashley Murrieta   Cognition    Level of Consciousness Alert   Safety Awareness Impaired;Impulsive   New Learning Impaired   Initiation Impaired   Gait Functional Level of Assist    Gait Level Of Assist Contact Guard Assist   Assistive Device Front Wheel Walker   Distance (Feet) 110   # of Times Distance was Traveled 3   Deviation Bradykinetic;Decreased Heel Strike;Decreased Toe Off;Decreased Base Of Support   Stairs Functional Level of Assist   Level of Assist with Stairs Contact Guard Assist   # of Stairs Climbed 12   Stairs Description Extra time;Hand rails;Supervision for safety;Verbal cueing  (4\" stairs)   Transfer Functional Level of Assist   Bed, Chair, Wheelchair Transfer Contact Guard Assist   Bed Chair Wheelchair Transfer Description Adaptive equipment;Increased time;Supervision for safety;Verbal cueing;Set-up of equipment   Sitting Lower Body Exercises   Sit to Stand 1 set of 10   Nustep Resistance Level 4;Time (See Comments)  (B LE/UE for warm up/CV endurance)   Bed Mobility    Sit to Stand Contact Guard Assist   Neuro-Muscular Treatments   Neuro-Muscular Treatments Sequencing;Verbal Cuing   Comments walker safety, sequencing with STS transfers using FWW   Burrell Balance Scale   Sitting Unsupported (Score 0-4) 4   Change Of Positon: Sitting To Standing (Score 0-4) 3   Change Of Positon: Standing To Sitting (Score 0-4) 3   Transfers (Score 0-4) 1  (uncontrolled descent, side ways " sitting on chair)   Standing Unsupported (Score 0-4) 2   Standing With Eyes Closed (Score 0-4) 3   Standing With Feet Together (Score 0-4) 2   Tandem Standing (Score 0-4) 2   Standing On One Leg (Score 0-4) 1   Turning Trunk (Feet Fixed) (Score 0-4) 1   Retrieving Objects From Floor (Score 0-4) 3   Turning 360 Degrees (Score 0-4) 2   Stool Stepping (Score 0-4) 1   Reaching Forward While Standing (Score 0-4) 1   [place] Balance Total Score (0-56) 29   Interdisciplinary Plan of Care Collaboration   IDT Collaboration with  Physician   Patient Position at End of Therapy Seated;Chair Alarm On;Self Releasing Lap Belt Applied;Tray Table within Reach;Call Light within Reach   Collaboration Comments rounded with patient   PT DME Recommendations   Assistive Device Front Wheeled Walker         Assessment    Pt tolerated session well focusing on progression of amb with FWW, stairs and completion of outcome measure. Pt scored 29/56 on the CHOWDARY  Strengths: Able to follow instructions, Alert and oriented, Independent prior level of function, Pleasant and cooperative  Barriers: Generalized weakness, Fatigue, Impaired activity tolerance, Impaired balance (high CBG)    Plan    Balance  Postural reactions  Safety with FWW  STS sequencing   Progress to LRAD as able    CV endurance    DME  PT DME Recommendations  Assistive Device: (P) Front Wheeled Walker    Passport items to be completed:  Get in/out of bed safely, in/out of a vehicle, safely use mobility device, walk or wheel around home/community, navigate up and down stairs, show how to get up/down from the ground, ensure home is accessible, demonstrate HEP, complete caregiver training    Physical Therapy Problems (Active)       Problem: Balance       Dates: Start:  02/15/24         Goal: STG-Within one week, patient will maintain dynamic standing x 5 mins       Dates: Start:  02/15/24               Problem: Mobility       Dates: Start:  02/15/24         Goal: STG-Within one week,  patient will ambulate household distance using LRAD x 150 feet, SBA       Dates: Start:  02/15/24            Goal: STG-Within one week, patient will ambulate up/down a curb using LRAD, SBA       Dates: Start:  02/15/24               Problem: Mobility Transfers       Dates: Start:  02/15/24         Goal: STG-Within one week, patient will transfer bed to chair SBA       Dates: Start:  02/15/24               Problem: PT-Long Term Goals       Dates: Start:  02/15/24         Goal: LTG-By discharge, patient will tolerate standing x10 mins       Dates: Start:  02/15/24            Goal: LTG-By discharge, patient will ambulate using LRAD x 300 feet, Mod I       Dates: Start:  02/15/24            Goal: LTG-By discharge, patient will perform home exercise program independently       Dates: Start:  02/15/24            Goal: LTG-By discharge, patient will transfer in/out of a car, Mod I       Dates: Start:  02/15/24

## 2024-02-19 NOTE — PROGRESS NOTES
..                                                         NURSING DAILY NOTE    Name: Molly Loera   Date of Admission: 2/14/2024   Admitting Diagnosis: Traumatic brain injury with new neurocognitive deficit (HCC)  Attending Physician: Mazin Gregory M.d.  Allergies: Depakote [valproic acid]    Safety  Patient Assist  sba  Patient Precautions  Fall Risk  Precaution Comments  poor safety awareness/impulsive  Bed Transfer Status  Contact Guard Assist  Toilet Transfer Status   Contact Guard Assist  Assistive Devices  Walker - front wheel  Oxygen  None - Room Air  Diet/Therapeutic Dining  Current Diet Order   Procedures    Diet Order Diet: Consistent CHO (Diabetic)     Pill Administration  whole  Agitated Behavioral Scale  15  ABS Level of Severity  No Agitation    Fall Risk  Has the patient had a fall this admission?   No  Prema Amaya Fall Risk Scoring  16, HIGH RISK  Fall Risk Safety Measures  bed alarm, chair alarm, seatbelt alarm, poor balance, low vision/ hearing, and ok to leave pt in bathroom    Vitals  Temperature: 36.6 °C (97.8 °F)  Temp src: Oral  Pulse: 73  Respiration: 18  Blood Pressure : (!) 159/98  Blood Pressure MAP (Calculated): 118 MM HG  BP Location: Right, Upper Arm  Patient BP Position: Supine     Oxygen  Pulse Oximetry: 96 %  O2 (LPM): 0  O2 Delivery Device: None - Room Air    Bowel and Bladder  Last Bowel Movement  02/16/24  Stool Type  Type 5: Soft blob with clear cut edges (passed easily)  Bowel Device     Continent  Bladder: Stress incontinence   Bowel: Incontinent movement  Bladder Function  Urine Void (mL):  (moderate)  Number of Times Voided: 1  Urine Color: Unable To Evaluate  Genitourinary Assessment   Bladder Assessment (WDL):  Within Defined Limits  Urine Color: Unable To Evaluate  Bladder Device: Bathroom  Time Void: No  Bladder Scan: Post Void  $ Bladder Scan Results (mL): 10    Skin  Zach Score   18  Sensory Interventions   Bed Types: Standard/Trauma  Mattress  Skin Preventative Measures: Pillows in Use for Support / Positioning  Moisture Interventions  Moisturizers/Barriers: Barrier Wipes      Pain  Pain Rating Scale  0 - No Pain  Pain Location  Leg, Knee  Pain Location Orientation  Right  Pain Interventions   Declines    ADLs    Bathing   Patient Refused Bathing  Linen Change    (Pt refused said she dont shower on nights is too cold Rn was inform)  Personal Hygiene  Moist Violeta Wipes  Chlorhexidine Bath      Oral Care     Teeth/Dentures     Shave     Nutrition Percentage Eaten  Snack, Between 50-75% Consumed  Environmental Precautions  Treaded Slipper Socks on Patient  Patient Turns/Positioning  Patient Turns Self from Side to Side  Patient Turns Assistance/Tolerance     Bed Positions  Bed Controls On  Head of Bed Elevated  Self regulated      Psychosocial/Neurologic Assessment  Psychosocial Assessment  Psychosocial (WDL):  Within Defined Limits  Neurologic Assessment  Neuro (WDL): Exceptions to WDL  Level of Consciousness: Alert  Orientation Level: Oriented X4  Cognition: Appropriate judgement, Appropriate safety awareness  Speech: Clear  Pupil Assesment: No  R Pupil Size (mm): 2  R Pupil Shape / Description: Round  R Pupil Reaction: Brisk  L Pupil Size (mm): 2  L Pupil Shape / Description: Round  L Pupil Reaction: Brisk  Motor Function/Sensation Assessment: Motor strength  Muscle Strength Right Arm: Good Strength Against Gravity and Moderate Resistance  Muscle Strength Left Arm: Good Strength Against Gravity and Moderate Resistance  Muscle Strength Right Leg: Good Strength Against Gravity and Moderate Resistance  Muscle Strength Left Leg: Good Strength Against Gravity and Moderate Resistance  EENT (WDL):  Within Defined Limits    Cardio/Pulmonary Assessment  Edema      Respiratory Breath Sounds  RUL Breath Sounds: Clear  RML Breath Sounds: Clear  RLL Breath Sounds: Clear  SHAWNA Breath Sounds: Clear  LLL Breath Sounds: Clear  Cardiac Assessment   Cardiac (WDL):   WDL Except (HX: HTN)

## 2024-02-19 NOTE — CARE PLAN
"The patient is Stable - Low risk of patient condition declining or worsening    Shift Goals  Clinical Goals: Safety  Patient Goals: Safety  Family Goals: Education    Patient is not progressing towards the following goals:    Problem: Fall Risk - Rehab  Goal: Patient will remain free from falls  Outcome: Not Met  Note: Prema Amaya Fall risk Assessment Score: 16    High fall risk Interventions   - Alarming seatbelt  - Bed and strip alarm   - Yellow sign by the door   - Yellow wrist band \"Fall risk\"  - Fall risk education provided     "

## 2024-02-20 ENCOUNTER — APPOINTMENT (OUTPATIENT)
Dept: SPEECH THERAPY | Facility: REHABILITATION | Age: 74
DRG: 950 | End: 2024-02-20
Attending: PHYSICAL MEDICINE & REHABILITATION
Payer: MEDICARE

## 2024-02-20 ENCOUNTER — APPOINTMENT (OUTPATIENT)
Dept: PHYSICAL THERAPY | Facility: REHABILITATION | Age: 74
DRG: 950 | End: 2024-02-20
Attending: PHYSICAL MEDICINE & REHABILITATION
Payer: MEDICARE

## 2024-02-20 ENCOUNTER — APPOINTMENT (OUTPATIENT)
Dept: OCCUPATIONAL THERAPY | Facility: REHABILITATION | Age: 74
DRG: 950 | End: 2024-02-20
Attending: PHYSICAL MEDICINE & REHABILITATION
Payer: MEDICARE

## 2024-02-20 LAB
GLUCOSE BLD STRIP.AUTO-MCNC: 169 MG/DL (ref 65–99)
GLUCOSE BLD STRIP.AUTO-MCNC: 203 MG/DL (ref 65–99)
GLUCOSE BLD STRIP.AUTO-MCNC: 203 MG/DL (ref 65–99)
GLUCOSE BLD STRIP.AUTO-MCNC: 297 MG/DL (ref 65–99)

## 2024-02-20 PROCEDURE — A9270 NON-COVERED ITEM OR SERVICE: HCPCS | Performed by: PHYSICAL MEDICINE & REHABILITATION

## 2024-02-20 PROCEDURE — 700102 HCHG RX REV CODE 250 W/ 637 OVERRIDE(OP): Performed by: HOSPITALIST

## 2024-02-20 PROCEDURE — 97535 SELF CARE MNGMENT TRAINING: CPT

## 2024-02-20 PROCEDURE — 97116 GAIT TRAINING THERAPY: CPT | Mod: CQ

## 2024-02-20 PROCEDURE — A9270 NON-COVERED ITEM OR SERVICE: HCPCS | Performed by: HOSPITALIST

## 2024-02-20 PROCEDURE — 99232 SBSQ HOSP IP/OBS MODERATE 35: CPT | Performed by: HOSPITALIST

## 2024-02-20 PROCEDURE — 770010 HCHG ROOM/CARE - REHAB SEMI PRIVAT*

## 2024-02-20 PROCEDURE — 97130 THER IVNTJ EA ADDL 15 MIN: CPT

## 2024-02-20 PROCEDURE — 97530 THERAPEUTIC ACTIVITIES: CPT

## 2024-02-20 PROCEDURE — 99233 SBSQ HOSP IP/OBS HIGH 50: CPT | Performed by: PHYSICAL MEDICINE & REHABILITATION

## 2024-02-20 PROCEDURE — 700102 HCHG RX REV CODE 250 W/ 637 OVERRIDE(OP): Performed by: PHYSICAL MEDICINE & REHABILITATION

## 2024-02-20 PROCEDURE — 97112 NEUROMUSCULAR REEDUCATION: CPT | Mod: CQ

## 2024-02-20 PROCEDURE — 82962 GLUCOSE BLOOD TEST: CPT | Mod: 91

## 2024-02-20 PROCEDURE — 97129 THER IVNTJ 1ST 15 MIN: CPT

## 2024-02-20 PROCEDURE — 97530 THERAPEUTIC ACTIVITIES: CPT | Mod: CQ

## 2024-02-20 PROCEDURE — 97110 THERAPEUTIC EXERCISES: CPT | Mod: CQ

## 2024-02-20 RX ADMIN — OMEPRAZOLE 20 MG: 20 CAPSULE, DELAYED RELEASE ORAL at 08:23

## 2024-02-20 RX ADMIN — CARBAMAZEPINE 200 MG: 100 TABLET, CHEWABLE ORAL at 20:30

## 2024-02-20 RX ADMIN — BUSPIRONE HYDROCHLORIDE 5 MG: 5 TABLET ORAL at 08:24

## 2024-02-20 RX ADMIN — CARIPRAZINE 6 MG: 3 CAPSULE, GELATIN COATED ORAL at 20:30

## 2024-02-20 RX ADMIN — APIXABAN 5 MG: 5 TABLET, FILM COATED ORAL at 08:24

## 2024-02-20 RX ADMIN — PRIMIDONE 100 MG: 50 TABLET ORAL at 20:30

## 2024-02-20 RX ADMIN — APIXABAN 5 MG: 5 TABLET, FILM COATED ORAL at 20:30

## 2024-02-20 RX ADMIN — ATORVASTATIN CALCIUM 80 MG: 40 TABLET, FILM COATED ORAL at 20:30

## 2024-02-20 RX ADMIN — LAMOTRIGINE 100 MG: 100 TABLET ORAL at 08:24

## 2024-02-20 RX ADMIN — DOCUSATE SODIUM 50MG AND SENNOSIDES 8.6MG 2 TABLET: 8.6; 5 TABLET, FILM COATED ORAL at 08:23

## 2024-02-20 RX ADMIN — INSULIN LISPRO 6 UNITS: 100 INJECTION, SOLUTION INTRAVENOUS; SUBCUTANEOUS at 11:23

## 2024-02-20 RX ADMIN — INSULIN LISPRO 2 UNITS: 100 INJECTION, SOLUTION INTRAVENOUS; SUBCUTANEOUS at 20:34

## 2024-02-20 RX ADMIN — INSULIN LISPRO 4 UNITS: 100 INJECTION, SOLUTION INTRAVENOUS; SUBCUTANEOUS at 08:15

## 2024-02-20 RX ADMIN — INSULIN LISPRO 4 UNITS: 100 INJECTION, SOLUTION INTRAVENOUS; SUBCUTANEOUS at 17:27

## 2024-02-20 RX ADMIN — BUSPIRONE HYDROCHLORIDE 5 MG: 5 TABLET ORAL at 20:31

## 2024-02-20 RX ADMIN — LEVOTHYROXINE SODIUM 25 MCG: 0.03 TABLET ORAL at 05:48

## 2024-02-20 RX ADMIN — Medication 1000 UNITS: at 08:24

## 2024-02-20 RX ADMIN — TIMOLOL MALEATE 1 DROP: 5 SOLUTION OPHTHALMIC at 20:33

## 2024-02-20 RX ADMIN — LOSARTAN POTASSIUM 100 MG: 50 TABLET, FILM COATED ORAL at 08:23

## 2024-02-20 RX ADMIN — CARBAMAZEPINE 200 MG: 100 TABLET, CHEWABLE ORAL at 08:24

## 2024-02-20 ASSESSMENT — ACTIVITIES OF DAILY LIVING (ADL)
TOILET_TRANSFER_DESCRIPTION: GRAB BAR;INCREASED TIME;SUPERVISION FOR SAFETY;VERBAL CUEING
BED_CHAIR_WHEELCHAIR_TRANSFER_DESCRIPTION: ADAPTIVE EQUIPMENT;INCREASED TIME;SET-UP OF EQUIPMENT;SUPERVISION FOR SAFETY;VERBAL CUEING
TUB_SHOWER_TRANSFER_DESCRIPTION: GRAB BAR;SHOWER BENCH;SUPERVISION FOR SAFETY
TOILET_TRANSFER_DESCRIPTION: GRAB BAR;SUPERVISION FOR SAFETY

## 2024-02-20 ASSESSMENT — ENCOUNTER SYMPTOMS
VOMITING: 0
NERVOUS/ANXIOUS: 0
CHILLS: 0
ABDOMINAL PAIN: 0
SHORTNESS OF BREATH: 0
NAUSEA: 0
DIARRHEA: 0
FEVER: 0

## 2024-02-20 ASSESSMENT — GAIT ASSESSMENTS
DEVIATION: BRADYKINETIC;DECREASED HEEL STRIKE;DECREASED TOE OFF;SHUFFLED GAIT
ASSISTIVE DEVICE: FRONT WHEEL WALKER
GAIT LEVEL OF ASSIST: STANDBY ASSIST

## 2024-02-20 NOTE — THERAPY
Occupational Therapy  Daily Treatment     Patient Name: Molly Loera  Age:  73 y.o., Sex:  female  Medical Record #: [id number]  Today's Date: 2/20/2024     Precautions  Precautions: Fall Risk  Comments: poor safety awareness/impulsive    Safety   ADL Safety : Requires Supervision for Safety    Subjective    Pt seated in w/c and agreeable to OT tx.     Objective       02/20/24 0931   OT Charge Group   OT Self Care / ADL (Units) 3   OT Therapy Activity (Units) 1   OT Total Time Spent   OT Individual Total Time Spent (Mins) 60   Pain   Intervention Emotional Support;Repositioned;Rest   Pain 0 - 10 Group   Location Neck   Location Orientation Mid   Description Sore;Tender   Comfort Goal Comfort with Movement;Perform Activity   Therapist Pain Assessment Post Activity Pain Same as Prior to Activity   Cognition    Level of Consciousness Alert   Safety Awareness Impaired   New Learning Impaired   Initiation Impaired   Comments pt required cues for safety and initiation with tasks. pt completed simple playing card sorting by shirley then in a specific order ace-wilmer. Pt required min cues for problem solving through task.   Functional Level of Assist   Grooming Supervision;Standing   Grooming Description Increased time;Standing at sink;Supervision for safety  (For hair care and oral care )   Bathing Supervision   Bathing Description Grab bar;Hand held shower;Tub bench;Increased time;Supervision for safety;Verbal cueing  (Reggie for initiation of bathing. SPV when standing for washing rear)   Upper Body Dressing Supervision   Upper Body Dressing Description Set-up of equipment;Supervision for safety   Lower Body Dressing Standby Assist   Lower Body Dressing Description Grab bar;Increased time;Set-up of equipment;Supervision for safety   Toilet Transfers Standby Assist   Toilet Transfer Description Grab bar;Supervision for safety  (No AD)   Tub / Shower Transfers Standby Assist   Tub Shower Transfer Description Grab bar;Shower  "bench;Supervision for safety   IADL Treatments   IADL Treatments Home management   Home Management Pt participated in laundry task, walking bag from room without device at SBA level with cues for way finding and problem solving. After watching OTR exchange clothing from washer to dry, pt then proceded to \"open lid\" to dry that was running directly next to top loading washer. Cues for attention to task. pt then opened detergent placing lid on top of washer, scooped, and opened lid to washer letting detergent lid fall into washer. Able to complete remainder of tasks without assist.   Interdisciplinary Plan of Care Collaboration   IDT Collaboration with  Nursing;Certified Nursing Assistant   Patient Position at End of Therapy Seated   Collaboration Comments pt with some noted dizziness at end of session. CNA completed vitals following session with 80's/50s reported to RN.     Pt completed all functional mobility without device at SBA level in room for accessing closet, transporting of clothing, and to carry dirty clothes bag from room to main gym. No LOB, but cues for all way finding.    Assessment    Pt tolerated session well with focus on ADLs, functional mobility and IADL of laundry task. Pt continues to be limited by decreased problem solving, initiation, and safety.  Strengths: Able to follow instructions, Alert and oriented, Independent prior level of function, Motivated for self care and independence, Pleasant and cooperative, Supportive family, Willingly participates in therapeutic activities  Barriers: Decreased endurance, Fatigue, Generalized weakness, Impaired balance, Impaired activity tolerance, Confused, Impaired functional cognition, Impulsive, Limited mobility, Visual impairment    Plan    Simple meal prep.    DME       Passport items to be completed:  Perform bathroom transfers, complete dressing, complete feeding, get ready for the day, prepare a simple meal, participate in household tasks, adapt home " for safety needs, demonstrate home exercise program, complete caregiver training     Occupational Therapy Goals (Active)       Problem: OT Long Term Goals       Dates: Start:  02/15/24         Goal: LTG-By discharge, patient will complete basic self care tasks at SPV-Mod I level.       Dates: Start:  02/15/24            Goal: LTG-By discharge, patient will perform bathroom transfers at SPV-Mod I level.       Dates: Start:  02/15/24            Goal: LTG-By discharge, patient will complete basic home management at SPV-Mod I level.       Dates: Start:  02/15/24               Problem: Toileting       Dates: Start:  02/15/24         Goal: STG-Within one week, patient will complete toileting tasks at SBA level.       Dates: Start:  02/15/24

## 2024-02-20 NOTE — CARE PLAN
Problem: Balance  Goal: STG-Within one week, patient will maintain dynamic standing x 5 mins  Outcome: Met     Problem: Mobility  Goal: STG-Within one week, patient will ambulate household distance using LRAD x 150 feet, SBA  Outcome: Met  Goal: STG-Within one week, patient will ambulate up/down a curb using LRAD, SBA  Outcome: Progressing     Problem: Mobility Transfers  Goal: STG-Within one week, patient will transfer bed to chair SBA  Outcome: Met

## 2024-02-20 NOTE — PROGRESS NOTES
NURSING DAILY NOTE    Name: Molly Loera   Date of Admission: 2/14/2024   Admitting Diagnosis: Traumatic brain injury with new neurocognitive deficit (HCC)  Attending Physician: Mazin Gregory M.d.  Allergies: Depakote [valproic acid]    Safety  Patient Assist  sba  Patient Precautions  Fall Risk  Precaution Comments  poor safety awareness/impulsive  Bed Transfer Status  Contact Guard Assist  Toilet Transfer Status   Contact Guard Assist  Assistive Devices  Wheelchair  Oxygen  None - Room Air  Diet/Therapeutic Dining  Current Diet Order   Procedures    Diet Order Diet: Consistent CHO (Diabetic)     Pill Administration  whole  Agitated Behavioral Scale  15  ABS Level of Severity  No Agitation    Fall Risk  Has the patient had a fall this admission?   No  Prema Amaya Fall Risk Scoring  16, HIGH RISK  Fall Risk Safety Measures  bed alarm, chair alarm, poor balance, and low vision/ hearing    Vitals  Temperature: 36.4 °C (97.6 °F)  Temp src: Oral  Pulse: 76  Respiration: 20  Blood Pressure : 92/50  Blood Pressure MAP (Calculated): 64 MM HG  BP Location: Right, Upper Arm  Patient BP Position: Sitting     Oxygen  Pulse Oximetry: 99 %  O2 (LPM): 0  O2 Delivery Device: None - Room Air    Bowel and Bladder  Last Bowel Movement  02/19/24  Stool Type  Type 5: Soft blob with clear cut edges (passed easily)  Bowel Device     Continent  Bladder: Stress incontinence   Bowel: Incontinent movement  Bladder Function  Urine Void (mL):  (moderate)  Number of Times Voided: 1  Urine Color: Yellow  Genitourinary Assessment   Bladder Assessment (WDL):  Within Defined Limits  Urine Color: Yellow  Bladder Device: Bathroom  Time Void: No  Bladder Scan: Post Void  $ Bladder Scan Results (mL): 10    Skin  Zach Score   18  Sensory Interventions   Bed Types: Standard/Trauma Mattress  Skin Preventative Measures: Pillows in Use for Support / Positioning  Moisture  Interventions  Moisturizers/Barriers: Barrier Wipes      Pain  Pain Rating Scale  6 - Hard to ignore, avoid usual activities  Pain Location  Head  Pain Location Orientation  Right  Pain Interventions   Declines    ADLs    Bathing    (shower with ot)  Linen Change    (Pt refused said she dont shower on nights is too cold Rn was inform)  Personal Hygiene  Moist Violeta Wipes  Chlorhexidine Bath      Oral Care     Teeth/Dentures     Shave     Nutrition Percentage Eaten  Between % Consumed  Environmental Precautions  Treaded Slipper Socks on Patient  Patient Turns/Positioning  Patient Turns Self from Side to Side  Patient Turns Assistance/Tolerance     Bed Positions  Bed Controls On  Head of Bed Elevated  Self regulated      Psychosocial/Neurologic Assessment  Psychosocial Assessment  Psychosocial (WDL):  Within Defined Limits  Neurologic Assessment  Neuro (WDL): Exceptions to WDL  Level of Consciousness: Alert  Orientation Level: Oriented X4  Cognition: Appropriate judgement, Appropriate safety awareness  Speech: Clear  Pupil Assesment: No  R Pupil Size (mm): 2  R Pupil Shape / Description: Round  R Pupil Reaction: Brisk  L Pupil Size (mm): 2  L Pupil Shape / Description: Round  L Pupil Reaction: Brisk  Motor Function/Sensation Assessment: Motor strength  Muscle Strength Right Arm: Good Strength Against Gravity and Moderate Resistance  Muscle Strength Left Arm: Good Strength Against Gravity and Moderate Resistance  Muscle Strength Right Leg: Good Strength Against Gravity and Moderate Resistance  Muscle Strength Left Leg: Good Strength Against Gravity and Moderate Resistance  EENT (WDL):  Within Defined Limits    Cardio/Pulmonary Assessment  Edema      Respiratory Breath Sounds  RUL Breath Sounds: Clear  RML Breath Sounds: Clear  RLL Breath Sounds: Clear  SHAWNA Breath Sounds: Clear  LLL Breath Sounds: Clear  Cardiac Assessment   Cardiac (WDL):  WDL Except (HX: HTN)

## 2024-02-20 NOTE — PROGRESS NOTES
Physical Medicine & Rehabilitation Progress Note    Encounter Date: 2/20/2024    Chief Complaint: Decreased mobility, weakness    Interval Events (Subjective):  Patient sitting up in room. She reports she is doing better with SLP. She reports she thinks she is close to baseline. Discussed about low SBP. Encouraged fluids. Denies NVD. Discussed would have IDT later today to discuss discharge planning.     _____________________________________  Interdisciplinary Team Conference   Most recent IDT on 2/20/2024    IMazin M.D./Ph.D., was present and led the interdisciplinary team conference on 2/20/2024.  I led the IDT conference and agree with the IDT conference documentation and plan of care as noted below.     Nursing:  Diet Current Diet Order   Procedures    Diet Order Diet: Consistent CHO (Diabetic)       Eating ADL Independent      % of Last Meal  Oral Nutrition: Between % Consumed   Sleep    Bowel Last BM: 02/20/24   Bladder Continent   Barriers to Discharge Home:  Blood sugars limited    Physical Therapy:  Bed Mobility    Transfers Standby Assist  Adaptive equipment, Increased time, Set-up of equipment, Supervision for safety, Verbal cueing   Mobility Standby Assist   Stairs    Barriers to Discharge Home:  29/54 on balance  Poor sequencing  Poor tasks attention      Occupational Therapy:  Grooming Supervision, Standing   Bathing Supervision   UB Dressing Supervision   LB Dressing Standby Assist   Toileting Contact Guard Assist   Shower & Transfer    Barriers to Discharge Home:  Poor problem solving  Self limited  Orthostatic hypotension  Fear of falling    Speech-Language Pathology:  Comprehension:  Modified Independent  Comprehension Description:  Glasses  Expression:  Independent  Expression Description:  Verbal cueing  Social Interaction:  Modified Independent  Social Interaction Description:  Medication  Problem Solving:  Minimal Assist  Problem Solving Description:  Verbal cueing,  "Therapy schedule, Bed/chair alarm, Increased time  Memory:  Moderate Assist  Memory Description:  Therapy schedule, Verbal cueing, Bed/chair alarm, Increased time  Barriers to Discharge Home:  Self distracting    Respiratory Therapy:  O2 (LPM): 0  O2 Delivery Device: None - Room Air    Case Management:  Continues to work on disposition and DME needs.      Discharge Date/Disposition:  2/27/24  _____________________________________      Objective:  VITAL SIGNS: BP (!) 88/56   Pulse 96   Temp 36.2 °C (97.2 °F) (Oral)   Resp 18   Ht 1.646 m (5' 4.8\")   Wt 60.3 kg (133 lb)   SpO2 98%   BMI 22.27 kg/m²   Gen: NAD  Psych: Mood and affect appropriate  CV: RRR, 0 edema  Resp: CTAB, no upper airway sounds  Abd: NTND  Neuro: AOx3, following commands  Unchanged from 2/19/24    Laboratory Values:  Recent Results (from the past 72 hour(s))   POCT glucose device results    Collection Time: 02/17/24  5:11 PM   Result Value Ref Range    POC Glucose, Blood 267 (H) 65 - 99 mg/dL   POCT glucose device results    Collection Time: 02/17/24  8:55 PM   Result Value Ref Range    POC Glucose, Blood 207 (H) 65 - 99 mg/dL   POCT glucose device results    Collection Time: 02/18/24  7:09 AM   Result Value Ref Range    POC Glucose, Blood 96 65 - 99 mg/dL   POCT glucose device results    Collection Time: 02/18/24 11:27 AM   Result Value Ref Range    POC Glucose, Blood 327 (H) 65 - 99 mg/dL   POCT glucose device results    Collection Time: 02/18/24  4:57 PM   Result Value Ref Range    POC Glucose, Blood 189 (H) 65 - 99 mg/dL   POCT glucose device results    Collection Time: 02/18/24  8:31 PM   Result Value Ref Range    POC Glucose, Blood 190 (H) 65 - 99 mg/dL   POTASSIUM SERUM (K)    Collection Time: 02/19/24  5:36 AM   Result Value Ref Range    Potassium 4.4 3.6 - 5.5 mmol/L   POCT glucose device results    Collection Time: 02/19/24  7:58 AM   Result Value Ref Range    POC Glucose, Blood 417 (HH) 65 - 99 mg/dL   POCT glucose device results "    Collection Time: 02/19/24 11:29 AM   Result Value Ref Range    POC Glucose, Blood 270 (H) 65 - 99 mg/dL   POCT glucose device results    Collection Time: 02/19/24  5:20 PM   Result Value Ref Range    POC Glucose, Blood 238 (H) 65 - 99 mg/dL   POCT glucose device results    Collection Time: 02/19/24  8:36 PM   Result Value Ref Range    POC Glucose, Blood 254 (H) 65 - 99 mg/dL   POCT glucose device results    Collection Time: 02/20/24  7:44 AM   Result Value Ref Range    POC Glucose, Blood 203 (H) 65 - 99 mg/dL   POCT glucose device results    Collection Time: 02/20/24 11:16 AM   Result Value Ref Range    POC Glucose, Blood 297 (H) 65 - 99 mg/dL       Medications:  Scheduled Medications   Medication Dose Frequency    amLODIPine  10 mg Nightly    insulin GLARGINE  10 Units Q EVENING    insulin GLARGINE  14 Units QAM INSULIN    apixaban  5 mg BID    insulin lispro  2-12 Units 4X/DAY ACHS    vitamin D3  1,000 Units DAILY    senna-docusate  2 Tablet BID    omeprazole  20 mg DAILY    atorvastatin  80 mg Nightly    busPIRone  5 mg BID    cariprazine  6 mg QHS    lamoTRIgine  100 mg DAILY    levothyroxine  25 mcg AM ES    losartan  100 mg DAILY    primidone  100 mg QHS    timolol  1 Drop QHS    carBAMazepine  200 mg BID     PRN medications: insulin lispro **AND** POC blood glucose manual result **AND** NOTIFY MD and PharmD **AND** Administer 20 grams of glucose (approximately 8 ounces of fruit juice) every 15 minutes PRN FSBG less than 70 mg/dL **AND** dextrose bolus, Respiratory Therapy Consult, hydrALAZINE, acetaminophen, senna-docusate **AND** polyethylene glycol/lytes, mag hydrox-al hydrox-simeth, ondansetron **OR** ondansetron, traZODone, sodium chloride, oxyCODONE immediate-release **OR** oxyCODONE immediate-release, midazolam, [COMPLETED] acetaminophen **FOLLOWED BY** acetaminophen, ziprasidone    Diet:  Current Diet Order   Procedures    Diet Order Diet: Consistent CHO (Diabetic)       Medical Decision Making and  Plan:  TBI (Traumatic Brain Injury): GLF on 2/9/24 with left sided subarachnoid hemorrhage s/p monitoring with serial CTs.   -PT and OT for mobility and ADLs. Per guidelines, 15 hours per week between PT, OT and/or SLP.  -Follow-up NSG. On 7 days of Keppra for seizure prophylaxis. Completed Keppra Hold AC until 2/19. Restart Eliquis, discussed with patient     HTN- Patient on Amlodipine 5 mg and Losartan 100 mg daily. Into 140s, consult hospitalist. Amlodipine increased to 10 mg QHS     Hx of PE - Previously on Eliquis. On hold for 10 days.      HLD - Patient on Atorvastatin 80 mg QHS     Anemia - Check AM CBC - 11.4 on admission.      Leukopenia - Check AM CBC - 5.8 on admission, resolved     DM1 with hyperglycemia - Patient on Lantus 15 U and SSI on transfer. Blood sugars into 500s, give extra dose and consult hospitalist  -Severe hyperglycemia - Glargine changed to 10 QPM and 14U QPM. Continue Glargine and SSI     Hypothyroidism - Patient on Levothyroxine 25 mcg daily     Hypophosphatemia - Check AM level - low 2.4. Consult hospitalist. Continue K-Phos. K level stable. K-phos discontinue per Hospitalist     BPD1 - Patient on Lamictal 100 mg daily, Primidone 100 mg QHS, Tegretol 200 mg BID, Buspar 5 mg BID, and Vraylar 6 mg QHS     Glaucoma - Patient on Timolol QHS    Vitamin D deficiency - 17 on admission, start 2000 U      Pain - Patient on PRN Tylenol and Oxycodone     Skin - Patient at risk for skin breakdown due to debility in areas including sacrum, achilles, elbows and head in addition to other sites. Nursing to assess skin daily.      GI Ppx - Patient on Prilosec for GERD prophylaxis. Patient on Senna-docusate for constipation prophylaxis.      DVT Ppx - Patient Eliquis is on hold on transfer. To restart 2/19, order placed  ____________________________________    T. Deven Gregory MD/PhD  [facility] - Physical Medicine & Rehabilitation   [facility] - Brain Injury Medicine    ____________________________________    Total time:  50 minutes. Time spent included pre-rounding review of vitals and tests, unit/floor time, face-to-face time with the patient including physical examination, care coordination, counseling of patient and/or family, ordering medications/procedures/tests, discussion with CM, PT, OT, SLP and/or other healthcare providers, and documentation in the electronic medical record. Topics discussed included discharge planning, no new bleeding, low SBP, discussed case with hospitalist, and continue current medications. Patient was discussed separately in IDT today; please see details above.

## 2024-02-20 NOTE — THERAPY
"Speech Language Pathology  Daily Treatment     Patient Name: Molly Loera  Age:  73 y.o., Sex:  female  Medical Record #: [id number]  Today's Date: 2/20/2024     Precautions  Precautions: Fall Risk  Comments: poor safety awareness/impulsive    Subjective    Pt seen at 1104 and 1304 for a total of 60 minutes. Pleasant and on time for both sessions.     Objective       02/20/24 1304   Treatment Charges   SLP Cognitive Skill Development First 15 Minutes 1   SLP Cognitive Skill Development Additional 15 Minutes 3   SLP Total Time Spent   SLP Individual Total Time Spent (Mins) 60   Cognition   Moderate Attention Moderate (3)   Interdisciplinary Plan of Care Collaboration   IDT Collaboration with  Nursing   Patient Position at End of Therapy Seated;Call Light within Reach;Phone within Reach;Tray Table within Reach;Self Releasing Lap Belt Applied   Collaboration Comments nursing checking FSBG in a.m. session, encouraged to push fluids         Assessment    1104: ongoing attention tasks - alternating attention with trail making - simple 1-step pattern for trail making pt was 100% IND, with increased complexity given multiple targets pt required MIN to MOD A. Cues for placekeeping needed to complete.    1304: Complex attention with pt alphabetizing books by author last name. Pt required MOD to MAX A to complete with 100% accuracy. Pt rating the activity as \"hard\" reports challenges with keeping names of authors straight, needing to read multiple times.    Strengths: Willingly participates in therapeutic activities, Motivated for self care and independence, Supportive family, Pleasant and cooperative, Making steady progress towards goals, Alert and oriented, Effective communication skills, Independent prior level of function  Barriers: Impaired functional cognition, Decreased endurance    Plan    Continue to address attention    Passport items to be completed:  Express basic needs, understand food/liquid recommendations, " consistently follow swallow precautions, manage finances, manage medications, arrive to therapy appointments on time, complete daily memory log entries, solve problems related to safety situations, review education related to hospitalization, complete caregiver training     Speech Therapy Problems (Active)       Problem: Memory STGs       Dates: Start:  02/15/24         Goal: STG-Within one week, patient will complete mental manipulation tasks up to 4 units with no more than 1 repetition in 8/10 trials       Dates: Start:  02/15/24         Goal Note filed on 02/20/24 1143 by Kandy Zuñiga MS,CCC-SLP       Will continue to target.                 Problem: Speech/Swallowing LTGs       Dates: Start:  02/15/24         Goal: LTG-By discharge, patient will solve basic problems by utilizing compensatory intervention for memory and problem-solving to allow for safe completion of daily activities with SPV in order to prepare for safe d/c         Dates: Start:  02/15/24

## 2024-02-20 NOTE — CARE PLAN
Problem: Memory STGs  Goal: STG-Within one week, patient will complete mental manipulation tasks up to 4 units with no more than 1 repetition in 8/10 trials  Outcome: Not Met  Note: Will continue to target.     Problem: Problem Solving STGs  Goal: STG-Within one week, patient will independently provide appropriate answers to problem solving scenarios involving IADLs and safety, with 90% accuracy.  Outcome: Met  Goal: STG-Within one week, patient will complete simple attention tasks with >80% accuracy provided MIN cues.  Outcome: Met

## 2024-02-20 NOTE — CARE PLAN
"The patient is Stable - Low risk of patient condition declining or worsening    Shift Goals  Clinical Goals: Safety  Patient Goals: Safety  Family Goals: Education    Patient is not progressing towards the following goals:    Problem: Fall Risk - Rehab  Goal: Patient will remain free from falls  Outcome: Not Met  Note: Prema Amaya Fall risk Assessment Score: 12    Moderate fall risk Interventions  - Bed and strip alarm   - Yellow sign by the door   - Yellow wrist band \"Fall risk\"  - Fall risk education provided     "

## 2024-02-20 NOTE — PROGRESS NOTES
Hospital Medicine Daily Progress Note      Chief Complaint  Hypertension  Diabetes    Interval Problem Update  No complaints.  Doing ok.    Review of Systems  Review of Systems   Constitutional:  Negative for chills and fever.   Respiratory:  Negative for shortness of breath.    Cardiovascular:  Negative for chest pain.   Gastrointestinal:  Negative for abdominal pain, diarrhea, nausea and vomiting.   Psychiatric/Behavioral:  The patient is not nervous/anxious.         Physical Exam  Temp:  [36.4 °C (97.6 °F)-36.9 °C (98.5 °F)] 36.9 °C (98.5 °F)  Pulse:  [64-77] 64  Resp:  [18-20] 18  BP: ()/(50-66) 129/62  SpO2:  [99 %] 99 %    Physical Exam  Vitals and nursing note reviewed.   Constitutional:       Appearance: Normal appearance.   HENT:      Head: Atraumatic.      Comments: Resolving facial ecchymoses under eyes and across nasal bridge  Eyes:      Conjunctiva/sclera: Conjunctivae normal.      Pupils: Pupils are equal, round, and reactive to light.   Cardiovascular:      Rate and Rhythm: Normal rate and regular rhythm.      Heart sounds: No murmur heard.  Pulmonary:      Effort: Pulmonary effort is normal.      Breath sounds: No stridor. No wheezing or rales.   Abdominal:      General: There is no distension.      Palpations: Abdomen is soft.      Tenderness: There is no abdominal tenderness.   Musculoskeletal:      Cervical back: Normal range of motion and neck supple.      Right lower leg: No edema.      Left lower leg: No edema.   Skin:     General: Skin is warm and dry.      Findings: No rash.   Neurological:      Mental Status: She is alert and oriented to person, place, and time.   Psychiatric:         Mood and Affect: Mood normal.         Behavior: Behavior normal.         Fluids    Intake/Output Summary (Last 24 hours) at 2/20/2024 1052  Last data filed at 2/20/2024 0900  Gross per 24 hour   Intake 840 ml   Output --   Net 840 ml       Laboratory        Recent Labs     02/19/24  0536   POTASSIUM 4.4                  Assessment/Plan  * Traumatic brain injury with new neurocognitive deficit (HCC)- (present on admission)  Assessment & Plan  S/P MGLF with left sided head trauma  Neuroimaging: showed SAH  Cont Keppra for seizure proph  Note: now back on Eliquis    Glaucoma  Assessment & Plan  Cont Timolol    Vitamin D deficiency  Assessment & Plan  Vit D: 17  Cont supplements    PE (pulmonary thromboembolism) (HCC)- (present on admission)  Assessment & Plan  Cont Eliquis     Other specified hypothyroidism- (present on admission)  Assessment & Plan  Euthyroid on Synthroid    Dyslipidemia- (present on admission)  Assessment & Plan  Cont Lipitor    Essential hypertension- (present on admission)  Assessment & Plan  BP ok  Cont Norvasc  Cont Cozaar -- dose recently increased  Cont to monitor    Type 1 diabetes mellitus without complication (HCC)- (present on admission)  Assessment & Plan  Hba1c: 9.0 (2/15)  BS labile  Cont Glargine: 14 units am, 10 units pm  Note: home meds include Levemir 10-12 units bid and Novolog SSI  Cont to monitor    Bipolar 1 disorder (HCC)- (present on admission)  Assessment & Plan  Cont Buspar, Vraylar, Tegretol, Lamictal, and Primidone

## 2024-02-20 NOTE — THERAPY
Physical Therapy   Daily Treatment     Patient Name: Molly Loera  Age:  73 y.o., Sex:  female  Medical Record #: [id number]  Today's Date: 2/20/2024     Precautions  Precautions: Fall Risk  Comments: poor safety awareness/impulsive    Subjective    Pt resting in bed, awake. Agreeable to therapy     Objective       02/20/24 0701   PT Charge Group   PT Gait Training (Units) 1   PT Therapeutic Exercise (Units) 1   PT Neuromuscular Re-Education / Balance (Units) 1   PT Therapeutic Activities (Units) 1   Supervising Physical Therapist Ashley Murrieta   PT Total Time Spent   PT Individual Total Time Spent (Mins) 60   Pain   Intervention Emotional Support   Pain 0 - 10 Group   Location Knee   Location Orientation Right   Description Sore;Tender   Comfort Goal Comfort with Movement;Perform Activity   Therapist Pain Assessment During Activity;Nurse Notified   Cognition    Safety Awareness Impaired;Impulsive   New Learning Impaired   Initiation Impaired   Comments requires frequent cuing for safety wtih FWW. impaired memory, unable to recall this therapist from yesterdays treatment   Gait Functional Level of Assist    Gait Level Of Assist Standby Assist   Assistive Device Front Wheel Walker   Distance (Feet)   (multiple bouts of 150'-200' throughout session)   # of Times Distance was Traveled 3   Deviation Bradykinetic;Decreased Heel Strike;Decreased Toe Off;Shuffled Gait   Transfer Functional Level of Assist   Bed, Chair, Wheelchair Transfer Standby Assist   Bed Chair Wheelchair Transfer Description Adaptive equipment;Increased time;Set-up of equipment;Supervision for safety;Verbal cueing   Toilet Transfers Standby Assist   Toilet Transfer Description Grab bar;Increased time;Supervision for safety;Verbal cueing  (from walker level)   Bed Mobility    Supine to Sit Supervised   Sit to Supine Supervised   Sit to Stand Standby Assist   Scooting Supervised   Rolling Supervised   Neuro-Muscular Treatments   Neuro-Muscular  "Treatments Anterior weight shift;Verbal Cuing;Sequencing;Compensatory Strategies   Comments see note   Interdisciplinary Plan of Care Collaboration   IDT Collaboration with  Occupational Therapist;Nursing   Patient Position at End of Therapy Seated;Chair Alarm On;Call Light within Reach;Tray Table within Reach;Phone within Reach   Collaboration Comments CLOF, memory, R knee pain   PT DME Recommendations   Assistive Device Front Wheeled Walker   Physical Therapist Assigned   Assigned PT / Treatment Time / Comments AM 60       Set-up + retrieval for clothing from closet (pants undergarments) pt instructed to thread both while seated at EOB, SBA then stand up and use FWW for balance and support when pulling them up.    Pt amb with FWW from bed <> toilet with SBA, transferred onto toilet and completed personal hygiene SBA, pt attempted to leave FWW close to the toilet and step over to the sink for hand washing    Neuro re education/dynamic balance   Anterior step outs with large colored dots and \"finger tip\" support on //  Progressed to anterior step outs with no UE support  Completed diagonal step outs crossing midline  Side and forward lunges with UE support on //   Amb in // with CGA fwrd/retro 20' x 2 no UE support          Assessment    Pt tolerates session well, follows simple single step directions however has difficulty with more complex instructions and difficulty dual tasking. Poor safety with walker management, frequent removes one hand from walker while still moving it ie to adjust shirt, scratch face. Tends to leave walker when going to transition to sit on the chair/bed. Pt resents with impaired dynamic standing balance, and thus fear of falling. Pt has improving automatic postural reactions,   Strengths: Able to follow instructions, Alert and oriented, Independent prior level of function, Pleasant and cooperative  Barriers: Generalized weakness, Fatigue, Impaired activity tolerance, Impaired balance (high " CBG)    Plan    Balance  Postural reactions  Safety with FWW  Car transfer/sequencing  STS sequencing   Progress to LRAD as appropriate  Dual task  CV endurance    DME  PT DME Recommendations  Assistive Device: (P) Front Wheeled Walker    Passport items to be completed:  Get in/out of bed safely, in/out of a vehicle, safely use mobility device, walk or wheel around home/community, navigate up and down stairs, show how to get up/down from the ground, ensure home is accessible, demonstrate HEP, complete caregiver training    Physical Therapy Problems (Active)       Problem: Balance       Dates: Start:  02/15/24         Goal: STG-Within one week, patient will maintain dynamic standing x 5 mins       Dates: Start:  02/15/24               Problem: Mobility       Dates: Start:  02/15/24         Goal: STG-Within one week, patient will ambulate household distance using LRAD x 150 feet, SBA       Dates: Start:  02/15/24            Goal: STG-Within one week, patient will ambulate up/down a curb using LRAD, SBA       Dates: Start:  02/15/24               Problem: Mobility Transfers       Dates: Start:  02/15/24         Goal: STG-Within one week, patient will transfer bed to chair SBA       Dates: Start:  02/15/24               Problem: PT-Long Term Goals       Dates: Start:  02/15/24         Goal: LTG-By discharge, patient will tolerate standing x10 mins       Dates: Start:  02/15/24            Goal: LTG-By discharge, patient will ambulate using LRAD x 300 feet, Mod I       Dates: Start:  02/15/24            Goal: LTG-By discharge, patient will perform home exercise program independently       Dates: Start:  02/15/24            Goal: LTG-By discharge, patient will transfer in/out of a car, Mod I       Dates: Start:  02/15/24

## 2024-02-20 NOTE — CARE PLAN
Problem: Knowledge Deficit - Standard  Goal: Patient and family/care givers will demonstrate understanding of plan of care, disease process/condition, diagnostic tests and medications  Outcome: Progressing   Pt education given regarding plan of care with emphasis on adequate hydration, pt shows poor understanding and follow through, pt has better response with flavored water,  will continue to reinforce education and continue to monitor.            Problem: Fall Risk - Rehab  Goal: Patient will remain free from falls  Outcome: Progressing   Pt education given regarding fall precautions AND safety measures, pt shows good understanding, has not attempted to self transfer this shift, will continue to reinforce education and continue to monitor.

## 2024-02-20 NOTE — PROGRESS NOTES
..                                                         NURSING DAILY NOTE    Name: Molly Loera   Date of Admission: 2/14/2024   Admitting Diagnosis: Traumatic brain injury with new neurocognitive deficit (HCC)  Attending Physician: Mazin Gregory M.d.  Allergies: Depakote [valproic acid]    Safety  Patient Assist  sba  Patient Precautions  Fall Risk  Precaution Comments  poor safety awareness/impulsive  Bed Transfer Status  Contact Guard Assist  Toilet Transfer Status   Contact Guard Assist  Assistive Devices  Wheelchair  Oxygen  None - Room Air  Diet/Therapeutic Dining  Current Diet Order   Procedures    Diet Order Diet: Consistent CHO (Diabetic)     Pill Administration  whole  Agitated Behavioral Scale  15  ABS Level of Severity  No Agitation    Fall Risk  Has the patient had a fall this admission?   No  Prema Amaya Fall Risk Scoring  12, MODERATE RISK  Fall Risk Safety Measures  bed alarm, chair alarm, poor balance, low vision/ hearing, and ok to leave pt in bathroom    Vitals  Temperature: 36.9 °C (98.5 °F)  Temp src: Oral  Pulse: 64  Respiration: 18  Blood Pressure : 129/62  Blood Pressure MAP (Calculated): 84 MM HG  BP Location: Right, Upper Arm  Patient BP Position: Supine     Oxygen  Pulse Oximetry: 99 %  O2 (LPM): 0  O2 Delivery Device: None - Room Air    Bowel and Bladder  Last Bowel Movement  02/19/24  Stool Type  Type 5: Soft blob with clear cut edges (passed easily)  Bowel Device     Continent  Bladder: Stress incontinence   Bowel: Incontinent movement  Bladder Function  Urine Void (mL):  (moderate)  Number of Times Voided: 1  Urine Color: Yellow  Number of Times Incontinent of Urine: 0  Genitourinary Assessment   Bladder Assessment (WDL):  Within Defined Limits  Urine Color: Yellow  Number of Bladder Accidents: 0  Total Number of Bladder of Accidents in Last 7 Days: 0  Number of Times Incontinent of Urine: 0  Bladder Device: Bathroom  Time Void: No  Bladder Scan: Post Void  $  Bladder Scan Results (mL): 10    Skin  Zach Score   18  Sensory Interventions   Bed Types: Standard/Trauma Mattress  Skin Preventative Measures: Pillows in Use for Support / Positioning  Moisture Interventions  Moisturizers/Barriers: Barrier Wipes      Pain  Pain Rating Scale  5 - Interrupts some activities  Pain Location  Head  Pain Location Orientation  Anterior, Upper  Pain Interventions   Medication (see MAR)    ADLs    Bathing    (shower with ot)  Linen Change    (Pt refused said she dont shower on nights is too cold Rn was inform)  Personal Hygiene  Moist Violeta Wipes  Chlorhexidine Bath      Oral Care     Teeth/Dentures     Shave     Nutrition Percentage Eaten  Between % Consumed  Environmental Precautions  Treaded Slipper Socks on Patient  Patient Turns/Positioning  Patient Turns Self from Side to Side  Patient Turns Assistance/Tolerance     Bed Positions  Bed Controls On  Head of Bed Elevated  Self regulated      Psychosocial/Neurologic Assessment  Psychosocial Assessment  Psychosocial (WDL):  Within Defined Limits  Neurologic Assessment  Neuro (WDL): Exceptions to WDL  Level of Consciousness: Alert  Orientation Level: Oriented X4  Cognition: Appropriate judgement, Appropriate safety awareness  Speech: Clear  Pupil Assesment: No  R Pupil Size (mm): 2  R Pupil Shape / Description: Round  R Pupil Reaction: Brisk  L Pupil Size (mm): 2  L Pupil Shape / Description: Round  L Pupil Reaction: Brisk  Motor Function/Sensation Assessment: Motor strength  Muscle Strength Right Arm: Good Strength Against Gravity and Moderate Resistance  Muscle Strength Left Arm: Good Strength Against Gravity and Moderate Resistance  Muscle Strength Right Leg: Good Strength Against Gravity and Moderate Resistance  Muscle Strength Left Leg: Good Strength Against Gravity and Moderate Resistance  EENT (WDL):  Within Defined Limits    Cardio/Pulmonary Assessment  Edema      Respiratory Breath Sounds  RUL Breath Sounds: Clear  RML  Breath Sounds: Clear  RLL Breath Sounds: Clear  SHAWNA Breath Sounds: Clear  LLL Breath Sounds: Clear  Cardiac Assessment   Cardiac (WDL):  WDL Except (HX: HTN)

## 2024-02-20 NOTE — CARE PLAN
Problem: Knowledge Deficit - Standard  Goal: Patient and family/care givers will demonstrate understanding of plan of care, disease process/condition, diagnostic tests and medications  Outcome: Progressing   Pt education given regarding plan of care with emphasis on adequate hydration, pt shows poor understanding and follow through, will continue to reinforce education and continue to monitor.         Problem: Fall Risk - Rehab  Goal: Patient will remain free from falls  Outcome: Progressing   Pt education given regarding fall precautions AND safety measures, pt shows good understanding, has not attempted to self transfer this shift, will continue to reinforce education and continue to monitor.

## 2024-02-21 ENCOUNTER — APPOINTMENT (OUTPATIENT)
Dept: OCCUPATIONAL THERAPY | Facility: REHABILITATION | Age: 74
DRG: 950 | End: 2024-02-21
Attending: PHYSICAL MEDICINE & REHABILITATION
Payer: MEDICARE

## 2024-02-21 ENCOUNTER — APPOINTMENT (OUTPATIENT)
Dept: SPEECH THERAPY | Facility: REHABILITATION | Age: 74
DRG: 950 | End: 2024-02-21
Attending: PHYSICAL MEDICINE & REHABILITATION
Payer: MEDICARE

## 2024-02-21 ENCOUNTER — APPOINTMENT (OUTPATIENT)
Dept: PHYSICAL THERAPY | Facility: REHABILITATION | Age: 74
DRG: 950 | End: 2024-02-21
Attending: PHYSICAL MEDICINE & REHABILITATION
Payer: MEDICARE

## 2024-02-21 LAB
GLUCOSE BLD STRIP.AUTO-MCNC: 107 MG/DL (ref 65–99)
GLUCOSE BLD STRIP.AUTO-MCNC: 251 MG/DL (ref 65–99)
GLUCOSE BLD STRIP.AUTO-MCNC: 294 MG/DL (ref 65–99)
GLUCOSE BLD STRIP.AUTO-MCNC: 300 MG/DL (ref 65–99)

## 2024-02-21 PROCEDURE — 82962 GLUCOSE BLOOD TEST: CPT

## 2024-02-21 PROCEDURE — 700102 HCHG RX REV CODE 250 W/ 637 OVERRIDE(OP): Performed by: HOSPITALIST

## 2024-02-21 PROCEDURE — A9270 NON-COVERED ITEM OR SERVICE: HCPCS | Performed by: HOSPITALIST

## 2024-02-21 PROCEDURE — 99232 SBSQ HOSP IP/OBS MODERATE 35: CPT | Performed by: HOSPITALIST

## 2024-02-21 PROCEDURE — 97129 THER IVNTJ 1ST 15 MIN: CPT

## 2024-02-21 PROCEDURE — A9270 NON-COVERED ITEM OR SERVICE: HCPCS | Performed by: PHYSICAL MEDICINE & REHABILITATION

## 2024-02-21 PROCEDURE — 770010 HCHG ROOM/CARE - REHAB SEMI PRIVAT*

## 2024-02-21 PROCEDURE — 97530 THERAPEUTIC ACTIVITIES: CPT

## 2024-02-21 PROCEDURE — 97116 GAIT TRAINING THERAPY: CPT

## 2024-02-21 PROCEDURE — 700102 HCHG RX REV CODE 250 W/ 637 OVERRIDE(OP): Performed by: PHYSICAL MEDICINE & REHABILITATION

## 2024-02-21 PROCEDURE — 99232 SBSQ HOSP IP/OBS MODERATE 35: CPT | Performed by: PHYSICAL MEDICINE & REHABILITATION

## 2024-02-21 PROCEDURE — 97110 THERAPEUTIC EXERCISES: CPT

## 2024-02-21 PROCEDURE — 97130 THER IVNTJ EA ADDL 15 MIN: CPT

## 2024-02-21 RX ADMIN — CARBAMAZEPINE 200 MG: 100 TABLET, CHEWABLE ORAL at 20:49

## 2024-02-21 RX ADMIN — BUSPIRONE HYDROCHLORIDE 5 MG: 5 TABLET ORAL at 08:46

## 2024-02-21 RX ADMIN — AMLODIPINE BESYLATE 10 MG: 5 TABLET ORAL at 20:49

## 2024-02-21 RX ADMIN — TIMOLOL MALEATE 1 DROP: 5 SOLUTION OPHTHALMIC at 20:52

## 2024-02-21 RX ADMIN — BUSPIRONE HYDROCHLORIDE 5 MG: 5 TABLET ORAL at 20:49

## 2024-02-21 RX ADMIN — CARBAMAZEPINE 200 MG: 100 TABLET, CHEWABLE ORAL at 08:45

## 2024-02-21 RX ADMIN — INSULIN LISPRO 6 UNITS: 100 INJECTION, SOLUTION INTRAVENOUS; SUBCUTANEOUS at 11:32

## 2024-02-21 RX ADMIN — APIXABAN 5 MG: 5 TABLET, FILM COATED ORAL at 08:45

## 2024-02-21 RX ADMIN — INSULIN LISPRO 6 UNITS: 100 INJECTION, SOLUTION INTRAVENOUS; SUBCUTANEOUS at 20:55

## 2024-02-21 RX ADMIN — INSULIN LISPRO 6 UNITS: 100 INJECTION, SOLUTION INTRAVENOUS; SUBCUTANEOUS at 08:46

## 2024-02-21 RX ADMIN — OMEPRAZOLE 20 MG: 20 CAPSULE, DELAYED RELEASE ORAL at 08:46

## 2024-02-21 RX ADMIN — DOCUSATE SODIUM 50MG AND SENNOSIDES 8.6MG 2 TABLET: 8.6; 5 TABLET, FILM COATED ORAL at 08:45

## 2024-02-21 RX ADMIN — Medication 1000 UNITS: at 08:46

## 2024-02-21 RX ADMIN — LOSARTAN POTASSIUM 100 MG: 50 TABLET, FILM COATED ORAL at 08:43

## 2024-02-21 RX ADMIN — LAMOTRIGINE 100 MG: 100 TABLET ORAL at 08:45

## 2024-02-21 RX ADMIN — ATORVASTATIN CALCIUM 80 MG: 40 TABLET, FILM COATED ORAL at 20:49

## 2024-02-21 RX ADMIN — DOCUSATE SODIUM 50MG AND SENNOSIDES 8.6MG 2 TABLET: 8.6; 5 TABLET, FILM COATED ORAL at 20:48

## 2024-02-21 RX ADMIN — LEVOTHYROXINE SODIUM 25 MCG: 0.03 TABLET ORAL at 06:07

## 2024-02-21 RX ADMIN — APIXABAN 5 MG: 5 TABLET, FILM COATED ORAL at 20:49

## 2024-02-21 RX ADMIN — CARIPRAZINE 6 MG: 3 CAPSULE, GELATIN COATED ORAL at 20:49

## 2024-02-21 RX ADMIN — PRIMIDONE 100 MG: 50 TABLET ORAL at 20:48

## 2024-02-21 ASSESSMENT — GAIT ASSESSMENTS
DISTANCE (FEET): 200
GAIT LEVEL OF ASSIST: CONTACT GUARD ASSIST
ASSISTIVE DEVICE: FRONT WHEEL WALKER;NONE

## 2024-02-21 ASSESSMENT — ACTIVITIES OF DAILY LIVING (ADL): BED_CHAIR_WHEELCHAIR_TRANSFER_DESCRIPTION: ADAPTIVE EQUIPMENT;INCREASED TIME;SUPERVISION FOR SAFETY;SET-UP OF EQUIPMENT

## 2024-02-21 ASSESSMENT — ENCOUNTER SYMPTOMS
HEADACHES: 0
SHORTNESS OF BREATH: 0
DIZZINESS: 0
BLURRED VISION: 0
VOMITING: 0
HALLUCINATIONS: 0
NAUSEA: 0
PALPITATIONS: 0
FEVER: 0

## 2024-02-21 NOTE — THERAPY
Speech Language Pathology  Daily Treatment     Patient Name: Molly Loera  Age:  73 y.o., Sex:  female  Medical Record #: [id number]  Today's Date: 2/21/2024     Precautions  Precautions: Fall Risk  Comments: poor safety awareness/impulsive    Subjective    Pt waiting in doorway for SLP at scheduled time. Pleasant and cooperative.     Objective       02/21/24 1404   Treatment Charges   SLP Cognitive Skill Development First 15 Minutes 1   SLP Cognitive Skill Development Additional 15 Minutes 3   SLP Total Time Spent   SLP Individual Total Time Spent (Mins) 60   Cognition   Verbal Short Term Memory 2 Hours   Functional Problem Solving Supervision (5)   Interdisciplinary Plan of Care Collaboration   Patient Position at End of Therapy Seated;Call Light within Reach;Self Releasing Lap Belt Applied;Chair Alarm On;Tray Table within Reach         Assessment    Pt verbalizing good safety awareness as it pertains to IADLs, ADLs and ambulation. Pt verbalizing potential safety hazards at home (I.e. tripping over dog toys) as well as potential solutions (asking daughter to  before she leaves for work, only allowing a couple toys out). Recommendation for Life Alert while daughter is at work, pt in agreement and will discuss with daughter. Good recall of tasks previously completed in OT and ST as well as challenges and ways to overcome in next attempts.    Strengths: Willingly participates in therapeutic activities, Motivated for self care and independence, Supportive family, Pleasant and cooperative, Making steady progress towards goals, Alert and oriented, Effective communication skills, Independent prior level of function  Barriers: Impaired functional cognition, Decreased endurance    Plan    Continue to target attention, recall.    Passport items to be completed:  Express basic needs, understand food/liquid recommendations, consistently follow swallow precautions, manage finances, manage medications, arrive to  therapy appointments on time, complete daily memory log entries, solve problems related to safety situations, review education related to hospitalization, complete caregiver training     Speech Therapy Problems (Active)       Problem: Memory STGs       Dates: Start:  02/15/24         Goal: STG-Within one week, patient will complete mental manipulation tasks up to 4 units with no more than 1 repetition in 8/10 trials       Dates: Start:  02/15/24         Goal Note filed on 02/20/24 1143 by Kandy Zuñiga MS,CCC-SLP       Will continue to target.                 Problem: Speech/Swallowing LTGs       Dates: Start:  02/15/24         Goal: LTG-By discharge, patient will solve basic problems by utilizing compensatory intervention for memory and problem-solving to allow for safe completion of daily activities with SPV in order to prepare for safe d/c         Dates: Start:  02/15/24

## 2024-02-21 NOTE — PROGRESS NOTES
Hospital Medicine Daily Progress Note      Chief Complaint  Hypertension  Diabetes    Interval Problem Update  Discussed about adjusting her insulin for elevated BS.    Review of Systems  Review of Systems   Constitutional:  Negative for fever.   Eyes:  Negative for blurred vision.   Respiratory:  Negative for shortness of breath.    Cardiovascular:  Negative for palpitations.   Gastrointestinal:  Negative for nausea and vomiting.   Neurological:  Negative for dizziness and headaches.   Psychiatric/Behavioral:  Negative for hallucinations.         Physical Exam  Temp:  [36.2 °C (97.2 °F)-36.5 °C (97.7 °F)] 36.5 °C (97.7 °F)  Pulse:  [75-96] 77  Resp:  [18] 18  BP: ()/(56-72) 113/62  SpO2:  [94 %-98 %] 95 %    Physical Exam  Vitals and nursing note reviewed.   Constitutional:       General: She is not in acute distress.  HENT:      Mouth/Throat:      Mouth: Mucous membranes are moist.      Pharynx: Oropharynx is clear.   Eyes:      General: No scleral icterus.  Cardiovascular:      Rate and Rhythm: Normal rate and regular rhythm.      Heart sounds: No murmur heard.  Pulmonary:      Effort: Pulmonary effort is normal.      Breath sounds: Normal breath sounds. No stridor.   Abdominal:      General: There is no distension.      Palpations: Abdomen is soft.      Tenderness: There is no abdominal tenderness.   Musculoskeletal:      Cervical back: Normal range of motion. No rigidity.      Right lower leg: No edema.      Left lower leg: No edema.   Skin:     General: Skin is warm and dry.      Findings: No rash.   Neurological:      Mental Status: She is alert and oriented to person, place, and time.   Psychiatric:         Mood and Affect: Mood normal.         Behavior: Behavior normal.         Fluids    Intake/Output Summary (Last 24 hours) at 2/21/2024 1006  Last data filed at 2/21/2024 0900  Gross per 24 hour   Intake 840 ml   Output --   Net 840 ml       Laboratory        Recent Labs     02/19/24  0536   POTASSIUM  4.4                 Assessment/Plan  * Traumatic brain injury with new neurocognitive deficit (HCC)- (present on admission)  Assessment & Plan  S/P MGLF with left sided head trauma  Neuroimaging: showed SAH  Cont Keppra for seizure proph  Note: now back on Eliquis    Glaucoma  Assessment & Plan  Cont Timolol    Vitamin D deficiency  Assessment & Plan  Vit D: 17  Cont supplements    PE (pulmonary thromboembolism) (HCC)- (present on admission)  Assessment & Plan  Cont Eliquis     Other specified hypothyroidism- (present on admission)  Assessment & Plan  Euthyroid on Synthroid    Dyslipidemia- (present on admission)  Assessment & Plan  Cont Lipitor    Essential hypertension- (present on admission)  Assessment & Plan  BP ok  Cont Norvasc  Cont Cozaar -- dose recently increased  Cont to monitor    Type 1 diabetes mellitus without complication (HCC)- (present on admission)  Assessment & Plan  Hba1c: 9.0 (2/15)  BS labile and elevated  Cont Glargine: 14 units am, 10 units pm --> will increase to 18 units qam, 12 units qpm  Note: home meds include Levemir 10-12 units bid and Novolog SSI  Cont to monitor    Bipolar 1 disorder (HCC)- (present on admission)  Assessment & Plan  Cont Buspar, Vraylar, Tegretol, Lamictal, and Primidone

## 2024-02-21 NOTE — DISCHARGE PLANNING
Case Management/IDT follow up.   IDT continues to recommend IRF level of care as patient continue to make progress with all therapies.   Projected dc date set for 2/27/24      DC needs:  Recommendations made for home health for PT/OT/SLP  Follow up with: PCP    Will meet with the patient and  her daughter to provide update from IDT and discuss plan of care.    Plan:  Continue to follow

## 2024-02-21 NOTE — PROGRESS NOTES
NURSING DAILY NOTE    Name: Molly Loera   Date of Admission: 2/14/2024   Admitting Diagnosis: Traumatic brain injury with new neurocognitive deficit (HCC)  Attending Physician: Mazin Gregory M.d.  Allergies: Depakote [valproic acid]    Safety  Patient Assist  sba  Patient Precautions  Fall Risk  Precaution Comments  poor safety awareness/impulsive  Bed Transfer Status  Standby Assist  Toilet Transfer Status   Standby Assist  Assistive Devices  Wheelchair  Oxygen  None - Room Air  Diet/Therapeutic Dining  Current Diet Order   Procedures    Diet Order Diet: Consistent CHO (Diabetic)     Pill Administration  whole  Agitated Behavioral Scale  15  ABS Level of Severity  No Agitation    Fall Risk  Has the patient had a fall this admission?   No  Prema Amaya Fall Risk Scoring  12, MODERATE RISK  Fall Risk Safety Measures  bed alarm, chair alarm, poor balance, and low vision/ hearing    Vitals  Temperature: 36.3 °C (97.3 °F)  Temp src: Oral  Pulse: 75  Respiration: 18  Blood Pressure : 124/72  Blood Pressure MAP (Calculated): 89 MM HG  BP Location: Right, Upper Arm  Patient BP Position: Supine     Oxygen  Pulse Oximetry: 94 %  O2 (LPM): 0  O2 Delivery Device: None - Room Air    Bowel and Bladder  Last Bowel Movement  02/20/24  Stool Type  Type 5: Soft blob with clear cut edges (passed easily)  Bowel Device     Continent  Bladder: Stress incontinence   Bowel: Incontinent movement  Bladder Function  Urine Void (mL):  (moderate)  Number of Times Voided: 1  Urine Color: Yellow  Number of Times Incontinent of Urine: 0  Genitourinary Assessment   Bladder Assessment (WDL):  Within Defined Limits  Urine Color: Yellow  Number of Bladder Accidents: 0  Total Number of Bladder of Accidents in Last 7 Days: 0  Number of Times Incontinent of Urine: 0  Bladder Device: Bathroom  Time Void: No  Bladder Scan: Post Void  $ Bladder Scan Results (mL): 10    Skin  Zach  Score   18  Sensory Interventions   Bed Types: Standard/Trauma Mattress  Skin Preventative Measures: Pillows in Use for Support / Positioning  Moisture Interventions  Moisturizers/Barriers: Barrier Wipes      Pain  Pain Rating Scale  0 - No Pain  Pain Location  Neck  Pain Location Orientation  Mid  Pain Interventions   Emotional Support, Repositioned, Rest    ADLs    Bathing    (shower with ot)  Linen Change    (Pt refused said she dont shower on nights is too cold Rn was inform)  Personal Hygiene  Moist Violeta Wipes  Chlorhexidine Bath      Oral Care     Teeth/Dentures     Shave     Nutrition Percentage Eaten  Between % Consumed  Environmental Precautions  Treaded Slipper Socks on Patient  Patient Turns/Positioning  Patient Turns Self from Side to Side  Patient Turns Assistance/Tolerance     Bed Positions  Bed Controls On  Head of Bed Elevated  Self regulated      Psychosocial/Neurologic Assessment  Psychosocial Assessment  Psychosocial (WDL):  Within Defined Limits  Neurologic Assessment  Neuro (WDL): Exceptions to WDL  Level of Consciousness: Alert  Orientation Level: Oriented X4  Cognition: Appropriate judgement, Appropriate safety awareness  Speech: Clear  Pupil Assesment: No  R Pupil Size (mm): 2  R Pupil Shape / Description: Round  R Pupil Reaction: Brisk  L Pupil Size (mm): 2  L Pupil Shape / Description: Round  L Pupil Reaction: Brisk  Motor Function/Sensation Assessment: Motor strength  Muscle Strength Right Arm: Good Strength Against Gravity and Moderate Resistance  Muscle Strength Left Arm: Good Strength Against Gravity and Moderate Resistance  Muscle Strength Right Leg: Good Strength Against Gravity and Moderate Resistance  Muscle Strength Left Leg: Good Strength Against Gravity and Moderate Resistance  EENT (WDL):  Within Defined Limits    Cardio/Pulmonary Assessment  Edema      Respiratory Breath Sounds  RUL Breath Sounds: Clear  RML Breath Sounds: Clear  RLL Breath Sounds: Clear  SHAWNA Breath  Sounds: Clear  LLL Breath Sounds: Clear  Cardiac Assessment   Cardiac (WDL):  WDL Except (HX: HTN)

## 2024-02-21 NOTE — PROGRESS NOTES
..                                                         NURSING DAILY NOTE    Name: Molly Loera   Date of Admission: 2/14/2024   Admitting Diagnosis: Traumatic brain injury with new neurocognitive deficit (HCC)  Attending Physician: Mazin Gregory M.d.  Allergies: Depakote [valproic acid]    Safety  Patient Assist  sba  Patient Precautions  Fall Risk  Precaution Comments  poor safety awareness/impulsive  Bed Transfer Status  Standby Assist  Toilet Transfer Status   Standby Assist  Assistive Devices  Hand held assist, Walker - front wheel  Oxygen  None - Room Air  Diet/Therapeutic Dining  Current Diet Order   Procedures    Diet Order Diet: Consistent CHO (Diabetic)     Pill Administration  whole  Agitated Behavioral Scale  15  ABS Level of Severity  No Agitation    Fall Risk  Has the patient had a fall this admission?   No  Prema Amaya Fall Risk Scoring  12, MODERATE RISK  Fall Risk Safety Measures  bed alarm, chair alarm, low vision/ hearing, and ok to leave pt in bathroom    Vitals  Temperature: 36.5 °C (97.7 °F)  Temp src: Temporal  Pulse: 77  Respiration: 18  Blood Pressure : 113/62  Blood Pressure MAP (Calculated): 79 MM HG  BP Location: Right, Upper Arm  Patient BP Position: Supine     Oxygen  Pulse Oximetry: 95 %  O2 (LPM): 0  O2 Delivery Device: None - Room Air    Bowel and Bladder  Last Bowel Movement  02/21/24  Stool Type  Type 5: Soft blob with clear cut edges (passed easily)  Bowel Device     Continent  Bladder: Stress incontinence   Bowel: Incontinent movement  Bladder Function  Urine Void (mL):  (moderate)  Number of Times Voided: 1  Urine Color: Unable To Evaluate  Number of Times Incontinent of Urine: 0  Genitourinary Assessment   Bladder Assessment (WDL):  WDL Except  Urinary Elimination: Frequency  Urine Color: Unable To Evaluate  Number of Bladder Accidents: 0  Total Number of Bladder of Accidents in Last 7 Days: 0  Number of Times Incontinent of Urine: 0  Bladder Device:  Bathroom  Time Void: No  Bladder Scan: Post Void  $ Bladder Scan Results (mL): 10    Skin  Zach Score   18  Sensory Interventions   Bed Types: Standard/Trauma Mattress with Overlay  Skin Preventative Measures: Pillows in Use for Support / Positioning  Moisture Interventions  Moisturizers/Barriers: Barrier Wipes      Pain  Pain Rating Scale  0 - No Pain  Pain Location  Neck  Pain Location Orientation  Mid  Pain Interventions   Declines    ADLs    Bathing    (shower with ot)  Linen Change    (Pt refused said she dont shower on nights is too cold Rn was inform)  Personal Hygiene  Change Violeta Pads, Moist Violeta Wipes, Perineal Care  Chlorhexidine Bath      Oral Care     Teeth/Dentures     Shave     Nutrition Percentage Eaten  Between % Consumed  Environmental Precautions  Bed in Low Position, Treaded Slipper Socks on Patient  Patient Turns/Positioning  Patient Turns Self from Side to Side  Patient Turns Assistance/Tolerance     Bed Positions  Bed Controls On  Head of Bed Elevated  Self regulated      Psychosocial/Neurologic Assessment  Psychosocial Assessment  Psychosocial (WDL):  Within Defined Limits  Neurologic Assessment  Neuro (WDL): Exceptions to WDL  Level of Consciousness: Alert  Orientation Level: Oriented X4  Cognition: Appropriate judgement, Appropriate safety awareness  Speech: Clear  Pupil Assesment: No  R Pupil Size (mm): 2  R Pupil Shape / Description: Round  R Pupil Reaction: Brisk  L Pupil Size (mm): 2  L Pupil Shape / Description: Round  L Pupil Reaction: Brisk  Motor Function/Sensation Assessment: Motor strength  Muscle Strength Right Arm: Good Strength Against Gravity and Moderate Resistance  Muscle Strength Left Arm: Good Strength Against Gravity and Moderate Resistance  Muscle Strength Right Leg: Good Strength Against Gravity and Moderate Resistance  Muscle Strength Left Leg: Good Strength Against Gravity and Moderate Resistance  EENT (WDL):  Within Defined Limits    Cardio/Pulmonary  Assessment  Edema   RLE Edema: Trace  LLE Edema: Trace  Respiratory Breath Sounds  RUL Breath Sounds: Clear  RML Breath Sounds: Clear  RLL Breath Sounds: Clear  SHAWNA Breath Sounds: Clear  LLL Breath Sounds: Clear  Cardiac Assessment   Cardiac (WDL):  WDL Except (HX: HTN)

## 2024-02-21 NOTE — PROGRESS NOTES
"  Physical Medicine & Rehabilitation Progress Note    Encounter Date: 2/21/2024    Chief Complaint: Decreased mobility, weakness    Interval Events (Subjective):  Patient sitting up in room. She reports therapy is going well. Denies pain.     _____________________________________  Interdisciplinary Team Conference   Most recent IDT on 2/20/2024    Discharge Date/Disposition:  2/27/24  _____________________________________      Objective:  VITAL SIGNS: /58   Pulse 92   Temp 36.3 °C (97.3 °F) (Oral)   Resp 18   Ht 1.646 m (5' 4.8\")   Wt 60.3 kg (133 lb)   SpO2 96%   BMI 22.27 kg/m²   Gen: NAD  Psych: Mood and affect appropriate  CV: RRR, 0 edema  Resp: CTAB, no upper airway sounds  Abd: NTND  Neuro: AOx4, following commands    Laboratory Values:  Recent Results (from the past 72 hour(s))   POCT glucose device results    Collection Time: 02/18/24  4:57 PM   Result Value Ref Range    POC Glucose, Blood 189 (H) 65 - 99 mg/dL   POCT glucose device results    Collection Time: 02/18/24  8:31 PM   Result Value Ref Range    POC Glucose, Blood 190 (H) 65 - 99 mg/dL   POTASSIUM SERUM (K)    Collection Time: 02/19/24  5:36 AM   Result Value Ref Range    Potassium 4.4 3.6 - 5.5 mmol/L   POCT glucose device results    Collection Time: 02/19/24  7:58 AM   Result Value Ref Range    POC Glucose, Blood 417 (HH) 65 - 99 mg/dL   POCT glucose device results    Collection Time: 02/19/24 11:29 AM   Result Value Ref Range    POC Glucose, Blood 270 (H) 65 - 99 mg/dL   POCT glucose device results    Collection Time: 02/19/24  5:20 PM   Result Value Ref Range    POC Glucose, Blood 238 (H) 65 - 99 mg/dL   POCT glucose device results    Collection Time: 02/19/24  8:36 PM   Result Value Ref Range    POC Glucose, Blood 254 (H) 65 - 99 mg/dL   POCT glucose device results    Collection Time: 02/20/24  7:44 AM   Result Value Ref Range    POC Glucose, Blood 203 (H) 65 - 99 mg/dL   POCT glucose device results    Collection Time: 02/20/24 " 11:16 AM   Result Value Ref Range    POC Glucose, Blood 297 (H) 65 - 99 mg/dL   POCT glucose device results    Collection Time: 02/20/24  5:27 PM   Result Value Ref Range    POC Glucose, Blood 203 (H) 65 - 99 mg/dL   POCT glucose device results    Collection Time: 02/20/24  8:29 PM   Result Value Ref Range    POC Glucose, Blood 169 (H) 65 - 99 mg/dL   POCT glucose device results    Collection Time: 02/21/24  8:05 AM   Result Value Ref Range    POC Glucose, Blood 300 (H) 65 - 99 mg/dL   POCT glucose device results    Collection Time: 02/21/24 11:30 AM   Result Value Ref Range    POC Glucose, Blood 251 (H) 65 - 99 mg/dL       Medications:  Scheduled Medications   Medication Dose Frequency    insulin GLARGINE  12 Units Q EVENING    [START ON 2/22/2024] insulin GLARGINE  18 Units QAM INSULIN    amLODIPine  10 mg Nightly    apixaban  5 mg BID    insulin lispro  2-12 Units 4X/DAY ACHS    vitamin D3  1,000 Units DAILY    senna-docusate  2 Tablet BID    omeprazole  20 mg DAILY    atorvastatin  80 mg Nightly    busPIRone  5 mg BID    cariprazine  6 mg QHS    lamoTRIgine  100 mg DAILY    levothyroxine  25 mcg AM ES    losartan  100 mg DAILY    primidone  100 mg QHS    timolol  1 Drop QHS    carBAMazepine  200 mg BID     PRN medications: insulin lispro **AND** POC blood glucose manual result **AND** NOTIFY MD and PharmD **AND** Administer 20 grams of glucose (approximately 8 ounces of fruit juice) every 15 minutes PRN FSBG less than 70 mg/dL **AND** dextrose bolus, Respiratory Therapy Consult, hydrALAZINE, acetaminophen, senna-docusate **AND** polyethylene glycol/lytes, mag hydrox-al hydrox-simeth, ondansetron **OR** ondansetron, traZODone, sodium chloride, oxyCODONE immediate-release **OR** oxyCODONE immediate-release, midazolam, [COMPLETED] acetaminophen **FOLLOWED BY** acetaminophen, ziprasidone    Diet:  Current Diet Order   Procedures    Diet Order Diet: Consistent CHO (Diabetic)       Medical Decision Making and  Plan:  TBI (Traumatic Brain Injury): GLF on 2/9/24 with left sided subarachnoid hemorrhage s/p monitoring with serial CTs.   -PT and OT for mobility and ADLs. Per guidelines, 15 hours per week between PT, OT and/or SLP.  -Follow-up NSG. On 7 days of Keppra for seizure prophylaxis. Completed Keppra Hold AC until 2/19. Restart Eliquis, discussed with patient     HTN- Patient on Amlodipine 5 mg and Losartan 100 mg daily. Into 140s, consult hospitalist. Amlodipine increased to 10 mg QHS. Continue Amlodipine 10 mg QHS     Hx of PE - Previously on Eliquis. On hold for 10 days. Restarted on Eliquis 5 mg BID     HLD - Patient on Atorvastatin 80 mg QHS     Anemia - Check AM CBC - 11.4 on admission.      Leukopenia - Check AM CBC - 5.8 on admission, resolved     DM1 with hyperglycemia - Patient on Lantus 15 U and SSI on transfer. Blood sugars into 500s, give extra dose and consult hospitalist  -Severe hyperglycemia - Glargine changed to 10 QPM and 14U QPM -. Increased 2/22, to 12 U qPM and 18 U qAM     Hypothyroidism - Patient on Levothyroxine 25 mcg daily     Hypophosphatemia - Check AM level - low 2.4. Consult hospitalist. Continue K-Phos. K level stable. K-phos discontinue per Hospitalist     BPD1 - Patient on Lamictal 100 mg daily, Primidone 100 mg QHS, Tegretol 200 mg BID, Buspar 5 mg BID, and Vraylar 6 mg QHS     Glaucoma - Patient on Timolol QHS    Vitamin D deficiency - 17 on admission, start 2000 U      Pain - Patient on PRN Tylenol and Oxycodone     Skin - Patient at risk for skin breakdown due to debility in areas including sacrum, achilles, elbows and head in addition to other sites. Nursing to assess skin daily.      GI Ppx - Patient on Prilosec for GERD prophylaxis. Patient on Senna-docusate for constipation prophylaxis.      DVT Ppx - Patient Eliquis is on hold on transfer. To restart 2/19, order placed  ____________________________________    T. Deven Gregory MD/PhD  [facility] - Physical Medicine &  Rehabilitation   [facility] - Brain Injury Medicine   ____________________________________

## 2024-02-21 NOTE — THERAPY
"Occupational Therapy  Daily Treatment     Patient Name: Molly Loera  Age:  73 y.o., Sex:  female  Medical Record #: [id number]  Today's Date: 2/21/2024     Precautions  Precautions: Fall Risk  Comments: poor safety awareness/impulsive    Safety   ADL Safety : Requires Supervision for Safety    Subjective    \"I usually get dinner started before my daughter gets home.\" Following simple meal prep, pt educated to wait for her dtr to be home before attempting simple meal prep tasks.     Objective       02/21/24 0931   OT Charge Group   OT Therapy Activity (Units) 4   OT Total Time Spent   OT Individual Total Time Spent (Mins) 60   Pain   Intervention Declines   Cognition    Level of Consciousness Alert   New Learning Impaired   Sequencing Impaired   Initiation Impaired   Comments Cues for way finding from ADL kitchen to room, but not room to ADL kitchen.   Functional Level of Assist   Bed, Chair, Wheelchair Transfer Standby Assist   Bed Chair Wheelchair Transfer Description Adaptive equipment;Increased time;Supervision for safety;Set-up of equipment  (SBA for stand step with FWW.)   IADL Treatments   IADL Treatments Meal preparation   Meal Preparation Pt participated in 2 simple meal prep tasks, one with stove top oatmeal and a second with micowave instant oatmeal (after pt had completed initial task, she reported that she usually uses instant at home). Pt required SPV with moderate consistent cues for sequencing and problem solving next steps. Pt was provided with the location of items and her glasses for reading instructions, but never initiated their use. Pt initially measured the wrong amount of water and placed into the serving bowl then asked \"how am I supposed to boil it?\" Pt failed to recall location and need for use of pot to boil water. Pt then allowed the water to boil and poured into a plastic bowl before adding oats stating, \"why isn't it getting thicker.\" OTR instructed pt to re-read instructions and " then transferred water and oats back to stove top for continued cooking for correct 5 min as per package instructions. Pt did turn on and off stove top safely without cues. Pt then attempted instant oats after kitchen clean up and SPV level with cues for amount of water to measure only.   Interdisciplinary Plan of Care Collaboration   Patient Position at End of Therapy Seated  (Pt left seated in diabetes education class.)     Pt completed functional mobility at SBA level using FWW on unit and in room, with cues not to leave FWW behind during cooking task.    Assessment    Pt continues to be limited by poor functional cognition, decreased standing balance, and overall weakness. While she did demonstrate good stove top operation, she did pour boiling hot water into a plastic bowl and required significant increased time for problem solving through a 2 ingredient stove top meal. Pt will require SPV for all meal prep at this time.  Strengths: Able to follow instructions, Alert and oriented, Independent prior level of function, Motivated for self care and independence, Pleasant and cooperative, Supportive family, Willingly participates in therapeutic activities  Barriers: Decreased endurance, Fatigue, Generalized weakness, Impaired balance, Impaired activity tolerance, Confused, Impaired functional cognition, Impulsive, Limited mobility, Visual impairment    Plan    Functional cognition, standing balance, overall strengthening    DME       Passport items to be completed:  Perform bathroom transfers, complete dressing, complete feeding, get ready for the day, prepare a simple meal, participate in household tasks, adapt home for safety needs, demonstrate home exercise program, complete caregiver training     Occupational Therapy Goals (Active)       Problem: OT Long Term Goals       Dates: Start:  02/15/24         Goal: LTG-By discharge, patient will complete basic self care tasks at SPV-Mod I level.       Dates: Start:   02/15/24            Goal: LTG-By discharge, patient will perform bathroom transfers at SPV-Mod I level.       Dates: Start:  02/15/24            Goal: LTG-By discharge, patient will complete basic home management at SPV-Mod I level.       Dates: Start:  02/15/24               Problem: Toileting       Dates: Start:  02/15/24         Goal: STG-Within one week, patient will complete toileting tasks at SBA level.       Dates: Start:  02/15/24

## 2024-02-22 ENCOUNTER — APPOINTMENT (OUTPATIENT)
Dept: PHYSICAL THERAPY | Facility: REHABILITATION | Age: 74
DRG: 950 | End: 2024-02-22
Attending: PHYSICAL MEDICINE & REHABILITATION
Payer: MEDICARE

## 2024-02-22 ENCOUNTER — APPOINTMENT (OUTPATIENT)
Dept: OCCUPATIONAL THERAPY | Facility: REHABILITATION | Age: 74
DRG: 950 | End: 2024-02-22
Attending: PHYSICAL MEDICINE & REHABILITATION
Payer: MEDICARE

## 2024-02-22 ENCOUNTER — APPOINTMENT (OUTPATIENT)
Dept: SPEECH THERAPY | Facility: REHABILITATION | Age: 74
DRG: 950 | End: 2024-02-22
Attending: PHYSICAL MEDICINE & REHABILITATION
Payer: MEDICARE

## 2024-02-22 LAB
GLUCOSE BLD STRIP.AUTO-MCNC: 150 MG/DL (ref 65–99)
GLUCOSE BLD STRIP.AUTO-MCNC: 171 MG/DL (ref 65–99)
GLUCOSE BLD STRIP.AUTO-MCNC: 247 MG/DL (ref 65–99)
GLUCOSE BLD STRIP.AUTO-MCNC: 261 MG/DL (ref 65–99)

## 2024-02-22 PROCEDURE — 97110 THERAPEUTIC EXERCISES: CPT

## 2024-02-22 PROCEDURE — 82962 GLUCOSE BLOOD TEST: CPT

## 2024-02-22 PROCEDURE — 99232 SBSQ HOSP IP/OBS MODERATE 35: CPT | Performed by: HOSPITALIST

## 2024-02-22 PROCEDURE — 99232 SBSQ HOSP IP/OBS MODERATE 35: CPT | Performed by: PHYSICAL MEDICINE & REHABILITATION

## 2024-02-22 PROCEDURE — 700102 HCHG RX REV CODE 250 W/ 637 OVERRIDE(OP): Performed by: PHYSICAL MEDICINE & REHABILITATION

## 2024-02-22 PROCEDURE — 770010 HCHG ROOM/CARE - REHAB SEMI PRIVAT*

## 2024-02-22 PROCEDURE — 97530 THERAPEUTIC ACTIVITIES: CPT

## 2024-02-22 PROCEDURE — 97130 THER IVNTJ EA ADDL 15 MIN: CPT

## 2024-02-22 PROCEDURE — 97116 GAIT TRAINING THERAPY: CPT

## 2024-02-22 PROCEDURE — A9270 NON-COVERED ITEM OR SERVICE: HCPCS | Performed by: HOSPITALIST

## 2024-02-22 PROCEDURE — A9270 NON-COVERED ITEM OR SERVICE: HCPCS | Performed by: PHYSICAL MEDICINE & REHABILITATION

## 2024-02-22 PROCEDURE — 97129 THER IVNTJ 1ST 15 MIN: CPT

## 2024-02-22 PROCEDURE — 700102 HCHG RX REV CODE 250 W/ 637 OVERRIDE(OP): Performed by: HOSPITALIST

## 2024-02-22 RX ADMIN — CARIPRAZINE 6 MG: 3 CAPSULE, GELATIN COATED ORAL at 20:54

## 2024-02-22 RX ADMIN — APIXABAN 5 MG: 5 TABLET, FILM COATED ORAL at 08:47

## 2024-02-22 RX ADMIN — DOCUSATE SODIUM 50MG AND SENNOSIDES 8.6MG 2 TABLET: 8.6; 5 TABLET, FILM COATED ORAL at 08:45

## 2024-02-22 RX ADMIN — Medication 1000 UNITS: at 08:47

## 2024-02-22 RX ADMIN — BUSPIRONE HYDROCHLORIDE 5 MG: 5 TABLET ORAL at 20:55

## 2024-02-22 RX ADMIN — PRIMIDONE 100 MG: 50 TABLET ORAL at 20:56

## 2024-02-22 RX ADMIN — AMLODIPINE BESYLATE 10 MG: 5 TABLET ORAL at 20:55

## 2024-02-22 RX ADMIN — TIMOLOL MALEATE 1 DROP: 5 SOLUTION OPHTHALMIC at 20:56

## 2024-02-22 RX ADMIN — OMEPRAZOLE 20 MG: 20 CAPSULE, DELAYED RELEASE ORAL at 08:45

## 2024-02-22 RX ADMIN — INSULIN LISPRO 6 UNITS: 100 INJECTION, SOLUTION INTRAVENOUS; SUBCUTANEOUS at 08:36

## 2024-02-22 RX ADMIN — LEVOTHYROXINE SODIUM 25 MCG: 0.03 TABLET ORAL at 06:11

## 2024-02-22 RX ADMIN — LOSARTAN POTASSIUM 100 MG: 50 TABLET, FILM COATED ORAL at 08:46

## 2024-02-22 RX ADMIN — BUSPIRONE HYDROCHLORIDE 5 MG: 5 TABLET ORAL at 08:47

## 2024-02-22 RX ADMIN — APIXABAN 5 MG: 5 TABLET, FILM COATED ORAL at 20:55

## 2024-02-22 RX ADMIN — INSULIN LISPRO 2 UNITS: 100 INJECTION, SOLUTION INTRAVENOUS; SUBCUTANEOUS at 17:55

## 2024-02-22 RX ADMIN — INSULIN LISPRO 4 UNITS: 100 INJECTION, SOLUTION INTRAVENOUS; SUBCUTANEOUS at 11:59

## 2024-02-22 RX ADMIN — ATORVASTATIN CALCIUM 80 MG: 40 TABLET, FILM COATED ORAL at 20:55

## 2024-02-22 RX ADMIN — CARBAMAZEPINE 200 MG: 100 TABLET, CHEWABLE ORAL at 08:47

## 2024-02-22 RX ADMIN — DOCUSATE SODIUM 50MG AND SENNOSIDES 8.6MG 2 TABLET: 8.6; 5 TABLET, FILM COATED ORAL at 20:54

## 2024-02-22 RX ADMIN — CARBAMAZEPINE 200 MG: 100 TABLET, CHEWABLE ORAL at 20:55

## 2024-02-22 RX ADMIN — LAMOTRIGINE 100 MG: 100 TABLET ORAL at 08:47

## 2024-02-22 ASSESSMENT — ACTIVITIES OF DAILY LIVING (ADL): TUB_SHOWER_TRANSFER_DESCRIPTION: GRAB BAR;SUPERVISION FOR SAFETY;VERBAL CUEING

## 2024-02-22 ASSESSMENT — GAIT ASSESSMENTS
ASSISTIVE DEVICE: FRONT WHEEL WALKER;NONE
DISTANCE (FEET): 200
GAIT LEVEL OF ASSIST: CONTACT GUARD ASSIST

## 2024-02-22 ASSESSMENT — ENCOUNTER SYMPTOMS
BLURRED VISION: 0
FEVER: 0
DIZZINESS: 0
DIARRHEA: 0
NERVOUS/ANXIOUS: 0
COUGH: 0

## 2024-02-22 NOTE — PROGRESS NOTES
..                                                         NURSING DAILY NOTE    Name: Molly Loera   Date of Admission: 2/14/2024   Admitting Diagnosis: Traumatic brain injury with new neurocognitive deficit (HCC)  Attending Physician: Mazin Gregory M.d.  Allergies: Depakote [valproic acid]    Safety  Patient Assist  sba  Patient Precautions  Fall Risk  Precaution Comments  poor safety awareness/impulsive  Bed Transfer Status  Standby Assist  Toilet Transfer Status   Standby Assist  Assistive Devices  Rails, Wheelchair  Oxygen  None - Room Air  Diet/Therapeutic Dining  Current Diet Order   Procedures    Diet Order Diet: Consistent CHO (Diabetic)     Pill Administration  whole  Agitated Behavioral Scale  15  ABS Level of Severity  No Agitation    Fall Risk  Has the patient had a fall this admission?   No  Prema Amaya Fall Risk Scoring  12, MODERATE RISK  Fall Risk Safety Measures  bed alarm, chair alarm, poor balance, low vision/ hearing, and ok to leave pt in bathroom    Vitals  Temperature: 37 °C (98.6 °F)  Temp src: Oral  Pulse: 66  Respiration: 18  Blood Pressure : 113/51  Blood Pressure MAP (Calculated): 72 MM HG  BP Location: Right, Upper Arm  Patient BP Position: Supine     Oxygen  Pulse Oximetry: 97 %  O2 (LPM): 0  O2 Delivery Device: None - Room Air    Bowel and Bladder  Last Bowel Movement  02/21/24  Stool Type  Type 5: Soft blob with clear cut edges (passed easily)  Bowel Device     Continent  Bladder: Stress incontinence   Bowel: Incontinent movement  Bladder Function  Urine Void (mL):  (moderate)  Number of Times Voided: 1  Urine Color: Unable To Evaluate  Number of Times Incontinent of Urine: 0  Genitourinary Assessment   Bladder Assessment (WDL):  WDL Except  Urinary Elimination: Frequency  Urine Color: Unable To Evaluate  Number of Bladder Accidents: 0  Total Number of Bladder of Accidents in Last 7 Days: 0  Number of Times Incontinent of Urine: 0  Bladder Device: Bathroom  Time  Void: No  Bladder Scan: Post Void  $ Bladder Scan Results (mL): 10    Skin  Zach Score   18  Sensory Interventions   Bed Types: Standard/Trauma Mattress with Overlay  Skin Preventative Measures: Waffle Overlay  Moisture Interventions  Moisturizers/Barriers: Barrier Wipes      Pain  Pain Rating Scale  0 - No Pain  Pain Location  Neck  Pain Location Orientation  Mid  Pain Interventions   Declines    ADLs    Bathing    (shower with ot)  Linen Change    (Pt refused said she dont shower on nights is too cold Rn was inform)  Personal Hygiene  Change Violeta Pads, Moist Violeta Wipes, Perineal Care  Chlorhexidine Bath      Oral Care     Teeth/Dentures     Shave     Nutrition Percentage Eaten  Between % Consumed  Environmental Precautions  Bed in Low Position, Treaded Slipper Socks on Patient  Patient Turns/Positioning  Patient Turns Self from Side to Side  Patient Turns Assistance/Tolerance     Bed Positions  Bed Controls On  Head of Bed Elevated  Self regulated      Psychosocial/Neurologic Assessment  Psychosocial Assessment  Psychosocial (WDL):  Within Defined Limits  Neurologic Assessment  Neuro (WDL): Exceptions to WDL  Level of Consciousness: Alert  Orientation Level: Oriented X4  Cognition: Appropriate judgement, Appropriate safety awareness  Speech: Clear  Pupil Assesment: No  R Pupil Size (mm): 2  R Pupil Shape / Description: Round  R Pupil Reaction: Brisk  L Pupil Size (mm): 2  L Pupil Shape / Description: Round  L Pupil Reaction: Brisk  Motor Function/Sensation Assessment: Motor strength  Muscle Strength Right Arm: Good Strength Against Gravity and Moderate Resistance  Muscle Strength Left Arm: Good Strength Against Gravity and Moderate Resistance  Muscle Strength Right Leg: Good Strength Against Gravity and Moderate Resistance  Muscle Strength Left Leg: Good Strength Against Gravity and Moderate Resistance  EENT (WDL):  Within Defined Limits    Cardio/Pulmonary Assessment  Edema   RLE Edema: Trace  LLE Edema:  Trace  Respiratory Breath Sounds  RUL Breath Sounds: Clear  RML Breath Sounds: Clear  RLL Breath Sounds: Clear  SHAWNA Breath Sounds: Clear  LLL Breath Sounds: Clear  Cardiac Assessment   Cardiac (WDL):  WDL Except (HX: HTN)

## 2024-02-22 NOTE — THERAPY
"Physical Therapy   Daily Treatment     Patient Name: Molly Loera  Age:  73 y.o., Sex:  female  Medical Record #: [id number]  Today's Date: 2/21/2024     Precautions  Precautions: Fall Risk  Comments: poor safety awareness/impulsive    Subjective    Patient reports her plan is to use no AD in home, use FWW for community ambulation; reports she has days with tremors that can affect her balance but \"today is a good day\"     Objective       02/21/24 1301   PT Charge Group   PT Gait Training (Units) 2   PT Therapeutic Exercise (Units) 1   PT Therapeutic Activities (Units) 1   PT Total Time Spent   PT Individual Total Time Spent (Mins) 60   Precautions   Precautions Fall Risk   Comments poor safety awareness/impulsive   Gait Functional Level of Assist    Gait Level Of Assist Contact Guard Assist   Assistive Device Front Wheel Walker;None   Distance (Feet) 200  (200ft x 2 FWW SBA, 40ft, 50ft 100ft no AD CGA; constant cues for wayfinding during task, CGA required when reaching for cones with FWW, close CGA to carry ball on cone no AD (no drops) to simulate dual-task ambulation)   Sitting Lower Body Exercises   Sitting Lower Body Exercises   (used as warm-up)   Ankle Pumps 1 set of 10;Bilateral   Hip Abduction 1 set of 10;Bilateral   Hip Adduction 1 set of 10;Bilateral   Long Arc Quad 1 set of 10;Bilateral   Marching 1 set of 10;Reciprocal   Hamstring Curl 1 set of 10;Medium Resistance Theraband;Bilateral   Interdisciplinary Plan of Care Collaboration   IDT Collaboration with  Occupational Therapist;Physical Therapist   Collaboration Comments treatment planning; PT reports she is non compliant with call lights and transfers self despite alarms         Assessment    Demonstrates 200ft x 2 with FWW SBA, requires frequent cue for wayfinding during task; able to demonstrate short distances (<100ft) with no AD CGA, able to perform dual task of ball on cone 40ft with CGA (no drops)    Strengths: Able to follow instructions, " Alert and oriented, Independent prior level of function, Pleasant and cooperative  Barriers: Generalized weakness, Fatigue, Impaired activity tolerance, Impaired balance (high CBG)    Plan  Dynamic standing task  Curb training  Balance  Postural reactions  Safety with FWW  Car transfer/sequencing  STS sequencing   Progress to LRAD as appropriate  Dual task  CV endurance     DME  PT DME Recommendations  Assistive Device: (P) Front Wheeled Walker     Passport items to be completed:  Get in/out of bed safely, in/out of a vehicle, safely use mobility device, walk or wheel around home/community, navigate up and down stairs, show how to get up/down from the ground, ensure home is accessible, demonstrate HEP, complete caregiver training    Physical Therapy Problems (Active)       Problem: Mobility       Dates: Start:  02/15/24         Goal: STG-Within one week, patient will ambulate up/down a curb using LRAD, SBA       Dates: Start:  02/15/24               Problem: PT-Long Term Goals       Dates: Start:  02/15/24         Goal: LTG-By discharge, patient will tolerate standing x10 mins       Dates: Start:  02/15/24            Goal: LTG-By discharge, patient will ambulate using LRAD x 300 feet, Mod I       Dates: Start:  02/15/24            Goal: LTG-By discharge, patient will perform home exercise program independently       Dates: Start:  02/15/24            Goal: LTG-By discharge, patient will transfer in/out of a car, Mod I       Dates: Start:  02/15/24

## 2024-02-22 NOTE — CARE PLAN
Problem: Knowledge Deficit - Standard  Goal: Patient and family/care givers will demonstrate understanding of plan of care, disease process/condition, diagnostic tests and medications  Outcome: Progressing   Pt education given regarding plan of care with emphasis on adequate hydration, pt shows poor understanding and follow through, pt again has better response with flavored water,  will continue to reinforce education and continue to monitor.            Problem: Fall Risk - Rehab  Goal: Patient will remain free from falls  Outcome: Progressing   Pt education given regarding fall precautions AND safety measures, pt shows good understanding, has not attempted to self transfer this shift, will continue to reinforce education and continue to monitor.

## 2024-02-22 NOTE — THERAPY
"Speech Language Pathology  Daily Treatment     Patient Name: Molly Loera  Age:  73 y.o., Sex:  female  Medical Record #: [id number]  Today's Date: 2/22/2024     Precautions  Precautions: Fall Risk  Comments: poor safety awareness/impulsive    Subjective    Patient was willing to participate in ST.      Objective       02/22/24 0902   Treatment Charges   SLP Cognitive Skill Development First 15 Minutes 1   SLP Cognitive Skill Development Additional 15 Minutes 3   SLP Total Time Spent   SLP Individual Total Time Spent (Mins) 60   Interdisciplinary Plan of Care Collaboration   Patient Position at End of Therapy Seated;Call Light within Reach;Chair Alarm On;Tray Table within Reach         Assessment    Patient completed find the \"and\" and \"of\" tasks. Independently located 88% on first task and 92% on second task. Min cues to reach 100%. Patient was able to recall strategies provided at previous session.       Strengths: Willingly participates in therapeutic activities, Motivated for self care and independence, Supportive family, Pleasant and cooperative, Making steady progress towards goals, Alert and oriented, Effective communication skills, Independent prior level of function  Barriers: Impaired functional cognition, Decreased endurance    Plan    Attention, functional problem solving.       Speech Therapy Problems (Active)       Problem: Memory STGs       Dates: Start:  02/15/24         Goal: STG-Within one week, patient will complete mental manipulation tasks up to 4 units with no more than 1 repetition in 8/10 trials       Dates: Start:  02/15/24         Goal Note filed on 02/20/24 1143 by Kandy Zuñiga MS,CCC-SLP       Will continue to target.                 Problem: Speech/Swallowing LTGs       Dates: Start:  02/15/24         Goal: LTG-By discharge, patient will solve basic problems by utilizing compensatory intervention for memory and problem-solving to allow for safe completion of daily activities with " SPV in order to prepare for safe d/c         Dates: Start:  02/15/24

## 2024-02-22 NOTE — THERAPY
Physical Therapy   Daily Treatment     Patient Name: Molly Loera  Age:  73 y.o., Sex:  female  Medical Record #: [id number]  Today's Date: 2/22/2024     Precautions  Precautions: Fall Risk  Comments: poor safety awareness/impulsive    Subjective    Patient agreeable to plan of using FWW for long, unpredictable routes and no AD for predictable, short distance ambulation     Objective       02/22/24 1001   PT Charge Group   PT Gait Training (Units) 2   PT Therapeutic Exercise (Units) 1   PT Therapeutic Activities (Units) 1   PT Total Time Spent   PT Individual Total Time Spent (Mins) 60   Precautions   Precautions Fall Risk   Comments poor safety awareness/impulsive   Gait Functional Level of Assist    Gait Level Of Assist Contact Guard Assist   Assistive Device Front Wheel Walker;None  (200ft FWW CGA/SBA, 100ftx 2 no AD CGA)   Distance (Feet) 200   # of Times Distance was Traveled 2   Stairs Functional Level of Assist   Level of Assist with Stairs Contact Guard Assist   # of Stairs Climbed 3  (2in wooden threshold, wooden ramp, curb step)   Stairs Description Verbal cueing;Extra time   Sitting Lower Body Exercises   Sitting Lower Body Exercises   (hand-out given; used as warm-up and cool-down, encouraged practice throughout the day)   Ankle Pumps 2 sets of 10;Bilateral   Hip Abduction 2 sets of 10;Bilateral   Hip Adduction 2 sets of 10;Bilateral   Long Arc Quad 2 sets of 10;Bilateral   Marching 2 sets of 10;Reciprocal   Hamstring Curl 2 sets of 10;Bilateral;Medium Resistance Theraband   Interdisciplinary Plan of Care Collaboration   IDT Collaboration with  Occupational Therapist;Physician   Collaboration Comments treatment planning- asking to make grab bar recommendations; daily rounds     Car transfer using FWW SBA for safe sequencing to sit prior to swinging legs into car    Assessment    Emphasis on hading patient select whether to use FWW or no AD in given scenarios and patient admits she is scared to  ambulate with no AD on her own, encouraged her to use FWW when unsure to help increase confidence and safety    Strengths: Able to follow instructions, Alert and oriented, Independent prior level of function, Pleasant and cooperative  Barriers: Generalized weakness, Fatigue, Impaired activity tolerance, Impaired balance (high CBG)    Plan    Dynamic standing task  Curb training  Balance  Postural reactions  Safety with FWW  Car transfer/sequencing  STS sequencing   Progress to LRAD as appropriate  Dual task  CV endurance     DME  PT DME Recommendations  Assistive Device: (P) Front Wheeled Walker     Passport items to be completed:  Get in/out of bed safely, in/out of a vehicle, safely use mobility device, walk or wheel around home/community, navigate up and down stairs, show how to get up/down from the ground, ensure home is accessible, demonstrate HEP, complete caregiver training    Physical Therapy Problems (Active)       Problem: Mobility       Dates: Start:  02/15/24         Goal: STG-Within one week, patient will ambulate up/down a curb using LRAD, SBA       Dates: Start:  02/15/24               Problem: PT-Long Term Goals       Dates: Start:  02/15/24         Goal: LTG-By discharge, patient will tolerate standing x10 mins       Dates: Start:  02/15/24            Goal: LTG-By discharge, patient will ambulate using LRAD x 300 feet, Mod I       Dates: Start:  02/15/24            Goal: LTG-By discharge, patient will perform home exercise program independently       Dates: Start:  02/15/24            Goal: LTG-By discharge, patient will transfer in/out of a car, Mod I       Dates: Start:  02/15/24

## 2024-02-22 NOTE — THERAPY
Occupational Therapy  Daily Treatment     Patient Name: Molly Loera  Age:  73 y.o., Sex:  female  Medical Record #: [id number]  Today's Date: 2/22/2024     Precautions  Precautions: Fall Risk  Comments: poor safety awareness/impulsive    Safety   ADL Safety : Requires Supervision for Safety    Subjective    Pt seated in room and agreeable to OT tx.     Objective       02/22/24 1231   OT Charge Group   OT Therapy Activity (Units) 2   OT Therapeutic Exercise (Units) 2   OT Total Time Spent   OT Individual Total Time Spent (Mins) 60   Pain   Intervention Declines   Cognition    Level of Consciousness Alert   Functional Level of Assist   Tub / Shower Transfers Supervised   Tub Shower Transfer Description Grab bar;Supervision for safety;Verbal cueing  (**dry step in shower transfer using FWW for approach SPV for clearing 4 inch barrier to sit on shower chair.)   Sitting Upper Body Exercises   Sitting Upper Body Exercises Yes   Chest Press 1 set of 15;Bilateral;Weight (See Comments for lbs)   Front Arm Raise 1 set of 15;Bilateral;Weight (See Comments for lbs)   Shoulder Extension 1 set of 15;Bilateral;Weight (See Comments for lbs)   Internal Shoulder Rotation 1 set of 15;Bilateral;Weight (See Comments for lbs)   External Shoulder Rotation 1 set of 15;Bilateral;Weight (See Comments for lbs)   Bilateral Row 1 set of 15;Bilateral;Weight (See Comments for lbs)   Bicep Curls 1 set of 15;Bilateral;Weight (See Comments for lbs)   Comments Issued HEP for BUE therex using 1 lb hand weight with some [place] for carryover.   Balance   Comments Pt participated in community distance mobility using FWW at SPV level without LOB and occasional cue for way finding. Pt also completed 4 bouts of 15 reps with rebounder in sitting and standing unweighted then with 2lb weighted ball.   Interdisciplinary Plan of Care Collaboration   Patient Position at End of Therapy Seated;Chair Alarm On;Self Releasing Lap Belt Applied;Call Light within  Reach;Tray Table within Reach         Assessment    Pt continues to make slow, but steady progress towards goals with functional transfers and demonstrated improvements with way finding using FWW.   Strengths: Able to follow instructions, Alert and oriented, Independent prior level of function, Motivated for self care and independence, Pleasant and cooperative, Supportive family, Willingly participates in therapeutic activities  Barriers: Decreased endurance, Fatigue, Generalized weakness, Impaired balance, Impaired activity tolerance, Confused, Impaired functional cognition, Impulsive, Limited mobility, Visual impairment    Plan    Functional cognition, standing balance, overall strengthening     DME        Passport items to be completed:  Perform bathroom transfers, complete dressing, complete feeding, get ready for the day, prepare a simple meal, participate in household tasks, adapt home for safety needs, demonstrate home exercise program, complete caregiver training     Occupational Therapy Goals (Active)       Problem: OT Long Term Goals       Dates: Start:  02/15/24         Goal: LTG-By discharge, patient will complete basic self care tasks at SPV-Mod I level.       Dates: Start:  02/15/24            Goal: LTG-By discharge, patient will perform bathroom transfers at SPV-Mod I level.       Dates: Start:  02/15/24            Goal: LTG-By discharge, patient will complete basic home management at SPV-Mod I level.       Dates: Start:  02/15/24               Problem: Toileting       Dates: Start:  02/15/24         Goal: STG-Within one week, patient will complete toileting tasks at SBA level.       Dates: Start:  02/15/24

## 2024-02-22 NOTE — PROGRESS NOTES
"  Physical Medicine & Rehabilitation Progress Note    Encounter Date: 2/22/2024    Chief Complaint: Decreased mobility, weakness    Interval Events (Subjective):  Patient sitting up in room. She reports she is doing well. She reports family will not be available until 2/28 and she is OK with staying an extra day. She denies pain. Bruising to left orbit improving.     _____________________________________  Interdisciplinary Team Conference   Most recent IDT on 2/20/2024    Discharge Date/Disposition:  2/27/24 -> 2/28/24  _____________________________________      Objective:  VITAL SIGNS: /60   Pulse 80   Temp 36.3 °C (97.4 °F) (Oral)   Resp 18   Ht 1.646 m (5' 4.8\")   Wt 60.3 kg (133 lb)   SpO2 95%   BMI 22.27 kg/m²   Gen: NAD  Psych: Mood and affect appropriate  CV: RRR, 0 edema  Resp: CTAB, no upper airway sounds  Abd: NTND  Neuro: AOx4, following commands  Unchanged from 2/21/24     Laboratory Values:  Recent Results (from the past 72 hour(s))   POCT glucose device results    Collection Time: 02/19/24  5:20 PM   Result Value Ref Range    POC Glucose, Blood 238 (H) 65 - 99 mg/dL   POCT glucose device results    Collection Time: 02/19/24  8:36 PM   Result Value Ref Range    POC Glucose, Blood 254 (H) 65 - 99 mg/dL   POCT glucose device results    Collection Time: 02/20/24  7:44 AM   Result Value Ref Range    POC Glucose, Blood 203 (H) 65 - 99 mg/dL   POCT glucose device results    Collection Time: 02/20/24 11:16 AM   Result Value Ref Range    POC Glucose, Blood 297 (H) 65 - 99 mg/dL   POCT glucose device results    Collection Time: 02/20/24  5:27 PM   Result Value Ref Range    POC Glucose, Blood 203 (H) 65 - 99 mg/dL   POCT glucose device results    Collection Time: 02/20/24  8:29 PM   Result Value Ref Range    POC Glucose, Blood 169 (H) 65 - 99 mg/dL   POCT glucose device results    Collection Time: 02/21/24  8:05 AM   Result Value Ref Range    POC Glucose, Blood 300 (H) 65 - 99 mg/dL   POCT glucose " device results    Collection Time: 02/21/24 11:30 AM   Result Value Ref Range    POC Glucose, Blood 251 (H) 65 - 99 mg/dL   POCT glucose device results    Collection Time: 02/21/24  5:11 PM   Result Value Ref Range    POC Glucose, Blood 107 (H) 65 - 99 mg/dL   POCT glucose device results    Collection Time: 02/21/24  8:47 PM   Result Value Ref Range    POC Glucose, Blood 294 (H) 65 - 99 mg/dL   POCT glucose device results    Collection Time: 02/22/24  8:03 AM   Result Value Ref Range    POC Glucose, Blood 261 (H) 65 - 99 mg/dL   POCT glucose device results    Collection Time: 02/22/24 11:31 AM   Result Value Ref Range    POC Glucose, Blood 247 (H) 65 - 99 mg/dL       Medications:  Scheduled Medications   Medication Dose Frequency    insulin GLARGINE  15 Units Q EVENING    insulin GLARGINE  18 Units QAM INSULIN    amLODIPine  10 mg Nightly    apixaban  5 mg BID    insulin lispro  2-12 Units 4X/DAY ACHS    vitamin D3  1,000 Units DAILY    senna-docusate  2 Tablet BID    omeprazole  20 mg DAILY    atorvastatin  80 mg Nightly    busPIRone  5 mg BID    cariprazine  6 mg QHS    lamoTRIgine  100 mg DAILY    levothyroxine  25 mcg AM ES    losartan  100 mg DAILY    primidone  100 mg QHS    timolol  1 Drop QHS    carBAMazepine  200 mg BID     PRN medications: insulin lispro **AND** POC blood glucose manual result **AND** NOTIFY MD and PharmD **AND** Administer 20 grams of glucose (approximately 8 ounces of fruit juice) every 15 minutes PRN FSBG less than 70 mg/dL **AND** dextrose bolus, Respiratory Therapy Consult, hydrALAZINE, acetaminophen, senna-docusate **AND** polyethylene glycol/lytes, mag hydrox-al hydrox-simeth, ondansetron **OR** ondansetron, traZODone, sodium chloride, oxyCODONE immediate-release **OR** oxyCODONE immediate-release, midazolam, [COMPLETED] acetaminophen **FOLLOWED BY** acetaminophen, ziprasidone    Diet:  Current Diet Order   Procedures    Diet Order Diet: Consistent CHO (Diabetic)       Medical  Decision Making and Plan:  TBI (Traumatic Brain Injury): GLF on 2/9/24 with left sided subarachnoid hemorrhage s/p monitoring with serial CTs.   -PT and OT for mobility and ADLs. Per guidelines, 15 hours per week between PT, OT and/or SLP.  -Follow-up NSG. On 7 days of Keppra for seizure prophylaxis. Completed Keppra Hold AC until 2/19. Restart Eliquis, discussed with patient     HTN- Patient on Amlodipine 5 mg and Losartan 100 mg daily. Into 140s, consult hospitalist. Amlodipine increased to 10 mg QHS. SBP labile, continue Amlodipine      Hx of PE - Previously on Eliquis. On hold for 10 days. Restarted on Eliquis 5 mg BID     HLD - Patient on Atorvastatin 80 mg QHS     Anemia - Check AM CBC - 11.4 on admission. Recheck 2/23/24     Leukopenia - Check AM CBC - 5.8 on admission, resolved     DM1 with hyperglycemia - Patient on Lantus 15 U and SSI on transfer. Blood sugars into 500s, give extra dose and consult hospitalist  -Severe hyperglycemia - Glargine changed to 10 QPM and 14U QPM -. Increased 2/22, continue 18 U qAM and 15 U qPM     Hypothyroidism - Patient on Levothyroxine 25 mcg daily     Hypophosphatemia - Check AM level - low 2.4. Consult hospitalist. Continue K-Phos. K level stable. K-phos discontinue per Hospitalist. Recheck 2/23/24     BPD1 - Patient on Lamictal 100 mg daily, Primidone 100 mg QHS, Tegretol 200 mg BID, Buspar 5 mg BID, and Vraylar 6 mg QHS     Glaucoma - Patient on Timolol QHS    Vitamin D deficiency - 17 on admission, start 2000 U      Pain - Patient on PRN Tylenol and Oxycodone     Skin - Patient at risk for skin breakdown due to debility in areas including sacrum, achilles, elbows and head in addition to other sites. Nursing to assess skin daily.      GI Ppx - Patient on Prilosec for GERD prophylaxis. Patient on Senna-docusate for constipation prophylaxis.      DVT Ppx - Patient Eliquis is on hold on transfer. Restarted Eliquis on 2/19/24. Continue  Eliquis  ____________________________________    JESSI Gregory MD/PhD  [facility] - Physical Medicine & Rehabilitation   [facility] - Brain Injury Medicine   ____________________________________

## 2024-02-22 NOTE — PROGRESS NOTES
NURSING DAILY NOTE    Name: Molly Loera   Date of Admission: 2/14/2024   Admitting Diagnosis: Traumatic brain injury with new neurocognitive deficit (HCC)  Attending Physician: Mazin Gregory M.d.  Allergies: Depakote [valproic acid]    Safety  Patient Assist  sba  Patient Precautions  Fall Risk  Precaution Comments  poor safety awareness/impulsive  Bed Transfer Status  Standby Assist  Toilet Transfer Status   Standby Assist  Assistive Devices  Hand held assist, Walker - front wheel  Oxygen  None - Room Air  Diet/Therapeutic Dining  Current Diet Order   Procedures    Diet Order Diet: Consistent CHO (Diabetic)     Pill Administration  whole  Agitated Behavioral Scale  15  ABS Level of Severity  No Agitation    Fall Risk  Has the patient had a fall this admission?   No  Prema Amaya Fall Risk Scoring  12, MODERATE RISK  Fall Risk Safety Measures  bed alarm, chair alarm, poor balance, and low vision/ hearing    Vitals  Temperature: 36.4 °C (97.6 °F)  Temp src: Oral  Pulse: 60  Respiration: 20  Blood Pressure : 112/58  Blood Pressure MAP (Calculated): 76 MM HG  BP Location: Right, Upper Arm  Patient BP Position: Supine     Oxygen  Pulse Oximetry: 96 %  O2 (LPM): 0  O2 Delivery Device: None - Room Air    Bowel and Bladder  Last Bowel Movement  02/21/24  Stool Type  Type 5: Soft blob with clear cut edges (passed easily)  Bowel Device     Continent  Bladder: Stress incontinence   Bowel: Incontinent movement  Bladder Function  Urine Void (mL):  (moderate)  Number of Times Voided: 1  Urine Color: Yellow  Number of Times Incontinent of Urine: 0  Genitourinary Assessment   Bladder Assessment (WDL):  WDL Except  Urinary Elimination: Frequency  Urine Color: Yellow  Number of Bladder Accidents: 0  Total Number of Bladder of Accidents in Last 7 Days: 0  Number of Times Incontinent of Urine: 0  Bladder Device: Bathroom  Time Void: No  Bladder Scan: Post  Void  $ Bladder Scan Results (mL): 10    Skin  Zach Score   18  Sensory Interventions   Bed Types: Standard/Trauma Mattress  Skin Preventative Measures: Pillows in Use for Support / Positioning  Moisture Interventions  Moisturizers/Barriers: Barrier Wipes      Pain  Pain Rating Scale  0 - No Pain  Pain Location  Neck  Pain Location Orientation  Mid  Pain Interventions   Declines    ADLs    Bathing    (shower with ot)  Linen Change    (Pt refused said she dont shower on nights is too cold Rn was inform)  Personal Hygiene  Change Violeta Pads, Moist Violeta Wipes, Perineal Care  Chlorhexidine Bath      Oral Care     Teeth/Dentures     Shave     Nutrition Percentage Eaten  Between % Consumed  Environmental Precautions  Bed in Low Position, Treaded Slipper Socks on Patient  Patient Turns/Positioning  Patient Turns Self from Side to Side  Patient Turns Assistance/Tolerance     Bed Positions  Bed Controls On  Head of Bed Elevated  Self regulated      Psychosocial/Neurologic Assessment  Psychosocial Assessment  Psychosocial (WDL):  Within Defined Limits  Neurologic Assessment  Neuro (WDL): Exceptions to WDL  Level of Consciousness: Alert  Orientation Level: Oriented X4  Cognition: Appropriate judgement, Appropriate safety awareness  Speech: Clear  Pupil Assesment: No  R Pupil Size (mm): 2  R Pupil Shape / Description: Round  R Pupil Reaction: Brisk  L Pupil Size (mm): 2  L Pupil Shape / Description: Round  L Pupil Reaction: Brisk  Motor Function/Sensation Assessment: Motor strength  Muscle Strength Right Arm: Good Strength Against Gravity and Moderate Resistance  Muscle Strength Left Arm: Good Strength Against Gravity and Moderate Resistance  Muscle Strength Right Leg: Good Strength Against Gravity and Moderate Resistance  Muscle Strength Left Leg: Good Strength Against Gravity and Moderate Resistance  EENT (WDL):  Within Defined Limits    Cardio/Pulmonary Assessment  Edema   RLE Edema: Trace  LLE Edema:  Trace  Respiratory Breath Sounds  RUL Breath Sounds: Clear  RML Breath Sounds: Clear  RLL Breath Sounds: Clear  SHAWNA Breath Sounds: Clear  LLL Breath Sounds: Clear  Cardiac Assessment   Cardiac (WDL):  WDL Except (HX: HTN)

## 2024-02-22 NOTE — CARE PLAN
"The patient is Stable - Low risk of patient condition declining or worsening    Shift Goals  Clinical Goals: Safety  Patient Goals: Safety  Family Goals: Education    Patient is not progressing towards the following goals:    Problem: Fall Risk - Rehab  Goal: Patient will remain free from falls  Outcome: Not Met  Note: Prema Amaya Fall risk Assessment Score: 12    Moderate fall risk Interventions  - Bed and strip alarm   - Yellow sign by the door   - Yellow wrist band \"Fall risk\"  - Room near to the nurse station  - Do not leave patient unattended in the bathroom  - Fall risk education provided     "

## 2024-02-22 NOTE — PROGRESS NOTES
Hospital Medicine Daily Progress Note      Chief Complaint  Hypertension  Diabetes    Interval Problem Update  Discussed again about adjusting her insulin for the labile BS.    Review of Systems  Review of Systems   Constitutional:  Negative for fever.   Eyes:  Negative for blurred vision.   Respiratory:  Negative for cough.    Cardiovascular:  Negative for chest pain.   Gastrointestinal:  Negative for diarrhea.   Musculoskeletal:  Negative for joint pain.   Neurological:  Negative for dizziness.   Psychiatric/Behavioral:  The patient is not nervous/anxious.         Physical Exam  Temp:  [36.3 °C (97.3 °F)-37 °C (98.6 °F)] 37 °C (98.6 °F)  Pulse:  [60-92] 66  Resp:  [18-20] 18  BP: (100-132)/(51-72) 113/51  SpO2:  [96 %-97 %] 97 %    Physical Exam  Vitals and nursing note reviewed.   Constitutional:       Appearance: She is not diaphoretic.   HENT:      Mouth/Throat:      Pharynx: No oropharyngeal exudate or posterior oropharyngeal erythema.   Eyes:      Extraocular Movements: Extraocular movements intact.   Cardiovascular:      Rate and Rhythm: Normal rate and regular rhythm.      Heart sounds: No murmur heard.  Pulmonary:      Effort: Pulmonary effort is normal.      Breath sounds: Normal breath sounds. No stridor.   Abdominal:      General: Bowel sounds are normal.      Palpations: Abdomen is soft.   Musculoskeletal:      Cervical back: Normal range of motion.      Right lower leg: No edema.      Left lower leg: No edema.   Skin:     General: Skin is warm and dry.      Findings: No rash.   Neurological:      Mental Status: She is alert and oriented to person, place, and time.   Psychiatric:         Mood and Affect: Mood normal.         Behavior: Behavior normal.         Fluids    Intake/Output Summary (Last 24 hours) at 2/22/2024 1037  Last data filed at 2/22/2024 0900  Gross per 24 hour   Intake 840 ml   Output --   Net 840 ml       Laboratory                          Assessment/Plan  * Traumatic brain injury  with new neurocognitive deficit (HCC)- (present on admission)  Assessment & Plan  S/P MGLF with left sided head trauma  Neuroimaging: showed SAH  Cont Keppra for seizure proph  Note: now back on Eliquis    Glaucoma  Assessment & Plan  Cont Timolol    Vitamin D deficiency  Assessment & Plan  Vit D: 17  Cont supplements    PE (pulmonary thromboembolism) (HCC)- (present on admission)  Assessment & Plan  Cont Eliquis     Other specified hypothyroidism- (present on admission)  Assessment & Plan  Euthyroid on Synthroid    Dyslipidemia- (present on admission)  Assessment & Plan  Cont Lipitor    Essential hypertension- (present on admission)  Assessment & Plan  BP ok  Cont Norvasc  Cont Cozaar  Cont to monitor    Type 1 diabetes mellitus without complication (HCC)- (present on admission)  Assessment & Plan  Hba1c: 9.0 (2/15)  BS labile and elevated  Cont Glargine: 14 units am, 10 units pm --> 18 units qam, 12 units qpm --> will increase to 18 units qam, 15 units qpm  Note: home meds include Levemir 10-12 units bid and Novolog SSI  Cont to monitor    Bipolar 1 disorder (HCC)- (present on admission)  Assessment & Plan  Cont Buspar, Vraylar, Tegretol, Lamictal, and Primidone

## 2024-02-23 ENCOUNTER — APPOINTMENT (OUTPATIENT)
Dept: OCCUPATIONAL THERAPY | Facility: REHABILITATION | Age: 74
DRG: 950 | End: 2024-02-23
Attending: PHYSICAL MEDICINE & REHABILITATION
Payer: MEDICARE

## 2024-02-23 ENCOUNTER — APPOINTMENT (OUTPATIENT)
Dept: PHYSICAL THERAPY | Facility: REHABILITATION | Age: 74
DRG: 950 | End: 2024-02-23
Attending: PHYSICAL MEDICINE & REHABILITATION
Payer: MEDICARE

## 2024-02-23 ENCOUNTER — APPOINTMENT (OUTPATIENT)
Dept: SPEECH THERAPY | Facility: REHABILITATION | Age: 74
DRG: 950 | End: 2024-02-23
Attending: PHYSICAL MEDICINE & REHABILITATION
Payer: MEDICARE

## 2024-02-23 LAB
ANION GAP SERPL CALC-SCNC: 12 MMOL/L (ref 7–16)
BUN SERPL-MCNC: 22 MG/DL (ref 8–22)
CALCIUM SERPL-MCNC: 9.8 MG/DL (ref 8.5–10.5)
CHLORIDE SERPL-SCNC: 102 MMOL/L (ref 96–112)
CO2 SERPL-SCNC: 26 MMOL/L (ref 20–33)
CREAT SERPL-MCNC: 0.63 MG/DL (ref 0.5–1.4)
ERYTHROCYTE [DISTWIDTH] IN BLOOD BY AUTOMATED COUNT: 50.5 FL (ref 35.9–50)
GFR SERPLBLD CREATININE-BSD FMLA CKD-EPI: 92 ML/MIN/1.73 M 2
GLUCOSE BLD STRIP.AUTO-MCNC: 166 MG/DL (ref 65–99)
GLUCOSE BLD STRIP.AUTO-MCNC: 226 MG/DL (ref 65–99)
GLUCOSE BLD STRIP.AUTO-MCNC: 90 MG/DL (ref 65–99)
GLUCOSE BLD STRIP.AUTO-MCNC: 93 MG/DL (ref 65–99)
GLUCOSE SERPL-MCNC: 80 MG/DL (ref 65–99)
HCT VFR BLD AUTO: 37.7 % (ref 37–47)
HGB BLD-MCNC: 12.1 G/DL (ref 12–16)
MAGNESIUM SERPL-MCNC: 2 MG/DL (ref 1.5–2.5)
MCH RBC QN AUTO: 28.9 PG (ref 27–33)
MCHC RBC AUTO-ENTMCNC: 32.1 G/DL (ref 32.2–35.5)
MCV RBC AUTO: 90 FL (ref 81.4–97.8)
PHOSPHATE SERPL-MCNC: 2.9 MG/DL (ref 2.5–4.5)
PLATELET # BLD AUTO: 291 K/UL (ref 164–446)
PMV BLD AUTO: 10.3 FL (ref 9–12.9)
POTASSIUM SERPL-SCNC: 4.1 MMOL/L (ref 3.6–5.5)
RBC # BLD AUTO: 4.19 M/UL (ref 4.2–5.4)
SODIUM SERPL-SCNC: 140 MMOL/L (ref 135–145)
WBC # BLD AUTO: 4.7 K/UL (ref 4.8–10.8)

## 2024-02-23 PROCEDURE — 82962 GLUCOSE BLOOD TEST: CPT | Mod: 91

## 2024-02-23 PROCEDURE — 97129 THER IVNTJ 1ST 15 MIN: CPT

## 2024-02-23 PROCEDURE — 80048 BASIC METABOLIC PNL TOTAL CA: CPT

## 2024-02-23 PROCEDURE — 97530 THERAPEUTIC ACTIVITIES: CPT

## 2024-02-23 PROCEDURE — 97110 THERAPEUTIC EXERCISES: CPT

## 2024-02-23 PROCEDURE — 97116 GAIT TRAINING THERAPY: CPT

## 2024-02-23 PROCEDURE — 770010 HCHG ROOM/CARE - REHAB SEMI PRIVAT*

## 2024-02-23 PROCEDURE — 36415 COLL VENOUS BLD VENIPUNCTURE: CPT

## 2024-02-23 PROCEDURE — 99232 SBSQ HOSP IP/OBS MODERATE 35: CPT | Performed by: HOSPITALIST

## 2024-02-23 PROCEDURE — 85027 COMPLETE CBC AUTOMATED: CPT

## 2024-02-23 PROCEDURE — 99232 SBSQ HOSP IP/OBS MODERATE 35: CPT | Performed by: PHYSICAL MEDICINE & REHABILITATION

## 2024-02-23 PROCEDURE — 83735 ASSAY OF MAGNESIUM: CPT

## 2024-02-23 PROCEDURE — A9270 NON-COVERED ITEM OR SERVICE: HCPCS | Performed by: HOSPITALIST

## 2024-02-23 PROCEDURE — 84100 ASSAY OF PHOSPHORUS: CPT

## 2024-02-23 PROCEDURE — 97130 THER IVNTJ EA ADDL 15 MIN: CPT

## 2024-02-23 PROCEDURE — 97535 SELF CARE MNGMENT TRAINING: CPT

## 2024-02-23 PROCEDURE — 700102 HCHG RX REV CODE 250 W/ 637 OVERRIDE(OP): Performed by: PHYSICAL MEDICINE & REHABILITATION

## 2024-02-23 PROCEDURE — 700102 HCHG RX REV CODE 250 W/ 637 OVERRIDE(OP): Performed by: HOSPITALIST

## 2024-02-23 PROCEDURE — A9270 NON-COVERED ITEM OR SERVICE: HCPCS | Performed by: PHYSICAL MEDICINE & REHABILITATION

## 2024-02-23 RX ADMIN — OMEPRAZOLE 20 MG: 20 CAPSULE, DELAYED RELEASE ORAL at 08:51

## 2024-02-23 RX ADMIN — CARBAMAZEPINE 200 MG: 100 TABLET, CHEWABLE ORAL at 08:52

## 2024-02-23 RX ADMIN — CARBAMAZEPINE 200 MG: 100 TABLET, CHEWABLE ORAL at 21:17

## 2024-02-23 RX ADMIN — PRIMIDONE 100 MG: 50 TABLET ORAL at 21:18

## 2024-02-23 RX ADMIN — LAMOTRIGINE 100 MG: 100 TABLET ORAL at 08:52

## 2024-02-23 RX ADMIN — APIXABAN 5 MG: 5 TABLET, FILM COATED ORAL at 08:51

## 2024-02-23 RX ADMIN — BUSPIRONE HYDROCHLORIDE 5 MG: 5 TABLET ORAL at 08:52

## 2024-02-23 RX ADMIN — LOSARTAN POTASSIUM 100 MG: 50 TABLET, FILM COATED ORAL at 08:51

## 2024-02-23 RX ADMIN — CARIPRAZINE 6 MG: 3 CAPSULE, GELATIN COATED ORAL at 21:17

## 2024-02-23 RX ADMIN — Medication 1000 UNITS: at 08:52

## 2024-02-23 RX ADMIN — APIXABAN 5 MG: 5 TABLET, FILM COATED ORAL at 21:17

## 2024-02-23 RX ADMIN — LEVOTHYROXINE SODIUM 25 MCG: 0.03 TABLET ORAL at 05:27

## 2024-02-23 RX ADMIN — INSULIN LISPRO 2 UNITS: 100 INJECTION, SOLUTION INTRAVENOUS; SUBCUTANEOUS at 11:50

## 2024-02-23 RX ADMIN — AMLODIPINE BESYLATE 10 MG: 5 TABLET ORAL at 21:18

## 2024-02-23 RX ADMIN — TIMOLOL MALEATE 1 DROP: 5 SOLUTION OPHTHALMIC at 21:19

## 2024-02-23 RX ADMIN — DOCUSATE SODIUM 50MG AND SENNOSIDES 8.6MG 2 TABLET: 8.6; 5 TABLET, FILM COATED ORAL at 08:51

## 2024-02-23 RX ADMIN — BUSPIRONE HYDROCHLORIDE 5 MG: 5 TABLET ORAL at 21:17

## 2024-02-23 RX ADMIN — INSULIN LISPRO 4 UNITS: 100 INJECTION, SOLUTION INTRAVENOUS; SUBCUTANEOUS at 21:26

## 2024-02-23 RX ADMIN — ATORVASTATIN CALCIUM 80 MG: 40 TABLET, FILM COATED ORAL at 21:17

## 2024-02-23 ASSESSMENT — ACTIVITIES OF DAILY LIVING (ADL): TOILETING_LEVEL_OF_ASSIST_DESCRIPTION: SUPERVISION FOR SAFETY;INCREASED TIME;INITIAL PREPARATION FOR TASK

## 2024-02-23 ASSESSMENT — ENCOUNTER SYMPTOMS
FEVER: 0
CHILLS: 0
NERVOUS/ANXIOUS: 0
SHORTNESS OF BREATH: 0
VOMITING: 0
DIARRHEA: 0
NAUSEA: 0
ABDOMINAL PAIN: 0

## 2024-02-23 ASSESSMENT — GAIT ASSESSMENTS
ASSISTIVE DEVICE: FRONT WHEEL WALKER;NONE
GAIT LEVEL OF ASSIST: CONTACT GUARD ASSIST

## 2024-02-23 NOTE — CARE PLAN
Problem: Knowledge Deficit - Standard  Goal: Patient and family/care givers will demonstrate understanding of plan of care, disease process/condition, diagnostic tests and medications  Outcome: Progressing   Pt education given regarding plan of care with emphasis on adequate hydration, pt shows some understanding and with moderate follow through, will continue to reinforce education and continue to monitor.            Problem: Fall Risk - Rehab  Goal: Patient will remain free from falls  Outcome: Progressing   Pt education given regarding fall precautions AND safety measures, pt shows good understanding, has not attempted to self transfer this shift, will continue to reinforce education and continue to monitor.

## 2024-02-23 NOTE — PROGRESS NOTES
"  Physical Medicine & Rehabilitation Progress Note    Encounter Date: 2/23/2024    Chief Complaint: Decreased mobility, weakness    Interval Events (Subjective):  Patient sitting up in room. She reports therapy is going well. She looks forward to going home next week. Denies NVD.     _____________________________________  Interdisciplinary Team Conference   Most recent IDT on 2/20/2024    Discharge Date/Disposition:  2/27/24 -> 2/28/24  _____________________________________      Objective:  VITAL SIGNS: /56   Pulse 80   Temp 36.9 °C (98.4 °F) (Temporal)   Resp 18   Ht 1.646 m (5' 4.8\")   Wt 60.3 kg (133 lb)   SpO2 95%   BMI 22.27 kg/m²   Gen: NAD  Psych: Mood and affect appropriate  CV: RRR, 0 edema  Resp: CTAB, no upper airway sounds  Abd: NTND  Neuro: AOx4, following commands    Laboratory Values:  Recent Results (from the past 72 hour(s))   POCT glucose device results    Collection Time: 02/20/24  5:27 PM   Result Value Ref Range    POC Glucose, Blood 203 (H) 65 - 99 mg/dL   POCT glucose device results    Collection Time: 02/20/24  8:29 PM   Result Value Ref Range    POC Glucose, Blood 169 (H) 65 - 99 mg/dL   POCT glucose device results    Collection Time: 02/21/24  8:05 AM   Result Value Ref Range    POC Glucose, Blood 300 (H) 65 - 99 mg/dL   POCT glucose device results    Collection Time: 02/21/24 11:30 AM   Result Value Ref Range    POC Glucose, Blood 251 (H) 65 - 99 mg/dL   POCT glucose device results    Collection Time: 02/21/24  5:11 PM   Result Value Ref Range    POC Glucose, Blood 107 (H) 65 - 99 mg/dL   POCT glucose device results    Collection Time: 02/21/24  8:47 PM   Result Value Ref Range    POC Glucose, Blood 294 (H) 65 - 99 mg/dL   POCT glucose device results    Collection Time: 02/22/24  8:03 AM   Result Value Ref Range    POC Glucose, Blood 261 (H) 65 - 99 mg/dL   POCT glucose device results    Collection Time: 02/22/24 11:31 AM   Result Value Ref Range    POC Glucose, Blood 247 (H) " 65 - 99 mg/dL   POCT glucose device results    Collection Time: 02/22/24  5:23 PM   Result Value Ref Range    POC Glucose, Blood 171 (H) 65 - 99 mg/dL   POCT glucose device results    Collection Time: 02/22/24  9:00 PM   Result Value Ref Range    POC Glucose, Blood 150 (H) 65 - 99 mg/dL   CBC WITHOUT DIFFERENTIAL    Collection Time: 02/23/24  5:40 AM   Result Value Ref Range    WBC 4.7 (L) 4.8 - 10.8 K/uL    RBC 4.19 (L) 4.20 - 5.40 M/uL    Hemoglobin 12.1 12.0 - 16.0 g/dL    Hematocrit 37.7 37.0 - 47.0 %    MCV 90.0 81.4 - 97.8 fL    MCH 28.9 27.0 - 33.0 pg    MCHC 32.1 (L) 32.2 - 35.5 g/dL    RDW 50.5 (H) 35.9 - 50.0 fL    Platelet Count 291 164 - 446 K/uL    MPV 10.3 9.0 - 12.9 fL   Basic Metabolic Panel    Collection Time: 02/23/24  5:40 AM   Result Value Ref Range    Sodium 140 135 - 145 mmol/L    Potassium 4.1 3.6 - 5.5 mmol/L    Chloride 102 96 - 112 mmol/L    Co2 26 20 - 33 mmol/L    Glucose 80 65 - 99 mg/dL    Bun 22 8 - 22 mg/dL    Creatinine 0.63 0.50 - 1.40 mg/dL    Calcium 9.8 8.5 - 10.5 mg/dL    Anion Gap 12.0 7.0 - 16.0   PHOSPHORUS    Collection Time: 02/23/24  5:40 AM   Result Value Ref Range    Phosphorus 2.9 2.5 - 4.5 mg/dL   MAGNESIUM    Collection Time: 02/23/24  5:40 AM   Result Value Ref Range    Magnesium 2.0 1.5 - 2.5 mg/dL   ESTIMATED GFR    Collection Time: 02/23/24  5:40 AM   Result Value Ref Range    GFR (CKD-EPI) 92 >60 mL/min/1.73 m 2   POCT glucose device results    Collection Time: 02/23/24  8:26 AM   Result Value Ref Range    POC Glucose, Blood 90 65 - 99 mg/dL   POCT glucose device results    Collection Time: 02/23/24 11:46 AM   Result Value Ref Range    POC Glucose, Blood 166 (H) 65 - 99 mg/dL       Medications:  Scheduled Medications   Medication Dose Frequency    insulin GLARGINE  15 Units Q EVENING    insulin GLARGINE  18 Units QAM INSULIN    amLODIPine  10 mg Nightly    apixaban  5 mg BID    insulin lispro  2-12 Units 4X/DAY ACHS    vitamin D3  1,000 Units DAILY     senna-docusate  2 Tablet BID    omeprazole  20 mg DAILY    atorvastatin  80 mg Nightly    busPIRone  5 mg BID    cariprazine  6 mg QHS    lamoTRIgine  100 mg DAILY    levothyroxine  25 mcg AM ES    losartan  100 mg DAILY    primidone  100 mg QHS    timolol  1 Drop QHS    carBAMazepine  200 mg BID     PRN medications: insulin lispro **AND** POC blood glucose manual result **AND** NOTIFY MD and PharmD **AND** Administer 20 grams of glucose (approximately 8 ounces of fruit juice) every 15 minutes PRN FSBG less than 70 mg/dL **AND** dextrose bolus, Respiratory Therapy Consult, hydrALAZINE, acetaminophen, senna-docusate **AND** polyethylene glycol/lytes, mag hydrox-al hydrox-simeth, ondansetron **OR** ondansetron, traZODone, sodium chloride, oxyCODONE immediate-release **OR** oxyCODONE immediate-release, midazolam, [COMPLETED] acetaminophen **FOLLOWED BY** acetaminophen, ziprasidone    Diet:  Current Diet Order   Procedures    Diet Order Diet: Consistent CHO (Diabetic)       Medical Decision Making and Plan:  TBI (Traumatic Brain Injury): GLF on 2/9/24 with left sided subarachnoid hemorrhage s/p monitoring with serial CTs.   -PT and OT for mobility and ADLs. Per guidelines, 15 hours per week between PT, OT and/or SLP.  -Follow-up NSG. On 7 days of Keppra for seizure prophylaxis. Completed Keppra Hold AC until 2/19. Restart Eliquis, discussed with patient     HTN- Patient on Amlodipine 5 mg and Losartan 100 mg daily. Into 140s, consult hospitalist. Amlodipine increased to 10 mg QHS. SBP into 130s, continue Amlodipine 10 mg and Losartan 100 mg daily     Hx of PE - Previously on Eliquis. On hold for 10 days. Restarted on Eliquis 5 mg BID     HLD - Patient on Atorvastatin 80 mg QHS     Anemia - Check AM CBC - 11.4 on admission. Recheck 2/23/24     Leukopenia - Check AM CBC - 5.8 on admission, resolved     DM1 with hyperglycemia - Patient on Lantus 15 U and SSI on transfer. Blood sugars into 500s, give extra dose and consult  hospitalist  -Severe hyperglycemia - Glargine changed to 10 QPM and 14U QPM -. Increased 2/22, continue 18 U qAM and 15 U qPM     Hypothyroidism - Patient on Levothyroxine 25 mcg daily     Hypophosphatemia - Check AM level - low 2.4. Consult hospitalist. Continue K-Phos. K level stable. K-phos discontinue per Hospitalist. Recheck 2/23/24     BPD1 - Patient on Lamictal 100 mg daily, Primidone 100 mg QHS, Tegretol 200 mg BID, Buspar 5 mg BID, and Vraylar 6 mg QHS     Glaucoma - Patient on Timolol QHS    Vitamin D deficiency - 17 on admission, start 2000 U      Pain - Patient on PRN Tylenol and Oxycodone     Skin - Patient at risk for skin breakdown due to debility in areas including sacrum, achilles, elbows and head in addition to other sites. Nursing to assess skin daily.      GI Ppx - Patient on Prilosec for GERD prophylaxis. Patient on Senna-docusate for constipation prophylaxis.      DVT Ppx - Patient Eliquis is on hold on transfer. Restarted Eliquis on 2/19/24. Continue Eliquis, no new bleeding.   ____________________________________    T. Deven Gregory MD/PhD  [facility] - Physical Medicine & Rehabilitation   [facility] - Brain Injury Medicine   ____________________________________

## 2024-02-23 NOTE — PROGRESS NOTES
NURSING DAILY NOTE    Name: Molly Loera   Date of Admission: 2/14/2024   Admitting Diagnosis: Traumatic brain injury with new neurocognitive deficit (HCC)  Attending Physician: Mazin Gregory M.d.  Allergies: Depakote [valproic acid]    Safety  Patient Assist  sba  Patient Precautions  Fall Risk  Precaution Comments  poor safety awareness/impulsive  Bed Transfer Status  Standby Assist  Toilet Transfer Status   Standby Assist  Assistive Devices  Rails, Wheelchair  Oxygen  None - Room Air  Diet/Therapeutic Dining  Current Diet Order   Procedures    Diet Order Diet: Consistent CHO (Diabetic)     Pill Administration  whole  Agitated Behavioral Scale  15  ABS Level of Severity  No Agitation    Fall Risk  Has the patient had a fall this admission?   No  Prema Amaya Fall Risk Scoring  13, MODERATE RISK  Fall Risk Safety Measures  bed alarm, chair alarm, poor balance, and low vision/ hearing    Vitals  Temperature: 36.5 °C (97.7 °F)  Temp src: Oral  Pulse: 66  Respiration: 20  Blood Pressure : 135/73  Blood Pressure MAP (Calculated): 94 MM HG  BP Location: Right, Upper Arm  Patient BP Position: Adames's Position     Oxygen  Pulse Oximetry: 94 %  O2 (LPM): 0  O2 Delivery Device: None - Room Air    Bowel and Bladder  Last Bowel Movement  02/22/24  Stool Type  Type 5: Soft blob with clear cut edges (passed easily)  Bowel Device     Continent  Bladder: Stress incontinence   Bowel: Incontinent movement  Bladder Function  Urine Void (mL):  (MOderate)  Number of Times Voided: 1  Urine Color: Yellow  Number of Times Incontinent of Urine: 0  Genitourinary Assessment   Bladder Assessment (WDL):  WDL Except  Urinary Elimination: Frequency  Urine Color: Yellow  Number of Bladder Accidents: 0  Total Number of Bladder of Accidents in Last 7 Days: 0  Number of Times Incontinent of Urine: 0  Bladder Device: Bathroom  Time Void: No  Bladder Scan: Post Void  $  Bladder Scan Results (mL): 10    Skin  Zach Score   18  Sensory Interventions   Bed Types: Standard/Trauma Mattress with Overlay  Skin Preventative Measures: Waffle Overlay  Moisture Interventions  Moisturizers/Barriers: Barrier Wipes      Pain  Pain Rating Scale  0 - No Pain  Pain Location  Neck  Pain Location Orientation  Mid  Pain Interventions   [place]    ADLs    Bathing    (shower with ot)  Linen Change    (Pt refused said she dont shower on nights is too cold Rn was inform)  Personal Hygiene  Change Violeta Pads, Moist Violeta Wipes, Perineal Care  Chlorhexidine Bath      Oral Care     Teeth/Dentures     Shave     Nutrition Percentage Eaten  Between % Consumed  Environmental Precautions  Treaded Slipper Socks on Patient, Bed in Low Position  Patient Turns/Positioning  Patient Turns Self from Side to Side  Patient Turns Assistance/Tolerance     Bed Positions  Bed Controls On, Bed Locked  Head of Bed Elevated  Self regulated      Psychosocial/Neurologic Assessment  Psychosocial Assessment  Psychosocial (WDL):  Within Defined Limits  Neurologic Assessment  Neuro (WDL): Exceptions to WDL  Level of Consciousness: Alert  Orientation Level: Oriented X4  Cognition: Appropriate judgement, Appropriate safety awareness  Speech: Clear  Pupil Assesment: No  R Pupil Size (mm): 2  R Pupil Shape / Description: Round  R Pupil Reaction: Brisk  L Pupil Size (mm): 2  L Pupil Shape / Description: Round  L Pupil Reaction: Brisk  Motor Function/Sensation Assessment: Motor strength  Muscle Strength Right Arm: Good Strength Against Gravity and Moderate Resistance  Muscle Strength Left Arm: Good Strength Against Gravity and Moderate Resistance  Muscle Strength Right Leg: Good Strength Against Gravity and Moderate Resistance  Muscle Strength Left Leg: Good Strength Against Gravity and Moderate Resistance  EENT (WDL):  Within Defined Limits    Cardio/Pulmonary Assessment  Edema   RLE Edema: Trace  LLE Edema: Trace  Respiratory Breath  Sounds  RUL Breath Sounds: Clear  RML Breath Sounds: Clear  RLL Breath Sounds: Clear  SHAWNA Breath Sounds: Clear  LLL Breath Sounds: Clear  Cardiac Assessment   Cardiac (WDL):  WDL Except (HX: HTN)

## 2024-02-23 NOTE — THERAPY
Speech Language Pathology  Daily Treatment     Patient Name: Molly Loera  Age:  73 y.o., Sex:  female  Medical Record #: [id number]  Today's Date: 2/23/2024     Precautions  Precautions: Fall Risk  Comments: poor safety awareness/impulsive    Subjective    Pt pleasant and appeared in good spirits.      Objective       02/23/24 1004   Treatment Charges   SLP Cognitive Skill Development First 15 Minutes 1   SLP Cognitive Skill Development Additional 15 Minutes 3   SLP Total Time Spent   SLP Individual Total Time Spent (Mins) 60   Cognition   Moderate Attention Minimal (4)   Functional Problem Solving Supervision (5)   Interdisciplinary Plan of Care Collaboration   Patient Position at End of Therapy Seated;Chair Alarm On;Call Light within Reach         Assessment    Fxl PS as it pertains to DM management. Pt demonstrates good insight to managing blood sugars/administering insulin on daily basis. Pt aware of diet recommendations/restrictions and will continue to manage independently at home. Per patient, daughter keeps track of all appointments and attends with patient.   Selective attention task given multi paragraph reading passage. Pt able to locate 4/15 errors IND. With MIN cues pt able to increase to 11/15.     Strengths: Willingly participates in therapeutic activities, Motivated for self care and independence, Supportive family, Pleasant and cooperative, Making steady progress towards goals, Alert and oriented, Effective communication skills, Independent prior level of function  Barriers: Impaired functional cognition, Decreased endurance    Plan    Complete follow up outcome assessment testing. Family training on Saturday, please review with daughter ongoing deficits in attention, STM, recommend setting alarms to take meds during the day, ongoing SPV/assistance with all IADLs, use of Life Alert for when daughter is at work and pt is home alone. Continue to target attention, fxl PS    Passport items to be  completed:  Express basic needs, understand food/liquid recommendations, consistently follow swallow precautions, manage finances, manage medications, arrive to therapy appointments on time, complete daily memory log entries, solve problems related to safety situations, review education related to hospitalization, complete caregiver training     Speech Therapy Problems (Active)       Problem: Memory STGs       Dates: Start:  02/15/24         Goal: STG-Within one week, patient will complete mental manipulation tasks up to 4 units with no more than 1 repetition in 8/10 trials       Dates: Start:  02/15/24         Goal Note filed on 02/20/24 1143 by Kandy Zuñiga MS,CCC-SLP       Will continue to target.                 Problem: Speech/Swallowing LTGs       Dates: Start:  02/15/24         Goal: LTG-By discharge, patient will solve basic problems by utilizing compensatory intervention for memory and problem-solving to allow for safe completion of daily activities with SPV in order to prepare for safe d/c         Dates: Start:  02/15/24

## 2024-02-23 NOTE — CARE PLAN
"  Problem: Fall Risk - Rehab  Goal: Patient will remain free from falls  Outcome: Progressing  Note: Lloyd Jose Luis Fall risk Assessment Score: 13    Moderate fall risk Interventions  - Bed and strip alarm   - Yellow sign by the door   - Yellow wrist band \"Fall risk\"  - Room near to the nurse station  - Do not leave patient unattended in the bathroom  - Fall risk education provided      Problem: Bladder / Voiding  Goal: Patient will establish and maintain regular urinary output  Outcome: Progressing  Note: Assisted oob to bathroom , with frequency in urination, cont monitored.     Problem: Diabetes Management  Goal: Patient's ability to maintain appropriate glucose levels will be maintained or improve  Outcome: Progressing  Note: F/s monitored - 150 , no coverage needed , no s/s of hypo and hyperglycemia noted. Cont monitored.     Problem: Pain - Standard  Goal: Alleviation of pain or a reduction in pain to the patient’s comfort goal  Outcome: Progressing  Note: Assessed for pain and discomfort , pain under control, Needs anticipated and attended.   The patient is Stable - Low risk of patient condition declining or worsening    Shift Goals  Clinical Goals: Safety  Patient Goals: Safety  Family Goals: Education    Progress made toward(s) clinical / shift goals:  Pt free from fall and injury.    "

## 2024-02-23 NOTE — PROGRESS NOTES
NURSING DAILY NOTE    Name: Molly Loera   Date of Admission: 2/14/2024   Admitting Diagnosis: Traumatic brain injury with new neurocognitive deficit (HCC)  Attending Physician: Mazin Gregory M.d.  Allergies: Depakote [valproic acid]    Safety  Patient Assist  sba  Patient Precautions  Fall Risk  Precaution Comments  poor safety awareness/impulsive  Bed Transfer Status  Standby Assist  Toilet Transfer Status   Standby Assist  Assistive Devices  Wheelchair  Oxygen  None - Room Air  Diet/Therapeutic Dining  Current Diet Order   Procedures    Diet Order Diet: Consistent CHO (Diabetic)     Pill Administration  whole  Agitated Behavioral Scale  15  ABS Level of Severity  No Agitation    Fall Risk  Has the patient had a fall this admission?   No  Prema Amaya Fall Risk Scoring  13, MODERATE RISK  Fall Risk Safety Measures  bed alarm, chair alarm, poor balance, and low vision/ hearing    Vitals  Temperature: 36.5 °C (97.7 °F)  Temp src: Oral  Pulse: 75  Respiration: 20  Blood Pressure : 100/58  Blood Pressure MAP (Calculated): 72 MM HG  BP Location: Right, Upper Arm  Patient BP Position: Supine     Oxygen  Pulse Oximetry: 94 %  O2 (LPM): 0  O2 Delivery Device: None - Room Air    Bowel and Bladder  Last Bowel Movement  02/22/24  Stool Type  Type 5: Soft blob with clear cut edges (passed easily)  Bowel Device     Continent  Bladder: Stress incontinence   Bowel: Incontinent movement  Bladder Function  Urine Void (mL):  (moderate)  Number of Times Voided: 1  Urine Color: Yellow  Number of Times Incontinent of Urine: 0  Genitourinary Assessment   Bladder Assessment (WDL):  WDL Except  Urinary Elimination: Frequency  Urine Color: Yellow  Number of Bladder Accidents: 0  Total Number of Bladder of Accidents in Last 7 Days: 0  Number of Times Incontinent of Urine: 0  Bladder Device: Bathroom  Time Void: No  Bladder Scan: Post Void  $ Bladder Scan Results  (mL): 10    Skin  Zach Score   18  Sensory Interventions   Bed Types: Standard/Trauma Mattress with Overlay  Skin Preventative Measures: Waffle Overlay  Moisture Interventions  Moisturizers/Barriers: Barrier Wipes      Pain  Pain Rating Scale  0 - No Pain  Pain Location  Neck  Pain Location Orientation  Mid  Pain Interventions   Declines    ADLs    Bathing    (shower with ot)  Linen Change    (Pt refused said she dont shower on nights is too cold Rn was inform)  Personal Hygiene  Change Violeta Pads, Moist Violeta Wipes, Perineal Care  Chlorhexidine Bath      Oral Care     Teeth/Dentures     Shave     Nutrition Percentage Eaten  Between % Consumed  Environmental Precautions  Bed in Low Position, Treaded Slipper Socks on Patient  Patient Turns/Positioning  Patient Turns Self from Side to Side  Patient Turns Assistance/Tolerance     Bed Positions  Bed Controls On  Head of Bed Elevated  Self regulated      Psychosocial/Neurologic Assessment  Psychosocial Assessment  Psychosocial (WDL):  Within Defined Limits  Neurologic Assessment  Neuro (WDL): Exceptions to WDL  Level of Consciousness: Alert  Orientation Level: Oriented X4  Cognition: Appropriate judgement, Appropriate safety awareness  Speech: Clear  Pupil Assesment: No  R Pupil Size (mm): 2  R Pupil Shape / Description: Round  R Pupil Reaction: Brisk  L Pupil Size (mm): 2  L Pupil Shape / Description: Round  L Pupil Reaction: Brisk  Motor Function/Sensation Assessment: Motor strength  Muscle Strength Right Arm: Good Strength Against Gravity and Moderate Resistance  Muscle Strength Left Arm: Good Strength Against Gravity and Moderate Resistance  Muscle Strength Right Leg: Good Strength Against Gravity and Moderate Resistance  Muscle Strength Left Leg: Good Strength Against Gravity and Moderate Resistance  EENT (WDL):  Within Defined Limits    Cardio/Pulmonary Assessment  Edema   RLE Edema: Trace  LLE Edema: Trace  Respiratory Breath Sounds  RUL Breath Sounds:  Clear  RML Breath Sounds: Clear  RLL Breath Sounds: Clear  SHAWNA Breath Sounds: Clear  LLL Breath Sounds: Clear  Cardiac Assessment   Cardiac (WDL):  WDL Except (HX: HTN)
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
## 2024-02-24 ENCOUNTER — APPOINTMENT (OUTPATIENT)
Dept: PHYSICAL THERAPY | Facility: REHABILITATION | Age: 74
DRG: 950 | End: 2024-02-24
Attending: PHYSICAL MEDICINE & REHABILITATION
Payer: MEDICARE

## 2024-02-24 ENCOUNTER — APPOINTMENT (OUTPATIENT)
Dept: SPEECH THERAPY | Facility: REHABILITATION | Age: 74
DRG: 950 | End: 2024-02-24
Attending: PHYSICAL MEDICINE & REHABILITATION
Payer: MEDICARE

## 2024-02-24 ENCOUNTER — APPOINTMENT (OUTPATIENT)
Dept: OCCUPATIONAL THERAPY | Facility: REHABILITATION | Age: 74
DRG: 950 | End: 2024-02-24
Attending: PHYSICAL MEDICINE & REHABILITATION
Payer: MEDICARE

## 2024-02-24 LAB
GLUCOSE BLD STRIP.AUTO-MCNC: 119 MG/DL (ref 65–99)
GLUCOSE BLD STRIP.AUTO-MCNC: 125 MG/DL (ref 65–99)
GLUCOSE BLD STRIP.AUTO-MCNC: 160 MG/DL (ref 65–99)
GLUCOSE BLD STRIP.AUTO-MCNC: 399 MG/DL (ref 65–99)

## 2024-02-24 PROCEDURE — 99232 SBSQ HOSP IP/OBS MODERATE 35: CPT | Performed by: HOSPITALIST

## 2024-02-24 PROCEDURE — 97130 THER IVNTJ EA ADDL 15 MIN: CPT

## 2024-02-24 PROCEDURE — 82962 GLUCOSE BLOOD TEST: CPT

## 2024-02-24 PROCEDURE — 97530 THERAPEUTIC ACTIVITIES: CPT

## 2024-02-24 PROCEDURE — 770010 HCHG ROOM/CARE - REHAB SEMI PRIVAT*

## 2024-02-24 PROCEDURE — 97116 GAIT TRAINING THERAPY: CPT

## 2024-02-24 PROCEDURE — A9270 NON-COVERED ITEM OR SERVICE: HCPCS | Performed by: PHYSICAL MEDICINE & REHABILITATION

## 2024-02-24 PROCEDURE — 700102 HCHG RX REV CODE 250 W/ 637 OVERRIDE(OP): Performed by: PHYSICAL MEDICINE & REHABILITATION

## 2024-02-24 PROCEDURE — A9270 NON-COVERED ITEM OR SERVICE: HCPCS | Performed by: HOSPITALIST

## 2024-02-24 PROCEDURE — 700102 HCHG RX REV CODE 250 W/ 637 OVERRIDE(OP): Performed by: HOSPITALIST

## 2024-02-24 PROCEDURE — 97129 THER IVNTJ 1ST 15 MIN: CPT

## 2024-02-24 PROCEDURE — 97535 SELF CARE MNGMENT TRAINING: CPT

## 2024-02-24 RX ADMIN — AMLODIPINE BESYLATE 10 MG: 5 TABLET ORAL at 20:54

## 2024-02-24 RX ADMIN — DOCUSATE SODIUM 50MG AND SENNOSIDES 8.6MG 2 TABLET: 8.6; 5 TABLET, FILM COATED ORAL at 20:54

## 2024-02-24 RX ADMIN — APIXABAN 5 MG: 5 TABLET, FILM COATED ORAL at 08:05

## 2024-02-24 RX ADMIN — BUSPIRONE HYDROCHLORIDE 5 MG: 5 TABLET ORAL at 20:54

## 2024-02-24 RX ADMIN — DOCUSATE SODIUM 50MG AND SENNOSIDES 8.6MG 2 TABLET: 8.6; 5 TABLET, FILM COATED ORAL at 08:08

## 2024-02-24 RX ADMIN — CARBAMAZEPINE 200 MG: 100 TABLET, CHEWABLE ORAL at 20:59

## 2024-02-24 RX ADMIN — ATORVASTATIN CALCIUM 80 MG: 40 TABLET, FILM COATED ORAL at 20:54

## 2024-02-24 RX ADMIN — LOSARTAN POTASSIUM 100 MG: 50 TABLET, FILM COATED ORAL at 08:05

## 2024-02-24 RX ADMIN — INSULIN LISPRO 2 UNITS: 100 INJECTION, SOLUTION INTRAVENOUS; SUBCUTANEOUS at 21:06

## 2024-02-24 RX ADMIN — LEVOTHYROXINE SODIUM 25 MCG: 0.03 TABLET ORAL at 05:46

## 2024-02-24 RX ADMIN — APIXABAN 5 MG: 5 TABLET, FILM COATED ORAL at 20:54

## 2024-02-24 RX ADMIN — LAMOTRIGINE 100 MG: 100 TABLET ORAL at 08:05

## 2024-02-24 RX ADMIN — BUSPIRONE HYDROCHLORIDE 5 MG: 5 TABLET ORAL at 08:05

## 2024-02-24 RX ADMIN — OMEPRAZOLE 20 MG: 20 CAPSULE, DELAYED RELEASE ORAL at 08:05

## 2024-02-24 RX ADMIN — INSULIN LISPRO 10 UNITS: 100 INJECTION, SOLUTION INTRAVENOUS; SUBCUTANEOUS at 11:39

## 2024-02-24 RX ADMIN — CARBAMAZEPINE 200 MG: 100 TABLET, CHEWABLE ORAL at 08:05

## 2024-02-24 RX ADMIN — Medication 1000 UNITS: at 08:05

## 2024-02-24 RX ADMIN — PRIMIDONE 100 MG: 50 TABLET ORAL at 20:54

## 2024-02-24 RX ADMIN — TIMOLOL MALEATE 1 DROP: 5 SOLUTION OPHTHALMIC at 21:04

## 2024-02-24 RX ADMIN — CARIPRAZINE 6 MG: 3 CAPSULE, GELATIN COATED ORAL at 20:59

## 2024-02-24 ASSESSMENT — GAIT ASSESSMENTS
GAIT LEVEL OF ASSIST: STANDBY ASSIST
DISTANCE (FEET): 200
DEVIATION: DECREASED HEEL STRIKE;DECREASED TOE OFF;SHUFFLED GAIT;BRADYKINETIC

## 2024-02-24 ASSESSMENT — ENCOUNTER SYMPTOMS
HALLUCINATIONS: 0
VOMITING: 0
SHORTNESS OF BREATH: 0
DIZZINESS: 0
NAUSEA: 0
FEVER: 0
BLURRED VISION: 0
PALPITATIONS: 0
HEADACHES: 0

## 2024-02-24 ASSESSMENT — ACTIVITIES OF DAILY LIVING (ADL)
BED_CHAIR_WHEELCHAIR_TRANSFER_DESCRIPTION: ADAPTIVE EQUIPMENT;SUPERVISION FOR SAFETY
TUB_SHOWER_TRANSFER_DESCRIPTION: GRAB BAR;SUPERVISION FOR SAFETY

## 2024-02-24 ASSESSMENT — PATIENT HEALTH QUESTIONNAIRE - PHQ9
1. LITTLE INTEREST OR PLEASURE IN DOING THINGS: NOT AT ALL
SUM OF ALL RESPONSES TO PHQ9 QUESTIONS 1 AND 2: 0
2. FEELING DOWN, DEPRESSED, IRRITABLE, OR HOPELESS: NOT AT ALL

## 2024-02-24 NOTE — CARE PLAN
Problem: Skin Integrity  Goal: Skin integrity is maintained or improved  Outcome: Progressing  Patient assisted to turn to sides to prevent pressure areas.     Problem: Fall Risk - Rehab  Goal: Patient will remain free from falls  Outcome: Progressing  Patient assisted with needs/transfers to prevent falls/injury   The patient is Stable - Low risk of patient condition declining or worsening    Shift Goals  Clinical Goals: Safety  Patient Goals: Safety  Family Goals: Education    Progress made toward(s) clinical / shift goals:      Patient is not progressing towards the following goals:

## 2024-02-24 NOTE — PROGRESS NOTES
NURSING DAILY NOTE    Name: Molly Loera   Date of Admission: 2/14/2024   Admitting Diagnosis: Traumatic brain injury with new neurocognitive deficit (HCC)  Attending Physician: Mazin Gregory M.d.  Allergies: Depakote [valproic acid]    Safety  Patient Assist  sba  Patient Precautions  Fall Risk  Precaution Comments  poor safety awareness/impulsive  Bed Transfer Status  Standby Assist (car transfer with SBA, PT/family to manage door and FWW)  Toilet Transfer Status   Standby Assist  Assistive Devices  Rails, Wheelchair  Oxygen  None - Room Air  Diet/Therapeutic Dining  Current Diet Order   Procedures    Diet Order Diet: Consistent CHO (Diabetic)     Pill Administration  whole  Agitated Behavioral Scale  15  ABS Level of Severity  No Agitation    Fall Risk  Has the patient had a fall this admission?   No  Prema Amaya Fall Risk Scoring  15, HIGH RISK  Fall Risk Safety Measures  bed alarm, chair alarm, poor balance, and low vision/ hearing    Vitals  Temperature: 36.9 °C (98.5 °F)  Temp src: Temporal  Pulse: 75  Respiration: 18  Blood Pressure : 122/58  Blood Pressure MAP (Calculated): 79 MM HG  BP Location: Right, Upper Arm  Patient BP Position: Supine     Oxygen  Pulse Oximetry: 98 %  O2 (LPM): 0  O2 Delivery Device: None - Room Air    Bowel and Bladder  Last Bowel Movement  02/22/24  Stool Type  Type 5: Soft blob with clear cut edges (passed easily)  Bowel Device     Continent  Bladder: Stress incontinence   Bowel: Incontinent movement  Bladder Function  Urine Void (mL):  (MOderate)  Number of Times Voided: 1  Urine Color: Yellow  Number of Times Incontinent of Urine: 0  Genitourinary Assessment   Bladder Assessment (WDL):  WDL Except  Urinary Elimination: Frequency  Urine Color: Yellow  Number of Bladder Accidents: 0  Total Number of Bladder of Accidents in Last 7 Days: 0  Number of Times Incontinent of Urine: 0  Bladder Device:  Bathroom  Time Void: No  Bladder Scan: Post Void  $ Bladder Scan Results (mL): 10    Skin  Zach Score   18  Sensory Interventions   Bed Types: Standard/Trauma Mattress with Overlay  Skin Preventative Measures: Waffle Overlay  Moisture Interventions  Moisturizers/Barriers: Barrier Wipes      Pain  Pain Rating Scale  0 - No Pain  Pain Location  Neck  Pain Location Orientation  Mid  Pain Interventions   [place]    ADLs    Bathing    (shower with ot)  Linen Change    (Pt refused said she dont shower on nights is too cold Rn was inform)  Personal Hygiene  Change Violeta Pads, Moist Violeta Wipes, Perineal Care  Chlorhexidine Bath      Oral Care     Teeth/Dentures     Shave     Nutrition Percentage Eaten  Lunch, Between % Consumed  Environmental Precautions  Treaded Slipper Socks on Patient, Bed in Low Position  Patient Turns/Positioning  Patient Turns Self from Side to Side  Patient Turns Assistance/Tolerance     Bed Positions  Bed Controls On, Bed Locked  Head of Bed Elevated  Self regulated      Psychosocial/Neurologic Assessment  Psychosocial Assessment  Psychosocial (WDL):  Within Defined Limits  Neurologic Assessment  Neuro (WDL): Exceptions to WDL  Level of Consciousness: Alert  Orientation Level: Oriented X4  Cognition: Appropriate judgement, Appropriate safety awareness  Speech: Clear  Pupil Assesment: No  R Pupil Size (mm): 2  R Pupil Shape / Description: Round  R Pupil Reaction: Brisk  L Pupil Size (mm): 2  L Pupil Shape / Description: Round  L Pupil Reaction: Brisk  Motor Function/Sensation Assessment: Motor strength  Muscle Strength Right Arm: Good Strength Against Gravity and Moderate Resistance  Muscle Strength Left Arm: Good Strength Against Gravity and Moderate Resistance  Muscle Strength Right Leg: Good Strength Against Gravity and Moderate Resistance  Muscle Strength Left Leg: Good Strength Against Gravity and Moderate Resistance  EENT (WDL):  Within Defined Limits    Cardio/Pulmonary  Assessment  Edema   RLE Edema: Trace  LLE Edema: Trace  Respiratory Breath Sounds  RUL Breath Sounds: Clear  RML Breath Sounds: Clear  RLL Breath Sounds: Clear  SHAWNA Breath Sounds: Clear  LLL Breath Sounds: Clear  Cardiac Assessment   Cardiac (WDL):  WDL Except (HX: HTN)

## 2024-02-24 NOTE — THERAPY
Speech Language Pathology  Daily Treatment     Patient Name: Molly Loera  Age:  73 y.o., Sex:  female  Medical Record #: [id number]  Today's Date: 2/24/2024     Precautions  Precautions: Fall Risk  Comments: poor safety awareness/impulsive    Subjective    Pt pleasant and cooperative, dtr present and supportive.      Objective       02/24/24 1101   Treatment Charges   SLP Cognitive Skill Development First 15 Minutes 1   SLP Cognitive Skill Development Additional 15 Minutes 1   SLP Total Time Spent   SLP Individual Total Time Spent (Mins) 30   Cognition   Attention to Task Minimal (4)   Simple Attention Minimal (4)   Interdisciplinary Plan of Care Collaboration   IDT Collaboration with  Family / Caregiver   Patient Position at End of Therapy Seated;Call Light within Reach;Tray Table within Reach;Phone within Reach;Family / Friend in Room   Collaboration Comments D/C planning family training.         Assessment    Family training/education with pt r/t external and internal memory strategies. Education re: safety devices when dtr is at work- including Life Alert, utilizing a phone pouch necklace or risa pack so pt has phone at all times, and potentially a smart watch to alert for falls.  Pt and dtr report that they will look into Life Alert and get pt a phone pouch. Education provided re: using alarms and a shared calendar to alert pt of appointments and/or times to take medications etc.     Strengths: Willingly participates in therapeutic activities, Motivated for self care and independence, Supportive family, Pleasant and cooperative, Making steady progress towards goals, Alert and oriented, Effective communication skills, Independent prior level of function  Barriers: Impaired functional cognition, Decreased endurance    Plan    D/C planning.     Passport items to be completed:  Express basic needs, understand food/liquid recommendations, consistently follow swallow precautions, manage finances, manage  medications, arrive to therapy appointments on time, complete daily memory log entries, solve problems related to safety situations, review education related to hospitalization, complete caregiver training     Speech Therapy Problems (Active)       Problem: Memory STGs       Dates: Start:  02/15/24         Goal: STG-Within one week, patient will complete mental manipulation tasks up to 4 units with no more than 1 repetition in 8/10 trials       Dates: Start:  02/15/24         Goal Note filed on 02/20/24 1143 by Kandy Zuñiga MS,CCC-SLP       Will continue to target.                 Problem: Speech/Swallowing LTGs       Dates: Start:  02/15/24         Goal: LTG-By discharge, patient will solve basic problems by utilizing compensatory intervention for memory and problem-solving to allow for safe completion of daily activities with SPV in order to prepare for safe d/c         Dates: Start:  02/15/24

## 2024-02-24 NOTE — CARE PLAN
"  Problem: Fall Risk - Rehab  Goal: Patient will remain free from falls  Outcome: Progressing  Note: Prema Amaya Fall risk Assessment Score: 15    High fall risk Interventions   - Alarming seatbelt  - Bed and strip alarm   - Yellow sign by the door   - Yellow wrist band \"Fall risk\"  - Room near to the nurse station  - Do not leave patient unattended in the bathroom  - Fall risk education provided      Problem: Diabetes Management  Goal: Patient's ability to maintain appropriate glucose levels will be maintained or improve  Outcome: Progressing  Note: Finger stick monitored  HS- 226,due coverage given, no s/s of hypo and hyperglycemia noted.     Problem: Pain - Standard  Goal: Alleviation of pain or a reduction in pain to the patient’s comfort goal  Outcome: Progressing  Note: Assessed for pain and discomfort , pain under control. Needs anticipated and attended.   The patient is Stable - Low risk of patient condition declining or worsening    Shift Goals  Clinical Goals: Safety  Patient Goals: Safety  Family Goals: Education    Progress made toward(s) clinical / shift goals:  Pt free from fall and injury.    "

## 2024-02-24 NOTE — THERAPY
"Occupational Therapy  Daily Treatment     Patient Name: Molly Loera  Age:  73 y.o., Sex:  female  Medical Record #: [id number]  Today's Date: 2/24/2024     Precautions  Precautions: Fall Risk  Comments: poor safety awareness/impulsive    Safety   ADL Safety : Requires Supervision for Safety    Subjective    Pt seen 2x this day and agreeable to both sessions.     Objective       02/24/24 0831 02/24/24 1031   OT Charge Group   OT Self Care / ADL (Units) 1  --    OT Therapy Activity (Units) 3 2   OT Total Time Spent   OT Individual Total Time Spent (Mins) 60 30   Pain   Intervention Declines Declines   Cognition    Level of Consciousness Alert Alert   Functional Level of Assist   Upper Body Dressing Supervision  --    Upper Body Dressing Description Supervision for safety  (SPV for clothing retrieval from closet using FWW.)  --    Lower Body Dressing Supervision  --    Lower Body Dressing Description Increased time;Supervision for safety  --    Bed, Chair, Wheelchair Transfer Standby Assist  --    Bed Chair Wheelchair Transfer Description Adaptive equipment;Supervision for safety  --    Tub / Shower Transfers  --  Supervised   Tub Shower Transfer Description  --  Grab bar;Supervision for safety  (**dry step in shower transfer at SPV for clearing 4 inch barrier to sit on shower chair.)   IADL Treatments   Meal Preparation Pt participated in moderately challenging meal prep of spighetti and meat sauce on stove top without device at SPV level. Pt was able to physically perform all tasks ind, but required cues for problem solving, sequencing, time management, and safety during the task. When pouring pasta she dropped several peices on the floor and did not pick them up. After several min, OTR noted this with pt reporting, \"I knew I dropped them, but I don't like reaching down to pick things up. It hurts my back. Besides, I usually let my dog eat it.\" Pt then pickup and carried the pasta to the sink for draining, " missing the collandar leaving 14 the pasta in the sink on the cutting board that had previously been used for raw meat. OTR had to educate pt that this pasta was no longer edible or safe due to cross contamination, after which it was removed. Lastly, pt visually observed her pasta boiling, nearly over and verbalized this, but did not have an adequate solution to stop it from boiling over. She only stirred the pot again, then it began to boil up.  --    Interdisciplinary Plan of Care Collaboration   IDT Collaboration with   --  Family / Caregiver   Patient Position at End of Therapy  --  Family / Friend in Room  (Pt passed off in standing in main gym to SLP for continued FT.)   Collaboration Comments  --  dtr present for FT.     Family training completed with Daughter who were on time and present in person for all training. Reviewed and educated re: CLOF with ADL's, functional transfers, cognitive fatigue, DME recommendations, home safety, and level of supervision. OTR answered all questions recommending shower chair, GB in shower, and 24/7 care initially. Pt is currently using suction cup GB, but plans to consult a contractor for GB installation. Educated pt and dtr that if pt were to continue to use suction cup GB, that the dtr would be required to test each and every time pt was about to use them. Educated drt on pt's need for cues with safety, problem solving, and attention at times due to TBI. All family members reported that they feel comfortable and confident that they will be able to provide pt with 24/7 care.      Assessment    Pt continues to demonstrate poor attention to safety, problem solving, and attention during meal prep, and has been instructed not to use stove top or oven if dtr is not present.   Strengths: Able to follow instructions, Alert and oriented, Independent prior level of function, Motivated for self care and independence, Pleasant and cooperative, Supportive family, Willingly participates  in therapeutic activities  Barriers: Decreased endurance, Fatigue, Generalized weakness, Impaired balance, Impaired activity tolerance, Confused, Impaired functional cognition, Impulsive, Limited mobility, Visual impairment    Plan    Functional cognition, standing balance, overall strengthening      DME        Passport items to be completed:  Perform bathroom transfers, complete dressing, complete feeding, get ready for the day, prepare a simple meal, participate in household tasks, adapt home for safety needs, demonstrate home exercise program, complete caregiver training     Occupational Therapy Goals (Active)       Problem: OT Long Term Goals       Dates: Start:  02/15/24         Goal: LTG-By discharge, patient will complete basic self care tasks at SPV-Mod I level.       Dates: Start:  02/15/24            Goal: LTG-By discharge, patient will perform bathroom transfers at SPV-Mod I level.       Dates: Start:  02/15/24            Goal: LTG-By discharge, patient will complete basic home management at SPV-Mod I level.       Dates: Start:  02/15/24               Problem: Toileting       Dates: Start:  02/15/24         Goal: STG-Within one week, patient will complete toileting tasks at SBA level.       Dates: Start:  02/15/24

## 2024-02-24 NOTE — PROGRESS NOTES
Hospital Medicine Daily Progress Note      Chief Complaint  Hypertension  Diabetes    Interval Problem Update  Pt had pancakes for [employer] and her BS zulay up significantly.    Review of Systems  Review of Systems   Constitutional:  Negative for fever.   Eyes:  Negative for blurred vision.   Respiratory:  Negative for shortness of breath.    Cardiovascular:  Negative for palpitations.   Gastrointestinal:  Negative for nausea and vomiting.   Neurological:  Negative for dizziness and headaches.   Psychiatric/Behavioral:  Negative for hallucinations.         Physical Exam  Temp:  [36.7 °C (98 °F)-36.9 °C (98.5 °F)] 36.7 °C (98 °F)  Pulse:  [73-75] 73  Resp:  [16-18] 16  BP: (112-122)/(58-64) 112/64  SpO2:  [93 %-98 %] 93 %    Physical Exam  Vitals and nursing note reviewed.   Constitutional:       General: She is not in acute distress.  HENT:      Mouth/Throat:      Mouth: Mucous membranes are moist.      Pharynx: Oropharynx is clear.   Eyes:      General: No scleral icterus.  Cardiovascular:      Rate and Rhythm: Normal rate and regular rhythm.   Pulmonary:      Effort: Pulmonary effort is normal.      Breath sounds: No wheezing or rales.   Abdominal:      General: Bowel sounds are normal.      Palpations: Abdomen is soft.   Musculoskeletal:      Cervical back: No rigidity.      Right lower leg: No edema.      Left lower leg: No edema.   Skin:     General: Skin is warm and dry.   Neurological:      Mental Status: She is alert and oriented to person, place, and time.   Psychiatric:         Mood and Affect: Mood normal.         Behavior: Behavior normal.         Fluids    Intake/Output Summary (Last 24 hours) at 2/24/2024 1120  Last data filed at 2/23/2024 1220  Gross per 24 hour   Intake 240 ml   Output --   Net 240 ml       Laboratory  Recent Labs     02/23/24  0540   WBC 4.7*   RBC 4.19*   HEMOGLOBIN 12.1   HEMATOCRIT 37.7   MCV 90.0   MCH 28.9   MCHC 32.1*   RDW 50.5*   PLATELETCT 291   MPV 10.3       Recent Labs      02/23/24  0540   SODIUM 140   POTASSIUM 4.1   CHLORIDE 102   CO2 26   GLUCOSE 80   BUN 22   CREATININE 0.63   CALCIUM 9.8                   Assessment/Plan  * Traumatic brain injury with new neurocognitive deficit (HCC)- (present on admission)  Assessment & Plan  S/P MGLF with left sided head trauma  Neuroimaging: showed SAH  Cont Keppra for seizure proph  Note: now back on Eliquis    Glaucoma  Assessment & Plan  Cont Timolol    Vitamin D deficiency  Assessment & Plan  Vit D: 17  Cont supplements    PE (pulmonary thromboembolism) (HCC)- (present on admission)  Assessment & Plan  Cont Eliquis     Other specified hypothyroidism- (present on admission)  Assessment & Plan  Euthyroid on Synthroid    Dyslipidemia- (present on admission)  Assessment & Plan  Cont Lipitor    Essential hypertension- (present on admission)  Assessment & Plan  BP ok  Cont Norvasc  Cont Cozaar  Cont to monitor    Type 1 diabetes mellitus without complication (HCC)- (present on admission)  Assessment & Plan  Hba1c: 9.0 (2/15)  BS labile but trending better recently  Cont Glargine: 18 units qam, 12 units qpm --> 18 units qam, 15 units qpm  Note: home meds include Levemir 10-12 units bid and Novolog SSI  Cont to monitor    Bipolar 1 disorder (HCC)- (present on admission)  Assessment & Plan  Cont Buspar, Vraylar, Tegretol, Lamictal, and Primidone

## 2024-02-24 NOTE — CARE PLAN
Problem: Knowledge Deficit - Standard  Goal: Patient and family/care givers will demonstrate understanding of plan of care, disease process/condition, diagnostic tests and medications  Outcome: Progressing   Pt education given regarding plan of care with emphasis on adequate hydration, pt shows some understanding with moderate follow through, will continue to reinforce education and continue to monitor.            Problem: Fall Risk - Rehab  Goal: Patient will remain free from falls  Outcome: Progressing   Pt education given regarding fall precautions AND safety measures, pt shows good understanding, has not attempted to self transfer this shift, will continue to reinforce education and continue to monitor

## 2024-02-24 NOTE — PROGRESS NOTES
NURSING DAILY NOTE    Name: Molly Loera   Date of Admission: 2/14/2024   Admitting Diagnosis: Traumatic brain injury with new neurocognitive deficit (HCC)  Attending Physician: Mazin Gregory M.d.  Allergies: Depakote [valproic acid]    Safety  Patient Assist  contact guard  Patient Precautions  Fall Risk  Precaution Comments  poor safety awareness/impulsive  Bed Transfer Status  Standby Assist (car transfer with SBA, PT/family to manage door and FWW)  Toilet Transfer Status   Standby Assist  Assistive Devices  Rails, Wheelchair  Oxygen  None - Room Air  Diet/Therapeutic Dining  Current Diet Order   Procedures    Diet Order Diet: Consistent CHO (Diabetic)     Pill Administration  whole and one at a time   Agitated Behavioral Scale  15  ABS Level of Severity  No Agitation    Fall Risk  Has the patient had a fall this admission?   No  Prema Amaya Fall Risk Scoring  15, HIGH RISK  Fall Risk Safety Measures  bed alarm, chair alarm, poor balance, and low vision/ hearing    Vitals  Temperature: 36.7 °C (98 °F)  Temp src: Oral  Pulse: 73  Respiration: 16  Blood Pressure : 112/64  Blood Pressure MAP (Calculated): 80 MM HG  BP Location: Right, Upper Arm  Patient BP Position: Supine     Oxygen  Pulse Oximetry: 93 %  O2 (LPM): 0  O2 Delivery Device: None - Room Air    Bowel and Bladder  Last Bowel Movement  02/24/24  Stool Type  Type 5: Soft blob with clear cut edges (passed easily)  Bowel Device     Continent  Bladder: Stress incontinence   Bowel: Incontinent movement  Bladder Function  Urine Void (mL):  (bathroom augustus)  Number of Times Voided: 1  Urine Color: Yellow  Number of Times Incontinent of Urine: 0  Genitourinary Assessment   Bladder Assessment (WDL):  WDL Except  Urinary Elimination: Frequency  Urine Color: Yellow  Number of Bladder Accidents: 0  Total Number of Bladder of Accidents in Last 7 Days: 0  Number of Times Incontinent of Urine:  0  Bladder Device: Bathroom  Time Void: No  Bladder Scan: Post Void  $ Bladder Scan Results (mL): 10    Skin  Zach Score   18  Sensory Interventions   Bed Types: Standard/Trauma Mattress with Overlay  Skin Preventative Measures: Waffle Overlay  Moisture Interventions  Moisturizers/Barriers: Barrier Wipes      Pain  Pain Rating Scale  0 - No Pain  Pain Location  Neck  Pain Location Orientation  Mid  Pain Interventions   Declines    ADLs    Bathing    (shower with ot)  Linen Change    (Pt refused said she dont shower on nights is too cold Rn was inform)  Personal Hygiene  Change Violeta Pads, Moist Violeta Wipes, Perineal Care  Chlorhexidine Bath      Oral Care  Brushed Teeth  Teeth/Dentures     Shave     Nutrition Percentage Eaten  Lunch, Between % Consumed  Environmental Precautions  Treaded Slipper Socks on Patient, Personal Belongings, Wastebasket, [place] etc. in Easy Reach, Bed in Low Position  Patient Turns/Positioning  Patient Turns Self from Side to Side  Patient Turns Assistance/Tolerance     Bed Positions  Bed Controls On, Bed Locked  Head of Bed Elevated  Self regulated      Psychosocial/Neurologic Assessment  Psychosocial Assessment  Psychosocial (WDL):  WDL Except  Neurologic Assessment  Neuro (WDL): Exceptions to WDL  Level of Consciousness: Alert  Orientation Level: Oriented X4  Cognition: Appropriate judgement, Appropriate safety awareness  Speech: Clear  Pupil Assesment: No  R Pupil Size (mm): 2  R Pupil Shape / Description: Round  R Pupil Reaction: Brisk  L Pupil Size (mm): 2  L Pupil Shape / Description: Round  L Pupil Reaction: Brisk  Motor Function/Sensation Assessment: Motor strength  Muscle Strength Right Arm: Good Strength Against Gravity and Moderate Resistance  Muscle Strength Left Arm: Good Strength Against Gravity and Moderate Resistance  Muscle Strength Right Leg: Good Strength Against Gravity and Moderate Resistance  Muscle Strength Left Leg: Good Strength Against Gravity and  Moderate Resistance  EENT (WDL):  Within Defined Limits    Cardio/Pulmonary Assessment  Edema   RLE Edema: Trace  LLE Edema: Trace  Respiratory Breath Sounds  RUL Breath Sounds: Clear  RML Breath Sounds: Clear  RLL Breath Sounds: Clear  SHAWNA Breath Sounds: Clear  LLL Breath Sounds: Clear  Cardiac Assessment   Cardiac (WDL):  WDL Except (HX: HTN)

## 2024-02-24 NOTE — THERAPY
Physical Therapy   Daily Treatment     Patient Name: Molly Loera  Age:  73 y.o., Sex:  female  Medical Record #: [id number]  Today's Date: 2/24/2024     Precautions  Precautions: Fall Risk  Comments: poor safety awareness/impulsive    Subjective    Dtr came for FT, brought 4WW for home use     Objective       02/24/24 1001   PT Charge Group   PT Gait Training (Units) 1   PT Therapeutic Activities (Units) 1   PT Total Time Spent   PT Individual Total Time Spent (Mins) 30   Precautions   Precautions Fall Risk   Comments poor safety awareness/impulsive   Gait Functional Level of Assist    Gait Level Of Assist Standby Assist   Assistive Device 4 Wheel Walker;None  (100ft x 3 no AD with dual task SBA)   Distance (Feet) 200   # of Times Distance was Traveled 2   Deviation Decreased Heel Strike;Decreased Toe Off;Shuffled Gait;Bradykinetic  (constant cues for STS on 4WW for safe sequencing)   Stairs Functional Level of Assist   Level of Assist with Stairs Contact Guard Assist   # of Stairs Climbed 1  (4in step with right grab bar CGA)   Transfer Functional Level of Assist   Bed, Chair, Wheelchair Transfer Standby Assist  (car transfer using 4WW SBA, dtr to manage 4WW and car door)   Interdisciplinary Plan of Care Collaboration   IDT Collaboration with  Family / Caregiver;Occupational Therapist   Collaboration Comments Family training completed for ambulation with 4WW, no AD, car transfers and 4in step home entrance         Assessment    Completed FT for ambulation with 4WW (adjusted ht for patient), ambulation with no AD, car transfers, and 4in step; patient requires constant cues for safe sequencing with the 4WW since it is new to her, may benefit from FWW for long distance ambulation since she is familiar with the sequencing    Strengths: Able to follow instructions, Alert and oriented, Independent prior level of function, Pleasant and cooperative  Barriers: Generalized weakness, Fatigue, Impaired activity tolerance,  Impaired balance (high CBG)    Plan    Plan  4WW ambulation- reduce verbal cues to sequence rest breaks  Dynamic standing task  STS sequencing   Dual task  CV endurance     DME  PT DME Recommendations  Assistive Device: (P) Front Wheeled Walker     Passport items to be completed:  Get in/out of bed safely, in/out of a vehicle, safely use mobility device, walk or wheel around home/community, navigate up and down stairs, show how to get up/down from the ground, ensure home is accessible, demonstrate HEP, complete caregiver training    Physical Therapy Problems (Active)       Problem: Mobility       Dates: Start:  02/15/24         Goal: STG-Within one week, patient will ambulate up/down a curb using LRAD, SBA       Dates: Start:  02/15/24               Problem: PT-Long Term Goals       Dates: Start:  02/15/24         Goal: LTG-By discharge, patient will tolerate standing x10 mins       Dates: Start:  02/15/24            Goal: LTG-By discharge, patient will ambulate using LRAD x 300 feet, Mod I       Dates: Start:  02/15/24            Goal: LTG-By discharge, patient will perform home exercise program independently       Dates: Start:  02/15/24            Goal: LTG-By discharge, patient will transfer in/out of a car, Mod I       Dates: Start:  02/15/24

## 2024-02-24 NOTE — PROGRESS NOTES
NURSING DAILY NOTE    Name: Molly Loera   Date of Admission: 2/14/2024   Admitting Diagnosis: Traumatic brain injury with new neurocognitive deficit (HCC)  Attending Physician: Mazin Gregory M.d.  Allergies: Depakote [valproic acid]    Safety  Patient Assist  contact guard  Patient Precautions  Fall Risk  Precaution Comments  poor safety awareness/impulsive  Bed Transfer Status  Standby Assist (car transfer with SBA, PT/family to manage door and FWW)  Toilet Transfer Status   Standby Assist  Assistive Devices  Rails, Wheelchair  Oxygen  None - Room Air  Diet/Therapeutic Dining  Current Diet Order   Procedures    Diet Order Diet: Consistent CHO (Diabetic)     Pill Administration  whole  Agitated Behavioral Scale  15  ABS Level of Severity  No Agitation    Fall Risk  Has the patient had a fall this admission?   No  Prema Amaya Fall Risk Scoring  15, HIGH RISK  Fall Risk Safety Measures  bed alarm, chair alarm, and seatbelt alarm    Vitals  Temperature: 36.3 °C (97.4 °F)  Temp src: Oral  Pulse: 99  Respiration: 18  Blood Pressure : 107/71  Blood Pressure MAP (Calculated): 83 MM HG  BP Location: Right, Upper Arm  Patient BP Position: Supine     Oxygen  Pulse Oximetry: 98 %  O2 (LPM): 0  O2 Delivery Device: None - Room Air    Bowel and Bladder  Last Bowel Movement  02/24/24  Stool Type  Type 5: Soft blob with clear cut edges (passed easily)  Bowel Device     Continent  Bladder: Stress incontinence   Bowel: Incontinent movement  Bladder Function  Urine Void (mL):  (bathroom augustus)  Number of Times Voided: 1  Urine Color: Yellow  Number of Times Incontinent of Urine: 0  Genitourinary Assessment   Bladder Assessment (WDL):  WDL Except  Urinary Elimination: Frequency  Urine Color: Yellow  Number of Bladder Accidents: 0  Total Number of Bladder of Accidents in Last 7 Days: 0  Number of Times Incontinent of Urine: 0  Bladder Device: Bathroom  Time Void:  No  Bladder Scan: Post Void  $ Bladder Scan Results (mL): 10    Skin  Zach Score   18  Sensory Interventions   Bed Types: Standard/Trauma Mattress  Skin Preventative Measures: Waffle Overlay  Moisture Interventions  Moisturizers/Barriers: Barrier Wipes      Pain  Pain Rating Scale  0 - No Pain  Pain Location  Neck  Pain Location Orientation  Mid  Pain Interventions   Declines    ADLs    Bathing    (shower with ot)  Linen Change    (Pt refused said she dont shower on nights is too cold Rn was inform)  Personal Hygiene  Change Violeta Pads, Moist Violeta Wipes, Perineal Care  Chlorhexidine Bath      Oral Care  Brushed Teeth  Teeth/Dentures     Shave     Nutrition Percentage Eaten  Lunch, Between % Consumed  Environmental Precautions  Treaded Slipper Socks on Patient  Patient Turns/Positioning  Patient Turns Self from Side to Side  Patient Turns Assistance/Tolerance     Bed Positions  Bed Controls On, Bed Locked  Head of Bed Elevated  Self regulated      Psychosocial/Neurologic Assessment  Psychosocial Assessment  Psychosocial (WDL):  WDL Except  Neurologic Assessment  Neuro (WDL): Exceptions to WDL  Level of Consciousness: Alert  Orientation Level: Oriented X4  Cognition: Appropriate judgement  Speech: Clear  Pupil Assesment: No  R Pupil Size (mm): 2  R Pupil Shape / Description: Round  R Pupil Reaction: Brisk  L Pupil Size (mm): 2  L Pupil Shape / Description: Round  L Pupil Reaction: Brisk  Motor Function/Sensation Assessment: Motor strength  Muscle Strength Right Arm: Good Strength Against Gravity and Moderate Resistance  Muscle Strength Left Arm: Good Strength Against Gravity and Moderate Resistance  Muscle Strength Right Leg: Good Strength Against Gravity and Moderate Resistance  Muscle Strength Left Leg: Good Strength Against Gravity and Moderate Resistance  EENT (WDL):  Within Defined Limits    Cardio/Pulmonary Assessment  Edema   RLE Edema: Trace  LLE Edema: Trace  Respiratory Breath Sounds  RUL Breath  Sounds: Clear  RML Breath Sounds: Clear  RLL Breath Sounds: Clear  SHAWNA Breath Sounds: Clear  LLL Breath Sounds: Clear  Cardiac Assessment   Cardiac (WDL):  WDL Except (hx-htn)

## 2024-02-24 NOTE — THERAPY
Physical Therapy   Daily Treatment     Patient Name: Molly Loera  Age:  73 y.o., Sex:  female  Medical Record #: [id number]  Today's Date: 2/23/2024     Precautions  Precautions: Fall Risk  Comments: poor safety awareness/impulsive    Subjective    Patient able to remember order of yesterday's session and predict session order today based on memory     Objective       02/23/24 1331   PT Charge Group   PT Gait Training (Units) 2   PT Therapeutic Exercise (Units) 1   PT Therapeutic Activities (Units) 1   PT Total Time Spent   PT Individual Total Time Spent (Mins) 60   Precautions   Precautions Fall Risk   Comments poor safety awareness/impulsive   Gait Functional Level of Assist    Gait Level Of Assist Contact Guard Assist   Assistive Device Front Wheel Walker;None   Distance (Feet)   (200ft x 2 FWW SBA, 100ft x 2 FWW SBA; no AD 100ft x 3 on predictable route, carrying cone with CGA/SBA)   Transfer Functional Level of Assist   Bed, Chair, Wheelchair Transfer Standby Assist  (car transfer with SBA, PT/family to manage door and FWW)   Sitting Lower Body Exercises   Sitting Lower Body Exercises   (used as warm-up and cool-down)   Ankle Pumps 2 sets of 10;Bilateral   Hip Abduction 2 sets of 10;Bilateral   Hip Adduction 2 sets of 10;Bilateral   Long Arc Quad 2 sets of 10;Bilateral   Marching 2 sets of 10;Reciprocal   Hamstring Curl 2 sets of 10;Bilateral   Interdisciplinary Plan of Care Collaboration   IDT Collaboration with  Occupational Therapist   Collaboration Comments treatment planning         Assessment    Able to make safe choices on 4 out of 4 scenarios given whether or not to take FWW for mobility tasks  Long path- use FWW  Short path- no AD  Unpredictable path to car- use FWW  4.   Predictable path in room- no AD    Strengths: Able to follow instructions, Alert and oriented, Independent prior level of function, Pleasant and cooperative  Barriers: Generalized weakness, Fatigue, Impaired activity tolerance,  Impaired balance (high CBG)    Plan  FT with dtr- ambulation with FWW/no AD, car transfers, step up in to house with FWW  Dynamic standing task  STS sequencing   Dual task  CV endurance     DME  PT DME Recommendations  Assistive Device: (P) Front Wheeled Walker     Passport items to be completed:  Get in/out of bed safely, in/out of a vehicle, safely use mobility device, walk or wheel around home/community, navigate up and down stairs, show how to get up/down from the ground, ensure home is accessible, demonstrate HEP, complete caregiver training    Physical Therapy Problems (Active)       Problem: Mobility       Dates: Start:  02/15/24         Goal: STG-Within one week, patient will ambulate up/down a curb using LRAD, SBA       Dates: Start:  02/15/24               Problem: PT-Long Term Goals       Dates: Start:  02/15/24         Goal: LTG-By discharge, patient will tolerate standing x10 mins       Dates: Start:  02/15/24            Goal: LTG-By discharge, patient will ambulate using LRAD x 300 feet, Mod I       Dates: Start:  02/15/24            Goal: LTG-By discharge, patient will perform home exercise program independently       Dates: Start:  02/15/24            Goal: LTG-By discharge, patient will transfer in/out of a car, Mod I       Dates: Start:  02/15/24

## 2024-02-25 LAB
GLUCOSE BLD STRIP.AUTO-MCNC: 102 MG/DL (ref 65–99)
GLUCOSE BLD STRIP.AUTO-MCNC: 115 MG/DL (ref 65–99)
GLUCOSE BLD STRIP.AUTO-MCNC: 220 MG/DL (ref 65–99)
GLUCOSE BLD STRIP.AUTO-MCNC: 261 MG/DL (ref 65–99)
GLUCOSE BLD STRIP.AUTO-MCNC: 59 MG/DL (ref 65–99)
GLUCOSE BLD STRIP.AUTO-MCNC: 63 MG/DL (ref 65–99)

## 2024-02-25 PROCEDURE — 770010 HCHG ROOM/CARE - REHAB SEMI PRIVAT*

## 2024-02-25 PROCEDURE — 700102 HCHG RX REV CODE 250 W/ 637 OVERRIDE(OP): Performed by: HOSPITALIST

## 2024-02-25 PROCEDURE — A9270 NON-COVERED ITEM OR SERVICE: HCPCS | Performed by: HOSPITALIST

## 2024-02-25 PROCEDURE — 99232 SBSQ HOSP IP/OBS MODERATE 35: CPT | Performed by: HOSPITALIST

## 2024-02-25 PROCEDURE — A9270 NON-COVERED ITEM OR SERVICE: HCPCS | Performed by: PHYSICAL MEDICINE & REHABILITATION

## 2024-02-25 PROCEDURE — 700102 HCHG RX REV CODE 250 W/ 637 OVERRIDE(OP): Performed by: PHYSICAL MEDICINE & REHABILITATION

## 2024-02-25 PROCEDURE — 82962 GLUCOSE BLOOD TEST: CPT

## 2024-02-25 RX ADMIN — CARBAMAZEPINE 200 MG: 100 TABLET, CHEWABLE ORAL at 22:28

## 2024-02-25 RX ADMIN — APIXABAN 5 MG: 5 TABLET, FILM COATED ORAL at 08:48

## 2024-02-25 RX ADMIN — INSULIN LISPRO 6 UNITS: 100 INJECTION, SOLUTION INTRAVENOUS; SUBCUTANEOUS at 11:31

## 2024-02-25 RX ADMIN — TIMOLOL MALEATE 1 DROP: 5 SOLUTION OPHTHALMIC at 22:23

## 2024-02-25 RX ADMIN — Medication 1000 UNITS: at 08:47

## 2024-02-25 RX ADMIN — OMEPRAZOLE 20 MG: 20 CAPSULE, DELAYED RELEASE ORAL at 08:48

## 2024-02-25 RX ADMIN — PRIMIDONE 100 MG: 50 TABLET ORAL at 22:27

## 2024-02-25 RX ADMIN — LEVOTHYROXINE SODIUM 25 MCG: 0.03 TABLET ORAL at 05:22

## 2024-02-25 RX ADMIN — ATORVASTATIN CALCIUM 80 MG: 40 TABLET, FILM COATED ORAL at 22:28

## 2024-02-25 RX ADMIN — LOSARTAN POTASSIUM 100 MG: 50 TABLET, FILM COATED ORAL at 08:48

## 2024-02-25 RX ADMIN — BUSPIRONE HYDROCHLORIDE 5 MG: 5 TABLET ORAL at 22:28

## 2024-02-25 RX ADMIN — BUSPIRONE HYDROCHLORIDE 5 MG: 5 TABLET ORAL at 08:47

## 2024-02-25 RX ADMIN — DOCUSATE SODIUM 50MG AND SENNOSIDES 8.6MG 2 TABLET: 8.6; 5 TABLET, FILM COATED ORAL at 22:27

## 2024-02-25 RX ADMIN — CARIPRAZINE 6 MG: 3 CAPSULE, GELATIN COATED ORAL at 22:28

## 2024-02-25 RX ADMIN — APIXABAN 5 MG: 5 TABLET, FILM COATED ORAL at 22:28

## 2024-02-25 RX ADMIN — LAMOTRIGINE 100 MG: 100 TABLET ORAL at 08:48

## 2024-02-25 RX ADMIN — DOCUSATE SODIUM 50MG AND SENNOSIDES 8.6MG 2 TABLET: 8.6; 5 TABLET, FILM COATED ORAL at 08:47

## 2024-02-25 RX ADMIN — CARBAMAZEPINE 200 MG: 100 TABLET, CHEWABLE ORAL at 08:47

## 2024-02-25 RX ADMIN — AMLODIPINE BESYLATE 10 MG: 5 TABLET ORAL at 22:27

## 2024-02-25 RX ADMIN — INSULIN LISPRO 4 UNITS: 100 INJECTION, SOLUTION INTRAVENOUS; SUBCUTANEOUS at 22:17

## 2024-02-25 ASSESSMENT — ENCOUNTER SYMPTOMS
BLURRED VISION: 0
FEVER: 0
DIARRHEA: 0
DIZZINESS: 0
COUGH: 0
NERVOUS/ANXIOUS: 0

## 2024-02-25 ASSESSMENT — PAIN DESCRIPTION - PAIN TYPE: TYPE: ACUTE PAIN

## 2024-02-25 NOTE — PROGRESS NOTES
Hospital Medicine Daily Progress Note      Chief Complaint  Hypertension  Diabetes    Interval Problem Update  Pt had low BS this am.  States she ate all her dinner but didn't have any extra carbs like she sometimes has (I.e. cookies).  Pt to take a bedtime snack if BS are lower at night (bowen. Less than 125).    Review of Systems  Review of Systems   Constitutional:  Negative for fever.   Eyes:  Negative for blurred vision.   Respiratory:  Negative for cough.    Cardiovascular:  Negative for chest pain.   Gastrointestinal:  Negative for diarrhea.   Musculoskeletal:  Negative for joint pain.   Neurological:  Negative for dizziness.   Psychiatric/Behavioral:  The patient is not nervous/anxious.         Physical Exam  Temp:  [36.3 °C (97.4 °F)-36.9 °C (98.4 °F)] 36.9 °C (98.4 °F)  Pulse:  [73-99] 73  Resp:  [18] 18  BP: (107-134)/(51-74) 134/74  SpO2:  [95 %-99 %] 99 %    Physical Exam  Vitals and nursing note reviewed.   Constitutional:       Appearance: She is not diaphoretic.   HENT:      Mouth/Throat:      Pharynx: No oropharyngeal exudate or posterior oropharyngeal erythema.   Eyes:      Extraocular Movements: Extraocular movements intact.   Neck:      Vascular: No carotid bruit.   Cardiovascular:      Rate and Rhythm: Normal rate and regular rhythm.   Pulmonary:      Effort: Pulmonary effort is normal.      Breath sounds: No wheezing or rales.   Abdominal:      General: There is no distension.      Palpations: Abdomen is soft.      Tenderness: There is no abdominal tenderness.   Musculoskeletal:      Right lower leg: No edema.      Left lower leg: No edema.   Skin:     General: Skin is warm and dry.   Neurological:      Mental Status: She is alert and oriented to person, place, and time.   Psychiatric:         Mood and Affect: Mood normal.         Behavior: Behavior normal.         Fluids    Intake/Output Summary (Last 24 hours) at 2/25/2024 4018  Last data filed at 2/25/2024 0520  Gross per 24 hour   Intake 760  ml   Output --   Net 760 ml       Laboratory  Recent Labs     02/23/24  0540   WBC 4.7*   RBC 4.19*   HEMOGLOBIN 12.1   HEMATOCRIT 37.7   MCV 90.0   MCH 28.9   MCHC 32.1*   RDW 50.5*   PLATELETCT 291   MPV 10.3       Recent Labs     02/23/24  0540   SODIUM 140   POTASSIUM 4.1   CHLORIDE 102   CO2 26   GLUCOSE 80   BUN 22   CREATININE 0.63   CALCIUM 9.8                   Assessment/Plan  * Traumatic brain injury with new neurocognitive deficit (HCC)- (present on admission)  Assessment & Plan  S/P MGLF with left sided head trauma  Neuroimaging: showed SAH  Cont Keppra for seizure proph  Note: now back on Eliquis    Glaucoma  Assessment & Plan  Cont Timolol    Vitamin D deficiency  Assessment & Plan  Vit D: 17  Cont supplements    PE (pulmonary thromboembolism) (HCC)- (present on admission)  Assessment & Plan  Cont Eliquis     Other specified hypothyroidism- (present on admission)  Assessment & Plan  Euthyroid on Synthroid    Dyslipidemia- (present on admission)  Assessment & Plan  Cont Lipitor    Essential hypertension- (present on admission)  Assessment & Plan  BP ok  Cont Norvasc  Cont Cozaar  Cont to monitor    Type 1 diabetes mellitus without complication (HCC)- (present on admission)  Assessment & Plan  Hba1c: 9.0 (2/15)  BS labile and dropped lower this am  Cont Glargine: 18 units qam, 15 units qpm  Note: home meds include Levemir 10-12 units bid and Novolog SSI  Cont to monitor    Bipolar 1 disorder (HCC)- (present on admission)  Assessment & Plan  Cont Buspar, Vraylar, Tegretol, Lamictal, and Primidone

## 2024-02-25 NOTE — CARE PLAN
"The patient is Watcher - Medium risk of patient condition declining or worsening    Shift Goals  Clinical Goals: Safety  Patient Goals: Safety  Family Goals: Education    Progress made toward(s) clinical / shift goals:    Problem: Skin Integrity  Goal: Skin integrity is maintained or improved  Note: Patient's skin remains intact and free from new or accidental injury this shift.  Will continue to monitor.     Problem: Fall Risk - Rehab  Goal: Patient will remain free from falls  Note: Prema Amaya Fall risk Assessment Score: 15    High fall risk Interventions   - Alarming seatbelt  - Bed and strip alarm   - Yellow sign by the door   - Yellow wrist band \"Fall risk\"  - Room near to the nurse station  - Do not leave patient unattended in the bathroom  - Fall risk education provided       "

## 2024-02-25 NOTE — CARE PLAN
The patient is Stable - Low risk of patient condition declining or worsening    Shift Goals  Clinical Goals: Safety  Patient Goals: Safety  Family Goals: Education    Progress made toward(s) clinical / shift goals:  pt participates with therapy and is cooperative with nursing    Patient is not progressing towards the following goals:

## 2024-02-25 NOTE — PROGRESS NOTES
NURSING DAILY NOTE    Name: Molly Loera   Date of Admission: 2/14/2024   Admitting Diagnosis: Traumatic brain injury with new neurocognitive deficit (HCC)  Attending Physician: Mazin Gregory M.d.  Allergies: Depakote [valproic acid]    Safety  Patient Assist  cga  Patient Precautions  Fall Risk  Precaution Comments  poor safety awareness/impulsive  Bed Transfer Status  Standby Assist (car transfer using 4WW SBA, dtr to manage 4WW and car door)  Toilet Transfer Status   Standby Assist  Assistive Devices  Rails, Wheelchair  Oxygen  None - Room Air  Diet/Therapeutic Dining  Current Diet Order   Procedures    Diet Order Diet: Consistent CHO (Diabetic)     Pill Administration  whole  Agitated Behavioral Scale  15  ABS Level of Severity  No Agitation    Fall Risk  Has the patient had a fall this admission?   No  Prema Amaya Fall Risk Scoring  15, HIGH RISK  Fall Risk Safety Measures  bed alarm, chair alarm, and seatbelt alarm    Vitals  Temperature: 36.9 °C (98.4 °F)  Temp src: Oral  Pulse: 73  Respiration: 18  Blood Pressure : 134/74  Blood Pressure MAP (Calculated): 94 MM HG  BP Location: Right, Upper Arm  Patient BP Position: Supine     Oxygen  Pulse Oximetry: 99 %  O2 (LPM): 0  O2 Delivery Device: None - Room Air    Bowel and Bladder  Last Bowel Movement  02/24/24  Stool Type  Type 5: Soft blob with clear cut edges (passed easily)  Bowel Device     Continent  Bladder: Stress incontinence   Bowel: Incontinent movement  Bladder Function  Urine Void (mL):  (moderate)  Number of Times Voided: 1  Urine Color: Yellow  Number of Times Incontinent of Urine: 0  Genitourinary Assessment   Bladder Assessment (WDL):  WDL Except  Urinary Elimination: Frequency  Urine Color: Yellow  Number of Bladder Accidents: 0  Total Number of Bladder of Accidents in Last 7 Days: 0  Number of Times Incontinent of Urine: 0  Bladder Device: Bathroom  Time Void:  No  Bladder Scan: Post Void  $ Bladder Scan Results (mL): 10    Skin  Zach Score   18  Sensory Interventions   Bed Types: Standard/Trauma Mattress  Skin Preventative Measures: Pillows in Use for Support / Positioning  Moisture Interventions  Moisturizers/Barriers: Barrier Wipes      Pain  Pain Rating Scale  0 - No Pain  Pain Location  Neck  Pain Location Orientation  Mid  Pain Interventions   Declines    ADLs    Bathing   Patient Refused Bathing (nuese aware,refused shower)  Linen Change    (Pt refused said she dont shower on nights is too cold Rn was inform)  Personal Hygiene  Change Violeta Pads, Moist Violeta Wipes, Perineal Care  Chlorhexidine Bath      Oral Care  Brushed Teeth  Teeth/Dentures     Shave     Nutrition Percentage Eaten  Lunch, Between 50-75% Consumed  Environmental Precautions  Bed in Low Position, Treaded Slipper Socks on Patient  Patient Turns/Positioning  Patient Turns Self from Side to Side  Patient Turns Assistance/Tolerance     Bed Positions  Bed Controls On  Head of Bed Elevated  Self regulated      Psychosocial/Neurologic Assessment  Psychosocial Assessment  Psychosocial (WDL):  WDL Except  Neurologic Assessment  Neuro (WDL): Exceptions to WDL  Level of Consciousness: Alert  Orientation Level: Oriented X4  Cognition: Appropriate judgement  Speech: Clear  Pupil Assesment: No  R Pupil Size (mm): 2  R Pupil Shape / Description: Round  R Pupil Reaction: Brisk  L Pupil Size (mm): 2  L Pupil Shape / Description: Round  L Pupil Reaction: Brisk  Motor Function/Sensation Assessment: Motor strength  Muscle Strength Right Arm: Good Strength Against Gravity and Moderate Resistance  Muscle Strength Left Arm: Good Strength Against Gravity and Moderate Resistance  Muscle Strength Right Leg: Good Strength Against Gravity and Moderate Resistance  Muscle Strength Left Leg: Good Strength Against Gravity and Moderate Resistance  EENT (WDL):  Within Defined Limits    Cardio/Pulmonary Assessment  Edema   RLE Edema:  Trace  LLE Edema: Trace  Respiratory Breath Sounds  RUL Breath Sounds: Clear  RML Breath Sounds: Clear  RLL Breath Sounds: Clear  SHAWNA Breath Sounds: Clear  LLL Breath Sounds: Clear  Cardiac Assessment   Cardiac (WDL):  WDL Except (hx-htn)

## 2024-02-26 ENCOUNTER — APPOINTMENT (OUTPATIENT)
Dept: SPEECH THERAPY | Facility: REHABILITATION | Age: 74
DRG: 950 | End: 2024-02-26
Attending: PHYSICAL MEDICINE & REHABILITATION
Payer: MEDICARE

## 2024-02-26 ENCOUNTER — APPOINTMENT (OUTPATIENT)
Dept: OCCUPATIONAL THERAPY | Facility: REHABILITATION | Age: 74
DRG: 950 | End: 2024-02-26
Attending: PHYSICAL MEDICINE & REHABILITATION
Payer: MEDICARE

## 2024-02-26 ENCOUNTER — APPOINTMENT (OUTPATIENT)
Dept: PHYSICAL THERAPY | Facility: REHABILITATION | Age: 74
DRG: 950 | End: 2024-02-26
Attending: PHYSICAL MEDICINE & REHABILITATION
Payer: MEDICARE

## 2024-02-26 LAB
GLUCOSE BLD STRIP.AUTO-MCNC: 105 MG/DL (ref 65–99)
GLUCOSE BLD STRIP.AUTO-MCNC: 145 MG/DL (ref 65–99)
GLUCOSE BLD STRIP.AUTO-MCNC: 245 MG/DL (ref 65–99)
GLUCOSE BLD STRIP.AUTO-MCNC: 296 MG/DL (ref 65–99)

## 2024-02-26 PROCEDURE — A9270 NON-COVERED ITEM OR SERVICE: HCPCS | Performed by: HOSPITALIST

## 2024-02-26 PROCEDURE — 97112 NEUROMUSCULAR REEDUCATION: CPT | Mod: CQ

## 2024-02-26 PROCEDURE — 99232 SBSQ HOSP IP/OBS MODERATE 35: CPT | Performed by: PHYSICAL MEDICINE & REHABILITATION

## 2024-02-26 PROCEDURE — 97130 THER IVNTJ EA ADDL 15 MIN: CPT

## 2024-02-26 PROCEDURE — 82962 GLUCOSE BLOOD TEST: CPT | Mod: 91

## 2024-02-26 PROCEDURE — A9270 NON-COVERED ITEM OR SERVICE: HCPCS | Performed by: PHYSICAL MEDICINE & REHABILITATION

## 2024-02-26 PROCEDURE — 700102 HCHG RX REV CODE 250 W/ 637 OVERRIDE(OP): Performed by: HOSPITALIST

## 2024-02-26 PROCEDURE — 97530 THERAPEUTIC ACTIVITIES: CPT

## 2024-02-26 PROCEDURE — 97530 THERAPEUTIC ACTIVITIES: CPT | Mod: CQ

## 2024-02-26 PROCEDURE — 97116 GAIT TRAINING THERAPY: CPT | Mod: CQ

## 2024-02-26 PROCEDURE — 700102 HCHG RX REV CODE 250 W/ 637 OVERRIDE(OP): Performed by: PHYSICAL MEDICINE & REHABILITATION

## 2024-02-26 PROCEDURE — 99231 SBSQ HOSP IP/OBS SF/LOW 25: CPT | Performed by: HOSPITALIST

## 2024-02-26 PROCEDURE — 97129 THER IVNTJ 1ST 15 MIN: CPT

## 2024-02-26 PROCEDURE — 770010 HCHG ROOM/CARE - REHAB SEMI PRIVAT*

## 2024-02-26 PROCEDURE — 97535 SELF CARE MNGMENT TRAINING: CPT

## 2024-02-26 RX ADMIN — CARBAMAZEPINE 200 MG: 100 TABLET, CHEWABLE ORAL at 08:13

## 2024-02-26 RX ADMIN — CARIPRAZINE 6 MG: 3 CAPSULE, GELATIN COATED ORAL at 21:35

## 2024-02-26 RX ADMIN — BUSPIRONE HYDROCHLORIDE 5 MG: 5 TABLET ORAL at 08:12

## 2024-02-26 RX ADMIN — CARBAMAZEPINE 200 MG: 100 TABLET, CHEWABLE ORAL at 21:35

## 2024-02-26 RX ADMIN — AMLODIPINE BESYLATE 10 MG: 5 TABLET ORAL at 21:36

## 2024-02-26 RX ADMIN — BUSPIRONE HYDROCHLORIDE 5 MG: 5 TABLET ORAL at 21:35

## 2024-02-26 RX ADMIN — LEVOTHYROXINE SODIUM 25 MCG: 0.03 TABLET ORAL at 05:23

## 2024-02-26 RX ADMIN — Medication 1000 UNITS: at 08:13

## 2024-02-26 RX ADMIN — APIXABAN 5 MG: 5 TABLET, FILM COATED ORAL at 21:35

## 2024-02-26 RX ADMIN — INSULIN LISPRO 6 UNITS: 100 INJECTION, SOLUTION INTRAVENOUS; SUBCUTANEOUS at 11:36

## 2024-02-26 RX ADMIN — OMEPRAZOLE 20 MG: 20 CAPSULE, DELAYED RELEASE ORAL at 08:12

## 2024-02-26 RX ADMIN — TIMOLOL MALEATE 1 DROP: 5 SOLUTION OPHTHALMIC at 21:38

## 2024-02-26 RX ADMIN — LOSARTAN POTASSIUM 100 MG: 50 TABLET, FILM COATED ORAL at 08:17

## 2024-02-26 RX ADMIN — PRIMIDONE 100 MG: 50 TABLET ORAL at 21:36

## 2024-02-26 RX ADMIN — ATORVASTATIN CALCIUM 80 MG: 40 TABLET, FILM COATED ORAL at 21:35

## 2024-02-26 RX ADMIN — LAMOTRIGINE 100 MG: 100 TABLET ORAL at 08:11

## 2024-02-26 RX ADMIN — DOCUSATE SODIUM 50MG AND SENNOSIDES 8.6MG 2 TABLET: 8.6; 5 TABLET, FILM COATED ORAL at 08:12

## 2024-02-26 RX ADMIN — APIXABAN 5 MG: 5 TABLET, FILM COATED ORAL at 08:13

## 2024-02-26 RX ADMIN — INSULIN LISPRO 4 UNITS: 100 INJECTION, SOLUTION INTRAVENOUS; SUBCUTANEOUS at 21:38

## 2024-02-26 RX ADMIN — DOCUSATE SODIUM 50MG AND SENNOSIDES 8.6MG 2 TABLET: 8.6; 5 TABLET, FILM COATED ORAL at 21:35

## 2024-02-26 ASSESSMENT — ENCOUNTER SYMPTOMS
NERVOUS/ANXIOUS: 0
FEVER: 0
SHORTNESS OF BREATH: 0
ABDOMINAL PAIN: 0
VOMITING: 0
NAUSEA: 0
DIARRHEA: 0
CHILLS: 0

## 2024-02-26 ASSESSMENT — GAIT ASSESSMENTS
GAIT LEVEL OF ASSIST: STANDBY ASSIST
ASSISTIVE DEVICE: 4 WHEEL WALKER
DISTANCE (FEET): 250

## 2024-02-26 ASSESSMENT — ACTIVITIES OF DAILY LIVING (ADL)
TOILETING_LEVEL_OF_ASSIST_DESCRIPTION: ADAPTIVE EQUIPMENT;GRAB BAR;INCREASED TIME;SUPERVISION FOR SAFETY
TOILET_TRANSFER_DESCRIPTION: ADAPTIVE EQUIPMENT;GRAB BAR;INCREASED TIME;SUPERVISION FOR SAFETY

## 2024-02-26 NOTE — CARE PLAN
"  Problem: Knowledge Deficit - Standard  Goal: Patient and family/care givers will demonstrate understanding of plan of care, disease process/condition, diagnostic tests and medications  Outcome: Progressing  Note: Pt agrees with plan of care tonight regarding medications and safety.  Will continue to monitor patient.     Problem: Fall Risk - Rehab  Goal: Patient will remain free from falls  Outcome: Progressing  Note: Prema Amaya Fall risk Assessment Score:  15    High fall risk Interventions   - Alarming seatbelt  - Wander guard  - Bed and strip alarm   - Yellow sign by the door   - Yellow wrist band \"Fall risk\"  - Room near to the nurse station  - Do not leave patient unattended in the bathroom  - Fall risk education provided       The patient is Stable - Low risk of patient condition declining or worsening    Shift Goals  Clinical Goals: Safety  Patient Goals: Sleep well  Family Goals: Education    Progress made toward(s) clinical / shift goals:  progressing      "

## 2024-02-26 NOTE — PROGRESS NOTES
NURSING DAILY NOTE    Name: Molly Loera   Date of Admission: 2/14/2024   Admitting Diagnosis: Traumatic brain injury with new neurocognitive deficit (HCC)  Attending Physician: Mazin Gregory M.d.  Allergies: Depakote [valproic acid]    Safety  Patient Assist  cga  Patient Precautions  Fall Risk  Precaution Comments  poor safety awareness/impulsive  Bed Transfer Status  Standby Assist (car transfer using 4WW SBA, dtr to manage 4WW and car door)  Toilet Transfer Status   Standby Assist  Assistive Devices  Rails, Wheelchair  Oxygen  None - Room Air  Diet/Therapeutic Dining  Current Diet Order   Procedures    Diet Order Diet: Consistent CHO (Diabetic)     Pill Administration  whole  Agitated Behavioral Scale  15  ABS Level of Severity  No Agitation    Fall Risk  Has the patient had a fall this admission?   No  Prema Amaya Fall Risk Scoring  15, HIGH RISK  Fall Risk Safety Measures  bed alarm and bed strip alarm    Vitals  Temperature: 36.7 °C (98.1 °F)  Temp src: Oral  Pulse: 74  Respiration: 18  Blood Pressure : 113/52  Blood Pressure MAP (Calculated): 72 MM HG  BP Location: Right, Upper Arm  Patient BP Position: Supine     Oxygen  Pulse Oximetry: 93 %  O2 (LPM): 0  O2 Delivery Device: None - Room Air    Bowel and Bladder  Last Bowel Movement  02/24/24  Stool Type  Type 5: Soft blob with clear cut edges (passed easily)  Bowel Device  Bathroom, Other (Comment) (Bowel Meds)  Continent  Bladder: Stress incontinence   Bowel: Incontinent movement  Bladder Function  Urine Void (mL):  (moderate)  Number of Times Voided: 1  Urinary Options: Yes  Urine Color: Yellow  Number of Times Incontinent of Urine: 0  Genitourinary Assessment   Bladder Assessment (WDL):  WDL Except  Mcelroy Catheter: Not Applicable  Urinary Elimination: Frequency  Urine Color: Yellow  Number of Bladder Accidents: 0  Total Number of Bladder of Accidents in Last 7 Days: 0  Number of  Times Incontinent of Urine: 0  Bladder Device: Bathroom  Time Void: No  Bladder Scan: Post Void  $ Bladder Scan Results (mL): 10    Skin  Zach Score   18  Sensory Interventions   Bed Types: Standard/Trauma Mattress with Overlay  Skin Preventative Measures: Pillows in Use for Support / Positioning  Moisture Interventions  Moisturizers/Barriers: Barrier Wipes      Pain  Pain Rating Scale  0 - No Pain  Pain Location  Neck  Pain Location Orientation  Mid  Pain Interventions   Declines    ADLs    Bathing   Patient Refused Bathing (nuese aware,refused shower)  Linen Change    (Pt refused said she dont shower on nights is too cold Rn was inform)  Personal Hygiene  Change Violeta Pads, Moist Violeta Wipes, Perineal Care  Chlorhexidine Bath      Oral Care  Brushed Teeth  Teeth/Dentures     Shave     Nutrition Percentage Eaten  Lunch, Between 50-75% Consumed  Environmental Precautions  Bed in Low Position, Treaded Slipper Socks on Patient  Patient Turns/Positioning  Patient Turns Self from Side to Side  Patient Turns Assistance/Tolerance  Assistance of One, General Weakness  Bed Positions  Bed Controls On  Head of Bed Elevated  Self regulated      Psychosocial/Neurologic Assessment  Psychosocial Assessment  Psychosocial (WDL):  Within Defined Limits  Neurologic Assessment  Neuro (WDL): Exceptions to WDL  Level of Consciousness: Alert  Orientation Level: Oriented X4  Cognition: Appropriate judgement  Speech: Clear  Pupil Assesment: No  R Pupil Size (mm): 2  R Pupil Shape / Description: Round  R Pupil Reaction: Brisk  L Pupil Size (mm): 2  L Pupil Shape / Description: Round  L Pupil Reaction: Brisk  Motor Function/Sensation Assessment: Motor strength  Muscle Strength Right Arm: Good Strength Against Gravity and Moderate Resistance  Muscle Strength Left Arm: Good Strength Against Gravity and Moderate Resistance  Muscle Strength Right Leg: Good Strength Against Gravity and Moderate Resistance  Muscle Strength Left Leg: Good Strength  Against Gravity and Moderate Resistance  EENT (WDL):  Within Defined Limits    Cardio/Pulmonary Assessment  Edema   RLE Edema: Trace  LLE Edema: Trace  Respiratory Breath Sounds  RUL Breath Sounds: Clear  RML Breath Sounds: Clear  RLL Breath Sounds: Clear  SHAWNA Breath Sounds: Clear  LLL Breath Sounds: Clear  Cardiac Assessment   Cardiac (WDL):  WDL Except (H/O HTN.)
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
## 2024-02-26 NOTE — THERAPY
"Occupational Therapy  Daily Treatment     Patient Name: Molly Loera  Age:  73 y.o., Sex:  female  Medical Record #: [id number]  Today's Date: 2/26/2024     Precautions  Precautions: Fall Risk  Comments: poor safety awareness/impulsive    Safety   ADL Safety : Requires Supervision for Safety    Subjective    \"This was a good session, we did a lot.\"      Objective       02/26/24 1031   OT Charge Group   OT Self Care / ADL (Units) 1   OT Therapy Activity (Units) 3   OT Total Time Spent   OT Individual Total Time Spent (Mins) 60   Cognition    Level of Consciousness Alert   Safety Awareness Impaired   New Learning Impaired   Sequencing Impaired   Functional Level of Assist   Grooming Supervision;Standing   Grooming Description Adaptive equipment;Standing at sink;Supervision for safety  (wash hands- FWW next to pt)   Toileting Supervision   Toileting Description Adaptive equipment;Grab bar;Increased time;Supervision for safety  (FWW)   Toilet Transfers Supervised   Toilet Transfer Description Adaptive equipment;Grab bar;Increased time;Supervision for safety  (FWW)   IADL Treatments   Kitchen Mobility Education pt engaged in functional reaching into high/low cabinets- assist needed to get items out of lower cabinet 2/2 knee pain- Pt able to gather items out of fridge and place onto counter top for baking task   Meal Preparation pt engaged in baking muffins- min cues needed for sequencing task and follow directions, cues needed for making appropriate amount of muffins. Pt demonstrated good safety awareness while putting items into oven- stated that she did not feel comfortable removing  them- therapist took items out. Pt overall requires supervision for cooking for sequencing tasks. Rest breaks taken sitting on 4 WW throughout 2/2 fatigue and back pain   Home Management Pt engaged in cleaning up after baking- washing dishes in sink and placing into dirty bin and wiping down counter tops and throwing items away in " garbage with supervision. Pt using counter top for support during mobility- 4WW kept close by. Pt also engaged in laundry task- putting items into washer/dryer in standing with supervision. Cues needed for appropriate knobs 2/2 unfamilar washer/dryer.   Interdisciplinary Plan of Care Collaboration   Patient Position at End of Therapy Seated;Chair Alarm On;Self Releasing Lap Belt Applied;Tray Table within Reach;Call Light within Reach;Phone within Reach       Assessment    Pt tolerated session well. Pt engaged in various IADL tasks in standing. Pt ambulated from room to therapy kitchen and back 2x to gather up clothing items to put in laundry- able to complete laundry at supervision level. Pt then using 4WW for mobility when coming back to kitchen 2x time to practice using. Pt demonstrated locking brakes prior to sitting/standing- cues needed to put 4WW next to wall prior to sitting whenever possible. Educated pt on sitting during cooking/baking tasks in 4WW vs chair across the room so that she can rest and be close to task at hand. Discussed keeping 4WW close to pt in kitchen but using counter tops for safety for balance during ambulation and sitting when needed. Pt completed making muffins/cleaning up and laundry tasks in standing with supervision- cues needed for sequencing- assist needed to take items out of oven due to pt not feeling safe doing it on her own. Discussed only using the oven when daughter is home for safety.     Strengths: Able to follow instructions, Alert and oriented, Independent prior level of function, Motivated for self care and independence, Pleasant and cooperative, Supportive family, Willingly participates in therapeutic activities  Barriers: Decreased endurance, Fatigue, Generalized weakness, Impaired balance, Impaired activity tolerance, Confused, Impaired functional cognition, Impulsive, Limited mobility, Visual impairment    Plan    Functional cognition, standing balance, overall  strengthening      DME        Passport items to be completed:  Perform bathroom transfers, complete dressing, complete feeding, get ready for the day, prepare a simple meal, participate in household tasks, adapt home for safety needs, demonstrate home exercise program, complete caregiver training     Occupational Therapy Goals (Active)       Problem: OT Long Term Goals       Dates: Start:  02/15/24         Goal: LTG-By discharge, patient will complete basic self care tasks at SPV-Mod I level.       Dates: Start:  02/15/24            Goal: LTG-By discharge, patient will perform bathroom transfers at SPV-Mod I level.       Dates: Start:  02/15/24            Goal: LTG-By discharge, patient will complete basic home management at SPV-Mod I level.       Dates: Start:  02/15/24               Problem: Toileting       Dates: Start:  02/15/24         Goal: STG-Within one week, patient will complete toileting tasks at SBA level.       Dates: Start:  02/15/24

## 2024-02-26 NOTE — CARE PLAN
Problem: Skin Integrity  Goal: Patient's skin integrity will be maintained or improve  Outcome: Progressing  Note: Patient's skin remains intact and free from new or accidental injury this shift.  Will continue to monitor.   The patient is Stable - Low risk of patient condition declining or worsening    Shift Goals  Clinical Goals: Safety  Patient Goals: Sleep well  Family Goals: Education    Progress made toward(s) clinical / shift goals:  pt skin intact, no new injury or breakdown    Patient is not progressing towards the following goals:

## 2024-02-26 NOTE — THERAPY
"Physical Therapy   Daily Treatment     Patient Name: Molly Loera  Age:  73 y.o., Sex:  female  Medical Record #: [id number]  Today's Date: 2/26/2024     Precautions  Precautions: Fall Risk  Comments: poor safety awareness/impulsive    Subjective    Pt seated EOB upon arrival     \"My daughter bought me this fancy martha, that other walker is ugly\"    Objective       02/26/24 0831   PT Charge Group   PT Gait Training (Units) 1   PT Neuromuscular Re-Education / Balance (Units) 2   PT Therapeutic Activities (Units) 1   Supervising Physical Therapist Ashley Murrieta   PT Total Time Spent   PT Individual Total Time Spent (Mins) 60   Gait Functional Level of Assist    Gait Level Of Assist Standby Assist   Assistive Device 4 Wheel Walker   Distance (Feet) 250   # of Times Distance was Traveled 2   Deviation Decreased Heel Strike;Bradykinetic;Shuffled Gait   Wheelchair Functional Level of Assist   Wheelchair Assist   (n/a pt ambulatory)   Stairs Functional Level of Assist   Level of Assist with Stairs Contact Guard Assist   # of Stairs Climbed 2   Stairs Description Extra time;Walker;Verbal cueing;Safety concerns;Requires incidental assist  (6\" platform + 2\" threshold with 4WW)   Standing Lower Body Exercises   Hip Abduction Other Resistance (See Comments);Light Resistance Theraband   Comments side stepping with B UE support on // 2 x 10 R/L with no resistance, 2 x 10 R/L with pink band for resistance   Bed Mobility    Sit to Stand Supervised   Neuro-Muscular Treatments   Neuro-Muscular Treatments Weight Shift Right;Weight Shift Left;Compensatory Strategies;Sequencing;Verbal Cuing   Comments see note   Interdisciplinary Plan of Care Collaboration   IDT Collaboration with  Physical Therapist   Patient Position at End of Therapy Seated;Edge of Bed;Call Light within Reach   Collaboration Comments CLOF, AD FWW vs 4WW     Neuro re education  Fwrd/retro/side stepping with pink resistance band in // B UE support, emphasis on " "postural control  Stepping over hurdles with B UE support   Fwrd reaching/tossing bean bags with single UE support   Balloon volley with UE support on // 2 sets x 20 reps with each UE, emphasis on lateral weight shifting ad hand/eye coordination  Retrieval of beanbags using long handled reacher, standing with 4WW, cues to lock walker when standing for added stability  Gait trainign with 4WW  Floor transfer demonstration with instructions on side scooting, \"butt walking\" all in an attempt to get to a stable piece of furniture in the event of a fall  Discussed lanyard to carry cell phone, life alert system    Additional collaboration with CM re: order FWW, will contact daughter to discuss FWW vs 4WW and safety concerns    Assessment    Pt able to verbalize sequencing with 4WW break management, sitting and resting against walk for stability. Pt reports she plans to use no device at home as her kitchen area does not accomadate the 4WW. Pt requires SPV for safety with the 4WW as she still requires occasional/min vc for break management and sequencing   Strengths: Able to follow instructions, Alert and oriented, Independent prior level of function, Pleasant and cooperative  Barriers: Generalized weakness, Fatigue, Impaired activity tolerance, Impaired balance (high CBG)    Plan    4WW ambulation- reduce verbal cues to sequence rest breaks  Dynamic standing task  STS sequencing   Dual task  CV endurance    DME  PT DME Recommendations  Assistive Device: Front Wheeled Walker    Passport items to be completed:  Get in/out of bed safely, in/out of a vehicle, safely use mobility device, walk or wheel around home/community, navigate up and down stairs, show how to get up/down from the ground, ensure home is accessible, demonstrate HEP, complete caregiver training    Physical Therapy Problems (Active)       Problem: Mobility       Dates: Start:  02/15/24         Goal: STG-Within one week, patient will ambulate up/down a curb " using LRAD, SBA       Dates: Start:  02/15/24               Problem: PT-Long Term Goals       Dates: Start:  02/15/24         Goal: LTG-By discharge, patient will tolerate standing x10 mins       Dates: Start:  02/15/24            Goal: LTG-By discharge, patient will ambulate using LRAD x 300 feet, Mod I       Dates: Start:  02/15/24            Goal: LTG-By discharge, patient will perform home exercise program independently       Dates: Start:  02/15/24            Goal: LTG-By discharge, patient will transfer in/out of a car, Mod I       Dates: Start:  02/15/24

## 2024-02-26 NOTE — PROGRESS NOTES
Received bedside shift report from Benito LAINEZ RN regarding patient and assumed care. Patient awake, calm and stable, currently positioned in bed for comfort and safety; call light within reach. Denies pain or discomfort at this time. Will continue to monitor.

## 2024-02-26 NOTE — THERAPY
Speech Language Pathology  Daily Treatment     Patient Name: Molly Loera  Age:  73 y.o., Sex:  female  Medical Record #: [id number]  Today's Date: 2/26/2024     Precautions  Precautions: Fall Risk  Comments: poor safety awareness/impulsive    Subjective    Pt in room with daughter who was unable to attend therapy session this date. Appeared in good spirits and remarked how much she has enjoyed rehab stay.     Objective       02/26/24 1304   Treatment Charges   SLP Cognitive Skill Development First 15 Minutes 1   SLP Cognitive Skill Development Additional 15 Minutes 3   SLP Total Time Spent   SLP Individual Total Time Spent (Mins) 60   SCCAN (Scales of Cognitive and Communicative Ability for Neurorehabilitation)   Oral Expression - Raw Score 19   Oral Expression - Scale Performance Score 100   Orientation - Raw Score 12   Orientation - Scale Performance Score 100   Memory - Raw Score 17   Memory - Scale Performance Score 89   Speech Comprehension - Raw Score 13   Speech Comprehension - Scale Performance Score 100   Reading Comprehension - Raw Score 10   Reading Comprehension - Scale Performance Score 83   Writing - Raw Score 7   Writing - Scale Performance Score 100   Attention - Raw Score 13   Attention - Scale Performance Score 81   Problem Solving - Raw Score 20   Problem Solving - Scale Performance Score 87   SCCAN Total Raw Score 87   SCCAN Degree of Severity Typical Functioning   Interdisciplinary Plan of Care Collaboration   IDT Collaboration with  Family / Caregiver   Patient Position at End of Therapy Seated;Chair Alarm On;Call Light within Reach   Collaboration Comments dtr in room at start of therapy, did not attend session         Assessment    Follow up outcome assessment testing completed in preparation for d/c. Pt performed as follows with initial scores in (  ) ** please note a scoring error on initial assessment  SCCAN (Scales of Cognitive and Communicative Ability for Neurorehabilitation)  Oral  Expression - Raw Score: 19  Oral Expression - Scale Performance Score: 100 (84)  Orientation - Raw Score: 12  Orientation - Scale Performance Score: 100 (no change)  Memory - Raw Score: 17  Memory - Scale Performance Score: 89 (47)  Speech Comprehension - Raw Score: 13  Speech Comprehension - Scale Performance Score: 100 (*75 - score was initially reported as 85)  Reading Comprehension - Raw Score: 10  Reading Comprehension - Scale Performance Score: 83 (58)  Writing - Raw Score: 7  Writing - Scale Performance Score: 100 (86)  Attention - Raw Score: 13  Attention - Scale Performance Score: 81 (31)  Problem Solving - Raw Score: 20  Problem Solving - Scale Performance Score: 87 (39)  SCCAN Total Raw Score: 87 (**59 - score was initially reported as 61)  SCCAN Degree of Severity: Typical Functioning (moderate impairment)    Pt with marked improvement in areas of memory, attention, reading comprehension. Overall score falls within the cut off of within typical functioning (87-94).       Strengths: Willingly participates in therapeutic activities, Motivated for self care and independence, Supportive family, Pleasant and cooperative, Making steady progress towards goals, Alert and oriented, Effective communication skills, Independent prior level of function  Barriers: Impaired functional cognition, Decreased endurance    Plan    D/c planning, ongoing education on compensatory strategies for home.     Passport items to be completed:      Speech Therapy Problems (Active)       Problem: Memory STGs       Dates: Start:  02/15/24         Goal: STG-Within one week, patient will complete mental manipulation tasks up to 4 units with no more than 1 repetition in 8/10 trials       Dates: Start:  02/15/24         Goal Note filed on 02/20/24 1143 by Kandy Zuñiga MS,CCC-SLP       Will continue to target.                 Problem: Speech/Swallowing LTGs       Dates: Start:  02/15/24         Goal: LTG-By discharge, patient will solve  basic problems by utilizing compensatory intervention for memory and problem-solving to allow for safe completion of daily activities with SPV in order to prepare for safe d/c         Dates: Start:  02/15/24

## 2024-02-26 NOTE — PROGRESS NOTES
NURSING DAILY NOTE    Name: Molly Loera   Date of Admission: 2/14/2024   Admitting Diagnosis: Traumatic brain injury with new neurocognitive deficit (HCC)  Attending Physician: Mazin Gregory M.d.  Allergies: Depakote [valproic acid]    Safety  Patient Assist  min  Patient Precautions  Fall Risk  Precaution Comments  poor safety awareness/impulsive  Bed Transfer Status  Standby Assist (car transfer using 4WW SBA, dtr to manage 4WW and car door)  Toilet Transfer Status   Standby Assist  Assistive Devices  Rails, Wheelchair  Oxygen  None - Room Air  Diet/Therapeutic Dining  Current Diet Order   Procedures    Diet Order Diet: Consistent CHO (Diabetic)     Pill Administration  whole  Agitated Behavioral Scale  15  ABS Level of Severity  No Agitation    Fall Risk  Has the patient had a fall this admission?   No  Prema Amaya Fall Risk Scoring  15, HIGH RISK  Fall Risk Safety Measures  bed alarm and chair alarm    Vitals  Temperature: 36.7 °C (98 °F)  Temp src: Oral  Pulse: 69  Respiration: 18  Blood Pressure : 129/77  Blood Pressure MAP (Calculated): 94 MM HG  BP Location: Right, Upper Arm  Patient BP Position: Supine     Oxygen  Pulse Oximetry: 99 %  O2 (LPM): 0  O2 Delivery Device: None - Room Air    Bowel and Bladder  Last Bowel Movement  02/24/24  Stool Type  Type 5: Soft blob with clear cut edges (passed easily)  Bowel Device     Continent  Bladder: Stress incontinence   Bowel: Incontinent movement  Bladder Function  Urine Void (mL): 1 ml  Number of Times Voided: 1  Urine Color: Yellow  Number of Times Incontinent of Urine: 0  Genitourinary Assessment   Bladder Assessment (WDL):  WDL Except  Urinary Elimination: Frequency  Urine Color: Yellow  Number of Bladder Accidents: 0  Total Number of Bladder of Accidents in Last 7 Days: 0  Number of Times Incontinent of Urine: 0  Bladder Device: Bathroom  Time Void: No  Bladder Scan: Post Void  $  Bladder Scan Results (mL): 10    Skin  Zach Score   18  Sensory Interventions   Bed Types: Standard/Trauma Mattress  Skin Preventative Measures: Pillows in Use for Support / Positioning  Moisture Interventions  Moisturizers/Barriers: Barrier Wipes      Pain  Pain Rating Scale  0 - No Pain  Pain Location  Neck  Pain Location Orientation  Mid  Pain Interventions   Declines    ADLs    Bathing   Patient Refused Bathing (nuese aware,refused shower)  Linen Change    (Pt refused said she dont shower on nights is too cold Rn was inform)  Personal Hygiene  Change Violeta Pads, Moist Violeta Wipes, Perineal Care  Chlorhexidine Bath      Oral Care  Brushed Teeth  Teeth/Dentures     Shave     Nutrition Percentage Eaten  Lunch, Between 50-75% Consumed  Environmental Precautions  Treaded Slipper Socks on Patient, Personal Belongings, Wastebasket, Call Bell etc. in Easy Reach, Transferred to Stronger Side, Report Given to Other Health Care Providers Regarding Fall Risk, Bed in Low Position  Patient Turns/Positioning  Patient Turns Self from Side to Side  Patient Turns Assistance/Tolerance     Bed Positions  Bed Controls On  Head of Bed Elevated  Self regulated      Psychosocial/Neurologic Assessment  Psychosocial Assessment  Psychosocial (WDL):  Within Defined Limits  Neurologic Assessment  Neuro (WDL): Exceptions to WDL  Level of Consciousness: Alert  Orientation Level: Oriented X4  Cognition: Appropriate judgement  Speech: Clear  Pupil Assesment: No  R Pupil Size (mm): 2  R Pupil Shape / Description: Round  R Pupil Reaction: Brisk  L Pupil Size (mm): 2  L Pupil Shape / Description: Round  L Pupil Reaction: Brisk  Motor Function/Sensation Assessment: Motor strength  Muscle Strength Right Arm: Good Strength Against Gravity and Moderate Resistance  Muscle Strength Left Arm: Good Strength Against Gravity and Moderate Resistance  Muscle Strength Right Leg: Good Strength Against Gravity and Moderate Resistance  Muscle Strength Left Leg:  Good Strength Against Gravity and Moderate Resistance  EENT (WDL):  Within Defined Limits    Cardio/Pulmonary Assessment  Edema   RLE Edema: Trace  LLE Edema: Trace  Respiratory Breath Sounds  RUL Breath Sounds: Clear  RML Breath Sounds: Clear  RLL Breath Sounds: Clear  SHAWNA Breath Sounds: Clear  LLL Breath Sounds: Clear  Cardiac Assessment   Cardiac (WDL):  WDL Except (hx htn)

## 2024-02-26 NOTE — PROGRESS NOTES
"  Physical Medicine & Rehabilitation Progress Note    Encounter Date: 2/26/2024    Chief Complaint: Decreased mobility, weakness    Interval Events (Subjective):  Patient sitting up in room. She reports therapy is going well. She reports her memory is improving. Denies NVD.     _____________________________________  Interdisciplinary Team Conference   Most recent IDT on 2/20/2024    Discharge Date/Disposition:  2/27/24 -> 2/28/24  _____________________________________      Objective:  VITAL SIGNS: /68   Pulse 78   Temp 36.5 °C (97.7 °F) (Oral)   Resp 18   Ht 1.646 m (5' 4.8\")   Wt 60.3 kg (133 lb)   SpO2 96%   BMI 22.27 kg/m²   Gen: NAD  Psych: Mood and affect appropriate  CV: RRR, 0 edema  Resp: CTAB, no upper airway sounds  Abd: NTND  Neuro: AOx4, following commands    Laboratory Values:  Recent Results (from the past 72 hour(s))   POCT glucose device results    Collection Time: 02/23/24  5:45 PM   Result Value Ref Range    POC Glucose, Blood 93 65 - 99 mg/dL   POCT glucose device results    Collection Time: 02/23/24  9:23 PM   Result Value Ref Range    POC Glucose, Blood 226 (H) 65 - 99 mg/dL   POCT glucose device results    Collection Time: 02/24/24  7:29 AM   Result Value Ref Range    POC Glucose, Blood 119 (H) 65 - 99 mg/dL   POCT glucose device results    Collection Time: 02/24/24 11:40 AM   Result Value Ref Range    POC Glucose, Blood 399 (H) 65 - 99 mg/dL   POCT glucose device results    Collection Time: 02/24/24  5:23 PM   Result Value Ref Range    POC Glucose, Blood 125 (H) 65 - 99 mg/dL   POCT glucose device results    Collection Time: 02/24/24  8:51 PM   Result Value Ref Range    POC Glucose, Blood 160 (H) 65 - 99 mg/dL   POCT glucose device results    Collection Time: 02/25/24  8:16 AM   Result Value Ref Range    POC Glucose, Blood 63 (L) 65 - 99 mg/dL   POCT glucose device results    Collection Time: 02/25/24  8:36 AM   Result Value Ref Range    POC Glucose, Blood 59 (L) 65 - 99 mg/dL "   POCT glucose device results    Collection Time: 02/25/24  8:54 AM   Result Value Ref Range    POC Glucose, Blood 102 (H) 65 - 99 mg/dL   POCT glucose device results    Collection Time: 02/25/24 11:19 AM   Result Value Ref Range    POC Glucose, Blood 261 (H) 65 - 99 mg/dL   POCT glucose device results    Collection Time: 02/25/24  5:20 PM   Result Value Ref Range    POC Glucose, Blood 115 (H) 65 - 99 mg/dL   POCT glucose device results    Collection Time: 02/25/24 10:15 PM   Result Value Ref Range    POC Glucose, Blood 220 (H) 65 - 99 mg/dL   POCT glucose device results    Collection Time: 02/26/24  8:01 AM   Result Value Ref Range    POC Glucose, Blood 105 (H) 65 - 99 mg/dL   POCT glucose device results    Collection Time: 02/26/24 11:35 AM   Result Value Ref Range    POC Glucose, Blood 296 (H) 65 - 99 mg/dL       Medications:  Scheduled Medications   Medication Dose Frequency    insulin GLARGINE  15 Units Q EVENING    insulin GLARGINE  18 Units QAM INSULIN    amLODIPine  10 mg Nightly    apixaban  5 mg BID    insulin lispro  2-12 Units 4X/DAY ACHS    vitamin D3  1,000 Units DAILY    senna-docusate  2 Tablet BID    omeprazole  20 mg DAILY    atorvastatin  80 mg Nightly    busPIRone  5 mg BID    cariprazine  6 mg QHS    lamoTRIgine  100 mg DAILY    levothyroxine  25 mcg AM ES    losartan  100 mg DAILY    primidone  100 mg QHS    timolol  1 Drop QHS    carBAMazepine  200 mg BID     PRN medications: insulin lispro **AND** POC blood glucose manual result **AND** NOTIFY MD and PharmD **AND** Administer 20 grams of glucose (approximately 8 ounces of fruit juice) every 15 minutes PRN FSBG less than 70 mg/dL **AND** dextrose bolus, Respiratory Therapy Consult, hydrALAZINE, acetaminophen, senna-docusate **AND** polyethylene glycol/lytes, mag hydrox-al hydrox-simeth, ondansetron **OR** ondansetron, traZODone, sodium chloride, oxyCODONE immediate-release **OR** oxyCODONE immediate-release, midazolam, [COMPLETED]  acetaminophen **FOLLOWED BY** acetaminophen, ziprasidone    Diet:  Current Diet Order   Procedures    Diet Order Diet: Consistent CHO (Diabetic)       Medical Decision Making and Plan:  TBI (Traumatic Brain Injury): GLF on 2/9/24 with left sided subarachnoid hemorrhage s/p monitoring with serial CTs.   -PT and OT for mobility and ADLs. Per guidelines, 15 hours per week between PT, OT and/or SLP.  -Follow-up NSG. On 7 days of Keppra for seizure prophylaxis. Completed Keppra Hold AC until 2/19. Restart Eliquis, discussed with patient     HTN- Patient on Amlodipine 5 mg and Losartan 100 mg daily. Into 140s, consult hospitalist. Amlodipine increased to 10 mg QHS. SBP labile, continue Amlodipine 10 mg and Losartan 100 mg      Hx of PE - Previously on Eliquis. On hold for 10 days. Restarted on Eliquis 5 mg BID     HLD - Patient on Atorvastatin 80 mg QHS     Anemia - Check AM CBC - 11.4 on admission. Recheck 2/23/24     Leukopenia - Check AM CBC - 5.8 on admission, resolved     DM1 with hyperglycemia - Patient on Lantus 15 U and SSI on transfer. Blood sugars into 500s, give extra dose and consult hospitalist  -Severe hyperglycemia - Glargine changed to 10 QPM and 14U QPM -. Increased 2/22, blood sugars still into 260s, continue Glargine 18 U QAM and 15 U qPM     Hypothyroidism - Patient on Levothyroxine 25 mcg daily     Hypophosphatemia - Check AM level - low 2.4. Consult hospitalist. Continue K-Phos. K level stable. K-phos discontinue per Hospitalist. Recheck 2/23/24     BPD1 - Patient on Lamictal 100 mg daily, Primidone 100 mg QHS, Tegretol 200 mg BID, Buspar 5 mg BID, and Vraylar 6 mg QHS     Glaucoma - Patient on Timolol QHS    Vitamin D deficiency - 17 on admission, start 2000 U      Pain - Patient on PRN Tylenol and Oxycodone     Skin - Patient at risk for skin breakdown due to debility in areas including sacrum, achilles, elbows and head in addition to other sites. Nursing to assess skin daily.      GI Ppx - Patient  on Prilosec for GERD prophylaxis. Patient on Senna-docusate for constipation prophylaxis.      DVT Ppx - Patient Eliquis is on hold on transfer. Restarted Eliquis on 2/19/24. Continue Eliquis   ____________________________________    T. Deven Gregory MD/PhD  [facility] - Physical Medicine & Rehabilitation   [facility] - Brain Injury Medicine   ____________________________________

## 2024-02-27 ENCOUNTER — APPOINTMENT (OUTPATIENT)
Dept: PHYSICAL THERAPY | Facility: REHABILITATION | Age: 74
DRG: 950 | End: 2024-02-27
Attending: PHYSICAL MEDICINE & REHABILITATION
Payer: MEDICARE

## 2024-02-27 ENCOUNTER — APPOINTMENT (OUTPATIENT)
Dept: OCCUPATIONAL THERAPY | Facility: REHABILITATION | Age: 74
DRG: 950 | End: 2024-02-27
Attending: PHYSICAL MEDICINE & REHABILITATION
Payer: MEDICARE

## 2024-02-27 ENCOUNTER — APPOINTMENT (OUTPATIENT)
Dept: SPEECH THERAPY | Facility: REHABILITATION | Age: 74
DRG: 950 | End: 2024-02-27
Attending: PHYSICAL MEDICINE & REHABILITATION
Payer: MEDICARE

## 2024-02-27 LAB
GLUCOSE BLD STRIP.AUTO-MCNC: 194 MG/DL (ref 65–99)
GLUCOSE BLD STRIP.AUTO-MCNC: 209 MG/DL (ref 65–99)
GLUCOSE BLD STRIP.AUTO-MCNC: 273 MG/DL (ref 65–99)
GLUCOSE BLD STRIP.AUTO-MCNC: 85 MG/DL (ref 65–99)

## 2024-02-27 PROCEDURE — 700102 HCHG RX REV CODE 250 W/ 637 OVERRIDE(OP): Performed by: HOSPITALIST

## 2024-02-27 PROCEDURE — 82962 GLUCOSE BLOOD TEST: CPT | Mod: 91

## 2024-02-27 PROCEDURE — 770010 HCHG ROOM/CARE - REHAB SEMI PRIVAT*

## 2024-02-27 PROCEDURE — 97129 THER IVNTJ 1ST 15 MIN: CPT

## 2024-02-27 PROCEDURE — A9270 NON-COVERED ITEM OR SERVICE: HCPCS | Performed by: HOSPITALIST

## 2024-02-27 PROCEDURE — 700102 HCHG RX REV CODE 250 W/ 637 OVERRIDE(OP): Performed by: PHYSICAL MEDICINE & REHABILITATION

## 2024-02-27 PROCEDURE — 97530 THERAPEUTIC ACTIVITIES: CPT

## 2024-02-27 PROCEDURE — 97112 NEUROMUSCULAR REEDUCATION: CPT | Mod: CQ

## 2024-02-27 PROCEDURE — 97110 THERAPEUTIC EXERCISES: CPT | Mod: CQ

## 2024-02-27 PROCEDURE — 97116 GAIT TRAINING THERAPY: CPT | Mod: CQ

## 2024-02-27 PROCEDURE — A9270 NON-COVERED ITEM OR SERVICE: HCPCS | Performed by: PHYSICAL MEDICINE & REHABILITATION

## 2024-02-27 PROCEDURE — 99232 SBSQ HOSP IP/OBS MODERATE 35: CPT | Performed by: HOSPITALIST

## 2024-02-27 PROCEDURE — 99232 SBSQ HOSP IP/OBS MODERATE 35: CPT | Performed by: PHYSICAL MEDICINE & REHABILITATION

## 2024-02-27 PROCEDURE — 97130 THER IVNTJ EA ADDL 15 MIN: CPT

## 2024-02-27 PROCEDURE — 97535 SELF CARE MNGMENT TRAINING: CPT

## 2024-02-27 RX ORDER — INSULIN LISPRO 100 [IU]/ML
INJECTION, SOLUTION INTRAVENOUS; SUBCUTANEOUS
Qty: 9 ML | Refills: 2 | Status: ACTIVE | OUTPATIENT
Start: 2024-02-27

## 2024-02-27 RX ORDER — ATORVASTATIN CALCIUM 80 MG/1
80 TABLET, FILM COATED ORAL NIGHTLY
Qty: 90 TABLET | Refills: 2 | Status: SHIPPED | OUTPATIENT
Start: 2024-02-27

## 2024-02-27 RX ORDER — LOSARTAN POTASSIUM 100 MG/1
100 TABLET ORAL DAILY
Qty: 90 TABLET | Refills: 2 | Status: SHIPPED | OUTPATIENT
Start: 2024-02-27

## 2024-02-27 RX ORDER — AMLODIPINE BESYLATE 10 MG/1
10 TABLET ORAL EVERY EVENING
Qty: 90 TABLET | Refills: 2 | Status: SHIPPED | OUTPATIENT
Start: 2024-02-27

## 2024-02-27 RX ORDER — BUSPIRONE HYDROCHLORIDE 5 MG/1
5 TABLET ORAL 2 TIMES DAILY
Qty: 90 TABLET | Refills: 2 | Status: SHIPPED | OUTPATIENT
Start: 2024-02-27 | End: 2024-03-18 | Stop reason: SDUPTHER

## 2024-02-27 RX ORDER — INSULIN GLARGINE 100 [IU]/ML
15 INJECTION, SOLUTION SUBCUTANEOUS 2 TIMES DAILY
Qty: 15 ML | Refills: 2 | Status: ACTIVE | OUTPATIENT
Start: 2024-02-27

## 2024-02-27 RX ORDER — LEVOTHYROXINE SODIUM 0.03 MG/1
25 TABLET ORAL
Qty: 90 TABLET | Refills: 2 | Status: SHIPPED | OUTPATIENT
Start: 2024-02-27

## 2024-02-27 RX ORDER — ANASTROZOLE 1 MG/1
1 TABLET ORAL EVERY EVENING
Qty: 90 TABLET | Refills: 3 | Status: SHIPPED | OUTPATIENT
Start: 2024-02-27

## 2024-02-27 RX ORDER — CARBAMAZEPINE 200 MG/1
200 TABLET ORAL 2 TIMES DAILY
Qty: 180 TABLET | Refills: 2 | Status: SHIPPED | OUTPATIENT
Start: 2024-02-27 | End: 2024-03-18 | Stop reason: SDUPTHER

## 2024-02-27 RX ORDER — LAMOTRIGINE 100 MG/1
100 TABLET ORAL DAILY
Qty: 30 TABLET | Refills: 2 | Status: SHIPPED | OUTPATIENT
Start: 2024-02-27 | End: 2024-03-18 | Stop reason: SDUPTHER

## 2024-02-27 RX ORDER — TIMOLOL MALEATE 5 MG/ML
1 SOLUTION/ DROPS OPHTHALMIC
Qty: 5 ML | Refills: 3 | Status: SHIPPED | OUTPATIENT
Start: 2024-02-27

## 2024-02-27 RX ORDER — PRIMIDONE 50 MG/1
100 TABLET ORAL
Qty: 180 TABLET | Refills: 2 | Status: SHIPPED | OUTPATIENT
Start: 2024-02-27 | End: 2024-03-18 | Stop reason: SDUPTHER

## 2024-02-27 RX ADMIN — APIXABAN 5 MG: 5 TABLET, FILM COATED ORAL at 21:30

## 2024-02-27 RX ADMIN — LAMOTRIGINE 100 MG: 100 TABLET ORAL at 07:56

## 2024-02-27 RX ADMIN — BUSPIRONE HYDROCHLORIDE 5 MG: 5 TABLET ORAL at 07:57

## 2024-02-27 RX ADMIN — LOSARTAN POTASSIUM 100 MG: 50 TABLET, FILM COATED ORAL at 07:57

## 2024-02-27 RX ADMIN — INSULIN LISPRO 2 UNITS: 100 INJECTION, SOLUTION INTRAVENOUS; SUBCUTANEOUS at 17:01

## 2024-02-27 RX ADMIN — DOCUSATE SODIUM 50MG AND SENNOSIDES 8.6MG 2 TABLET: 8.6; 5 TABLET, FILM COATED ORAL at 21:30

## 2024-02-27 RX ADMIN — LEVOTHYROXINE SODIUM 25 MCG: 0.03 TABLET ORAL at 05:26

## 2024-02-27 RX ADMIN — INSULIN LISPRO 6 UNITS: 100 INJECTION, SOLUTION INTRAVENOUS; SUBCUTANEOUS at 11:21

## 2024-02-27 RX ADMIN — APIXABAN 5 MG: 5 TABLET, FILM COATED ORAL at 07:57

## 2024-02-27 RX ADMIN — ATORVASTATIN CALCIUM 80 MG: 40 TABLET, FILM COATED ORAL at 21:30

## 2024-02-27 RX ADMIN — CARIPRAZINE 6 MG: 3 CAPSULE, GELATIN COATED ORAL at 21:31

## 2024-02-27 RX ADMIN — OMEPRAZOLE 20 MG: 20 CAPSULE, DELAYED RELEASE ORAL at 07:57

## 2024-02-27 RX ADMIN — DOCUSATE SODIUM 50MG AND SENNOSIDES 8.6MG 2 TABLET: 8.6; 5 TABLET, FILM COATED ORAL at 07:57

## 2024-02-27 RX ADMIN — AMLODIPINE BESYLATE 10 MG: 5 TABLET ORAL at 21:31

## 2024-02-27 RX ADMIN — Medication 1000 UNITS: at 07:57

## 2024-02-27 RX ADMIN — CARBAMAZEPINE 200 MG: 100 TABLET, CHEWABLE ORAL at 21:31

## 2024-02-27 RX ADMIN — CARBAMAZEPINE 200 MG: 100 TABLET, CHEWABLE ORAL at 07:57

## 2024-02-27 RX ADMIN — PRIMIDONE 100 MG: 50 TABLET ORAL at 21:31

## 2024-02-27 RX ADMIN — BUSPIRONE HYDROCHLORIDE 5 MG: 5 TABLET ORAL at 21:31

## 2024-02-27 RX ADMIN — TIMOLOL MALEATE 1 DROP: 5 SOLUTION OPHTHALMIC at 21:37

## 2024-02-27 RX ADMIN — INSULIN LISPRO 4 UNITS: 100 INJECTION, SOLUTION INTRAVENOUS; SUBCUTANEOUS at 21:35

## 2024-02-27 ASSESSMENT — ENCOUNTER SYMPTOMS
NAUSEA: 0
SPUTUM PRODUCTION: 0
ABDOMINAL PAIN: 0
BLURRED VISION: 0
COUGH: 0
FEVER: 0
BRUISES/BLEEDS EASILY: 0
DOUBLE VISION: 0
PALPITATIONS: 0
POLYDIPSIA: 0
CHILLS: 0
VOMITING: 0

## 2024-02-27 ASSESSMENT — ACTIVITIES OF DAILY LIVING (ADL)
TOILET_TRANSFER_DESCRIPTION: ADAPTIVE EQUIPMENT;SUPERVISION FOR SAFETY;VERBAL CUEING
BED_CHAIR_WHEELCHAIR_TRANSFER_DESCRIPTION: ADAPTIVE EQUIPMENT;INCREASED TIME;VERBAL CUEING;SUPERVISION FOR SAFETY
SHOWER_TRANSFER_LEVEL_OF_ASSIST: REQUIRES SUPERVISION WITH SHOWER TRANSFER
BED_CHAIR_WHEELCHAIR_TRANSFER_DESCRIPTION: ADAPTIVE EQUIPMENT;SET-UP OF EQUIPMENT
TOILETING_LEVEL_OF_ASSIST: ABLE TO COMPLETE TOILETING WITHOUT ASSIST
TOILET_TRANSFER_LEVEL_OF_ASSIST: REQUIRES SUPERVISION WITH TOILET TRANSFER

## 2024-02-27 ASSESSMENT — BRIEF INTERVIEW FOR MENTAL STATUS (BIMS)
ASKED TO RECALL BED: NO, COULD NOT RECALL
ASKED TO RECALL BLUE: YES, NO CUE REQUIRED
WHAT MONTH IS IT: ACCURATE WITHIN 5 DAYS
BIMS SUMMARY SCORE: 12
WHAT YEAR IS IT: CORRECT
WHAT DAY OF THE WEEK IS IT: CORRECT
INITIAL REPETITION OF BED BLUE SOCK - FIRST ATTEMPT: 3
ASKED TO RECALL SOCK: YES, AFTER CUEING (SOMETHING TO WEAR")"

## 2024-02-27 ASSESSMENT — GAIT ASSESSMENTS
DEVIATION: DECREASED BASE OF SUPPORT;DECREASED HEEL STRIKE;DECREASED TOE OFF;BRADYKINETIC
GAIT LEVEL OF ASSIST: STANDBY ASSIST
ASSISTIVE DEVICE: 4 WHEEL WALKER
DISTANCE (FEET): 200

## 2024-02-27 ASSESSMENT — BALANCE ASSESSMENTS
STANDING UNSUPPORTED WITH FEET TOGETHER: 4
MEDICARE IMPAIRMENT PERCENTAGE: 30
LONG VERSION TOTAL SCORE (MAX 56): 39
REACHING FORWARD WITH OUTSTRETCHED ARM WHILE STANDING: 3
STANDING ON ONE LEG: 1
PICK UP OBJECT FROM THE FLOOR FROM A STANDING POSITION: 3
LONG VERSION TOTAL SCORE (MAX 56): 39
PLACE ALTERNATE FOOT ON STEP OR STOOL WHILE STANDING UNSUPPORTED: 1
STANDING UNSUPPORTED WITH EYES CLOSED: 4
TRANSFERS: 3
LOOK OVER LEFT AND RIGHT SHOULDERS WHILE STANDING: 2
SITTING UNSUPPORTED: 4
SITTING TO STANDING: 3
STANDING TO SITTING: 3
STANDING UNSUPPORTED: 4
TURN 360 DEGREES: 2
STANDING UNSUPPORTED ONE FOOT IN FRONT: 2

## 2024-02-27 NOTE — DISCHARGE PLANNING
Case Management/IDT follow up.   IDT continues to recommend IRF level of care as patient continue to make progress with all therapies.   Projected dc date set for 2/28/24      DC needs:  Recommendations made for home health for PT/OT/SLP  Follow up with: PCP neuro    Met with patient and family providing update from IDT and discussed plan of care.    Plan:  Continue to follow

## 2024-02-27 NOTE — PROGRESS NOTES
Physical Medicine & Rehabilitation Progress Note    Encounter Date: 2/27/2024    Chief Complaint: Decreased mobility, weakness    Interval Events (Subjective):  Patient sitting up in room. She reports she is doing well. She is happy that her bruising around her eye has mostly resolved. Denies visual changes. Discussed would have final IDT later today.     _____________________________________  Interdisciplinary Team Conference   Most recent IDT on 2/27/2024    IMazin M.D./Ph.D., was present and led the interdisciplinary team conference on 2/27/2024.  I led the IDT conference and agree with the IDT conference documentation and plan of care as noted below.     Nursing:  Diet Current Diet Order   Procedures    Diet Order Diet: Consistent CHO (Diabetic)       Eating ADL Independent      % of Last Meal  Oral Nutrition: Between % Consumed   Sleep    Bowel Last BM: 02/25/24 (per patient)   Bladder    Barriers to Discharge Home:  Blood sugar elevated    Physical Therapy:  Bed Mobility    Transfers Standby Assist  Adaptive equipment, Increased time, Verbal cueing, Supervision for safety   Mobility Standby Assist   Stairs    Barriers to Discharge Home:  Supervision for safety    Occupational Therapy:  Grooming Supervision, Standing   Bathing Supervision   UB Dressing Supervision   LB Dressing Supervision   Toileting Supervision   Shower & Transfer    Barriers to Discharge Home:  None    Speech-Language Pathology:  Comprehension:  Modified Independent  Comprehension Description:  Glasses  Expression:  Independent  Expression Description:  Verbal cueing  Social Interaction:  Independent  Social Interaction Description:  Medication  Problem Solving:  Supervision  Problem Solving Description:  Therapy schedule, Verbal cueing, Bed/chair alarm, Seat belt  Memory:  Supervision  Memory Description:  Therapy schedule, Verbal cueing, Bed/chair alarm, Seat belt  Barriers to Discharge Home:  None -   "recommended    Rec Therapy   None    Case Management:  Continues to work on disposition and DME needs.      Discharge Date/Disposition:  2/27/24 -> 2/28/24  _____________________________________      Objective:  VITAL SIGNS: BP 98/60   Pulse 90   Temp 36.3 °C (97.4 °F) (Oral)   Resp 18   Ht 1.646 m (5' 4.8\")   Wt 60.3 kg (133 lb)   SpO2 93%   BMI 22.27 kg/m²   Gen: NAD  Psych: Mood and affect appropriate  CV: RRR, 0 edema  Resp: CTAB, no upper airway sounds  Abd: NTND  Neuro: AOx4, following commands  Unchanged from 2/26/24    Laboratory Values:  Recent Results (from the past 72 hour(s))   POCT glucose device results    Collection Time: 02/24/24  5:23 PM   Result Value Ref Range    POC Glucose, Blood 125 (H) 65 - 99 mg/dL   POCT glucose device results    Collection Time: 02/24/24  8:51 PM   Result Value Ref Range    POC Glucose, Blood 160 (H) 65 - 99 mg/dL   POCT glucose device results    Collection Time: 02/25/24  8:16 AM   Result Value Ref Range    POC Glucose, Blood 63 (L) 65 - 99 mg/dL   POCT glucose device results    Collection Time: 02/25/24  8:36 AM   Result Value Ref Range    POC Glucose, Blood 59 (L) 65 - 99 mg/dL   POCT glucose device results    Collection Time: 02/25/24  8:54 AM   Result Value Ref Range    POC Glucose, Blood 102 (H) 65 - 99 mg/dL   POCT glucose device results    Collection Time: 02/25/24 11:19 AM   Result Value Ref Range    POC Glucose, Blood 261 (H) 65 - 99 mg/dL   POCT glucose device results    Collection Time: 02/25/24  5:20 PM   Result Value Ref Range    POC Glucose, Blood 115 (H) 65 - 99 mg/dL   POCT glucose device results    Collection Time: 02/25/24 10:15 PM   Result Value Ref Range    POC Glucose, Blood 220 (H) 65 - 99 mg/dL   POCT glucose device results    Collection Time: 02/26/24  8:01 AM   Result Value Ref Range    POC Glucose, Blood 105 (H) 65 - 99 mg/dL   POCT glucose device results    Collection Time: 02/26/24 11:35 AM   Result Value Ref Range    POC Glucose, Blood " 296 (H) 65 - 99 mg/dL   POCT glucose device results    Collection Time: 02/26/24  5:30 PM   Result Value Ref Range    POC Glucose, Blood 145 (H) 65 - 99 mg/dL   POCT glucose device results    Collection Time: 02/26/24  9:32 PM   Result Value Ref Range    POC Glucose, Blood 245 (H) 65 - 99 mg/dL   POCT glucose device results    Collection Time: 02/27/24  7:51 AM   Result Value Ref Range    POC Glucose, Blood 85 65 - 99 mg/dL   POCT glucose device results    Collection Time: 02/27/24 11:21 AM   Result Value Ref Range    POC Glucose, Blood 273 (H) 65 - 99 mg/dL       Medications:  Scheduled Medications   Medication Dose Frequency    insulin GLARGINE  15 Units Q EVENING    insulin GLARGINE  18 Units QAM INSULIN    amLODIPine  10 mg Nightly    apixaban  5 mg BID    insulin lispro  2-12 Units 4X/DAY ACHS    vitamin D3  1,000 Units DAILY    senna-docusate  2 Tablet BID    omeprazole  20 mg DAILY    atorvastatin  80 mg Nightly    busPIRone  5 mg BID    cariprazine  6 mg QHS    lamoTRIgine  100 mg DAILY    levothyroxine  25 mcg AM ES    losartan  100 mg DAILY    primidone  100 mg QHS    timolol  1 Drop QHS    carBAMazepine  200 mg BID     PRN medications: insulin lispro **AND** POC blood glucose manual result **AND** NOTIFY MD and PharmD **AND** Administer 20 grams of glucose (approximately 8 ounces of fruit juice) every 15 minutes PRN FSBG less than 70 mg/dL **AND** dextrose bolus, Respiratory Therapy Consult, hydrALAZINE, acetaminophen, senna-docusate **AND** polyethylene glycol/lytes, mag hydrox-al hydrox-simeth, ondansetron **OR** ondansetron, traZODone, sodium chloride, oxyCODONE immediate-release **OR** oxyCODONE immediate-release, midazolam, [COMPLETED] acetaminophen **FOLLOWED BY** acetaminophen, ziprasidone    Diet:  Current Diet Order   Procedures    Diet Order Diet: Consistent CHO (Diabetic)       Medical Decision Making and Plan:  TBI (Traumatic Brain Injury): GLF on 2/9/24 with left sided subarachnoid hemorrhage  s/p monitoring with serial CTs.   -PT and OT for mobility and ADLs. Per guidelines, 15 hours per week between PT, OT and/or SLP.  -Follow-up NSG. On 7 days of Keppra for seizure prophylaxis. Completed Keppra Hold AC until 2/19. Restart Eliquis, discussed with patient     HTN- Patient on Amlodipine 5 mg and Losartan 100 mg daily. Into 140s, consult hospitalist. Amlodipine increased to 10 mg QHS. SBP labile, continue Amlodipine 10 mg and Losartan 100 mg daily at discharge     Hx of PE - Previously on Eliquis. On hold for 10 days. Restarted on Eliquis 5 mg BID     HLD - Patient on Atorvastatin 80 mg QHS     Anemia - Check AM CBC - 11.4 on admission. Recheck 2/23/24 - 12.1, follow-up PCP     Leukopenia - Check AM CBC - 5.8 on admission, resolved     DM1 with hyperglycemia - Patient on Lantus 15 U and SSI on transfer. Blood sugars into 500s, give extra dose and consult hospitalist  -Severe hyperglycemia - Glargine changed to 10 QPM and 14U QPM -. Increased 2/22, blood sugars still into 200s, continue Lantus 18 u QPM and 15 U qAM at discharge     Hypothyroidism - Patient on Levothyroxine 25 mcg daily     Hypophosphatemia - Check AM level - low 2.4. Consult hospitalist. Continue K-Phos. K level stable. K-phos discontinue per Hospitalist. Recheck 2/23/24     BPD1 - Patient on Lamictal 100 mg daily, Primidone 100 mg QHS, Tegretol 200 mg BID, Buspar 5 mg BID, and Vraylar 6 mg QHS     Glaucoma - Patient on Timolol QHS    Vitamin D deficiency - 17 on admission, start 2000 U      Pain - Patient on PRN Tylenol and Oxycodone     Skin - Patient at risk for skin breakdown due to debility in areas including sacrum, achilles, elbows and head in addition to other sites. Nursing to assess skin daily.      GI Ppx - Patient on Prilosec for GERD prophylaxis. Patient on Senna-docusate for constipation prophylaxis.      DVT Ppx - Patient Eliquis is on hold on transfer. Restarted Eliquis on 2/19/24. Continue Eliquis at discharge.    ____________________________________    JSESI Gregory MD/PhD  [facility] - Physical Medicine & Rehabilitation   [facility] - Brain Injury Medicine   ____________________________________

## 2024-02-27 NOTE — CARE PLAN
Problem: Toileting  Goal: STG-Within one week, patient will complete toileting tasks at SBA level.  Outcome: Met     Problem: OT Long Term Goals  Goal: LTG-By discharge, patient will complete basic self care tasks at SPV-Mod I level.  Outcome: Met  Goal: LTG-By discharge, patient will perform bathroom transfers at SPV-Mod I level.  Outcome: Met  Goal: LTG-By discharge, patient will complete basic home management at SPV-Mod I level.  Outcome: Met

## 2024-02-27 NOTE — THERAPY
Occupational Therapy   Discharge Summary     Patient Name: Molly Loera  Age:  73 y.o., Sex:  female  Medical Record #: [id number]  Today's Date: 2/27/2024     Precautions  Precautions: Fall Risk  Comments: poor safety awareness/impulsive    Safety   ADL Safety : Requires Supervision for Safety    Subjective    Pt supine in bed and agreeable to OT tx. Pt expressed no concerns for pending DC home tomorrow.     Objective       02/27/24 1231   OT Charge Group   OT Self Care / ADL (Units) 3   OT Therapy Activity (Units) 1   OT Total Time Spent   OT Individual Total Time Spent (Mins) 60   Cognition    Level of Consciousness Alert   Safety Awareness Impaired   New Learning Impaired   Sequencing Impaired   Initiation Impaired   Comments Cues for locking 4WW breaks.   Functional Level of Assist   Eating Independent   Grooming Independent;Standing   Grooming Description Standing at sink   Bathing Modified Independent   Bathing Description Hand held shower;Tub bench;Increased time   Upper Body Dressing Modified Independent   Upper Body Dressing Description Increased time   Lower Body Dressing Supervision   Lower Body Dressing Description Set-up of equipment   Toileting Independent   Bed, Chair, Wheelchair Transfer Supervised   Bed Chair Wheelchair Transfer Description Adaptive equipment;Set-up of equipment   Toilet Transfers Supervised   Toilet Transfer Description Adaptive equipment;Supervision for safety;Verbal cueing   Tub / Shower Transfers Supervised   Tub Shower Transfer Description Verbal cueing;Supervision for safety;Adaptive equipment;Shower bench   Balance   Comments Pt participated in community distance functional mobility with focus on 4WW safety in locking of breaks. Pt initially required min cues for locking then sitting against wall. She completed multiple sit<>stands, without cues for sequencing following. Pt engaged in mobiliy in room as well at SPV level while accessing closet, cleaning up linens from  "shower, and while organizing dirty/clean clothes in room   Interdisciplinary Plan of Care Collaboration   Patient Position at End of Therapy Seated   Eating   Assistance Needed Independent   CARE Score - Eating 6   Oral Hygiene   Assistance Needed Independent   CARE Score - Oral Hygiene 6   Toileting Hygiene   Assistance Needed Independent   CARE Score - Toileting Hygiene 6   Shower/Bathe Self   Assistance Needed Independent   CARE Score - Shower/Bathe Self 6   Upper Body Dressing   Assistance Needed Independent   CARE Score - Upper Body Dressing 6   Lower Body Dressing   Assistance Needed Set-up / clean-up;Supervision   CARE Score - Lower Body Dressing 4   Putting On/Taking Off Footwear   Assistance Needed Independent   CARE Score - Putting On/Taking Off Footwear 6   Toilet Transfer   Assistance Needed Supervision;Set-up / clean-up;Verbal cues   CARE Score - Toilet Transfer 4   Cognitive Pattern Assessment   Cognitive Pattern Assessment Used BIMS   Brief Interview for Mental Status (BIMS)   Repetition of Three Words (First Attempt) 3   Temporal Orientation: Year Correct   Temporal Orientation: Month Accurate within 5 days   Temporal Orientation: Day Correct   Recall: \"Sock\" Yes, after cueing (\"something to wear\")   Recall: \"Blue\" Yes, no cue required   Recall: \"Bed\" No, could not recall   BIMS Summary Score 12   Confusion Assessment Method (CAM)   Is there evidence of an acute change in mental status from the patient's baseline? Yes   Inattention Behavior present, fluctuates (comes and goes, changes in severity)   Disorganized thinking Behavior present, fluctuates (comes and goes, changes in severity)   Altered level of consciousness Behavior not present   Discharge Summary    Discharge Location  Home   Patient Discharging with Assist of Family    Level of Supervision Required 24 Hour Supervision   Recommended Equipment for Discharge 4-Wheeled Walker;Shower Chair;Grab Bars in Tub / Shower   Recommended Services " Upon Discharge Home Health Occupational Therapy   Long Term Goals Met 3   Long Term Goals Not Met 0   Reason(s) for Goals Not Met NA   Criteria for Termination of Services Maximum Function Achieved for Inpatient Rehabilitation   Discharge Instructions to Patient   Level of Assist Required for Eating Able to Complete Eating without Assist   Level of Assist Required for Grooming Able to Complete Grooming without Assist   Level of Assist Required for Dressing Able to Complete Dressing without Assist   Level of Assist Required for Toileting Able to Complete Toileting without Assist   Level of Assist Required for Toilet Transfer Requires Supervision with Toilet Transfer   Level of Assist Required for Bathing Able to Complete Bathing without Assist   Equipment for Bathing Shower Chair;Grab Bars in Tub / Shower   Level of Assist Required for Shower Transfer Requires Supervision with Shower Transfer   Equipment for Shower Transfer Grab Bars in Tub / Shower;Shower Chair   Level of Assist Required for Home Mgmt Requires Supervision with Home Management   Level of Assist Required for Meal Prep Requires Supervision with Meal Preparation   Driving May not Drive, Please Contact Physician for Further Information   Home Exercise Program Refer to Home Exercise Program Handout for Details       Assessment    Pt tolerated OT session well, but continues to require cues and SPV for all functional transfer and use of 4WW with inconsistent locking of breaks.   Strengths: Able to follow instructions, Alert and oriented, Independent prior level of function, Motivated for self care and independence, Pleasant and cooperative, Supportive family, Willingly participates in therapeutic activities  Barriers: Decreased endurance, Fatigue, Generalized weakness, Impaired balance, Impaired activity tolerance, Confused, Impaired functional cognition, Impulsive, Limited mobility, Visual impairment    Plan    DC home with 24/7 assist from dtr    Passport  items to be completed:  Perform bathroom transfers, complete dressing, complete feeding, get ready for the day, prepare a simple meal, participate in household tasks, adapt home for safety needs, demonstrate home exercise program, complete caregiver training     Occupational Therapy Goals (Active)       There are no active problems.

## 2024-02-27 NOTE — CARE PLAN
Problem: Bowel Elimination  Goal: Patient will participate in bowel management program  Outcome: Progressing  Note: Patient uses bathroom during bowel elimination. Will continue to monitor   The patient is Stable - Low risk of patient condition declining or worsening    Shift Goals  Clinical Goals: Safety  Patient Goals: Sleep well  Family Goals: Education    Progress made toward(s) clinical / shift goals:  pt continent of bowel and having regular bowel movements    Patient is not progressing towards the following goals:

## 2024-02-27 NOTE — CARE PLAN
Problem: Mobility  Goal: STG-Within one week, patient will ambulate up/down a curb using LRAD, SBA  Outcome: Met     Problem: PT-Long Term Goals  Goal: LTG-By discharge, patient will tolerate standing x10 mins  Outcome: Met  Goal: LTG-By discharge, patient will ambulate using LRAD x 300 feet, Mod I  Outcome: Not Met  Goal: LTG-By discharge, patient will perform home exercise program independently  Outcome: Met  Goal: LTG-By discharge, patient will transfer in/out of a car, Mod I  Outcome: Not Met

## 2024-02-27 NOTE — CARE PLAN
Problem: Speech/Swallowing LTGs  Goal: LTG-By discharge, patient will solve basic problems by utilizing compensatory intervention for memory and problem-solving to allow for safe completion of daily activities with SPV in order to prepare for safe d/c    Outcome: Met     Problem: Memory STGs  Goal: STG-Within one week, patient will complete mental manipulation tasks up to 4 units with no more than 1 repetition in 8/10 trials  Outcome: Met

## 2024-02-27 NOTE — DISCHARGE SUMMARY
Physical Medicine & Rehabilitation Discharge Summary    Admission Date: 2/14/2024    Discharge Date: 2/28/2024    Attending Provider: Mazin Gregory MD/PhD    Admission Diagnosis:   Active Hospital Problems    Diagnosis     *Traumatic brain injury with new neurocognitive deficit (HCC)     Vitamin D deficiency     Glaucoma     PE (pulmonary thromboembolism) (HCC)     Other specified hypothyroidism     Type 1 diabetes mellitus without complication (HCC)     Essential hypertension     Dyslipidemia     Bipolar 1 disorder (HCC)        Discharge Diagnosis:  Active Hospital Problems    Diagnosis     *Traumatic brain injury with new neurocognitive deficit (HCC)     Vitamin D deficiency     Glaucoma     PE (pulmonary thromboembolism) (HCC)     Other specified hypothyroidism     Type 1 diabetes mellitus without complication (HCC)     Essential hypertension     Dyslipidemia     Bipolar 1 disorder (HCC)        HPI per Admission History & Physical:  Patient is a 73 y.o. female with a PMH of BPD1, depression, DM1, and CKD who presented on 2/9/24 with a GLF. Patient reportedly had a mechanical fall at outside facility while walking to restroom and had left sided head strike. Patient was evaluated in ED and found on CT to have left sided subarachnoid hemorrhage. NSG was consulted and recommended conservative management with serial CTs. Repeat CT showed stable bleed. Stay complicated by confusion and hyperglycemia.     Patient was admitted to [facility] on 2/14/2024.     Hospital Course by Problem List:  TBI (Traumatic Brain Injury): GLF on 2/9/24 with left sided subarachnoid hemorrhage s/p monitoring with serial CTs.   -PT and OT for mobility and ADLs. Per guidelines, 15 hours per week between PT, OT and/or SLP.  -Follow-up NSG. On 7 days of Keppra for seizure prophylaxis. Completed Keppra Hold AC until 2/19. Restart Eliquis, discussed with patient     HTN- Patient on Amlodipine 5 mg and Losartan 100  mg daily. Into 140s, consult hospitalist. Amlodipine increased to 10 mg QHS. SBP labile, continue Amlodipine 10 mg and Losartan 100 mg      Hx of PE - Previously on Eliquis. On hold for 10 days. Restarted on Eliquis 5 mg BID     HLD - Patient on Atorvastatin 80 mg QHS     Anemia - Check AM CBC - 11.4 on admission. Recheck 2/23/24     Leukopenia - Check AM CBC - 5.8 on admission, resolved     DM1 with hyperglycemia - Patient on Lantus 15 U and SSI on transfer. Blood sugars into 500s, give extra dose and consult hospitalist  -Severe hyperglycemia - Glargine changed to 10 QPM and 14U QPM -. Increased 2/22, blood sugars still into 260s, continue Glargine 18 U QAM and 15 U qPM     Hypothyroidism - Patient on Levothyroxine 25 mcg daily     Hypophosphatemia - Check AM level - low 2.4. Consult hospitalist. Continue K-Phos. K level stable. K-phos discontinue per Hospitalist. Recheck 2/23/24     BPD1 - Patient on Lamictal 100 mg daily, Primidone 100 mg QHS, Tegretol 200 mg BID, Buspar 5 mg BID, and Vraylar 6 mg QHS     Glaucoma - Patient on Timolol QHS     Vitamin D deficiency - 17 on admission, start 2000 U      Pain - Patient on PRN Tylenol and Oxycodone     Skin - Patient at risk for skin breakdown due to debility in areas including sacrum, achilles, elbows and head in addition to other sites. Nursing to assess skin daily.      GI Ppx - Patient on Prilosec for GERD prophylaxis. Patient on Senna-docusate for constipation prophylaxis.      DVT Ppx - Patient Eliquis is on hold on transfer. Restarted Eliquis on 2/19/24. Continue Eliquis     Functional Status at Discharge  Eating:  Independent  Eating Description:     Grooming:  Supervision, Standing  Grooming Description:  Adaptive equipment, Standing at sink, Supervision for safety (wash hands- FWW next to pt)  Bathing:  Supervision  Bathing Description:  Grab bar, Hand held shower, Tub bench, Increased time, Supervision for safety, Verbal cueing (Reggie for initiation of  bathing. SPV when standing for washing rear)  Upper Body Dressing:  Supervision  Upper Body Dressing Description:  Supervision for safety (SPV for clothing retrieval from closet using FWW.)  Lower Body Dressing:  Supervision  Lower Body Dressing Description:  Increased time, Supervision for safety     Walk:  Standby Assist  Distance Walked:  200  Number of Times Distance Was Traveled:  2  Assistive Device:  4 Wheel Walker  Gait Deviation:  Decreased Base Of Support, Decreased Heel Strike, Decreased Toe Off, Bradykinetic  Wheelchair:   (n/a pt ambulatory)  Distance Propelled:      Wheelchair Description:     Stairs Standby Assist  Stairs Description Extra time, Hand rails, Supervision for safety, Verbal cueing     Comprehension:  Modified Independent  Comprehension Description:  Glasses  Expression:  Independent  Expression Description:  Verbal cueing  Social Interaction:  Independent  Social Interaction Description:  Medication  Problem Solving:  Supervision  Problem Solving Description:  Therapy schedule, Verbal cueing, Bed/chair alarm, Seat belt  Memory:  Supervision  Memory Description:  Therapy schedule, Verbal cueing, Bed/chair alarm, Seat belt       Mazin DU M.D., personally performed a complete drug regimen review and no potential clinically significant medication issues were identified.   Discharge Medication:     Medication List        START taking these medications        Instructions   Lantus SoloStar 100 UNIT/ML Sopn injection  Generic drug: insulin glargine  Replaces: insulin GLARGINE 100 UNIT/ML Soln   Inject 15 Units under the skin 2 times a day.  Dose: 15 Units            CHANGE how you take these medications        Instructions   amLODIPine 10 MG Tabs  What changed:   medication strength  how much to take  Commonly known as: Norvasc   Take 1 Tablet by mouth every evening. Hold for SBP <110  Dose: 10 mg            CONTINUE taking these medications        Instructions    acetaminophen 500 MG Tabs  Commonly known as: Tylenol   Take 2 Tablets by mouth every 6 hours as needed for Mild Pain or Moderate Pain.  Dose: 1,000 mg     anastrozole 1 MG Tabs  Commonly known as: Arimidex   Take 1 Tablet by mouth every evening. Needs to establish with cancer specialist. Last refill  Dose: 1 mg     apixaban 5mg Tabs  Commonly known as: Eliquis   Take 1 Tablet by mouth 2 times a day.  Dose: 5 mg     atorvastatin 80 MG tablet  Commonly known as: Lipitor   Take 1 Tablet by mouth every evening.  Dose: 80 mg     busPIRone 5 MG tablet  Commonly known as: Buspar   Take 1 Tablet by mouth 2 times a day.  Dose: 5 mg     carBAMazepine 200 MG Tabs  Commonly known as: TEGretol   Take 1 Tablet by mouth 2 times a day.  Dose: 200 mg     insulin lispro 100 UNIT/ML Sopn injection PEN  Commonly known as: HumaLOG/AdmeLOG   70 - 150 mg/dL = 0 Units 151 - 200 mg/dL = 2 Units 201 - 250 mg/dL = 3 Units 251 - 300 mg/dL = 5 Units 301 - 350 mg/dL = 6 Units 351 - 400 mg/dL = 8 Units Over 400 mg/dL = 9 Units Over 400 mg/dL x 2 consecutive FSBG = 9 Units and call MD     lamoTRIgine 100 MG Tabs  Commonly known as: LaMICtal   Take 1 Tablet by mouth every day.  Dose: 100 mg     levothyroxine 25 MCG Tabs  Commonly known as: Synthroid   Take 1 Tablet by mouth every morning on an empty stomach.  Dose: 25 mcg     losartan 100 MG Tabs  Commonly known as: Cozaar   Take 1 Tablet by mouth every day. Hold for SBP < 110  Dose: 100 mg     primidone 50 MG Tabs  Commonly known as: Mysoline   Take 2 Tablets by mouth at bedtime. 100 mg = 2 tabs  Dose: 100 mg     timolol 0.5 % Soln  Commonly known as: Timoptic   Administer 1 Drop into both eyes at bedtime.  Dose: 1 Drop     Vraylar 6 MG Caps  Generic drug: Cariprazine HCl   Take 6 mg by mouth at bedtime.  Dose: 6 mg            STOP taking these medications      Aspirin Low Dose 81 MG EC tablet  Generic drug: aspirin     bisacodyl 10 MG Supp  Commonly known as: Dulcolax     docusate sodium 100  MG Caps     insulin GLARGINE 100 UNIT/ML Soln  Commonly known as: Lantus,Semglee  Replaced by: Lantus SoloStar 100 UNIT/ML Sopn injection     levETIRAcetam 500 MG Tabs  Commonly known as: Keppra     magnesium hydroxide 400 MG/5ML Susp  Commonly known as: Milk Of Magnesia     ondansetron 4 MG Tbdp  Commonly known as: Zofran ODT     oxyCODONE immediate-release 5 MG Tabs  Commonly known as: Roxicodone     polyethylene glycol/lytes Pack  Commonly known as: Miralax     senna-docusate 8.6-50 MG Tabs  Commonly known as: Pericolace Or Senokot S     ziprasidone 20 MG Solr  Commonly known as: Geodon              Discharge Diet:  Current Diet Order   Procedures    Diet Order Diet: Consistent CHO (Diabetic)       Discharge Activity:  As tolerated     Disposition:  Patient to discharge home with family support and community resources.    Equipment:  FWW    Follow-up & Discharge Instructions:  Follow up with your primary care provider (PCP) within 7-10 days of discharge to review your medications and take over your care.     If you develop chest pain, fever, chills, change in neurologic function (weakness, sensation changes, vision changes), or other concerning sxs, seek immediate medical attention or call 911.      Future Appointments   Date Time Provider Department Center   2/27/2024 12:30 PM Wanda Jim MS,OTR/L [facility] None   5/22/2024  8:00 AM VASCULAR NURSE PRACTITIONER [facility] None       Condition on Discharge:  Good    More than 31 minutes was spent on discharging this patient, including face-to-face time, prescription management, and the dictation of this note.    Mazin Gregory M.D.    Date of Service: 2/28/2024

## 2024-02-27 NOTE — PROGRESS NOTES
Hospital Medicine Daily Progress Note      Chief Complaint  Hypertension  Diabetes    Interval Problem Update  No overnight problems.  Blood pressure and blood sugar trends reviewed and discussed.    Review of Systems  Review of Systems   Constitutional:  Negative for chills and fever.   HENT: Negative.     Eyes:  Negative for blurred vision and double vision.   Respiratory:  Negative for cough and sputum production.    Cardiovascular:  Negative for chest pain and palpitations.   Gastrointestinal:  Negative for abdominal pain, nausea and vomiting.   Skin:  Negative for itching and rash.   Endo/Heme/Allergies:  Negative for polydipsia. Does not bruise/bleed easily.        Physical Exam  Temp:  [36.3 °C (97.4 °F)-36.9 °C (98.5 °F)] 36.9 °C (98.5 °F)  Pulse:  [72-90] 77  Resp:  [18-20] 18  BP: ()/(60-79) 144/74  SpO2:  [93 %-100 %] 95 %    Physical Exam  Vitals reviewed.   Constitutional:       General: She is not in acute distress.     Appearance: Normal appearance. She is not ill-appearing.   HENT:      Head: Normocephalic and atraumatic.      Right Ear: External ear normal.      Left Ear: External ear normal.      Nose: Nose normal.      Comments: Resolved facial ecchymoses under eyes and across nasal bridge     Mouth/Throat:      Pharynx: Oropharynx is clear.   Eyes:      General:         Right eye: No discharge.         Left eye: No discharge.      Extraocular Movements: Extraocular movements intact.      Conjunctiva/sclera: Conjunctivae normal.   Cardiovascular:      Rate and Rhythm: Normal rate and regular rhythm.   Pulmonary:      Effort: No respiratory distress.      Breath sounds: No wheezing.      Comments: Decreased BS  Abdominal:      General: Bowel sounds are normal. There is no distension.      Palpations: Abdomen is soft.      Tenderness: There is no abdominal tenderness.   Musculoskeletal:      Cervical back: Normal range of motion and neck supple.      Right lower leg: No edema.      Left lower  leg: No edema.   Skin:     General: Skin is warm and dry.   Neurological:      Mental Status: She is alert and oriented to person, place, and time.         Fluids    Intake/Output Summary (Last 24 hours) at 2/28/2024 1002  Last data filed at 2/28/2024 0900  Gross per 24 hour   Intake 1320 ml   Output --   Net 1320 ml       Laboratory                      Assessment/Plan  * Traumatic brain injury with new neurocognitive deficit (HCC)- (present on admission)  Assessment & Plan  S/P MGLF w/ left sided head trauma  Neuroimaging +SAH  Completed Keppra for SZ proph    Glaucoma  Assessment & Plan  On Timolol    Vitamin D deficiency  Assessment & Plan  Vit D level 17  On supplement    PE (pulmonary thromboembolism) (HCC)- (present on admission)  Assessment & Plan  Chronically anticoagulated on Eliquis    Other specified hypothyroidism- (present on admission)  Assessment & Plan  Euthyroid on Synthroid    Dyslipidemia- (present on admission)  Assessment & Plan  On Lipitor    Essential hypertension- (present on admission)  Assessment & Plan  On Norvasc and Losartan  May need to decrease CCB if blood pressures continue to trend low    Type 1 diabetes mellitus without complication (HCC)- (present on admission)  Assessment & Plan  HbA1c 9.0  Suggest increase qam Lantus and decrease qhs Lantus  Outpt meds include Levemir 10-12 u bid and Novolog SSI    Bipolar 1 disorder (HCC)- (present on admission)  Assessment & Plan  On Buspar, Vraylar, Tegretol, Lamictal, and Primidone  Needs Psychiatry F/U       Full Code

## 2024-02-27 NOTE — CARE PLAN
Problem: Toileting  Goal: STG-Within one week, patient will complete toileting tasks at SBA level.  Outcome: Met

## 2024-02-27 NOTE — DISCHARGE INSTRUCTIONS
[facility] NURSING DISCHARGE INSTRUCTIONS    Blood Pressure : (!) 144/74  Weight: 60.3 kg (133 lb)  Nursing recommendations for Nohelia Loera at time of discharge are as follows:  Client and Family Member verbalized understanding of all discharge instructions and prescriptions.     Review all your home medications and newly ordered medications with your doctor and/or pharmacist. Follow medication instructions as directed by your doctor and/or pharmacist.    Pain Management:   Discharge Pain Medication Instructions:  Comfort Goal: Comfort with Movement, Perform Activity, Stay Alert  Notify your primary care provider if pain is unrelieved with these measures, if the pain is new, or increased in intensity.    Discharge Skin Characteristics: Warm, Dry  Discharge Skin Exam: Bruise (Indicate Location In Comment) (facial bruising)     Skin / Wound Care Instructions: Please contact your primary care physician for any change in skin integrity.     If You Have Surgical Incisions / Wounds:  Monitor surgical site(s) for signs of increased swelling, redness or symptoms of drainage from the site or fever as this could indicate signs and symptoms of infection. If these symptoms are noted, notifiy your primary care provider.      Discharge Safety Instructions: Should Have ADULT SUPERVISION     Discharge Safety Concerns: Unsteady Gait, Weakness, History Of Falls, Balance Problems (Dizziness, Light Headedness)  The interdisciplinary team has made recommendation that you should have adult supervision in the house due to history of falls, weakness, unsteady gait, and demonstration of safety with ADL's and IADLS and problem solving skills  Anti-embolic stockings are not required to increase circulation to the lower extremities.    Discharge Diet: Diabetic     Discharge Liquids: Thin  Discharge Bowel Function: Continent  Please contact your primary care physician for any changes in bowel habits.  Discharge  "Bowel Program:    Discharge Bladder Function: Continent  Discharge Urinary Devices: None      Nursing Discharge Plan:   Influenza Vaccine Indication: Not indicated: Previously immunized this influenza season and > 8 years of age    Case Management Discharge Instructions:   Discharge Location:    Agency Name/Address/Phone:    Home Health:    Outpatient Services:    DME Provider/Phone:    Medical Equipment Ordered:    Prescription Faxed to:        Discharge Medication Instructions:  Below are the medications your physician expects you to take upon discharge:    Prevent Falls in Your Home    \"Falling once doubles your chance of falling again\"        -Center for Disease Control and Prevention    Falls in the home can lead to serious injury (fractures, brain injuries), hospitalizations, increased medical costs, and could even be fatal.  The good news is, there are many precautions you can take to avoid falls in your home and help keep you safe:     If prescribed an assistive device (walker, crutches), use as instructed by the healthcare provider\"   Remove any tripping hazards from your home, including loose cords, throw rugs and clutter  Keep a nightlight on in dark (hallways, bathrooms, etc)   Get up slowly, to make sure you feel okay before getting up  Be aware of any side effects of your medications: some medications may make you dizzy  Place a non-skid rubber mat in your shower or tub-consider a shower bench or chair if unsteady on your feet  Wear supportive shoes or non-skid socks when moving around  Start an exercise program once approved by your provider.  If you are feeling weak following a hospital stay, talk to your doctor about home health or outpatient therapy programs designed to help rebuild your strength and endurance      Monitoring for Diabetes  There are two blood tests that help you monitor and manage your diabetes. These include:  An A1c (hemoglobin A1c) test.  This test is an average of your glucose " (or blood sugar) control over the past 3 months.  This is recommended as a way for you and your caregiver to understand how well your glucose levels are controlled on the average.  Your A1c goal will be determined by your caregiver, but it is usually best if it is less than 6.5% to 7.0%.  Glucose (sugar) attaches itself to red blood cells. The amount of glucose then can then be measured. The amount of glucose on the cells depends on how high your blood glucose has been.  SMBG test (self-monitoring blood glucose).  Using a blood glucose monitor (meter) to do SMBG testing is an easy way to monitor the amount of glucose in your blood and can help you improve your control. The monitor will tell you what your blood glucose is at that very moment. Every person with diabetes should have a blood glucose monitor and know how to use it. The better you control your blood sugar on a daily basis, the better your A1c levels will be.  HOW OFTEN SHOULD I HAVE AN A1C LEVEL?  Every 3 months if your diabetes is not well controlled or if therapy has changed.  Every 6 months if you are meeting your treatment goals.  HOW OFTEN SHOULD I DO SMBG TESTING?   Your caregiver will recommend how often you should test. Testing times are based on the kind of medicine you take, type of diabetes you have, and your blood glucose control. Testing times can include:  Type 1 diabetes: test 3 or 4 times a day or as directed.  Type 2 diabetes and if you are taking insulin and diabetes pills: test 3 or 4 times a day or as directed.  If you are taking diabetes pills only and not reaching your target A1c: test 2 to 4 times a day or as directed.  If you are taking diabetes pills and are controlling your diabetes well with diet and exercise, your caregiver will help you decide what is appropriate.  WHAT TIME OF DAY SHOULD I TEST?   The best time of day to test your blood glucose depends on medications, mealtimes, exercise, and blood glucose control. It is best  to test at different times because this will help you know how you are doing throughout the day. Your caregiver will help you decide what is best.  WHAT SHOULD MY BLOOD GLUCOSE BE?  Blood glucose target goals may vary depending on each persons needs, whether they have type 1 or type 2 diabetes or what medications they are taking. However, as a general rule, blood glucose should be:  Before meals    mg/dl.  After meals    ..less than 180 mg/dl.  CHECK YOUR BLOOD GLUCOSE IF:  You have symptoms of low blood sugar (hypoglycemia), which may include dizziness, shaking, sweating, chills and confusion.  You have symptoms of high blood sugar (hyperglycemia), which may include sleepiness, blurred vision, frequent urination and excessive thirst.  You are learning how meals, physical activity and medicine affect your blood glucose level. The more you learn about how various foods, your medications, and activities affect you, the better job you will do of taking care of yourself.  You have a job in which poor control could cause safety problems while driving or operating machinery.  CHECK YOUR BLOOD SUGAR MORE FREQUENTLY:  If you have medication or dietary changes.  If you begin taking other kinds of medicines.  If you become sick or your level of stress increases. With an illness, your blood sugar may even be high without eating.  Before and after exercise.  Follow your caregiver's testing recommendations during this time.   TO DISPOSE OF SHARPS:  Each city or state may have different regulations. Check with your public works or waste management department.  Sharps containers can be purchased from pharmacies.  Place all used sharps in a container. You do not need to replace any protective covers over the needle or break the needle.  Sharps should be contained in a ridge, leakproof, puncture-resistant container.  Plastic detergent bottle.  Bleach bottle.  When container is almost full, add a solution that is 1 part laundry  bleach and 9 parts tap water (it is okay to use undiluted bleach if you wish). You may want to wear gloves since bleach can damage tissue. Let the solution sit for 30 minutes.  Carefully pour all the liquid into the sanitary . Be sure to prevent the sharps from falling out.  Once liquid is drained, reseal the container with lid and tape it shut with duct tape. This will prevent the cap from coming off.  Dispose of the container with your regular household trash and waste. It is a good idea to let your  know that you will be disposing of sharps.  Document Released: 12/20/2004 Document Revised: 09/11/2013 Document Reviewed: 06/21/2010  ExitCare® Patient Information ©2014 [employer].        Occupational Therapy Discharge Instructions for Molly Loera    2/27/2024    Level of Assist Required for Eating: Able to Complete Eating without Assist  Level of Assist Required for Grooming: Able to Complete Grooming without Assist  Level of Assist Required for Dressing: Able to Complete Dressing without Assist  Level of Assist Required for Toileting: Able to Complete Toileting without Assist  Level of Assist Required for Toilet Transfer: Requires Supervision with Toilet Transfer  Level of Assist Required for Bathing: Able to Complete Bathing without Assist  Equipment for Bathing: Shower Chair, Grab Bars in Tub / Shower  Level of Assist Required for Shower Transfer: Requires Supervision with Shower Transfer  Equipment for Shower Transfer: Grab Bars in Tub / Shower, Shower Chair  Level of Assist Required for Home Mgmt: Requires Supervision with Home Management  Level of Assist Required for Meal Prep: Requires Supervision with Meal Preparation  Driving: May not Drive, Please Contact Physician for Further Information  Home Exercise Program: Refer to Home Exercise Program Handout for Details    Speech Therapy Discharge Instructions for Nohelia Loera    2/27/2024    Diet: Regular (7), Thin  (0)  Supervision: initial 24-7 supervision for safety with transition to intermittent supervision.  Cognition / Communication: Nohelia, I have enjoyed working with you so much - you are such a pleasure! Please keep up the great work at home, safe and take care! ~ Kandy Zuñiga MS, CCC-SLP    To increase your ability to pay attention and concentrate it is recommended you follow these strategies:     Monitor your fatigue: Monitoring our fatigue is probably one of the most important strategies we can use. Many people suffer from higher levels of fatigue than normal and must be aware that fatigue will have a major impact on attention capabilities. This means scheduling activities that require your attention at times when you feel at your best.  Make a schedule: Choose a time that’s quiet and unhurried -- maybe at night before you go to bed -- and plan out the next day, down to the task. Use a reminder tani, timer, or alarm to help you stick to that schedule. Alternate things you want to do with ones you don’t to help your mind stay engaged.  Allow plenty of time to complete tasks.  Be realistic about time: Your brain is wired differently than other people’s, and it may take you longer to get things done. That’s OK. Figure out a realistic time frame for your daily tasks -- and don’t forget to build in time for breaks if you think you’ll need them.  Quiet your mind by quieting your space: When it’s time to buckle down and get something done, take away the distractions. Use noise-canceling headphones to drown out sounds. Put your phone on silent. Work in a room with a door you can close. If you can do your job from home, set up the space in a way that helps you focus.  Control clutter: Another way to quiet your brain is to clear your space of things you don’t need. It can prevent distractions, and it can help you stay organized because you’ll have fewer things to tidy up.  Get some organizational helpers like under-the-bed  containers or over-the-door holders. Ask a friend to help if it seems like you’re swimming in a sea of debris and you don’t know where to start.    What is memory?   Memory is the ability to learn, store, and retrieve information. New or increasing problems with any or all of these 3 stages of memory often occur after a traumatic brain injury, stroke, brain tumor, multiple sclerosis, or other kind of injury or illness that affects your nervous system.   Some kinds of memory problems may also occur as part of normal aging, when many people have more trouble retrieving new information.     Types of Memory:    Long-term (remote): memory for old, well-learned information that has been “rehearsed” (used) over time, such as the name of a childhood pet, memories of past vacations, or where you went to high school. Long-term memory tends to be retained after injury or illness.   Short-term (recent): memory for new experiences that took place a few minutes, hours, or days ago, such as what you had for breakfast or what you did yesterday. Short-term memory tends to be the most severely affected after injury. People who have had brain injuries may have problems with their attention span, how much memory they can store, how quickly they can think, and how efficiently they learn. These memory problems will make it hard to understand and save short-term memories so that they can be correctly rehearsed and stored in long-term memory.   Immediate (working): memory for information that is current, that you usually keep track of mentally, such as a phone number you look up, directions someone just gave you, or keeping track of numbers in your head when you add or subtract.   Prospective: the ability to remember to do something in the future, such as remembering to take a medicine, go to an appointment, or follow through on an assignment or project.       Strategies to Help Improve Your Memory   Your speech therapist can work with you  to develop strategies to help you remember new information. There are 2 main types of strategies to help your memory: internal reminders and external reminders.     Internal Reminders     Rehearsal: retelling yourself information you just learned, or restating it out loud in your own words.   Repetition: saying the same information over and over, either silently or out loud.   Clarification: asking someone else to repeat or rephrase information.   Chunking: grouping items together to reduce the number of items to remember, such as grouping 7-digit phone numbers into 2 chunks, one with 3 numbers and the other with 4 numbers.   Rhyming: making a rhyme out of important information.   Acronyms or alphabet cueing: creating a letter for each word you want to remember, or vice versa. One example is using the sentence “Every Good Boy Does Fine” to remember that the lines of a treble staff in music are the notes E, G, B, D, and F.   Imagery (also called visualization): creating pictures of the information in your mind.   Association: linking old information or habits with the new, such as remembering to take your medicine every night at the same time that you brush your teeth.   Personal meaning: making the new information meaningful or emotionally important to you in some way.                 External Reminders     Using a paper or electronic calendar or day planner.   Setting timers or alarms to remind you to do something.   Writing down reminders such as to-do lists, shopping lists, and project outlines.   Recording new information with a voice recorder.   Using a medicine organizing tool.   Creating specific, permanent places for important items. One example is always putting your keys, wallet, and cell phone in the same place every time you get homeLarissa Pozo would benefit from assistance managing their medications.  It is recommended that you purchase a pill organizer to sort medications in on a weekly basis.   Implementing a system to help remind you to take your medications will also be helpful (Ex: setting alarms, having a friend/family member call as a reminder).

## 2024-02-27 NOTE — THERAPY
"Physical Therapy   Daily Treatment     Patient Name: Molly Loera  Age:  73 y.o., Sex:  female  Medical Record #: [id number]  Today's Date: 2/27/2024     Precautions  Precautions: Fall Risk  Comments: poor safety awareness/impulsive    Subjective    Pt agreeable to PT, reports R knee is sore    \"Its always sore\"    Edu pt on non pharmalogical pain control RICE     Objective       02/27/24 0931   PT Charge Group   PT Gait Training (Units) 2   PT Therapeutic Exercise (Units) 1   PT Neuromuscular Re-Education / Balance (Units) 1   Supervising Physical Therapist Ashley Murrieta   PT Total Time Spent   PT Individual Total Time Spent (Mins) 60   Pain   Intervention Emotional Support   Pain 0 - 10 Group   Location Knee   Location Orientation Right   Gait Functional Level of Assist    Gait Level Of Assist Standby Assist   Assistive Device 4 Wheel Walker   Distance (Feet) 200   # of Times Distance was Traveled 2   Deviation Decreased Base Of Support;Decreased Heel Strike;Decreased Toe Off;Bradykinetic   Stairs Functional Level of Assist   Level of Assist with Stairs Standby Assist   # of Stairs Climbed 12   Stairs Description Extra time;Hand rails;Supervision for safety;Verbal cueing   Transfer Functional Level of Assist   Bed, Chair, Wheelchair Transfer Standby Assist   Bed Chair Wheelchair Transfer Description Adaptive equipment;Increased time;Verbal cueing;Supervision for safety   Sitting Lower Body Exercises   Nustep Resistance Level 5;Time (See Comments)  (B LE/UE for CV endurance/ROM)   Bed Mobility    Supine to Sit Modified Independent   Sit to Supine Modified Independent   Sit to Stand Supervised   Scooting Modified Independent   Rolling Modified Independent   Neuro-Muscular Treatments   Neuro-Muscular Treatments Other (See Comments)   Comments BURRELL   Burrell Balance Scale   Sitting Unsupported (Score 0-4) 4   Change Of Positon: Sitting To Standing (Score 0-4) 3   Change Of Positon: Standing To Sitting (Score 0-4) 3 "   Transfers (Score 0-4) 3   Standing Unsupported (Score 0-4) 4   Standing With Eyes Closed (Score 0-4) 4   Standing With Feet Together (Score 0-4) 4   Tandem Standing (Score 0-4) 2   Standing On One Leg (Score 0-4) 1   Turning Trunk (Feet Fixed) (Score 0-4) 2   Retrieving Objects From Floor (Score 0-4) 3   Turning 360 Degrees (Score 0-4) 2   Stool Stepping (Score 0-4) 1   Reaching Forward While Standing (Score 0-4) 3   Chowdary Balance Total Score (0-56) 39   Interdisciplinary Plan of Care Collaboration   IDT Collaboration with  Occupational Therapist   Patient Position at End of Therapy Seated;Self Releasing Lap Belt Applied;Call Light within Reach;Tray Table within Reach   Collaboration Comments CLOF       Reviewed safety/sequencing with 4WW. Floor recovery transfer(demo/verbally) and added written reminder card for pt's 4WW  Assessment    Pt increased CHOWDARY score from 29 to a 39/56 which indicated pt is at continued risk for falling and needs to use an AD, pt outcome measure shows improvement if greater than MCID.   Strengths: Able to follow instructions, Alert and oriented, Independent prior level of function, Pleasant and cooperative  Barriers: Generalized weakness, Fatigue, Impaired activity tolerance, Impaired balance (high CBG)    Plan    FT has been completed, Pt is prepared for DC follow up with HH, recommended pt have 24/7 supervision upon dc    DME  PT DME Recommendations  Assistive Device: Front Wheeled Walker    Passport items to be completed:  Get in/out of bed safely, in/out of a vehicle, safely use mobility device, walk or wheel around home/community, navigate up and down stairs, show how to get up/down from the ground, ensure home is accessible, demonstrate HEP, complete caregiver training    Physical Therapy Problems (Active)       Problem: Mobility       Dates: Start:  02/15/24         Goal: STG-Within one week, patient will ambulate up/down a curb using LRAD, SBA       Dates: Start:  02/15/24                Problem: PT-Long Term Goals       Dates: Start:  02/15/24         Goal: LTG-By discharge, patient will tolerate standing x10 mins       Dates: Start:  02/15/24            Goal: LTG-By discharge, patient will ambulate using LRAD x 300 feet, Mod I       Dates: Start:  02/15/24            Goal: LTG-By discharge, patient will perform home exercise program independently       Dates: Start:  02/15/24            Goal: LTG-By discharge, patient will transfer in/out of a car, Mod I       Dates: Start:  02/15/24

## 2024-02-27 NOTE — CARE PLAN
Problem: Memory STGs  Goal: STG-Within one week, patient will complete mental manipulation tasks up to 4 units with no more than 1 repetition in 8/10 trials  Outcome: Met

## 2024-02-27 NOTE — CARE PLAN
"The patient is Stable - Low risk of patient condition declining or worsening    Shift Goals  Clinical Goals: Safety  Patient Goals: Sleep well  Family Goals: Education    Patient is not progressing towards the following goals:    Problem: Fall Risk - Rehab  Goal: Patient will remain free from falls  Outcome: Not Met  Note: Prema Amaya Fall risk Assessment Score: 15    High fall risk Interventions   - Alarming seatbelt  - Bed and strip alarm   - Yellow sign by the door   - Yellow wrist band \"Fall risk\"  - Fall risk education provided     "

## 2024-02-27 NOTE — THERAPY
Speech Language Pathology  Daily Treatment     Patient Name: Molly Loera  Age:  73 y.o., Sex:  female  Medical Record #: [id number]  Today's Date: 2/27/2024     Precautions  Precautions: Fall Risk  Comments: poor safety awareness/impulsive    Subjective    Pt finishing breakfast at start of ST, agreeable to attend in SLP's office.     Objective       02/27/24 0834   Treatment Charges   SLP Cognitive Skill Development First 15 Minutes 1   SLP Cognitive Skill Development Additional 15 Minutes 3   SLP Total Time Spent   SLP Individual Total Time Spent (Mins) 60   Cognition   Attention to Task Supervision (5)   Simple Attention Supervision (5)   Moderate Attention Minimal (4)   Interdisciplinary Plan of Care Collaboration   IDT Collaboration with  Certified Nursing Assistant   Patient Position at End of Therapy Seated;Call Light within Reach   Collaboration Comments CNA assisted with transferring pt to w/c for St         Assessment    Alternating visual attention task - pt located 7/10 targets IND, MIN cues to locate 9/10, max to locate remaining target. Pt in agreement with recommendations for d/c.    Strengths: Able to follow instructions, Alert and oriented, Effective communication skills, Independent prior level of function, Motivated for self care and independence, Willingly participates in therapeutic activities, Supportive family, Pleasant and cooperative, Making steady progress towards goals, Manages pain appropriately  Barriers:  (complex attention)    Plan    Anticipated d/c scheduled for 2/28/24.     Passport items to be completed:  Express basic needs, understand food/liquid recommendations, consistently follow swallow precautions, manage finances, manage medications, arrive to therapy appointments on time, complete daily memory log entries, solve problems related to safety situations, review education related to hospitalization, complete caregiver training     Speech Therapy Problems (Active)        Problem: Speech/Swallowing LTGs       Dates: Start:  02/15/24         Goal: LTG-By discharge, patient will solve basic problems by utilizing compensatory intervention for memory and problem-solving to allow for safe completion of daily activities with SPV in order to prepare for safe d/c         Dates: Start:  02/15/24

## 2024-02-27 NOTE — PROGRESS NOTES
..                                                         NURSING DAILY NOTE    Name: Molly Loera   Date of Admission: 2/14/2024   Admitting Diagnosis: Traumatic brain injury with new neurocognitive deficit (HCC)  Attending Physician: Mazin Gregory M.d.  Allergies: Depakote [valproic acid]    Safety  Patient Assist  standby assist  Patient Precautions  Fall Risk  Precaution Comments  poor safety awareness/impulsive  Bed Transfer Status  Standby Assist (car transfer using 4WW SBA, dtr to manage 4WW and car door)  Toilet Transfer Status   Supervised  Assistive Devices  Rails, Wheelchair  Oxygen  None - Room Air  Diet/Therapeutic Dining  Current Diet Order   Procedures    Diet Order Diet: Consistent CHO (Diabetic)     Pill Administration  whole  Agitated Behavioral Scale  15  ABS Level of Severity  No Agitation    Fall Risk  Has the patient had a fall this admission?   No  Prema Amaya Fall Risk Scoring  15, HIGH RISK  Fall Risk Safety Measures  bed alarm, chair alarm, poor balance, low vision/ hearing, and ok to leave pt in bathroom    Vitals  Temperature: 36.6 °C (97.8 °F)  Temp src: Oral  Pulse: 80  Respiration: 18  Blood Pressure : 134/78  Blood Pressure MAP (Calculated): 97 MM HG  BP Location: Right, Upper Arm  Patient BP Position: Supine     Oxygen  Pulse Oximetry: 94 %  O2 (LPM): 0  O2 Delivery Device: None - Room Air    Bowel and Bladder  Last Bowel Movement  02/24/24  Stool Type  Type 5: Soft blob with clear cut edges (passed easily)  Bowel Device  Bathroom  Continent  Bladder: Stress incontinence   Bowel: Incontinent movement  Bladder Function  Urine Void (mL):  (moderate)  Number of Times Voided: 1  Urinary Options: Yes  Urine Color: Yellow  Number of Times Incontinent of Urine: 0  Genitourinary Assessment   Bladder Assessment (WDL):  WDL Except  Mcelroy Catheter: Not Applicable  Urinary Elimination: Frequency  Urine Color: Yellow  Number of Bladder Accidents: 0  Total Number of Bladder of  "Accidents in Last 7 Days: 0  Number of Times Incontinent of Urine: 0  Bladder Device: Bathroom  Time Void: No  Bladder Scan: Post Void  $ Bladder Scan Results (mL): 10    Skin  Zach Score   18  Sensory Interventions   Bed Types: Standard/Trauma Mattress with Overlay  Skin Preventative Measures: Waffle Overlay  Moisture Interventions  Moisturizers/Barriers: Barrier Wipes      Pain  Pain Rating Scale  0 - No Pain  Pain Location  Neck  Pain Location Orientation  Mid  Pain Interventions   Declines    ADLs    Bathing    (Offered shower, refused. Patient said:\"I'll have OT shower tomorrow.\" Nurse noted.)  Linen Change    (Pt refused said she dont shower on nights is too cold Rn was inform)  Personal Hygiene  Moist Violeta Wipes  Chlorhexidine Bath      Oral Care  Brushed Teeth  Teeth/Dentures     Shave     Nutrition Percentage Eaten  Between % Consumed  Environmental Precautions  Treaded Slipper Socks on Patient, Personal Belongings, Wastebasket, Call Bell etc. in Easy Reach, Transferred to Stronger Side, Report Given to Other Health Care Providers Regarding Fall Risk, Bed in Low Position  Patient Turns/Positioning  Patient Turns Self from Side to Side  Patient Turns Assistance/Tolerance  Assistance of One, General Weakness  Bed Positions  Bed Controls On  Head of Bed Elevated  Self regulated      Psychosocial/Neurologic Assessment  Psychosocial Assessment  Psychosocial (WDL):  Within Defined Limits  Neurologic Assessment  Neuro (WDL): Within Defined Limits  Level of Consciousness: Alert  Orientation Level: Oriented X4  Cognition: Appropriate judgement, Appropriate safety awareness  Speech: Clear  Pupil Assesment: No  R Pupil Size (mm): 2  R Pupil Shape / Description: Round  R Pupil Reaction: Brisk  L Pupil Size (mm): 2  L Pupil Shape / Description: Round  L Pupil Reaction: Brisk  Motor Function/Sensation Assessment: Motor strength  Muscle Strength Right Arm: Good Strength Against Gravity and Moderate " Resistance  Muscle Strength Left Arm: Good Strength Against Gravity and Moderate Resistance  Muscle Strength Right Leg: Good Strength Against Gravity and Moderate Resistance  Muscle Strength Left Leg: Good Strength Against Gravity and Moderate Resistance  EENT (WDL):  Within Defined Limits    Cardio/Pulmonary Assessment  Edema   RLE Edema: Trace  LLE Edema: Trace  Respiratory Breath Sounds  RUL Breath Sounds: Clear  RML Breath Sounds: Clear  RLL Breath Sounds: Clear  SHAWNA Breath Sounds: Clear  LLL Breath Sounds: Clear  Cardiac Assessment   Cardiac (WDL):  WDL Except (HX: HTN)

## 2024-02-28 VITALS
BODY MASS INDEX: 22.16 KG/M2 | TEMPERATURE: 97.8 F | HEART RATE: 79 BPM | HEIGHT: 65 IN | OXYGEN SATURATION: 100 % | RESPIRATION RATE: 18 BRPM | DIASTOLIC BLOOD PRESSURE: 68 MMHG | SYSTOLIC BLOOD PRESSURE: 126 MMHG | WEIGHT: 133 LBS

## 2024-02-28 LAB
GLUCOSE BLD STRIP.AUTO-MCNC: 163 MG/DL (ref 65–99)
GLUCOSE BLD STRIP.AUTO-MCNC: 179 MG/DL (ref 65–99)
GLUCOSE BLD STRIP.AUTO-MCNC: 239 MG/DL (ref 65–99)
GLUCOSE BLD STRIP.AUTO-MCNC: 60 MG/DL (ref 65–99)

## 2024-02-28 PROCEDURE — 700102 HCHG RX REV CODE 250 W/ 637 OVERRIDE(OP): Performed by: PHYSICAL MEDICINE & REHABILITATION

## 2024-02-28 PROCEDURE — 99232 SBSQ HOSP IP/OBS MODERATE 35: CPT | Performed by: HOSPITALIST

## 2024-02-28 PROCEDURE — 700102 HCHG RX REV CODE 250 W/ 637 OVERRIDE(OP): Performed by: HOSPITALIST

## 2024-02-28 PROCEDURE — 82962 GLUCOSE BLOOD TEST: CPT

## 2024-02-28 PROCEDURE — A9270 NON-COVERED ITEM OR SERVICE: HCPCS | Performed by: PHYSICAL MEDICINE & REHABILITATION

## 2024-02-28 PROCEDURE — A9270 NON-COVERED ITEM OR SERVICE: HCPCS | Performed by: HOSPITALIST

## 2024-02-28 PROCEDURE — 99239 HOSP IP/OBS DSCHRG MGMT >30: CPT | Performed by: PHYSICAL MEDICINE & REHABILITATION

## 2024-02-28 RX ADMIN — DOCUSATE SODIUM 50MG AND SENNOSIDES 8.6MG 2 TABLET: 8.6; 5 TABLET, FILM COATED ORAL at 07:58

## 2024-02-28 RX ADMIN — BUSPIRONE HYDROCHLORIDE 5 MG: 5 TABLET ORAL at 07:59

## 2024-02-28 RX ADMIN — LEVOTHYROXINE SODIUM 25 MCG: 0.03 TABLET ORAL at 06:02

## 2024-02-28 RX ADMIN — LOSARTAN POTASSIUM 100 MG: 50 TABLET, FILM COATED ORAL at 07:58

## 2024-02-28 RX ADMIN — INSULIN LISPRO 4 UNITS: 100 INJECTION, SOLUTION INTRAVENOUS; SUBCUTANEOUS at 11:23

## 2024-02-28 RX ADMIN — CARBAMAZEPINE 200 MG: 100 TABLET, CHEWABLE ORAL at 07:59

## 2024-02-28 RX ADMIN — INSULIN LISPRO 2 UNITS: 100 INJECTION, SOLUTION INTRAVENOUS; SUBCUTANEOUS at 07:40

## 2024-02-28 RX ADMIN — Medication 1000 UNITS: at 07:59

## 2024-02-28 RX ADMIN — APIXABAN 5 MG: 5 TABLET, FILM COATED ORAL at 07:59

## 2024-02-28 RX ADMIN — LAMOTRIGINE 100 MG: 100 TABLET ORAL at 07:58

## 2024-02-28 ASSESSMENT — ENCOUNTER SYMPTOMS
PALPITATIONS: 0
DOUBLE VISION: 0
NAUSEA: 0
COUGH: 0
VOMITING: 0
BLURRED VISION: 0
FEVER: 0
POLYDIPSIA: 0
SPUTUM PRODUCTION: 0
ABDOMINAL PAIN: 0
CHILLS: 0
BRUISES/BLEEDS EASILY: 0

## 2024-02-28 ASSESSMENT — PATIENT HEALTH QUESTIONNAIRE - PHQ9
SUM OF ALL RESPONSES TO PHQ9 QUESTIONS 1 AND 2: 0
2. FEELING DOWN, DEPRESSED, IRRITABLE, OR HOPELESS: NOT AT ALL
2. FEELING DOWN, DEPRESSED, IRRITABLE, OR HOPELESS: NOT AT ALL
SUM OF ALL RESPONSES TO PHQ9 QUESTIONS 1 AND 2: 0
1. LITTLE INTEREST OR PLEASURE IN DOING THINGS: NOT AT ALL
1. LITTLE INTEREST OR PLEASURE IN DOING THINGS: NOT AT ALL

## 2024-02-28 ASSESSMENT — PAIN DESCRIPTION - PAIN TYPE: TYPE: ACUTE PAIN

## 2024-02-28 NOTE — PROGRESS NOTES
Patient discharged to home per order.  Reviewed all discharge instructions, appointments, discharge medications, and wound care instructions with patient and daughter; they verbalize understanding.  Education provided in discharge instructions about diet and Home Safety and Fall Prevention. Discharge paperwork completed; signed copies in chart.  Patient has education binder and all belongings; signed copy in chart.  Pt alert, calm, stable; no change in status from morning assessment.  Patient left facility at 1200 via wheelchair accompanied by daughter; escorted to car by staff.  Have enjoyed working with this pleasant patient.

## 2024-02-28 NOTE — CARE PLAN
"The patient is Stable - Low risk of patient condition declining or worsening    Shift Goals  Clinical Goals: Safety  Patient Goals: Sleep well  Family Goals: Education    Patient is not progressing towards the following goals:    Problem: Fall Risk - Rehab  Goal: Patient will remain free from falls  Outcome: Not Met  Note: Prema Amaya Fall risk Assessment Score: 19    High fall risk Interventions   - Alarming seatbelt  - Bed and strip alarm   - Yellow sign by the door   - Yellow wrist band \"Fall risk\"  - Fall risk education provided     Problem: Diabetes Management  Goal: Patient's ability to maintain appropriate glucose levels will be maintained or improve  Outcome: Not Met  Note: Patient reported feeling \"funny\" when up to urinate early this AM. Patient requested blood sugar to be checked and it was 60 at that time. Patient ate diabetic snack from last evening as well as peanut butter and crackers and an orange juice. Recheck FSBS was 176.     "

## 2024-02-28 NOTE — PROGRESS NOTES
..                                                         NURSING DAILY NOTE    Name: Molly Loera   Date of Admission: 2/14/2024   Admitting Diagnosis: Traumatic brain injury with new neurocognitive deficit (HCC)  Attending Physician: Mazin Gregory M.d.  Allergies: Depakote [valproic acid]    Safety  Patient Assist  sba  Patient Precautions  Fall Risk  Precaution Comments  poor safety awareness/impulsive  Bed Transfer Status  Supervised  Toilet Transfer Status   Supervised  Assistive Devices  Rails, Wheelchair  Oxygen  None - Room Air  Diet/Therapeutic Dining  Current Diet Order   Procedures    Diet Order Diet: Consistent CHO (Diabetic)     Pill Administration  whole  Agitated Behavioral Scale  15  ABS Level of Severity  No Agitation    Fall Risk  Has the patient had a fall this admission?   No  Prema Amaya Fall Risk Scoring  19, HIGH RISK  Fall Risk Safety Measures  bed alarm, chair alarm, seatbelt alarm, low vision/ hearing, and ok to leave pt in bathroom    Vitals  Temperature: 36.9 °C (98.5 °F)  Temp src: Oral  Pulse: 77  Respiration: 18  Blood Pressure : (!) 144/74  Blood Pressure MAP (Calculated): 97 MM HG  BP Location: Right, Upper Arm  Patient BP Position: Supine     Oxygen  Pulse Oximetry: 95 %  O2 (LPM): 0  O2 Delivery Device: None - Room Air    Bowel and Bladder  Last Bowel Movement  02/27/24 (per patient)  Stool Type  Type 5: Soft blob with clear cut edges (passed easily)  Bowel Device  Bathroom  Continent  Bladder: Stress incontinence   Bowel: Incontinent movement  Bladder Function  Urine Void (mL):  (moderate)  Number of Times Voided: 1  Urinary Options: Yes  Urine Color: Yellow  Number of Times Incontinent of Urine: 0  Genitourinary Assessment   Bladder Assessment (WDL):  WDL Except  Mcelroy Catheter: Not Applicable  Urinary Elimination: Frequency  Urine Color: Yellow  Number of Bladder Accidents: 0  Total Number of Bladder of Accidents in Last 7 Days: 0  Number of Times Incontinent  "of Urine: 0  Bladder Device: Bathroom  Time Void: No  Bladder Scan: Post Void  $ Bladder Scan Results (mL): 10    Skin  Zach Score   18  Sensory Interventions   Bed Types: Standard/Trauma Mattress with Overlay  Skin Preventative Measures: Waffle Overlay, Pillows in Use for Support / Positioning  Moisture Interventions  Moisturizers/Barriers: Barrier Wipes      Pain  Pain Rating Scale  0 - No Pain  Pain Location  Knee  Pain Location Orientation  Right  Pain Interventions   Declines    ADLs    Bathing    (Offered shower, refused. Patient said:\"I'll have OT shower tomorrow.\" Nurse noted.)  Linen Change    (Pt refused said she dont shower on nights is too cold Rn was inform)  Personal Hygiene  Moist Violeta Wipes, Perineal Care  Chlorhexidine Bath      Oral Care  Brushed Teeth  Teeth/Dentures     Shave     Nutrition Percentage Eaten  Between % Consumed  Environmental Precautions  Bed in Low Position, Treaded Slipper Socks on Patient  Patient Turns/Positioning  Patient Turns Self from Side to Side  Patient Turns Assistance/Tolerance  Assistance of One, General Weakness  Bed Positions  Bed Controls On  Head of Bed Elevated  Self regulated      Psychosocial/Neurologic Assessment  Psychosocial Assessment  Psychosocial (WDL):  Within Defined Limits  Neurologic Assessment  Neuro (WDL): Within Defined Limits  Level of Consciousness: Alert  Orientation Level: Oriented X4  Cognition: Appropriate judgement, Appropriate safety awareness  Speech: Clear  Pupil Assesment: No  R Pupil Size (mm): 2  R Pupil Shape / Description: Round  R Pupil Reaction: Brisk  L Pupil Size (mm): 2  L Pupil Shape / Description: Round  L Pupil Reaction: Brisk  Motor Function/Sensation Assessment: Motor strength  Muscle Strength Right Arm: Good Strength Against Gravity and Moderate Resistance  Muscle Strength Left Arm: Good Strength Against Gravity and Moderate Resistance  Muscle Strength Right Leg: Good Strength Against Gravity and Moderate " Resistance  Muscle Strength Left Leg: Good Strength Against Gravity and Moderate Resistance  EENT (WDL):  Within Defined Limits    Cardio/Pulmonary Assessment  Edema   RLE Edema: Trace  LLE Edema: Trace  Respiratory Breath Sounds  RUL Breath Sounds: Clear  RML Breath Sounds: Clear  RLL Breath Sounds: Clear  SHAWNA Breath Sounds: Clear  LLL Breath Sounds: Clear  Cardiac Assessment   Cardiac (WDL):  WDL Except (HX: HTN)

## 2024-02-28 NOTE — PROGRESS NOTES
This patient received individualized medication counseling regarding the current regimen they are receiving in this facility. Potential adverse effects, monitoring parameters, and proper administration were covered in preparation for their discharge. Blood sugar monitoring was discussed as well as the signs and symptoms of hypoglycemia as the patient is currently on Humalog and Lantus. This patient is being treated for hypertension and it is recommended that they monitor and record with a blood pressure device. The patient has been instructed to contact their primary care physician if the HR or blood pressure is abnormal. The patient asked relevant questions regarding the medications for which answers were provided. Our pharmacy hours and phone number were provided for follow up questions should they arise.  Telly Yanez  [facility]

## 2024-02-28 NOTE — DISCHARGE PLANNING
Case management  Reviewed signed copy of IMM and answered all questions.    Dc date /disposition: 2/28/24 Home with daughter and home health.

## 2024-02-28 NOTE — PROGRESS NOTES
NURSING DAILY NOTE    Name: Molly Loera   Date of Admission: 2/14/2024   Admitting Diagnosis: Traumatic brain injury with new neurocognitive deficit (HCC)  Attending Physician: Mazin Gregory M.d.  Allergies: Depakote [valproic acid]    Safety  Patient Assist  sba  Patient Precautions  Fall Risk  Precaution Comments  poor safety awareness/impulsive  Bed Transfer Status  Supervised  Toilet Transfer Status   Supervised  Assistive Devices  Rails, Wheelchair  Oxygen  None - Room Air  Diet/Therapeutic Dining  Current Diet Order   Procedures    Diet Order Diet: Consistent CHO (Diabetic)     Pill Administration  whole  Agitated Behavioral Scale  15  ABS Level of Severity  No Agitation    Fall Risk  Has the patient had a fall this admission?   No  Prema Amaya Fall Risk Scoring  15, HIGH RISK  Fall Risk Safety Measures  bed alarm    Vitals  Temperature: 36.4 °C (97.5 °F)  Temp src: Oral  Pulse: 72  Respiration: 20  Blood Pressure : 114/68  Blood Pressure MAP (Calculated): 83 MM HG  BP Location: Right, Upper Arm  Patient BP Position: Supine     Oxygen  Pulse Oximetry: 100 %  O2 (LPM): 0  O2 Delivery Device: None - Room Air    Bowel and Bladder  Last Bowel Movement  02/25/24 (per patient)  Stool Type  Type 5: Soft blob with clear cut edges (passed easily)  Bowel Device  Bathroom  Continent  Bladder: Stress incontinence   Bowel: Incontinent movement  Bladder Function  Urine Void (mL):  (moderate)  Number of Times Voided: 1  Urinary Options: Yes  Urine Color: Unable To Evaluate  Number of Times Incontinent of Urine: 0  Genitourinary Assessment   Bladder Assessment (WDL):  WDL Except  Mcelroy Catheter: Not Applicable  Urinary Elimination: Frequency  Urine Color: Unable To Evaluate  Number of Bladder Accidents: 0  Total Number of Bladder of Accidents in Last 7 Days: 0  Number of Times Incontinent of Urine: 0  Bladder Device: Bathroom  Time Void: No  Bladder  "Scan: Post Void  $ Bladder Scan Results (mL): 10    Skin  Zach Score   18  Sensory Interventions   Bed Types: Standard/Trauma Mattress  Skin Preventative Measures: Pillows in Use for Support / Positioning, Waffle Overlay  Moisture Interventions  Moisturizers/Barriers: Barrier Wipes      Pain  Pain Rating Scale  0 - No Pain  Pain Location  Knee  Pain Location Orientation  Right  Pain Interventions   Emotional Support    ADLs    Bathing    (Offered shower, refused. Patient said:\"I'll have OT shower tomorrow.\" Nurse noted.)  Linen Change    (Pt refused said she dont shower on nights is too cold Rn was inform)  Personal Hygiene  Moist Violtea Wipes  Chlorhexidine Bath      Oral Care  Brushed Teeth  Teeth/Dentures     Shave     Nutrition Percentage Eaten  Between % Consumed  Environmental Precautions  Treaded Slipper Socks on Patient, Personal Belongings, Wastebasket, Call Bell etc. in Easy Reach, Transferred to Stronger Side, Report Given to Other Health Care Providers Regarding Fall Risk, Bed in Low Position  Patient Turns/Positioning  Patient Turns Self from Side to Side  Patient Turns Assistance/Tolerance  Assistance of One, General Weakness  Bed Positions  Bed Controls On  Head of Bed Elevated  Self regulated      Psychosocial/Neurologic Assessment  Psychosocial Assessment  Psychosocial (WDL):  Within Defined Limits  Neurologic Assessment  Neuro (WDL): Exceptions to WDL  Level of Consciousness: Alert  Orientation Level: Oriented X4  Cognition: Appropriate judgement  Speech: Clear  Pupil Assesment: No  R Pupil Size (mm): 2  R Pupil Shape / Description: Round  R Pupil Reaction: Brisk  L Pupil Size (mm): 2  L Pupil Shape / Description: Round  L Pupil Reaction: Brisk  Motor Function/Sensation Assessment: Motor strength  Muscle Strength Right Arm: Good Strength Against Gravity and Moderate Resistance  Muscle Strength Left Arm: Good Strength Against Gravity and Moderate Resistance  Muscle Strength Right Leg: Good " Strength Against Gravity and Moderate Resistance  Muscle Strength Left Leg: Good Strength Against Gravity and Moderate Resistance  EENT (WDL):  Within Defined Limits    Cardio/Pulmonary Assessment  Edema   RLE Edema: Trace  LLE Edema: Trace  Respiratory Breath Sounds  RUL Breath Sounds: Clear  RML Breath Sounds: Clear  RLL Breath Sounds: Clear  SHAWNA Breath Sounds: Clear  LLL Breath Sounds: Clear  Cardiac Assessment   Cardiac (WDL):  WDL Except (hx htn)
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
## 2024-02-28 NOTE — CARE PLAN
The patient is Stable - Low risk of patient condition declining or worsening    Shift Goals  Clinical Goals: Safety  Patient Goals: Sleep well  Family Goals: Education    Progress made toward(s) clinical / shift goals:  Discharge

## 2024-02-29 ENCOUNTER — PATIENT OUTREACH (OUTPATIENT)
Dept: MEDICAL GROUP | Facility: PHYSICIAN GROUP | Age: 74
End: 2024-02-29
Payer: MEDICARE

## 2024-02-29 NOTE — PROGRESS NOTES
Transitional Care Management  TCM Outreach Date and Time: Filed (2/29/2024 10:58 AM)    Discharge Questions  Actual Discharge Date: 02/28/24  Now that you are home, how are you feeling?: Very Good  Did you receive any new prescriptions?: Yes  Were you able to get them filled?: Yes  Meds to Bed or Pharmacy filled?: Pharmacy  Do you have any questions about your current medications or new medications (Review Med Rec)?: No  Did you have any durable medical equipment ordered?: No  Do you have a follow up appointment scheduled with your PCP?: Yes  Appointment Date: 03/05/24  Appointment Time: 1300  Any issues or paperwork you wish to discuss with your PCP?: No  Are you (patient) able to get to the appointment?: Yes  If Home Health was ordered, have they contacted you (Patient): Yes  Did you have enough support after your last discharge?: Yes (pt's daughter)  Does this patient qualify for the CCM program?: Yes    Transitional Care  Number of attempts made to contact patient: 1  Current or previous attempts completed within two business days of discharge? : Yes  Provided education regarding treatment plan, medications, self-management, ADLs?: No  Has patient completed an Advanced Directive?: No  Has the Care Manager's phone number provided?: No  Is there anything else I can help you with?: No    Discharge Summary  Chief Complaint: head injury  Admitting Diagnosis: Traumatic brain injury with new neurocognitive deficit  Discharge Diagnosis: Traumatic brain injury with new neurocognitive deficit

## 2024-03-05 ENCOUNTER — OFFICE VISIT (OUTPATIENT)
Dept: MEDICAL GROUP | Facility: PHYSICIAN GROUP | Age: 74
End: 2024-03-05
Payer: MEDICARE

## 2024-03-05 VITALS
SYSTOLIC BLOOD PRESSURE: 110 MMHG | OXYGEN SATURATION: 98 % | HEIGHT: 64 IN | RESPIRATION RATE: 16 BRPM | HEART RATE: 86 BPM | BODY MASS INDEX: 23.22 KG/M2 | WEIGHT: 136 LBS | DIASTOLIC BLOOD PRESSURE: 78 MMHG | TEMPERATURE: 98.3 F

## 2024-03-05 DIAGNOSIS — S06.9XAA TRAUMATIC BRAIN INJURY WITH NEW NEUROCOGNITIVE DEFICIT (HCC): ICD-10-CM

## 2024-03-05 DIAGNOSIS — D64.9 ANEMIA, UNSPECIFIED TYPE: ICD-10-CM

## 2024-03-05 DIAGNOSIS — R29.818 TRAUMATIC BRAIN INJURY WITH NEW NEUROCOGNITIVE DEFICIT (HCC): ICD-10-CM

## 2024-03-05 DIAGNOSIS — E87.6 HYPOKALEMIA: ICD-10-CM

## 2024-03-05 DIAGNOSIS — E10.9 TYPE 1 DIABETES MELLITUS WITHOUT COMPLICATION (HCC): ICD-10-CM

## 2024-03-05 DIAGNOSIS — Z09 HOSPITAL DISCHARGE FOLLOW-UP: ICD-10-CM

## 2024-03-05 DIAGNOSIS — I26.99 PE (PULMONARY THROMBOEMBOLISM) (HCC): ICD-10-CM

## 2024-03-05 DIAGNOSIS — I60.9: ICD-10-CM

## 2024-03-05 DIAGNOSIS — R41.89 TRAUMATIC BRAIN INJURY WITH NEW NEUROCOGNITIVE DEFICIT (HCC): ICD-10-CM

## 2024-03-05 DIAGNOSIS — F31.9 BIPOLAR 1 DISORDER (HCC): ICD-10-CM

## 2024-03-05 DIAGNOSIS — Z79.01 CURRENT USE OF LONG TERM ANTICOAGULATION: ICD-10-CM

## 2024-03-05 DIAGNOSIS — E83.39 HYPOPHOSPHATEMIA: ICD-10-CM

## 2024-03-05 DIAGNOSIS — E83.42 HYPOMAGNESEMIA: ICD-10-CM

## 2024-03-05 PROBLEM — T14.90XA TRAUMA: Status: RESOLVED | Noted: 2024-02-09 | Resolved: 2024-03-05

## 2024-03-05 PROBLEM — Z01.89 ENCOUNTER FOR GERIATRIC ASSESSMENT: Status: RESOLVED | Noted: 2024-02-10 | Resolved: 2024-03-05

## 2024-03-05 PROBLEM — Z75.8 DISCHARGE PLANNING ISSUES: Status: RESOLVED | Noted: 2024-02-11 | Resolved: 2024-03-05

## 2024-03-05 PROBLEM — Z02.9 DISCHARGE PLANNING ISSUES: Status: RESOLVED | Noted: 2024-02-11 | Resolved: 2024-03-05

## 2024-03-05 ASSESSMENT — FIBROSIS 4 INDEX: FIB4 SCORE: 1.43

## 2024-03-05 NOTE — PROGRESS NOTES
Subjective:   Molly Loera is a 73 y.o. female who presents for Hospital Follow-up.    HPI:   Recently hospitalized for:    History of Present Illness  The patient presents for evaluation of multiple medical concerns. She is accompanied by her daughter.    Patient reports that she was severely depressed. She is bipolar. She went to [facility] for a couple of weeks. While she was there, she got COVID-19 and was in isolation for 10 days. When she got out of there, she had a pulmonary embolism, started on apixaban 5 mg twice daily. She was almost unconscious for a whole week. She could not speak well and could not use a fork. She got her speech back in a few days, but it took her a week or so to be able to feed herself. She was sent to a skilled nursing facility, [facility]. She was only there for a day. She fell and hit her head and got a subarachnoid bleed. She was sent to the ER and then discharged to [facility]. She was discharged home from Rehab on Wednesday 2/28/2024. She loves to cook. She has cooked 4 times last week. She is very careful with what she eats. She is diabetic. She makes salad every night for dinner. They gave her exercises, she does them mostly with the left leg due to severe osteoarthritis in her right knee. She does not do much with the arms because she had severe pain. She has very bad arthritis. She gets cortisone injections every 3 months. She is going to the pain specialist next Tuesday. Her diabetes numbers have been very good. They have been running between 75 and 150. Before, they were running in the 200s and once in a while at 300 if she ate 2 bananas. She is being more alert to what her situation is. She was put on buspirone. She was put on Eliquis. She was put on amlodipine 10 mg (increased from 5 mg/day). She takes losartan 100 mg in the morning. She takes amlodipine in the evening. She denies any ankle swelling since increasing the dose. She denies any dizziness or  lightheadedness since being home. When she was at the rehab, she felt a little woozy when she gets up after doing exercises. She sits down for a few minutes and then proceeds to do whatever she had to do. It has happened maybe twice. She checks her blood sugar when she feels weird. One night, she got up to go to the bathroom and knew something was not right. She called to the nurse and her blood sugar was 60. They gave her some orange juice. Her bipolar depression is doing very well. She had a cloud over her head and it was getting worse every day. She is happy and happy to be with her daughter again. She was seeing a psychiatrist, but they stopped responding. She is scheduled to establish with a new psychiatrist, Dr. Jo Lopez, in May 2024. She is seeing Dr. Roldan Simental at [facility] for follow up on the bleed on 3/28/2024. She also follows with vascular medicine, follows up in 05/2024. She does not need any refills on her medications. She will follow up with her PCP on 3/18/2024.    She has glaucoma, uses timolol drops. She had a cataract in the right eye. She has dry eyes, her eye doctor told her to use Refresh. She had to cancel her eye appointments when she was in the hospital.     She was doing great with her bowel movements in the hospital. Since she has been home, she has been on a stool softener. She has not gone to the bathroom in a few days. She thinks she is going to take a laxative tonight. She does not drink as much water as she should.     Current medicines (including reconciliation performed today)  Current Outpatient Medications   Medication Sig Dispense Refill    amLODIPine (NORVASC) 10 MG Tab Take 1 Tablet by mouth every evening. Hold for SBP <110 90 Tablet 2    anastrozole (ARIMIDEX) 1 MG Tab Take 1 Tablet by mouth every evening. Needs to establish with cancer specialist. Last refill 90 Tablet 3    apixaban (ELIQUIS) 5mg Tab Take 1 Tablet by mouth 2 times a day. 60 Tablet 2     atorvastatin (LIPITOR) 80 MG tablet Take 1 Tablet by mouth every evening. 90 Tablet 2    busPIRone (BUSPAR) 5 MG tablet Take 1 Tablet by mouth 2 times a day. 90 Tablet 2    carBAMazepine (TEGRETOL) 200 MG Tab Take 1 Tablet by mouth 2 times a day. 180 Tablet 2    insulin glargine (LANTUS SOLOSTAR) 100 UNIT/ML Solution Pen-injector injection Inject 15 Units under the skin 2 times a day. 15 mL 2    insulin lispro 100 UNIT/ML SC SOPN injection PEN 70 - 150 mg/dL = 0 Units 151 - 200 mg/dL = 2 Units 201 - 250 mg/dL = 3 Units 251 - 300 mg/dL = 5 Units 301 - 350 mg/dL = 6 Units 351 - 400 mg/dL = 8 Units Over 400 mg/dL = 9 Units Over 400 mg/dL x 2 consecutive FSBG = 9 Units and call MD 9 mL 2    lamoTRIgine (LAMICTAL) 100 MG Tab Take 1 Tablet by mouth every day. 30 Tablet 2    levothyroxine (SYNTHROID) 25 MCG Tab Take 1 Tablet by mouth every morning on an empty stomach. 90 Tablet 2    losartan (COZAAR) 100 MG Tab Take 1 Tablet by mouth every day. Hold for SBP < 110 90 Tablet 2    primidone (MYSOLINE) 50 MG Tab Take 2 Tablets by mouth at bedtime. 100 mg = 2 tabs 180 Tablet 2    timolol (TIMOPTIC) 0.5 % Solution Administer 1 Drop into both eyes at bedtime. 5 mL 3    acetaminophen (TYLENOL) 500 MG Tab Take 2 Tablets by mouth every 6 hours as needed for Mild Pain or Moderate Pain.      Cariprazine HCl (VRAYLAR) 6 MG Cap Take 6 mg by mouth at bedtime.       No current facility-administered medications for this visit.     Allergies:   Depakote [valproic acid]    Social History     Tobacco Use    Smoking status: Never    Smokeless tobacco: Never   Vaping Use    Vaping Use: Never used   Substance Use Topics    Alcohol use: Never    Drug use: Never     ROS:  Constitutional: No fevers, chills, malaise/fatigue.  Eyes: No eye pain.  ENT: No sore throat, congestion.   Resp: No cough, shortness of breath.  CV: No chest pain, leg swelling, palpitations.  GI: No nausea/vomiting, abdominal pain, constipation, diarrhea.  : No dysuria,  "hematuria.  MSK: No weakness.  Skin: No rashes.  Neuro: No dizziness, weakness, headaches.  Psych: No suicidal ideations.    All remaining systems reviewed and found to be negative, except as stated above.      Objective:     Vitals:    03/05/24 1257   BP: 110/78   Pulse: 86   Resp: 16   Temp: 36.8 °C (98.3 °F)   TempSrc: Temporal   SpO2: 98%   Weight: 61.7 kg (136 lb)   Height: 1.626 m (5' 4\")     Body mass index is 23.34 kg/m².    Physical Exam:  General: Normal appearing. No distress.  HEENT: Pupils round, reactive to light and accomodation, left pupil 3 right pupil 4.PERRLA  Normocephalic. Eyes conjunctiva clear lids without ptosis, ears normal shape and contour.  Pulmonary: Clear to ausculation.  Normal effort. No rales, rhonchi, or wheezing.  Cardiovascular: Regular rate and rhythm without murmur. Carotid and radial pulses are intact and equal bilaterally.  Abdomen: Soft, nontender, nondistended. Normal bowel sounds.   Neurologic: Grossly nonfocal.  Skin: Warm and dry.  No obvious lesions.  Musculoskeletal: Normal gait. No extremity cyanosis, clubbing, or edema.  Psych: Normal mood and affect. Alert and oriented x3. Judgment and insight is normal.     Assessment and Plan:   1. Hospital discharge follow-up  2. PE (pulmonary thromboembolism) (HCC)  3. Current use of long term anticoagulation  4. Subarachnoid hemorrhage without loss of consciousness (HCC)  5. Traumatic brain injury with new neurocognitive deficit (HCC)  New to examiner. Patient presents today with her daughter after two hospitalizations and two rehab stays. She was originally at [facility] because she felt that her bipolar depression was uncontrolled. Her medications were adjusted while there and she feels that her mood has stabilized. She is scheduled to establish with a new psychiatrist in May 2024, Dr. Jo Lopez. Does not need refill of her psych med's at this time.   She then developed COVID and was in isolation for 10 days at [facility] which is when she developed a pulmonary embolism, she was transferred to [facility] and started on apixaban 5 mg twice daily and when she improved she transferred to [place] re[facility]. Within 24 hours at [place] she fell onto her face while trying to get to the bathroom, was taken to [facility] and diagnosed with a subarachnoid hemorrhage.   When she improved she was transferred to [facility] where she spent 2 weeks and really feels like her strength, speech, and generally overall she improved. She was sent home last Wednesday, she lives with her daughter. Reports that she has been motivated to cook and make soups and is feeling really good. She will follow up with vascular medicine on 5/16/24 and neurosurgery on 3/28/2024. She has an appointment with her PCP on 3/28/2024.  Her blood pressure was elevated while in the hospital, she continues losartan 100 mg/day and amlodipine was increased from 5 mg/day to 10 mg/day. Her blood pressure is good today. Advised the patient that amlodipine can cause ankle swelling. She will continue taking amlodipine and losartan as prescribed. She will monitor her blood pressure a few times a week. She is on Eliquis. She will keep her appointment with vascular medicine. Her left pupil is slightly larger than the right. She was advised to monitor the pupil. She will follow up with neurosurgery on 03/28/2024.    6. Type 1 diabetes mellitus without complication (HCC)  New to examiner, chronic for patient. Patient missed her appointment with endocrinology while she was hospitalized, encourage patient to reschedule follow up. Continue lantus solo star 15 units twice a day and lispro sliding scale.    7. Bipolar 1 disorder (HCC)  New to examiner, chronic for patient. Reports that her bipolar depression is doing very well. She will discuss with her psychiatrist about psychiatric medications. Continue buspirone 5 mg twice a day, tegretol 200 mg twice a day, vraylar 6 mg nightly, Lamictal 100 mg  daily, and primidone 100 mg nightly.    8. Hypokalemia  Resolved.    9. Hypomagnesemia  Resolved.    10. Hypophosphatemia  Resolved.    11. Anemia, unspecified type  Resolved.     - Chart and discharge summary were reviewed.   - Hospitalization and results reviewed with patient.   - Medications reviewed including instructions regarding high risk medications, dosing and side effects.  - Recommended Services: No services needed at this time  - Advance directive/POLST on file?  No     Follow-up: Return in about 13 days (around 3/18/2024) for Follow up with PCP.    Face-to-face transitional care management services with HIGH (today's visit is within days post discharge & LACE+ score 59+) medical decision complexity were provided.     LACE+ Historical Score Over Time (0-28: Low, 29-58: Medium, 59+: High): 57    Please note that this dictation was created using voice recognition software. I have worked with consultants from the vendor as well as technical experts from [facility] to optimize the interface. I have made every reasonable attempt to correct obvious errors, but I expect that there are errors of grammar and possibly content that I did not discover before finalizing the note.      Verbal consent was acquired by the patient to use [employer] ambient listening note generation during this visit Yes

## 2024-03-18 ENCOUNTER — DOCUMENTATION (OUTPATIENT)
Dept: MEDICAL GROUP | Facility: PHYSICIAN GROUP | Age: 74
End: 2024-03-18

## 2024-03-18 ENCOUNTER — OFFICE VISIT (OUTPATIENT)
Dept: MEDICAL GROUP | Facility: PHYSICIAN GROUP | Age: 74
End: 2024-03-18
Payer: MEDICARE

## 2024-03-18 VITALS
DIASTOLIC BLOOD PRESSURE: 68 MMHG | BODY MASS INDEX: 22.2 KG/M2 | SYSTOLIC BLOOD PRESSURE: 114 MMHG | OXYGEN SATURATION: 97 % | WEIGHT: 130 LBS | TEMPERATURE: 98.5 F | HEART RATE: 92 BPM | HEIGHT: 64 IN

## 2024-03-18 DIAGNOSIS — I10 ESSENTIAL HYPERTENSION: ICD-10-CM

## 2024-03-18 DIAGNOSIS — R25.1 SHAKING: ICD-10-CM

## 2024-03-18 DIAGNOSIS — Z12.11 SCREENING FOR COLORECTAL CANCER: ICD-10-CM

## 2024-03-18 DIAGNOSIS — Z91.81 RISK FOR FALLS: ICD-10-CM

## 2024-03-18 DIAGNOSIS — I26.99 PE (PULMONARY THROMBOEMBOLISM) (HCC): ICD-10-CM

## 2024-03-18 DIAGNOSIS — E78.5 DYSLIPIDEMIA: ICD-10-CM

## 2024-03-18 DIAGNOSIS — Z12.12 SCREENING FOR COLORECTAL CANCER: ICD-10-CM

## 2024-03-18 DIAGNOSIS — Z09 HOSPITAL DISCHARGE FOLLOW-UP: ICD-10-CM

## 2024-03-18 DIAGNOSIS — C50.912 MALIGNANT NEOPLASM OF LEFT FEMALE BREAST, UNSPECIFIED ESTROGEN RECEPTOR STATUS, UNSPECIFIED SITE OF BREAST (HCC): ICD-10-CM

## 2024-03-18 DIAGNOSIS — E10.9 TYPE 1 DIABETES MELLITUS WITHOUT COMPLICATION (HCC): ICD-10-CM

## 2024-03-18 DIAGNOSIS — F31.9 BIPOLAR 1 DISORDER (HCC): ICD-10-CM

## 2024-03-18 PROCEDURE — 3078F DIAST BP <80 MM HG: CPT | Performed by: NURSE PRACTITIONER

## 2024-03-18 PROCEDURE — 99215 OFFICE O/P EST HI 40 MIN: CPT | Performed by: NURSE PRACTITIONER

## 2024-03-18 PROCEDURE — 3074F SYST BP LT 130 MM HG: CPT | Performed by: NURSE PRACTITIONER

## 2024-03-18 RX ORDER — BUSPIRONE HYDROCHLORIDE 5 MG/1
5 TABLET ORAL 2 TIMES DAILY
Qty: 180 TABLET | Refills: 0 | Status: SHIPPED | OUTPATIENT
Start: 2024-03-18

## 2024-03-18 RX ORDER — CARBAMAZEPINE 200 MG/1
200 TABLET ORAL 2 TIMES DAILY
Qty: 180 TABLET | Refills: 0 | Status: SHIPPED | OUTPATIENT
Start: 2024-03-18

## 2024-03-18 RX ORDER — LAMOTRIGINE 100 MG/1
100 TABLET ORAL DAILY
Qty: 90 TABLET | Refills: 0 | Status: SHIPPED | OUTPATIENT
Start: 2024-03-18

## 2024-03-18 RX ORDER — PRIMIDONE 50 MG/1
100 TABLET ORAL
Qty: 180 TABLET | Refills: 2 | Status: SHIPPED | OUTPATIENT
Start: 2024-03-18

## 2024-03-18 ASSESSMENT — FIBROSIS 4 INDEX: FIB4 SCORE: 1.43

## 2024-03-18 NOTE — PROGRESS NOTES
Sent [employer] to make a appt with the clinic for DOAC and carbamazepine drug interaction

## 2024-03-18 NOTE — LETTER
Jared Pryor is a 9 y.o. year old male patient with Enlarged Tonsils       The patient is presenting to the office today for follow-up on tonsils.  Unfortunately the patient has had nearly 5 episodes of pharyngitis/tonsillitis in the last year including 2 episodes already this school year in the last month.  After further assessment and discussion at home they have returned today to discuss tonsillectomy and adenoidectomy.  All other ENT related concerns are negative.  There have been no significant changes in past medical or past surgical histories except as mentioned.          Physical Exam:   General appearance: No acute distress. Normal facies. Symmetric facial movement. No gross lesions of the face are noted.  The external ear structures appear normal. The ear canals patent and the tympanic membranes are intact without evidence of air-fluid levels, retraction, or congenital defects.  Anterior rhinoscopy notes essentially a midline nasal septum. Examination is noted for normal healthy mucosal membranes without any evidence of lesions, polyps, or exudate. The tongue is normally mobile. There are no lesions on the gingiva, buccal, or oral mucosa. There are no oral cavity masses.  Tonsils 3+.  The neck is negative for mass lymphadenopathy. The trachea and parotid are clear. The thyroid bed is grossly unremarkable. The salivary gland structures are grossly unremarkable.    Assessment/Plan   1.  Chronic tonsillitis/pharyngitis  2.  Tonsil and adenoid hypertrophy    Patient seen in the office today for assessment of throat.  Patient with chronic tonsillitis with recurrent bouts totaling 7 episodes since last school year.  In addition to chronic infection the patient has hypertrophy of tonsils and adenoids and at this time I recommend definitive tonsillectomy and adenectomy.  The risks and benefits of the operation were discussed with the patient and family and include, but are not limited to, bleeding, infection,  [facility]  STACIE Paz  [address] [address]  [place] [address]  Fax: [phone]   Authorization for Release/Disclosure of   Protected Health Information   Name: JAMIA STARK : 1950 SSN: [id number]   Address: [address] [place] [id number] Phone:    [phone] (home)    I authorize the entity listed below to release/disclose the PHI below to:   [facility]/STACIE Paz and STACIE Paz   Provider or Entity Name:  [facility]    Address   City, State, [facility]  [place] [address] Phone:  [phone]    Fax:     Reason for request: continuity of care   Information to be released:    [  ] LAST COLONOSCOPY,  including any PATH REPORT and follow-up  [  ] LAST FIT/COLOGUARD RESULT [  ] LAST DEXA  [  ] LAST MAMMOGRAM  [  ] LAST PAP  [  ] LAST LABS [xxx ] RETINA EXAM REPORT  [  ] IMMUNIZATION RECORDS  [  ] Release all info      [  ] Check here and initial the line next to each item to release ALL health information INCLUDING  _____ Care and treatment for drug and / or alcohol abuse  _____ HIV testing, infection status, or AIDS  _____ Genetic Testing    DATES OF SERVICE OR TIME PERIOD TO BE DISCLOSED: _____________  I understand and acknowledge that:  * This Authorization may be revoked at any time by you in writing, except if your health information has already been used or disclosed.  * Your health information that will be used or disclosed as a result of you signing this authorization could be re-disclosed by the recipient. If this occurs, your re-disclosed health information may no longer be protected by State or Federal laws.  * You may refuse to sign this Authorization. Your refusal will not affect your ability to obtain treatment.  * This Authorization becomes effective upon signing and will  on (date) __________.      If no date is indicated, this Authorization will  one (1) year from the signature date.     Name: Molly Garciae Evaristo  Signature: Date:   3/18/2024     PLEASE FAX REQUESTED RECORDS BACK TO: [phone]   failure to clear all symptoms, and velopharyngeal incompetence. They appear to understand, and agree to proceed, and this will be scheduled at their convenience in the near future.

## 2024-03-18 NOTE — PROGRESS NOTES
Subjective:     CC: follow-up    HPI:   Molly presents today with the following:    Shaking  Continues primidone 100 mg at bedtime.     Hospital discharge follow-up  Patient presented to the ER on 2/9/2024.  She presented for fall at [facility] while attempting with the restroom.  [facility] and pulmonary embolism diagnosis in January 2024.  Her blood sugar was elevated.  EKG showed sinus rhythm.  Labs did show anemia with electrolyte abnormalities and elevated liver enzymes.  She was currently on treatment for pulmonary embolism that was diagnosed at the [facility] January 2024, patient was currently on apixaban.  CT of her head in the ER did show left temporal and occipital subarachnoid hemorrhage, chronic appearing right frontal lobe infarct.  GCS in the ER was 15 and neurosurgery was consulted.  She was admitted to the ICU for serial neuroexams and repeat imagings.  During her ICU stay her CT scans are stable.  Her apixaban was held for 10 days per neurosurgery recommendation.  Echocardiogram was negative for right heart strain.  She was discharged to [facility] on 2/14/2024.  At [facility] she completed therapy.  She was discharged home on 2/28/2024.  She continues with home physical therapy for 6 weeks.  Home OT did evaluate and it was not needed.  Patient does have upcoming appointments with a new psychiatrist on 5/23/2024 and neurosurgery on 3/28/2024.  She will need medication refills to get her to her appointments.  Vascular medicine is scheduled for 5/16/2024.    Bipolar 1 disorder (HCC)  Has no upcoming establishing appointment with psychiatry, Dr. Jo Lopez.  Requesting refills on her medication. Continues Buspar 5 mg twice daily, Lamictal 100 mg daily, vraylar 6 mg at bedtime andCarbamazepine 200 mg twice daily.    PE (pulmonary thromboembolism) (HCC)  Started on apixaban after pulmonary embolism found at [facility] 1/2024. Recommendations are for 6 months  therapy.  Has upcoming vascular medicine appointment.  Continues apixaban 5 mg twice daily.  Will place referral to anticoagulation monitoring.    Essential hypertension  Blood pressure today 114/68. Continues amlodipine 10 mg daily and losartan 100 mg daily.    Dyslipidemia  Continues atorvastatin 80 mg every evening.  Labs from 11/2023 show controlled cholesterol.    Malignant neoplasm of left female breast (HCC)  History of and followed by [facility], Dr. Harris.  Patient continues anastrozole 1 mg every evening.    Type 1 diabetes mellitus without complication (HCC)  Continues follow-up with endocrinology but will need to reschedule her appointment as she did have an appointment during her hospitalization. 2/15/2024 A1C 9.0%, previous A1C 7.3% on 3/27/2023. Monitoring 4 times a day.  Continues insulin lispro and insulin glargine.      Past Medical History:   Diagnosis Date    Anxiety     Bipolar 1 disorder (HCC)     Cancer (HCC)     left breast    Depression     Diabetes (HCC)     Kidney disease        Social History     Tobacco Use    Smoking status: Never    Smokeless tobacco: Never   Vaping Use    Vaping Use: Never used   Substance Use Topics    Alcohol use: Never    Drug use: Never       Current Outpatient Medications Ordered in Epic   Medication Sig Dispense Refill    busPIRone (BUSPAR) 5 MG tablet Take 1 Tablet by mouth 2 times a day. 180 Tablet 0    lamoTRIgine (LAMICTAL) 100 MG Tab Take 1 Tablet by mouth every day. 90 Tablet 0    Cariprazine HCl 6 MG Cap Take 6 mg by mouth at bedtime. 90 Capsule 0    carBAMazepine (TEGRETOL) 200 MG Tab Take 1 Tablet by mouth 2 times a day. 180 Tablet 0    apixaban (ELIQUIS) 5mg Tab Take 1 Tablet by mouth 2 times a day. 60 Tablet 2    primidone (MYSOLINE) 50 MG Tab Take 2 Tablets by mouth at bedtime. 100 mg = 2 tabs 180 Tablet 2    amLODIPine (NORVASC) 10 MG Tab Take 1 Tablet by mouth every evening. Hold for SBP <110 90 Tablet 2    anastrozole (ARIMIDEX) 1 MG  "Tab Take 1 Tablet by mouth every evening. Needs to establish with cancer specialist. Last refill 90 Tablet 3    atorvastatin (LIPITOR) 80 MG tablet Take 1 Tablet by mouth every evening. 90 Tablet 2    insulin glargine (LANTUS SOLOSTAR) 100 UNIT/ML Solution Pen-injector injection Inject 15 Units under the skin 2 times a day. 15 mL 2    levothyroxine (SYNTHROID) 25 MCG Tab Take 1 Tablet by mouth every morning on an empty stomach. 90 Tablet 2    losartan (COZAAR) 100 MG Tab Take 1 Tablet by mouth every day. Hold for SBP < 110 90 Tablet 2    timolol (TIMOPTIC) 0.5 % Solution Administer 1 Drop into both eyes at bedtime. 5 mL 3    acetaminophen (TYLENOL) 500 MG Tab Take 2 Tablets by mouth every 6 hours as needed for Mild Pain or Moderate Pain.      insulin lispro 100 UNIT/ML SC SOPN injection PEN 70 - 150 mg/dL = 0 Units 151 - 200 mg/dL = 2 Units 201 - 250 mg/dL = 3 Units 251 - 300 mg/dL = 5 Units 301 - 350 mg/dL = 6 Units 351 - 400 mg/dL = 8 Units Over 400 mg/dL = 9 Units Over 400 mg/dL x 2 consecutive FSBG = 9 Units and call MD 9 mL 2     No current Epic-ordered facility-administered medications on file.       Allergies:  Depakote [valproic acid]    Health Maintenance: Requesting retinal records      Objective:     Vital signs reviewed  Exam:  /68 (BP Location: Left arm, Patient Position: Sitting, BP Cuff Size: Adult)   Pulse 92   Temp 36.9 °C (98.5 °F) (Temporal)   Ht 1.626 m (5' 4\")   Wt 59 kg (130 lb)   SpO2 97%   BMI 22.31 kg/m²  Body mass index is 22.31 kg/m².    Gen: Alert and oriented, No apparent distress.  Eyes:   left pupil slightly larger than right pupil both react to accommodation and light.  Lungs: Normal effort, CTA bilaterally, no wheezes, rhonchi, or rales  CV: Regular rate and rhythm. No murmurs, rubs, or gallops.    Assessment & Plan:     73 y.o. female with the following -     1. Hospital discharge follow-up  Acute complicated problem.  Patient was scheduled in a 20-minute appointment " slot.  She did have records that I reviewed from her 5-day hospital stay and her 14-day rehab stay.  We have completed medication refills as below and patient will keep upcoming scheduled appointments.    2. Essential hypertension  Chronic stable problem.  Continue amlodipine 10 mg daily and losartan 100 mg daily.    3. PE (pulmonary thromboembolism) (HCC)  Chronic stable problem.  Continue apixaban 5 mg twice daily.  Keep upcoming vascular medicine appointment.  - apixaban (ELIQUIS) 5mg Tab; Take 1 Tablet by mouth 2 times a day.  Dispense: 60 Tablet; Refill: 2  - Referral to Anticoagulation Monitoring    4. Malignant neoplasm of left female breast, unspecified estrogen receptor status, unspecified site of breast (HCC)  Chronic stable problem.  Continue follow-up with oncology.  Continue anastrozole 1 mg every evening.    5. Type 1 diabetes mellitus without complication (HCC)  Chronic stable problem.  Reschedule endocrinology appointment.  Continue insulin lispro and insulin glargine per endocrinology directions.    6.  Dyslipidemia  Chronic stable problem.  Continue atorvastatin 80 mg every evening.    7. Bipolar 1 disorder (HCC)  Chronic stable problem.  Keep upcoming psychiatry establishing appointment.  Continue with buspirone 5 mg twice daily, Lamictal 100 mg every day and carbamazepine 200 mg twice daily.  - busPIRone (BUSPAR) 5 MG tablet; Take 1 Tablet by mouth 2 times a day.  Dispense: 180 Tablet; Refill: 0  - lamoTRIgine (LAMICTAL) 100 MG Tab; Take 1 Tablet by mouth every day.  Dispense: 90 Tablet; Refill: 0  - carBAMazepine (TEGRETOL) 200 MG Tab; Take 1 Tablet by mouth 2 times a day.  Dispense: 180 Tablet; Refill: 0    8. Shaking  Chronic stable problem.  Continue primidone 100 mg at bedtime.  - primidone (MYSOLINE) 50 MG Tab; Take 2 Tablets by mouth at bedtime. 100 mg = 2 tabs  Dispense: 180 Tablet; Refill: 2    9. Risk for falls  Chronic stable problem.  Activity as tolerated.  Continue home physical  therapy.  - Patient identified as fall risk.  Appropriate orders and counseling given.    10. Screening for colorectal cancer  Acute uncomplicated problem.  She like to proceed with Cologuard.  - COLOGUARD (FIT DNA)    My total time spent caring for the patient on the day of the encounter was 48 minutes.   This does not include time spent on separately billable procedures/tests.        Return in about 3 months (around 6/18/2024) for Hypertension, Multiple Issues.    Please note that this dictation was created using voice recognition software. I have made every reasonable attempt to correct obvious errors, but I expect that there are errors of grammar and possibly content that I did not discover before finalizing the note.

## 2024-03-19 NOTE — ASSESSMENT & PLAN NOTE
Continues follow-up with endocrinology but will need to reschedule her appointment as she did have an appointment during her hospitalization. 2/15/2024 A1C 9.0%, previous A1C 7.3% on 3/27/2023. Monitoring 4 times a day.  Continues insulin lispro and insulin glargine.

## 2024-03-19 NOTE — ASSESSMENT & PLAN NOTE
Started on apixaban after pulmonary embolism found at [facility] 1/2024. Recommendations are for 6 months therapy.  Has upcoming vascular medicine appointment.  Continues apixaban 5 mg twice daily.  Will place referral to anticoagulation monitoring.

## 2024-03-19 NOTE — ASSESSMENT & PLAN NOTE
Has no upcoming establishing appointment with psychiatry, Dr. Jo Lopez.  Requesting refills on her medication. Continues Buspar 5 mg twice daily, Lamictal 100 mg daily, vraylar 6 mg at bedtime andCarbamazepine 200 mg twice daily.

## 2024-03-19 NOTE — ASSESSMENT & PLAN NOTE
History of and followed by [facility], Dr. Harris.  Patient continues anastrozole 1 mg every evening.

## 2024-03-19 NOTE — ASSESSMENT & PLAN NOTE
Patient presented to the ER on 2/9/2024.  [facility] restroom.  She was at [facility] rehab after COVID and pulmonary embolism diagnosis in January 2024.  Her blood sugar was elevated.  EKG showed sinus rhythm.  Labs did show anemia with electrolyte abnormalities and elevated liver enzymes.  She was currently on treatment for pulmonary embolism that was diagnosed at the [facility] January 2024, patient was currently on apixaban.  CT of her head in the ER did show left temporal and occipital subarachnoid hemorrhage, chronic appearing right frontal lobe infarct.  GCS in the ER was 15 and neurosurgery was consulted.  She was admitted to the ICU for serial neuroexams and repeat imagings.  During her ICU stay her CT scans are stable.  Her apixaban was held for 10 days per neurosurgery recommendation.  Echocardiogram was negative for right heart strain.  She was discharged to [facility] rehab on 2/14/2024.  At [facility] she completed therapy.  She was discharged home on 2/28/2024.  She continues with home physical therapy for 6 weeks.  Home OT did evaluate and it was not needed.  Patient does have upcoming appointments with a new psychiatrist on 5/23/2024 and neurosurgery on 3/28/2024.  She will need medication refills to get her to her appointments.  Vascular medicine is scheduled for 5/16/2024.

## 2024-03-20 ENCOUNTER — TELEPHONE (OUTPATIENT)
Dept: VASCULAR LAB | Facility: MEDICAL CENTER | Age: 74
End: 2024-03-20
Payer: MEDICARE

## 2024-03-27 ENCOUNTER — TELEPHONE (OUTPATIENT)
Dept: HEALTH INFORMATION MANAGEMENT | Facility: OTHER | Age: 74
End: 2024-03-27

## 2024-03-27 ENCOUNTER — TELEPHONE (OUTPATIENT)
Dept: VASCULAR LAB | Facility: MEDICAL CENTER | Age: 74
End: 2024-03-27
Payer: MEDICARE

## 2024-03-27 NOTE — TELEPHONE ENCOUNTER
[facility] Heart and Vascular Health and Pharmacotherapy Programs     Received anticoagulation referral from AMINA Cormier on 3/18/24.     Called patient to schedule an appt to establish care. No answer. LVM.     2nd call    Insurance: Medicare  PCP: [facility]  Locations to be seen: Any     If no response by 4/18/24 (1 month from referral date) OR 2 no shows/cancellations, will remove from referral list and send FYI to referring provider     [facility] Anticoagulation/Pharmacotherapy Clinic at [phone], fax [phone]     Michael Feldman, PharmD, BCACP

## 2024-03-29 ENCOUNTER — APPOINTMENT (OUTPATIENT)
Dept: VASCULAR LAB | Facility: MEDICAL CENTER | Age: 74
End: 2024-03-29
Payer: MEDICARE

## 2024-04-03 ENCOUNTER — TELEPHONE (OUTPATIENT)
Dept: VASCULAR LAB | Facility: MEDICAL CENTER | Age: 74
End: 2024-04-03
Payer: MEDICARE

## 2024-04-03 NOTE — TELEPHONE ENCOUNTER
Caller: Nohelia    Topic/issue: Nohelia is returning call    Callback Number: [phone]    Thank you,   Marilyn HIDALGO

## 2024-04-03 NOTE — LETTER
Molly Loera  [id number]  [place] [id number]    04/03/24    Dear Molly Loera ,    We have been unsuccessful in our attempts to contact you regarding your Anticoagulation Service appointments. Eliquis is a potent blood-thinning agent that requires monitoring to ensure that the dosage is correct for your body.  If it isn't, you could develop serious, sometimes life-threatening bleeding problems or life-threatening blood clots or stroke could result.    It is extremely important that you contact the clinic as soon as possible to arrange appropriate follow up.  We are open Monday-Friday 8 am until 5 pm.  You may reach our Service at [phone].           Sincerely,           Austin Mcmahon, PharmD, [facility]  Clinic Supervisor  [facility]  Outpatient Anticoagulation Service

## 2024-04-03 NOTE — TELEPHONE ENCOUNTER
Called pt back - she will c/b when able to schedule visit. Needs to coordinate w/ her daughter for transportation.    Michael Feldman, ElmoD, BCACP

## 2024-04-03 NOTE — TELEPHONE ENCOUNTER
[facility] Heart and Vascular Health and Pharmacotherapy Programs     Received anticoagulation referral from AMINA Cormier on 3/18/24.     Called patient to schedule an appt to establish care. No answer. LVM.     3rd call     Insurance: Medicare  PCP: [facility]  Locations to be seen: Any     If no response by 4/18/24 (1 month from referral date) OR 2 no shows/cancellations, will remove from referral list and send FYI to referring provider     [facility] Anticoagulation/Pharmacotherapy Clinic at [phone], fax [phone]     Michael Feldman, PharmD, BCACP

## 2024-04-10 ENCOUNTER — TELEPHONE (OUTPATIENT)
Dept: VASCULAR LAB | Facility: MEDICAL CENTER | Age: 74
End: 2024-04-10
Payer: MEDICARE

## 2024-04-10 NOTE — TELEPHONE ENCOUNTER
[facility] Heart and Vascular Health and Pharmacotherapy Programs     Received anticoagulation referral from AMINA Cormier on 3/18/24.     Called patient to schedule an appt to establish care. No answer. LVM.     Insurance: Medicare  PCP: [facility]  Locations to be seen: Any     If no response by 4/18/24 (1 month from referral date) OR 2 no shows/cancellations, will remove from referral list and send FYI to referring provider    [facility] Anticoagulation/Pharmacotherapy Clinic at [phone], fax [phone]    Michael Feldman, ElmoD, BCACP  
[FreeTextEntry1] : VIC is a 1 year old boy with Moderate Persistent asthma, Allergic Rhinitis (AR), recurrent Otitis Media and Adenoid Hypertrophy followed by ENT (Dr. Lanette Hardy). Chronic cough responded to ICS -no improvement post Augmentin (Nov 2021). Eosinophilia 440.  VISIT APRIL 2024 - Doing Breyna 80 (since Dec 2023) on Singulair -Good adherence and technique. - After being sick Dec 2023 seen by ENT - recommended Flonase x 3 months, cough and runny nose resolved. - No cough recently. But has mild rhinorrhea - Flonase resumed. - No recent OCS use.  CLINIC VISIT 12/8/2023 - Started Flovent 110 and Singular 4mg, Xyzal PRN, Zyrtec PRN - Oct 2023 cough for 3 weeks following URI -post Cefdinir for AOM. - Cough continued in the morning "mostly." Suctioning often from nose. - ENT evaluation: fluid in ears 2 weeks ago- not a candidate for tubes. Flonase QD / saline 3x per day. - Recent Albuterol: Oct 2023 during illness. Not done recently. - Recent Oral steroids or ER visits: denies.  VISIT AUGUST 2023 - Stopped Flovent 110 following DC of Singulair. - Doing god with 1 mild cold in May 2023, well tolerated with Albuterol / Flovent. - When well, seems to have PND which causes "early morning cough" - Xyzal used and controls cough / PND. - Frequent Albuterol use: no - Oral steroids or ER visits: no  INTERIM HISTORY 4/21/2023 - Latest recommendations: Flovent 110 (decrease to 1 puff if well) and Singulair, Nasonex/Zyrtec, Benadryl qHS, CST. ENT f/u. - Recent symptoms: mild URI, needing to increase Flovent to 2 p BID. - ENT signed off. - Continues with Zyrtec (mostly to help with dermatitis to face, on ointment). - Albuterol last use: 1 month ago, during last cold. - Last Oral steroids: no - ER visits: no  INTERIM HISTORY 9/2/22 - Latest recommendations: 10-day Abx, Flovent 110/Singulair, Nasonex/Zyrtec, Benadryl PRN, Manual CPT w/ colds. F/U ENT. - COVID-19 infection July 27th, 2022. - Has runny nose off and on x 1 week. Still looks good and better than before. - No coughing currently. Recent cough 2 weeks ago improved with Albuterol use. - Using Zyrtec PRN - Albuterol not being used. - No recent cough, wheezing or shortness of breath. - No recent flare-ups/oral steroids or ED visits/hospitalizations. - No symptoms while asleep or with exercise. - Compliant with medications. Adequate technique reviewed.  INTERM HISTORY 7/8/22: - Latest recommendations: Flovent 110 / Singulair / Mometasone nasal / Zyrtec. CPT w/ colds. - Recent respiratory illness requiring 3-days of oral steroids (July 1st, 2022) - Cough now improved although remains with rhinorrhea (thin to thick) - Questionable Ear infections at PCP recently -Rx antibiotics x 10 days not started yet - Using Mometasone and Zyrtec. - Recent flare-ups or ED visits/hospitalizations: Yes, seen at [facility] due to recent URI - Recent wheezing or shortness of breath: no - Recent Albuterol use: this am -tapering down - Recent oral steroids: Yes - Current Medications: as above. Adherence confirmed. No side effects reported.  INTERM HISTORY 5/13/22: - Latest recommendations: +Prednisolone, Flovent 44, Nasonex / Singulair, ENT f/u May 2022 (agree w/ Adenoidectomy). Consider PSG - ENT evaluation 5/6/22: Dr. Hardy discussed adenoid hypertrophy and 'ear tubes', taper Nasonex - Doing better since on ICS. Doing Nasonex 3 x/week. Reports some intermittent coughing - Flare-up 1 month ago, wheezing on exam. Required Albuterol. - Ears examined at PCP. - Recent symptoms: none. Nighttime: none - Albuterol last used: 2 days ago due to URI symptoms (fever) - Recent wheezing or shortness of breath: no - Recent ED visits/hospitalizations: no - Recent oral steroids: no - Current Medications: Flovent 44, Nasonex / Singulair. Adherence confirmed. No side effects reported.  INTERM HISTORY 3/18/22: - Latest Recommendations: +Montelukast 4 mg, sent RVP, CPT, ENT f/u. Continue Flovent 44, Nasonex - Since last being seen, was hMPV+ in December 2021 and report resolution of cough by January 2022 - Exposed to COVID-19 positive (brother) January 2022 - Held Singulair -concerns of leg pain (negative xrays) and concerns of patient being on "too many meds" - Mother continues with "concerns about too many meds" (Flovent, Montelukast, and Nasonex) - Held Flovent + Montelukast lately. - Montelukast resumed recently due to cough x 3 (following URI in beginning of March 2022) - No ENT f/u yet. PCP checked fluid in ears and there was none - Mouth breaths at night. No snoring - Mom agreed with starting Flovent x 2 months as well as continuing Nasonex + Montelukast x 60 days until seen by ENT.  INTERM HISTORY (Dec 2021): Augmentin x 14 days did not resolve cough. On Nasonex. Flovent 44 not started until now. Albuterol used daily; helps phlegm expectoration. Cough multiple x/day since June 2021. URI x 2 days ER did NEB. Sister seems to bring Viral infections home, Neg Covid-19.  PAST HISTORY: - Followed by ENT for nasal congestion/rhinitis (improved with Nasonex) - Adenoid Hypertrophy and 'slight evidence' of Laryngomalacia (no stridor reported). - Adenoidectomy and PSG being considered. - +R/E and +Parainfluenza Nov 2021. - Started Albuterol Nov 2021 following +wheezing (PCP). - Dexamethasone burst x 2 days. - Wet cough seems to come from 'throat' secretions.  Asthma/Respiratory History (updated 3/18/22) - Symptoms: wet cough --> continues - Triggers: URI's, without triggers - ER, Hospitalizations, ICU, and Intubations: multiple UC/ER visits. Latest December 2021  - Daytime cough: multiple times/day --> stopped x 2 months --> recurred now - Nighttime cough: infrequent but recently nightly - Exertion symptoms: minimal increase cough - Allergic Rhinitis: seen by Allergist for rhinorrhea, Nasonex did not seem to improve rhinorrhea. Skin tested: negative aeroallergens - Albuterol: responds well -Using daily (multiple times some days) - ICS: Flovent 44 mcg 2 puffs BID (started Dec 15th, 2021) --> stopped January 2022 - Steroids burst: Dexamethasone burst x 1 (Nov 2021) - Spacer use: Yes (confirmed correct use) - Snoring or sleep-disordered breathing: no MANUEL symptoms, minimal mouth-breaths  - H Asthma: +Mother - Eczema: +Yes - Food Allergy: no ---- Skin testing Negative. - Exposed pets, smoke or mold: No cigarette smoke exposure - Recurrent ear/sinus/lung infections: recurrent AOM -possible ear tubes. Received Amoxicillin x 10d, Cefdinir, Amoxicillin x 10d recently. - Dysphagia / KIMBERLY: slow to progress to solids, used to gag + vomit frequently. Never had MBS. - Immunizations: Yes, pending Flu - Covid-19 info: no, negative on multiple test - School or : taken care of by Grandparents.

## 2024-04-10 NOTE — TELEPHONE ENCOUNTER
[facility] Heart and Vascular Health and Pharmacotherapy Programs     Received anticoagulation referral from AMINA Cormier on 3/18/24.     Called patient to schedule an appt to establish care. Patient states she is unable to schedule right now due to schedule conflicts with her daughter. She has a vascular medicine appt scheduled for 5/16/24.      3rd call     Insurance: Medicare  PCP: [facility]  Locations to be seen: Any     If no response by 4/18/24 (1 month from referral date) OR 2 no shows/cancellations, will remove from referral list and send FYI to referring provider     [facility] Anticoagulation/Pharmacotherapy Clinic at [phone], fax [phone]              Tonja Suarez, ElmoD

## 2024-04-17 ENCOUNTER — TELEPHONE (OUTPATIENT)
Dept: VASCULAR LAB | Facility: MEDICAL CENTER | Age: 74
End: 2024-04-17
Payer: MEDICARE

## 2024-04-17 NOTE — TELEPHONE ENCOUNTER
[facility] and V[facility] Health and Pharmacotherapy Programs     Received anticoagulation referral from AMINA Cormier on 3/18/24.     Called patient to schedule an appt to establish care. Patient states she is unable to schedule right now due to schedule conflicts with her daughter. She has a vascular medicine appt scheduled for 5/16/24. Prefers that we follow up in 2 more weeks to discuss. If unable to schedule at next visit will close referral and notify referring provider.      4th call     Insurance: Medicare  PCP: [facility]  Locations to be seen: Any     If no response by 4/18/24 (1 month from referral date) OR 2 no shows/cancellations, will remove from referral list and send FYI to referring provider     [facility] Anticoagulation/Pharmacotherapy Clinic at [phone], fax [phone]                    Elmo KingD

## 2024-04-18 ENCOUNTER — PATIENT MESSAGE (OUTPATIENT)
Dept: MEDICAL GROUP | Facility: PHYSICIAN GROUP | Age: 74
End: 2024-04-18
Payer: MEDICARE

## 2024-04-18 DIAGNOSIS — E10.9 TYPE 1 DIABETES MELLITUS WITHOUT COMPLICATION (HCC): ICD-10-CM

## 2024-04-18 DIAGNOSIS — E55.9 VITAMIN D DEFICIENCY: ICD-10-CM

## 2024-04-18 DIAGNOSIS — E03.8 OTHER SPECIFIED HYPOTHYROIDISM: ICD-10-CM

## 2024-04-25 DIAGNOSIS — Z79.4 TYPE 2 DIABETES MELLITUS TREATED WITH INSULIN (HCC): ICD-10-CM

## 2024-04-25 DIAGNOSIS — E11.9 TYPE 2 DIABETES MELLITUS TREATED WITH INSULIN (HCC): ICD-10-CM

## 2024-04-25 RX ORDER — INSULIN ASPART 100 [IU]/ML
10-20 INJECTION, SOLUTION INTRAVENOUS; SUBCUTANEOUS
Qty: 45 ML | Refills: 3 | Status: SHIPPED | OUTPATIENT
Start: 2024-04-25 | End: 2024-04-25

## 2024-04-25 RX ORDER — INSULIN ASPART 100 [IU]/ML
10-20 INJECTION, SOLUTION INTRAVENOUS; SUBCUTANEOUS
Qty: 45 ML | Refills: 3 | Status: SHIPPED | OUTPATIENT
Start: 2024-04-25 | End: 2024-07-24

## 2024-05-16 ENCOUNTER — OFFICE VISIT (OUTPATIENT)
Dept: VASCULAR LAB | Facility: MEDICAL CENTER | Age: 74
End: 2024-05-16
Attending: INTERNAL MEDICINE
Payer: MEDICARE

## 2024-05-16 VITALS
BODY MASS INDEX: 23.29 KG/M2 | WEIGHT: 136.4 LBS | HEIGHT: 64 IN | SYSTOLIC BLOOD PRESSURE: 130 MMHG | DIASTOLIC BLOOD PRESSURE: 72 MMHG | HEART RATE: 91 BPM

## 2024-05-16 DIAGNOSIS — Z86.73 HISTORY OF CVA (CEREBROVASCULAR ACCIDENT): ICD-10-CM

## 2024-05-16 DIAGNOSIS — I10 ESSENTIAL HYPERTENSION: ICD-10-CM

## 2024-05-16 DIAGNOSIS — E10.9 TYPE 1 DIABETES MELLITUS WITHOUT COMPLICATION (HCC): ICD-10-CM

## 2024-05-16 DIAGNOSIS — R41.89 TRAUMATIC BRAIN INJURY WITH NEW NEUROCOGNITIVE DEFICIT (HCC): ICD-10-CM

## 2024-05-16 DIAGNOSIS — Z79.01 ANTICOAGULATED: ICD-10-CM

## 2024-05-16 DIAGNOSIS — N18.31 STAGE 3A CHRONIC KIDNEY DISEASE: ICD-10-CM

## 2024-05-16 DIAGNOSIS — S06.9XAA TRAUMATIC BRAIN INJURY WITH NEW NEUROCOGNITIVE DEFICIT (HCC): ICD-10-CM

## 2024-05-16 DIAGNOSIS — I26.99 PE (PULMONARY THROMBOEMBOLISM) (HCC): ICD-10-CM

## 2024-05-16 DIAGNOSIS — R29.818 TRAUMATIC BRAIN INJURY WITH NEW NEUROCOGNITIVE DEFICIT (HCC): ICD-10-CM

## 2024-05-16 DIAGNOSIS — Z79.02 ANTIPLATELET OR ANTITHROMBOTIC LONG-TERM USE: ICD-10-CM

## 2024-05-16 DIAGNOSIS — E78.5 DYSLIPIDEMIA: ICD-10-CM

## 2024-05-16 PROCEDURE — 3078F DIAST BP <80 MM HG: CPT

## 2024-05-16 PROCEDURE — 3075F SYST BP GE 130 - 139MM HG: CPT

## 2024-05-16 PROCEDURE — 99214 OFFICE O/P EST MOD 30 MIN: CPT

## 2024-05-16 RX ORDER — ASPIRIN 81 MG/1
81 TABLET ORAL DAILY
COMMUNITY

## 2024-05-16 ASSESSMENT — FIBROSIS 4 INDEX: FIB4 SCORE: 1.43

## 2024-05-16 NOTE — PROGRESS NOTES
VASCULAR MEDICINE CLINIC - Follow up VISIT  05/16/2024      Molly Loera is a 73 y.o.  female who presents today  for   Chief Complaint   Patient presents with    Follow-Up      Subjective      HPI:  Here for f/u of dyslipidemia and hypertension in setting of type 1 dm  Doing well overall, now  Since last visit had multiple ER/Rehab visits  Jan 2024 checked self into [facility] as she was depressed.  Unfortunately while admitted there, contracted COVID and was placed on isolation.  Was definitely less active and more sedentary around to and prior to that time.    Developed SOB and was taken to [facility] where she was diagnosed with right lower lobe PE and started on eliquis 1/30/2024.  Subsequently discharged from [facility] to do rehab at [facility].  Had GLF on first day there, struck head and was confused.  Transferred to ER at [facility]  Found to have SAH and was admitted, eliquis was held for 10 days, no intervention for head bleed, just monitoring.  Transferred to [facility] for 2 weeks.    Restarted Eliquis 2/19/2024 and has remained on it since.  No bleeding, is affordable, although expensive.  No missed doses since restarting.    ASA was stopped when PE first diagnosed.   No ongoing SOB, no supplemental O2, no leg swelling  No prior history of DVT or clots, no family history, no surgery or injury.    Following with endo,  will be establishing with [facility] in august  Occasional hypoglycemia, notes higher blood sugars earlier this year during COVID and hospital stays, much better now  Home bps 120-130s/70-80s - only checking once a week  Denies dizziness, lightheadedness, HA or vision changes  Denies CV symptoms, no TIA/CVA  Good adherence  Has many psych and seizure medications   No interfering substances  No myalgias on statin  Energy level is good, working with endo on thyroid med  Exercise limited by back and foot pain - getting injections  Is pending back injections or an ablation soon       Social  "History     Tobacco Use    Smoking status: Never    Smokeless tobacco: Never   Vaping Use    Vaping status: Never Used   Substance Use Topics    Alcohol use: Never    Drug use: Never           DIET AND EXERCISE:  Weight Change: down 8lbs  Diet: whole foods - reasonably low carb  Exercise: minimal exercise, limited by ortho pain in knees and feet         Objective     Objective:     Vitals:    05/16/24 0809 05/16/24 0813   BP: (!) 149/70 130/72   BP Location: Right arm Right arm   Patient Position: Sitting Sitting   BP Cuff Size: Adult Adult   Pulse: 66 91   Weight: 61.9 kg (136 lb 6.4 oz)    Height: 1.626 m (5' 4\")           Physical Exam  Vitals reviewed.   Constitutional:       General: She is not in acute distress.     Appearance: She is not diaphoretic.   HENT:      Head: Normocephalic and atraumatic.   Eyes:      General: No scleral icterus.     Conjunctiva/sclera: Conjunctivae normal.   Cardiovascular:      Rate and Rhythm: Regular rhythm. Tachycardia present.      Heart sounds: Normal heart sounds. No murmur heard.  Pulmonary:      Effort: Pulmonary effort is normal. No respiratory distress.      Breath sounds: Normal breath sounds. No wheezing or rales.   Musculoskeletal:      Right lower leg: No edema.      Left lower leg: No edema.   Skin:     General: Skin is warm and dry.      Coloration: Skin is not pale.   Neurological:      General: No focal deficit present.      Mental Status: She is alert and oriented to person, place, and time.      Coordination: Coordination normal.      Gait: Gait is intact. Gait normal.   Psychiatric:         Mood and Affect: Mood and affect normal.         Behavior: Behavior normal.          DATA REVIEW    Lab Results   Component Value Date/Time    CHOLSTRLTOT 176 11/11/2023 09:02 AM    LDL 58 11/11/2023 09:02 AM    HDL 92 11/11/2023 09:02 AM    TRIGLYCERIDE 129 11/11/2023 09:02 AM       Lab Results   Component Value Date/Time    SODIUM 140 02/23/2024 05:40 AM    POTASSIUM 4.1 " 02/23/2024 05:40 AM    CHLORIDE 102 02/23/2024 05:40 AM    CO2 26 02/23/2024 05:40 AM    GLUCOSE 80 02/23/2024 05:40 AM    BUN 22 02/23/2024 05:40 AM    CREATININE 0.63 02/23/2024 05:40 AM     Lab Results   Component Value Date/Time    ALKPHOSPHAT 172 (H) 02/15/2024 05:21 AM    ASTSGOT 31 02/15/2024 05:21 AM    ALTSGPT 31 02/15/2024 05:21 AM    TBILIRUBIN 0.2 02/15/2024 05:21 AM       Lab Results   Component Value Date/Time    HBA1C 9.0 (H) 02/15/2024 05:21 AM       Lab Results   Component Value Date/Time    MALBCRT 28 11/11/2023 09:02 AM    MICROALBUR 3.3 11/11/2023 09:02 AM       Echocardiogram 2020  Technically difficult study.  Hyperdynamic left ventricular systolic function. Left ventricular   ejection fraction is visually estimated to be greater than 75%.   Normal right ventricular size and systolic function.  No significant valvular abnormalities.  No prior study is available for comparison.     ABPM 12/2022  Mean daytime: 113/61 consistent with well-controlled out of office blood pressure   Nocturnal dip: Perhaps blunted        Medical Decision Making:  Today's Assessment / Status / Plan:     1. Essential hypertension  Comp Metabolic Panel    Referral to Vascular Medicine      2. Dyslipidemia  Comp Metabolic Panel    Lipid Profile    Referral to Vascular Medicine      3. Stage 3a chronic kidney disease  Comp Metabolic Panel      4. Type 1 diabetes mellitus without complication (HCC)  Comp Metabolic Panel      5. Antiplatelet or antithrombotic long-term use  CBC WITHOUT DIFFERENTIAL      6. PE (pulmonary thromboembolism) (HCC)  Referral to Vascular Medicine      7. Traumatic brain injury with new neurocognitive deficit (HCC)        8. Anticoagulated        9. History of CVA (cerebrovascular accident)             Patient Type: Primary Prevention    Etiology of Established CVD if Present: None    Lipid Management: Qualifies for Statin Therapy Based on 2018 ACC/AHA Guidelines: yes  Calculated 10-Year Risk of  ASCVD: N/A  Currently on Statin: Yes  Goal LDL less than 100 non-HDL less than 130  At goal on most recent labs 11/2023  Plan:  -Continue atorvastatin 80 mg a day  -Recheck fasting lipid panel prior to next appt    Blood Pressure Management:  Acc[place] (2017) Blood Pressure Goal <130/80  Home readings under excellent control  ABPM 12/2022 shows excellent out of office BP control,   Blood pressure elevated in the office consistent with whitecoat hypertension  No known evidence of endorgan damage  Plan:  -Continue amlodipine 10 mg daily  -Continue losartan 100 mg a day  -Continue home blood pressure monitoring  -Recheck GFR and electrolytes as per endo and PCP - has labs every few months for both  -Consider addition of low dose thiazide type diuretic if out of office blood pressure elevated in future, or increase amlodipine to minimize pill burden    Glycemic Status: Diabetic  Apparently late onset type I diabetic  Reasonable control by most recent A1c,   Will be changing endo to [facility], first appt in August.  Will have A1c done at that time    rare hypoglycemia  Most recent A1c 9.0 2/2024, relates to recent sickness and poor eating during that time, is much better now, FSBS better at home.  Anticipate improved A1c at that time.  Declines poct A1c today  Plan:  -Defer further management to PCP and endocrinology  -Continue current dose adjustments of insulin as per endocrinology/[facility]  -Report any increase in frequency of hypoglycemia    Anti-Platelet/Anti-Coagulant Tx: yes  Recent provoked PE 1/2024, tx with Eliquis for ~3.5 months.  ASA stopped with initiation of DOAC.  Significant medication reactivity with current psych meds, see below for further discussion on OAC plan.  Reasonable to restart low-dose aspirin since she had tolerated it well and does have multiple cardiovascular risk factors  - stop eliquis     VTE disease   Appears to be a provoked right lower lobe PE 1/30/2024, following COVID infection and  immobility/sedentary from illness/depression.    No prior personal or family history of VTE.   Covid infection likely provoking factor, no other provoking factors identified.   Course complicated by GLF with SAH, where OAC was held for 10 days per neurosurgery recommendations.  Was restarted once cleared 2/19/2024, and remain on Eliquis 5mg at this time without bleeding.    Is asymptomatic at this time, denies SOB or CP, not requiring any supplemental oxygen, and no DVT found on imaging while hospitalized.     - Thrombophilia/hypercoag evaluation:  not recommended  PLAN  - no imaging surveillance needed at this time  - antithrombotic plan as noted below   - counseled on signs and symptoms of acute VTE that require seeking prompt attention in the ED including shortness of breath, chest pain, pain with deep inhalation, acute leg swelling and/or pain in calf or leg   - avoid hormonal therapies containing E2 or testosterone, raloxifene, tamoxifene, and other meds increasing risk for VTE   - avoid or limit sedentary periods  - if having any invasive procedure,please make sure the doctor knows of your history of blood clots and current anticoagulation status  - recommend f/u with PCP for age-appropriate cancer-screening due to risk of malig-associated VTE    ANTITHROMBOTIC THERAPY  Date of initiation: 1/30/2024, held 2/9/2024 for SAH, then resumed 2/19/2024.   HAS-BLED bleeding risk calc ([email or web address]): 2 pt, 4.1%, moderate risk   Factors to consider for indefinite OAC: None   Last CBC, BMP: reviewed above   Expected duration: reviewed standard of care as per Chest 2021 guidelines is 3-6 months minimum on full dose OAC.  Reviewed medication interactions with tegretol and primidone and Eliquis, decreasing effectiveness of OAC.  Long discussion on alternatives, including warfarin or adjusting psych medications with PCP/psych, pt and family member wishing to remain on current psych med regimen which is reasonable, and do not  wish to pursue warfarin.     After review of risks/benefits/alternatives, through shared decision-making, we will STOP Eliquis now, and will transition to ASA 81mg indefinitely as previously tolerated as pt has completed ~ 3.5 months of anticoagulation post provoked PE.    PLAN:  - STOP Eliquis now  - START ASA 81mg daily, indefinitely.  Pt to start after spinal injection next week once cleared by procedural physician.     - advised to seek urgent eval for any GI bleeding, stroke-like sx, or minor bleeding >30min duration       Smoking: continue complete avoidance of all tobacco products    Physical Activity: She is fairly limited by orthopedic issues but have encouraged her to exercise as much as possible    Weight Management and Nutrition: Continue good diabetic diet    Other:     -CKD, stage IIIa -likely due to diabetic and hypertensive nephrosclerosis.  Blood pressure control and ARB as above.  Recheck GFR and electrolytes and urine for albumin as ordered by PCP.  Avoid excess NSAIDs.  Will follow renal function over time    -SAH - s/p GLF 2/2024, conservative treatment.  Completed rehab stay and therapies.  And has followed up with neurosurgery.  Defer further care to PCP/neurosurgery.        Instructed to follow-up with PCP for remainder of adult medical needs: yes  We will partner with other providers in the management of established vascular disease and cardiometabolic risk factors.    Studies to Be Obtained: none  Labs to Be Obtained: CMP, lipid, cbc, - A1c (per endo at [facility]),     Follow up in: 6 months unless needed sooner       STEVEN Fox.  [facility]      Cc: LOURDES Penn

## 2024-05-18 DIAGNOSIS — F31.9 BIPOLAR 1 DISORDER (HCC): ICD-10-CM

## 2024-05-20 RX ORDER — LAMOTRIGINE 100 MG/1
100 TABLET ORAL DAILY
Qty: 90 TABLET | Refills: 0 | Status: SHIPPED | OUTPATIENT
Start: 2024-05-20 | End: 2024-05-23 | Stop reason: SDUPTHER

## 2024-05-20 NOTE — TELEPHONE ENCOUNTER
Requested Prescriptions     Signed Prescriptions Disp Refills    lamoTRIgine (LAMICTAL) 100 MG Tab 90 Tablet 0     Sig: TAKE 1 TABLET BY MOUTH EVERY DAY     Authorizing Provider: RJ LAM A.P.R.N.

## 2024-05-23 ENCOUNTER — APPOINTMENT (OUTPATIENT)
Dept: BEHAVIORAL HEALTH | Facility: CLINIC | Age: 74
End: 2024-05-23
Payer: MEDICARE

## 2024-05-23 DIAGNOSIS — F31.9 BIPOLAR 1 DISORDER (HCC): ICD-10-CM

## 2024-05-23 DIAGNOSIS — G47.09 OTHER INSOMNIA: ICD-10-CM

## 2024-05-23 PROCEDURE — 99204 OFFICE O/P NEW MOD 45 MIN: CPT | Mod: 95 | Performed by: PSYCHIATRY & NEUROLOGY

## 2024-05-23 PROCEDURE — 90833 PSYTX W PT W E/M 30 MIN: CPT | Mod: 95 | Performed by: PSYCHIATRY & NEUROLOGY

## 2024-05-23 RX ORDER — CARBAMAZEPINE 200 MG/1
200 TABLET ORAL 2 TIMES DAILY
Qty: 180 TABLET | Refills: 1 | Status: SHIPPED | OUTPATIENT
Start: 2024-05-23

## 2024-05-23 RX ORDER — BUSPIRONE HYDROCHLORIDE 5 MG/1
5 TABLET ORAL 2 TIMES DAILY
Qty: 180 TABLET | Refills: 1 | Status: SHIPPED | OUTPATIENT
Start: 2024-05-23

## 2024-05-23 RX ORDER — LAMOTRIGINE 100 MG/1
100 TABLET ORAL DAILY
Qty: 90 TABLET | Refills: 1 | Status: SHIPPED | OUTPATIENT
Start: 2024-05-23

## 2024-05-23 NOTE — PROGRESS NOTES
[facility]  INITIAL PSYCHIATRY EVALUATION    This evaluation was conducted via Zoom, using secure and encrypted videoconferencing technology. The patient was physically located at their home address in [place], [place], and the physician was located at her home office in [place], [place]. The patient was presented by self. The patient’s identity was confirmed and verbal consent for the telemedicine encounter was obtained.      CC:  Initial Evaluation and Medication Management of Mental Health Symptoms      History Of Present Illness:  Molly Loera is a 74 y.o. female with history of Bipolar DO I and Insomnia, DMI - started age 52 (father and her daughter developed late in life also), COVID twice, pulmonary embolism, fall with subarachnoid hematoma, possible catatonic state, with several hospitalizations, including 1 for 70 days with 38 ECT treatments, Arthritis - back, knees, feet; referred by her PCP, presents today for evaluation and to establish care.      The patient reported the following:  She did not have an episode x 3 years; they are always very severe, she was hospitalized in December at [facility] for Bipolar I MDD Severe with psychotic features, was not sleeping and was perseverating, depressed and irritable mood and then became psychotic.  At [facility] she got COVID, then pulm embolism followed by a fall and subarachnoid hematoma.  Her Bipolar DO episodes have become much more severe over the last 20 years.  She talked about 2 episodes that required long hospitalizations with complications - once 3 years ago hospitalized x 70 days - [facility], I believe and genetic testing was done - and again in Jan 2024.  Had done well in between these two episodes.     Prior to [facility], she was taking the following when she had the Bipolar MDD, Severe, With Psychotic features episode:  Seroquel 100 mg TID  Lamictal 100 mg BID  Vraylar 6 mg (has been a great  "medication for her)   Lithium 300 mg BID - caused severe tremor    [facility]TER [facility]:  D/c seroquel  D/c lithium  Reduce lamictal to 100 mg from 100 mg BID  Continue vraylar 6 mg  Begin buspar 5 mg BID    She is VERY sensitive to medication changes and she believes it was a reduction in her Seroquel that led to her last decompensation and hospitalization at . [facility].    Stressor: recent hospitalization; brother passed away suddenly, age 71, very healthy, [place] - flew to [place] for his .    She isn't sleeping well.  Sleeping approx 4 hours/night, needs \"at least 6 - 7 hours/night.\"    ROS: As noted above in HPI.        Past Psychiatric History:  Several hospitalizations   Medication trials:  Lithium, Depakote - \"allergic\", Many others, seroquel, vraylar, buspar, tegretol - added at [facility], lamictal - dose reduced at [facility]      Family Psychiatric History:  Obtain at future visit    Substance Use/Addiction History:  Alcohol:  denies  Cannabis:  denies  Tobacco:  denies  Caffeine:  drinks decaf coffee in AM 8 oz, may have another and then does drink diet coke  Other:  Denies any other substances    Social History:  From [place],   10 years ago, moved to [place] and then [place] and bought a house with her daughter, Rut, 2 years ago in [place].  Loves to garden, is an artist, loves to cook.  Works to stay physically active.    Allergies:  Depakote [valproic acid]      Physical Examination and Mental Status Exam:  Vital signs: There were no vitals taken for this visit.    CONSTITUTIONAL:  General Appearance:  Clean, casual attire, good eye contact, engaged with provider    ORIENTATION:  Oriented to time, place and person  RECENT AND REMOTE MEMORY:  Grossly intact  ATTENTION SPAN AND CONCENTRATION:  within normal range  LANGUAGE:  no deficits appreciated  FUND OF KNOWLEDGE:  has awareness of current events, past history and normal vocabulary  SPEECH:  normal volume, amount, rate and articulation, no perseveration " or paucity of language  MOOD:  Euthymic   AFFECT:  Congruent with mood  THOUGHT PROCESS:  logical and goal directed  THOUGHT CONTENT:  Denies any SI/HI or AVH, no delusional thinking nor preoccupations appreciated  ASSOCIATIONS:  Intact, not loose, no tangentiality or circumstantiality  MEMORY:  No gross evidence of memory deficits  JUDGMENT:  adequate concerning everyday activities  INSIGHT:  adequate to psychiatric condition    DIAGNOSTIC IMPRESSION:  1. Bipolar 1 disorder (HCC)  - lamoTRIgine (LAMICTAL) 100 MG Tab; Take 1 Tablet by mouth every day.  Dispense: 90 Tablet; Refill: 1  - Cariprazine HCl 6 MG Cap; Take 6 mg by mouth at bedtime.  Dispense: 90 Capsule; Refill: 1  - carBAMazepine (TEGRETOL) 200 MG Tab; Take 1 Tablet by mouth 2 times a day.  Dispense: 180 Tablet; Refill: 1  - busPIRone (BUSPAR) 5 MG tablet; Take 1 Tablet by mouth 2 times a day.  Dispense: 180 Tablet; Refill: 1    2. Other insomnia       Assessment and Plan:  The patient's risk of suicide is assessed as low.  Will be mindful she had her recent episode and a prior one on therapeutic doses of medications and that her episodes are SEVERE.  So important, if possible, to improve her medication regimen to reduce the risk of another episode and to have additional medications for her to take PRN at the first signs of a manic or depressive episode to improve her sleep.  1.  Bipolar DO I, most recent episode MDD, Severe, with psychosis, in remission  MICHELLE by hx of meds - on Buspar  Insomnia  Caffeine Use  Reduce caffeine intake  WILL BE MINDFUL HER BIPOLAR DO STARTED LATER IN LIFE AND   Will be mindful that she was hospitalized x 70 days and it appears her episode resolved on its own rather than with the medications alone, or the combination of the medications and ECT and time  Continue Vraylar 6 mg, will be mindful had severe episode on this medication at this dose  Consider restarting seroquel and move it to bedtime instead of TID, consider XR  Will  not use lithium since she had severe tremor on 300 mg BID, or consider very low dose, ex. 150 mg BID  Continue Lamictal 100 mg  Continue Tegretol 200 mg BID  Obtain records from her former Psychiatrist in [place] - melani Wu? Pedro Pablo with [facility]? & William?  Obtain discharge summary from [facility]  Obtain genetic testing - may be a rapid metabolizer, had genetic testing when she was at [facility]  Obtain discharge summary, if possible, from her [facility] hospitalization  Follow up on past psychiatric hx, how many episodes and when did her bipolar DO start  Follow up on family hx  Reviewed the patient's medical records in [facility] prior to the start of the patient's visit  Reviewed [place] PDMP    2.  The patient has a safety plan which included the 838 crisis text and phone line and going to the nearest ED if symptoms worsen.    3.  Risks, benefits, alternatives and side effects were discussed for all medicines prescribed at this visit.  The patient voiced understanding providing informed consent.  The patient agrees to call the clinic with any questions or concerns, or seek emergent medical care if warranted.    4.  Follow up in 4 weeks or call sooner PRN    The proposed treatment plan was discussed with the patient who was provided the opportunity to ask questions and make suggestions regarding alternative treatment. Patient verbalized understanding and expressed agreement with the plan.     Greater than 16 minutes of the visit was spent in psychotherapy.  Psychotherapy include:  Provided the patient with supportive psychotherapy to build rapport and establish a therapeutic alliance with the patient, psychoeducation, topics: last 6 months and all the complications and set backs and losses, home in [place], getting displaced from their rental home and had 6 weeks to move and find something.  Education about caffeine impact on insomnia.    Jo Lopez M.D.      This note was created using voice recognition software (Dragon). The accuracy  of the dictation is limited by the abilities of the software. I have reviewed the note prior to signing, however some errors in grammar and context are still possible. If you have any questions related to this note please do not hesitate to contact our office.

## 2024-06-18 ENCOUNTER — APPOINTMENT (OUTPATIENT)
Dept: MEDICAL GROUP | Facility: PHYSICIAN GROUP | Age: 74
End: 2024-06-18
Payer: MEDICARE

## 2024-06-25 ENCOUNTER — APPOINTMENT (OUTPATIENT)
Dept: MEDICAL GROUP | Facility: PHYSICIAN GROUP | Age: 74
End: 2024-06-25
Payer: MEDICARE

## 2024-06-25 VITALS
SYSTOLIC BLOOD PRESSURE: 136 MMHG | HEART RATE: 98 BPM | OXYGEN SATURATION: 98 % | BODY MASS INDEX: 22.49 KG/M2 | HEIGHT: 65 IN | WEIGHT: 135 LBS | TEMPERATURE: 98.5 F | DIASTOLIC BLOOD PRESSURE: 70 MMHG

## 2024-06-25 DIAGNOSIS — Z00.00 MEDICARE ANNUAL WELLNESS VISIT, INITIAL: Primary | ICD-10-CM

## 2024-06-25 DIAGNOSIS — Z23 NEED FOR VACCINATION: ICD-10-CM

## 2024-06-25 DIAGNOSIS — C50.912 MALIGNANT NEOPLASM OF LEFT BREAST IN FEMALE, ESTROGEN RECEPTOR POSITIVE, UNSPECIFIED SITE OF BREAST (HCC): ICD-10-CM

## 2024-06-25 DIAGNOSIS — I10 ESSENTIAL HYPERTENSION: ICD-10-CM

## 2024-06-25 DIAGNOSIS — E03.8 OTHER SPECIFIED HYPOTHYROIDISM: ICD-10-CM

## 2024-06-25 DIAGNOSIS — F31.9 BIPOLAR 1 DISORDER (HCC): ICD-10-CM

## 2024-06-25 DIAGNOSIS — E78.5 DYSLIPIDEMIA: ICD-10-CM

## 2024-06-25 DIAGNOSIS — H91.93 DECREASED HEARING, BILATERAL: ICD-10-CM

## 2024-06-25 DIAGNOSIS — E10.9 TYPE 1 DIABETES MELLITUS WITHOUT COMPLICATION (HCC): ICD-10-CM

## 2024-06-25 DIAGNOSIS — I26.99 PE (PULMONARY THROMBOEMBOLISM) (HCC): ICD-10-CM

## 2024-06-25 DIAGNOSIS — H61.22 EXCESSIVE CERUMEN IN EAR CANAL, LEFT: ICD-10-CM

## 2024-06-25 DIAGNOSIS — Z17.0 MALIGNANT NEOPLASM OF LEFT BREAST IN FEMALE, ESTROGEN RECEPTOR POSITIVE, UNSPECIFIED SITE OF BREAST (HCC): ICD-10-CM

## 2024-06-25 DIAGNOSIS — H61.21 EXCESSIVE CERUMEN IN EAR CANAL, RIGHT: ICD-10-CM

## 2024-06-25 PROCEDURE — 69210 REMOVE IMPACTED EAR WAX UNI: CPT | Performed by: NURSE PRACTITIONER

## 2024-06-25 PROCEDURE — 90677 PCV20 VACCINE IM: CPT | Performed by: NURSE PRACTITIONER

## 2024-06-25 PROCEDURE — G0438 PPPS, INITIAL VISIT: HCPCS | Mod: 25 | Performed by: NURSE PRACTITIONER

## 2024-06-25 PROCEDURE — G0009 ADMIN PNEUMOCOCCAL VACCINE: HCPCS | Performed by: NURSE PRACTITIONER

## 2024-06-25 ASSESSMENT — ACTIVITIES OF DAILY LIVING (ADL): BATHING_REQUIRES_ASSISTANCE: 0

## 2024-06-25 ASSESSMENT — ENCOUNTER SYMPTOMS: GENERAL WELL-BEING: FAIR

## 2024-06-25 ASSESSMENT — PATIENT HEALTH QUESTIONNAIRE - PHQ9: CLINICAL INTERPRETATION OF PHQ2 SCORE: 0

## 2024-06-25 ASSESSMENT — FIBROSIS 4 INDEX: FIB4 SCORE: 1.45

## 2024-06-25 NOTE — PROGRESS NOTES
Chief Complaint   Patient presents with    Medicare Annual Wellness       HPI:  Molly Loera is a 74 y.o. here for Medicare Annual Wellness Visit     Bipolar 1 disorder (HCC)  Currently followed by Dr. Lopez with psychiatry. Next appointment is scheduled.  Continues Lamictal 100 mg daily, primidone 100 mg at bedtime, carbamazepine 200 mg twice daily, BuSpar 5 mg twice daily and cariprazine 6 mg at bedtime.    Essential hypertension  Blood pressure today 136/70.  Continues amlodipine 10 mg daily and losartan 100 mg daily.  No chest pain, shortness of breath or dizziness.    Other specified hypothyroidism  Followed by endocrinology.  Continues levothyroxine 25 mcg daily on empty stomach.    PE (pulmonary thromboembolism) ([facility])  She followed-up with vascular medicine and Eliquis was stopped.  Recommendations were to continue aspirin 81 mg daily indefinitely.    Type 1 diabetes mellitus without complication ([facility])  Followed by endocrinology and has upcoming appointment to establish with Alberto Pena at [facility]. Declines monofilament today, prefers to do with endocrinology. Continues NovoLog and Lantus. Blood sugars are less than 150    Dyslipidemia  Continues atorvastatin 80 mg every evening.    Malignant neoplasm of left female breast (HCC)  [facility]  Continues anastrozole 1 mg every evening.      Patient Active Problem List    Diagnosis Date Noted    Other insomnia 05/23/2024    Risk for falls 03/18/2024    Vitamin D deficiency 02/16/2024    Glaucoma 02/16/2024    Traumatic brain injury with new neurocognitive deficit (HCC) 02/14/2024    History of CVA (cerebrovascular accident) 02/13/2024    Fall 02/13/2024    Current use of long term anticoagulation 02/10/2024    Contraindication to deep vein thrombosis (DVT) prophylaxis 02/10/2024    PE (pulmonary thromboembolism) (HCC) 02/10/2024    Subarachnoid hemorrhage without loss of consciousness (HCC) 02/09/2024     Other specified hypothyroidism 11/15/2022    Osteopenia of left thigh 11/15/2022    Elevated alkaline phosphatase level 11/15/2022    Hospital discharge follow-up 11/13/2020    Shaking     Malignant neoplasm of left female breast (HCC) 02/27/2020    Bipolar 1 disorder (HCC) 09/23/2019    Type 1 diabetes mellitus without complication (HCC) 09/23/2019    Essential hypertension 09/23/2019    Dyslipidemia 09/23/2019       Current Outpatient Medications   Medication Sig Dispense Refill    lamoTRIgine (LAMICTAL) 100 MG Tab Take 1 Tablet by mouth every day. 90 Tablet 1    Cariprazine HCl 6 MG Cap Take 6 mg by mouth at bedtime. 90 Capsule 1    carBAMazepine (TEGRETOL) 200 MG Tab Take 1 Tablet by mouth 2 times a day. 180 Tablet 1    busPIRone (BUSPAR) 5 MG tablet Take 1 Tablet by mouth 2 times a day. 180 Tablet 1    aspirin 81 MG EC tablet Take 81 mg by mouth every day.      insulin aspart (NOVOLOG FLEXPEN) 100 UNIT/ML injection PEN Inject 10-20 Units under the skin 3 times a day before meals for 90 days. 45 mL 3    primidone (MYSOLINE) 50 MG Tab Take 2 Tablets by mouth at bedtime. 100 mg = 2 tabs 180 Tablet 2    amLODIPine (NORVASC) 10 MG Tab Take 1 Tablet by mouth every evening. Hold for SBP <110 90 Tablet 2    anastrozole (ARIMIDEX) 1 MG Tab Take 1 Tablet by mouth every evening. Needs to establish with cancer specialist. Last refill 90 Tablet 3    atorvastatin (LIPITOR) 80 MG tablet Take 1 Tablet by mouth every evening. 90 Tablet 2    insulin glargine (LANTUS SOLOSTAR) 100 UNIT/ML Solution Pen-injector injection Inject 15 Units under the skin 2 times a day. 15 mL 2    levothyroxine (SYNTHROID) 25 MCG Tab Take 1 Tablet by mouth every morning on an empty stomach. 90 Tablet 2    losartan (COZAAR) 100 MG Tab Take 1 Tablet by mouth every day. Hold for SBP < 110 90 Tablet 2    timolol (TIMOPTIC) 0.5 % Solution Administer 1 Drop into both eyes at bedtime. 5 mL 3    acetaminophen (TYLENOL) 500 MG Tab Take 2 Tablets by mouth every  6 hours as needed for Mild Pain or Moderate Pain.      insulin lispro 100 UNIT/ML SC SOPN injection PEN 70 - 150 mg/dL = 0 Units 151 - 200 mg/dL = 2 Units 201 - 250 mg/dL = 3 Units 251 - 300 mg/dL = 5 Units 301 - 350 mg/dL = 6 Units 351 - 400 mg/dL = 8 Units Over 400 mg/dL = 9 Units Over 400 mg/dL x 2 consecutive FSBG = 9 Units and call MD (Patient not taking: Reported on 6/25/2024) 9 mL 2     No current facility-administered medications for this visit.          Current supplements as per medication list.     Allergies: Depakote [valproic acid]    Current social contact/activities: goes out with friends for lunch     She  reports that she has never smoked. She has never used smokeless tobacco. She reports that she does not drink alcohol and does not use drugs.  Counseling given: Yes      ROS:    Gait: Uses no assistive device  Ostomy: No  Other tubes: No  Amputations: No  Chronic oxygen use: No  Last eye exam: 7-8 months ago  Wears hearing aids: No   : Denies any urinary leakage during the last 6 months    Screening:  Discussed and reviewed  Depression Screening  Little interest or pleasure in doing things?  0 - not at all  Feeling down, depressed , or hopeless? 0 - not at all  Patient Health Questionnaire Score: 0     If depressive symptoms identified deferred to follow up visit unless specifically addressed in assessment and plan.    Interpretation of PHQ-9 Total Score   Score Severity   1-4 No Depression   5-9 Mild Depression   10-14 Moderate Depression   15-19 Moderately Severe Depression   20-27 Severe Depression    Screening for Cognitive Impairment  Do you or any of your friends or family members have any concern about your memory? No  Three Minute Recall (Leader, Season, Table) 2/3    Abdullahi clock face with all 12 numbers and set the hands to show 10 minutes after 11.  Yes    Cognitive concerns identified deferred for follow up unless specifically addressed in assessment and plan.    Fall Risk  Assessment  Has the patient had two or more falls in the last year or any fall with injury in the last year?  Yes    Safety Assessment  Do you always wear your seatbelt?  Yes  Any changes to home needed to function safely? No  Difficulty hearing.  Yes  Patient counseled about all safety risks that were identified.    Functional Assessment ADLs  Are there any barriers preventing you from cooking for yourself or meeting nutritional needs?  No.    Are there any barriers preventing you from driving safely or obtaining transportation?  No. Pts family gives her rides   Are there any barriers preventing you from using a telephone or calling for help?  No    Are there any barriers preventing you from shopping?  No.    Are there any barriers preventing you from taking care of your own finances?  No Daughter helps   Are there any barriers preventing you from managing your medications?  No Daughter helps   Are there any barriers preventing you from showering, bathing or dressing yourself? No    Are there any barriers preventing you from doing housework or laundry? No  Are there any barriers preventing you from using the toilet?No  Are you currently engaging in any exercise or physical activity?  Yes. Walks around house, gardens, takes care of dogs     Self-Assessment of Health  What is your perception of your health? Fair  Do you sleep more than six hours a night? No  In the past 7 days, how much did pain keep you from doing your normal work? Some  Do you spend quality time with family or friends (virtually or in person)? Yes  Do you usually eat a heart healthy diet that constists of a variety of fruits, vegetables, whole grains and fiber? Yes  Do you eat foods high in fat and/or Fast Food more than three times per week? No, once a week     Advance Care Planning  Do you have an Advance Directive, Living Will, Durable Power of , or POLST? No                 Health Maintenance Summary            Overdue - IMM  DTaP/Tdap/Td Vaccine (1 - Tdap) Never done      No completion history exists for this topic.              Overdue - Diabetes: Monofilament / LE Exam (Yearly) Overdue since 11/16/2023 11/16/2022  SmartData: WORKFLOW - DIABETES - DIABETIC FOOT EXAM PERFORMED    10/21/2021  Diabetic Monofilament Lower Extremity Exam    10/21/2021  SmartData: WORKFLOW - DIABETES - DIABETIC FOOT EXAM PERFORMED              A1c Screening (Every 6 Months) Next due on 8/15/2024      02/15/2024  HEMOGLOBIN A1C    03/27/2023  POCT  A1C    09/20/2022  POCT  A1C    05/24/2022  POCT Hemoglobin A1C    10/21/2021  HEMOGLOBIN A1C    Only the first 5 history entries have been loaded, but more history exists.              Ordered - Fasting Lipid Profile (Yearly) Ordered on 5/16/2024 11/11/2023  Lipid Profile    03/11/2023  Lipid Profile    11/05/2022  Lipid Profile    10/21/2021  Lipid Profile    10/29/2020  Lipid Profile    Only the first 5 history entries have been loaded, but more history exists.              Ordered - Diabetes: Urine Protein Screening (Yearly) Ordered on 11/17/2023 11/11/2023  MICROALBUMIN CREAT RATIO URINE    03/11/2023  MICROALBUMIN CREAT RATIO URINE    11/05/2022  MICROALBUMIN CREAT RATIO URINE    10/21/2021  MICROALBUMIN CREAT RATIO URINE    11/07/2020  MICROALBUMIN CREAT RATIO URINE    Only the first 5 history entries have been loaded, but more history exists.              Diabetes: Retinopathy Screening (Yearly) Next due on 12/27/2024 12/27/2023  RETINAL SCREENING RESULTS    10/14/2022  RETINAL SCREENING RESULTS    04/08/2022  Done    04/08/2022  REFERRAL FOR RETINAL SCREENING EXAM    11/03/2020  REFERRAL FOR RETINAL SCREENING EXAM    Only the first 5 history entries have been loaded, but more history exists.              Ordered - SERUM CREATININE (Yearly) Ordered on 5/16/2024 02/23/2024  Basic Metabolic Panel    02/17/2024  Basic Metabolic Panel    02/15/2024  Comp Metabolic Panel (CMP)     2024  Comp Metabolic Panel (CMP): Tomorrow AM    2024  Comp Metabolic Panel (CMP): Tomorrow AM    Only the first 5 history entries have been loaded, but more history exists.              Annual Wellness Visit (Yearly) Next due on 2024  Level of Service: INITIAL ANNUAL WELLNESS VISIT-INCLUDES PPPS    2024  Visit Dx: Medicare annual wellness visit, initial              Bone Density Scan (Every 2 Years) Next due on 2023  DS-BONE DENSITY STUDY (DEXA)    2021  DS-BONE DENSITY STUDY (DEXA)              Hepatitis C Screening  Completed      2020  Hepatitis C Antibody component of HCV Scrn ( 4205-2000 1xLife)              Zoster (Shingles) Vaccines (Series Information) Completed      2022  Imm Admin: Zoster Vaccine Recombinant (RZV) (SHINGRIX)    2022  Imm Admin: Zoster Vaccine Recombinant (RZV) (SHINGRIX)              Influenza Vaccine (Series Information) Completed      2023  Imm Admin: Influenza Vaccine Adult HD    10/30/2022  Imm Admin: Influenza, Unspecified - HISTORICAL DATA    10/14/2021  Imm Admin: Influenza Vaccine Adult HD    10/09/2020  Imm Admin: Influenza Vaccine Quad Inj (Pf)    2019  Imm Admin: Influenza Vaccine Adult HD    Only the first 5 history entries have been loaded, but more history exists.              COVID-19 Vaccine (Series Information) Completed      2023  Imm Admin: Covid-19 Mrna (Spikevax) Moderna 12+ Years    2022  Imm Admin: MODERNA BIVALENT BOOSTER SARS-COV-2 VACCINE (6+)    10/28/2021  Imm Admin: MODERNA SARS-COV-2 VACCINE (12+)    2021  Imm Admin: MODERNA SARS-COV-2 VACCINE (12+)    2021  Imm Admin: MODERNA SARS-COV-2 VACCINE (12+)    Only the first 5 history entries have been loaded, but more history exists.              Pneumococcal Vaccine: 65+ Years (Series Information) Completed      2024  Imm Admin: Pneumococcal Conjugate Vaccine (PCV20)               "Hepatitis A Vaccine (Hep A) (Series Information) Aged Out      No completion history exists for this topic.              Hepatitis B Vaccine (Hep B) (Series Information) Aged Out      No completion history exists for this topic.              HPV Vaccines (Series Information) Aged Out      No completion history exists for this topic.              Polio Vaccine (Inactivated Polio) (Series Information) Aged Out      No completion history exists for this topic.              Meningococcal Immunization (Series Information) Aged Out      No completion history exists for this topic.              Discontinued - Mammogram  Discontinued        Frequency changed to Never automatically (Topic No Longer Applies)    04/28/2023  MA-SCREENING MAMMO RIGHT W/TOMOSYNTHESIS W/CAD    03/06/2020  Done    03/06/2020  MA-DIAGNOSTIC MAMMO RIGHT W/TOMOSYNTHESIS W/CAD                    Patient Care Team:  CRISTINE PazRQUENTIN as PCP - General (Nurse Practitioner)  Brain Harris M.D. (Hematology & Oncology)  Jo Lopez M.D. (Psychiatry & Neurology Psychiatry)  CRISTINE MohanRQUENTIN (Endocrinology)  MICHELLE Fox.RQUENTIN (Nurse Practitioner Family)        Social History     Tobacco Use    Smoking status: Never    Smokeless tobacco: Never   Vaping Use    Vaping status: Never Used   Substance Use Topics    Alcohol use: Never    Drug use: Never     Family History   Problem Relation Age of Onset    Diabetes Daughter      She  has a past medical history of Anxiety, Bipolar 1 disorder (HCC), Cancer (HCC), Depression, Diabetes (HCC), and Kidney disease.   History reviewed. No pertinent surgical history.    Exam:   Vital signs reviewed   /70 (BP Location: Right arm, Patient Position: Sitting, BP Cuff Size: Adult)   Pulse 98   Temp 36.9 °C (98.5 °F) (Temporal)   Ht 1.66 m (5' 5.35\")   Wt 61.2 kg (135 lb)   SpO2 98%  Body mass index is 22.22 kg/m².    Hearing fair.    Dentition poor. Does not see regular dentist. "   Alert, oriented in no acute distress.  Eye contact is good, speech goal directed, affect calm  HENT: Normocephalic. Ears normal shape and contour, excessive cerumen to bilateral ear canal, left ear cerumen able to removed with lighted curette and left TM pearly gray, right ear canal with excessive cerumen unable to be completely removed with lighted curette, MA to irrigate right ear.  Post irrigation right TM pearly gray.  Eyes: Eyes conjunctiva clear lids without ptosis, pupils equal and reactive to light accommodation, lids normal.  Pulmonary: Clear to ausculation.  Normal effort. No rales, ronchi, or wheezing.  Cardiovascular: Regular rate and rhythm without murmur.       Assessment and Plan. The following treatment and monitoring plan is recommended:      1. Medicare annual wellness visit, initial  Acute uncomplicated problem.  Annual wellness exam completed today.  She declined her monofilament today.  Encouraged Tdap at the pharmacy.    2. Excessive cerumen in ear canal, left  Acute uncomplicated problem.  I was able to remove the excessive cerumen with a lighted curette, patient tolerated well.  Left TM pearly gray.    3. Excessive cerumen in ear canal, right  Acute uncomplicated problem.  Unable to remove with lighted curette.  MA was able to irrigate the cerumen from the right ear, right TM pearly gray.  - Ear Irrigation (MA Only); Future    4. Bipolar 1 disorder (HCC)  Chronic stable problem.  Continue follow-up with psychiatry. Continue Lamictal 100 mg daily, primidone 100 mg at bedtime, carbamazepine 200 mg twice daily, BuSpar 5 mg twice daily and cariprazine 6 mg at bedtime.    5. Essential hypertension  Chronic stable problem.  Continue losartan 100 mg daily and amlodipine 10 mg every evening.    6. Other specified hypothyroidism  Chronic stable problem.  Continue follow-up with endocrinology.  Continue levothyroxine 25 mcg daily on empty stomach.    7. PE (pulmonary thromboembolism) (HCC)  Chronic  stable problem.  Continue aspirin 81 mg daily indefinitely.  Continue follow-up with vascular medicine.    8. Type 1 diabetes mellitus without complication (HCC)  Chronic stable problem.  Continue follow-up with endocrinology.  Continue NovoLog and Lantus.  Again declines monofilament exam, states she will complete with endocrinology.    9. Decreased hearing, bilateral  Acute uncomplicated problem.  Referral to audiology.  - Referral to Audiology    10. Dyslipidemia  Chronic stable problem.  Continue atorvastatin 80 mg nightly.    11. Malignant neoplasm of left breast in female, estrogen receptor positive, unspecified site of breast (HCC)  Chronic stable problem.  Continue anastrozole 1 mg every evening.  Continue follow-up with oncology.    12. Need for vaccination  Acute uncomplicated problem.  Due for pneumonia vaccine and she is in agreement. I have placed the below orders and discussed them with an approved delegating provider. The MA is performing the below orders under the direction of Dr. Araya.  - Pneumococcal Conjugate Vaccine 20-Valent (6 wks+)      Services suggested: Referral to audiology  Health Care Screening: Age-appropriate preventive services recommended by USPTF and ACIP covered by Medicare were discussed today. Services ordered if indicated and agreed upon by the patient.  Referrals offered: Community-based lifestyle interventions to reduce health risks and promote self-management and wellness, fall prevention, nutrition, physical activity, tobacco-use cessation, weight loss, and mental health services as per orders if indicated.    Discussion today about general wellness and lifestyle habits:    Prevent falls and reduce trip hazards; Cautioned about securing or removing rugs.  Have a working fire alarm and carbon monoxide detector;   Engage in regular physical activity and social activities     Follow-up: Return in about 6 months (around 12/25/2024) for Hypertension, Diabetes.        Please note  that this dictation was created using voice recognition software. I have made every reasonable attempt to correct obvious errors, but I expect that there are errors of grammar and possibly content that I did not discover before finalizing the note.

## 2024-06-25 NOTE — ASSESSMENT & PLAN NOTE
Continues follow-up with [facility] oncology, Dr. Harris.  Continues anastrozole 1 mg every evening.

## 2024-06-25 NOTE — ASSESSMENT & PLAN NOTE
Followed by endocrinology and has upcoming appointment to establish with Alberto Pena at [facility]. Declines monofilament today, prefers to do with endocrinology. Continues NovoLog and Lantus. Blood sugars are less than 150

## 2024-06-25 NOTE — ASSESSMENT & PLAN NOTE
She followed-up with vascular medicine and Eliquis was stopped.  Recommendations were to continue aspirin 81 mg daily indefinitely.

## 2024-06-25 NOTE — ASSESSMENT & PLAN NOTE
Currently followed by Dr. Lopez with psychiatry. Next appointment is scheduled.  Continues Lamictal 100 mg daily, primidone 100 mg at bedtime, carbamazepine 200 mg twice daily, BuSpar 5 mg twice daily and cariprazine 6 mg at bedtime.

## 2024-06-25 NOTE — ASSESSMENT & PLAN NOTE
Blood pressure today 136/70.  Continues amlodipine 10 mg daily and losartan 100 mg daily.  No chest pain, shortness of breath or dizziness.

## 2024-07-11 ENCOUNTER — APPOINTMENT (OUTPATIENT)
Dept: BEHAVIORAL HEALTH | Facility: CLINIC | Age: 74
End: 2024-07-11
Payer: MEDICARE

## 2024-07-23 ENCOUNTER — TELEMEDICINE (OUTPATIENT)
Dept: BEHAVIORAL HEALTH | Facility: CLINIC | Age: 74
End: 2024-07-23
Payer: MEDICARE

## 2024-07-23 DIAGNOSIS — F41.1 GAD (GENERALIZED ANXIETY DISORDER): ICD-10-CM

## 2024-07-23 DIAGNOSIS — F31.9 BIPOLAR 1 DISORDER (HCC): ICD-10-CM

## 2024-07-23 DIAGNOSIS — G47.09 OTHER INSOMNIA: ICD-10-CM

## 2024-07-23 PROCEDURE — 90833 PSYTX W PT W E/M 30 MIN: CPT | Performed by: PSYCHIATRY & NEUROLOGY

## 2024-07-23 PROCEDURE — 99214 OFFICE O/P EST MOD 30 MIN: CPT | Performed by: PSYCHIATRY & NEUROLOGY

## 2024-07-23 RX ORDER — LORAZEPAM 0.5 MG/1
0.5 TABLET ORAL 2 TIMES DAILY PRN
Qty: 20 TABLET | Refills: 0 | Status: SHIPPED | OUTPATIENT
Start: 2024-07-23 | End: 2024-08-22

## 2024-07-23 RX ORDER — BUSPIRONE HYDROCHLORIDE 10 MG/1
10 TABLET ORAL 2 TIMES DAILY
Qty: 180 TABLET | Refills: 1 | Status: SHIPPED | OUTPATIENT
Start: 2024-07-23

## 2024-07-31 NOTE — ASSESSMENT & PLAN NOTE
K+ repleted 2/17/24  Mg++ normal  Check F/U labs in AM   [Endometrial Biopsy] : an endometrial biopsy [Postmenopausal Bleeding] : postmenopausal bleeding [Thickened Endometrium] : thickened endometrium [Patient] : the patient [Verbal Consent] : verbal consent was obtained prior to the procedure and is detailed in the patient's record [Site Verification] : site verification performed. [Time Out] : Time Out Procedure:The following was confirmed prior to the procedure: Correct patient identity, correct site, agreement on the procedure to be done and correct patient position. [Allergies Reviewed] : allergies were reviewed [1% Lidocaine ___(ml)] :  [unfilled]Uml of 1% Lidocaine [Betadine] : betadine [Silver Nitrate] : silver nitrate [Yes] : the specimen was sent to pathology [No Complications] : none [Tolerated Well] : the patient tolerated the procedure well [FreeTextEntry1] : Paracervical block with 8 ml 1% lidocaine with epinephrine.  Cervix dilated with tonsil clamp.  Pipelle biopsy 2 passes.

## 2024-08-06 ENCOUNTER — PATIENT MESSAGE (OUTPATIENT)
Dept: BEHAVIORAL HEALTH | Facility: CLINIC | Age: 74
End: 2024-08-06
Payer: MEDICARE

## 2024-08-07 NOTE — PROGRESS NOTES
Called the patient's daughter and then did a 3-way call with Dr. Mckeon intake.  They will call the patient's daughter back to get the details to get her admitted.  She was at the ER 2 days ago and [facility] and they did not place a referral to Sr. Mckeon for some reason.  The patient and her daughter believe she needs to be readmitted.  I agree with their them.  Her daughter will keep this MD informed.

## 2024-08-16 ENCOUNTER — PATIENT MESSAGE (OUTPATIENT)
Dept: BEHAVIORAL HEALTH | Facility: CLINIC | Age: 74
End: 2024-08-16
Payer: MEDICARE

## 2024-08-20 ENCOUNTER — APPOINTMENT (OUTPATIENT)
Dept: BEHAVIORAL HEALTH | Facility: CLINIC | Age: 74
End: 2024-08-20
Payer: MEDICARE

## 2024-11-15 ENCOUNTER — APPOINTMENT (OUTPATIENT)
Dept: VASCULAR LAB | Facility: MEDICAL CENTER | Age: 74
End: 2024-11-15
Payer: MEDICARE

## 2024-11-18 ENCOUNTER — TELEPHONE (OUTPATIENT)
Dept: HEALTH INFORMATION MANAGEMENT | Facility: OTHER | Age: 74
End: 2024-11-18
Payer: MEDICARE

## 2024-12-17 ENCOUNTER — DOCUMENTATION (OUTPATIENT)
Dept: VASCULAR LAB | Facility: MEDICAL CENTER | Age: 74
End: 2024-12-17
Payer: MEDICARE

## 2024-12-17 NOTE — PROGRESS NOTES
Patient overdue for follow-up appt with vascular care.  Schedulers have been unable to reach and reschedule  Multiple messages have been left  Await further patient contact.  Pending further contact, will defer all vascular care, including management of cardiac vascular risk factors, to PCP    Michael J. Bloch, MD  Vascular Care    Cc: LOURDES Penn

## 2025-06-10 NOTE — PROGRESS NOTES
Called and left third  for pt to call back to get rescheduled for f/u vascular appt. Dr.Bloch unavailable for Oct 25th appt due to meeting. Please reschedule to the below or next available. Pt notified in VM that original appt has been cancelled.        Wed 10/26 1140a- noon     Thur 10/27 240p-320p     Thur nov 3 1140, 220-320 and 340    [Postmenopausal] : postmenopausal